# Patient Record
Sex: MALE | Race: WHITE | NOT HISPANIC OR LATINO | Employment: OTHER | URBAN - METROPOLITAN AREA
[De-identification: names, ages, dates, MRNs, and addresses within clinical notes are randomized per-mention and may not be internally consistent; named-entity substitution may affect disease eponyms.]

---

## 2017-02-04 ENCOUNTER — OFFICE VISIT (OUTPATIENT)
Dept: URGENT CARE | Facility: CLINIC | Age: 67
End: 2017-02-04
Payer: MEDICARE

## 2017-02-04 VITALS
OXYGEN SATURATION: 94 % | SYSTOLIC BLOOD PRESSURE: 110 MMHG | TEMPERATURE: 97.9 F | BODY MASS INDEX: 36.49 KG/M2 | DIASTOLIC BLOOD PRESSURE: 80 MMHG | WEIGHT: 226 LBS | HEART RATE: 129 BPM | RESPIRATION RATE: 16 BRPM

## 2017-02-04 DIAGNOSIS — M10.00 ACUTE IDIOPATHIC GOUT, UNSPECIFIED SITE: ICD-10-CM

## 2017-02-04 PROCEDURE — 4040F PNEUMOC VAC/ADMIN/RCVD: CPT | Performed by: PHYSICIAN ASSISTANT

## 2017-02-04 PROCEDURE — 1101F PT FALLS ASSESS-DOCD LE1/YR: CPT | Mod: 8P | Performed by: PHYSICIAN ASSISTANT

## 2017-02-04 PROCEDURE — 99214 OFFICE O/P EST MOD 30 MIN: CPT | Performed by: PHYSICIAN ASSISTANT

## 2017-02-04 PROCEDURE — G8484 FLU IMMUNIZE NO ADMIN: HCPCS | Performed by: PHYSICIAN ASSISTANT

## 2017-02-04 PROCEDURE — G8419 CALC BMI OUT NRM PARAM NOF/U: HCPCS | Performed by: PHYSICIAN ASSISTANT

## 2017-02-04 PROCEDURE — 3017F COLORECTAL CA SCREEN DOC REV: CPT | Mod: 8P | Performed by: PHYSICIAN ASSISTANT

## 2017-02-04 PROCEDURE — G8432 DEP SCR NOT DOC, RNG: HCPCS | Performed by: PHYSICIAN ASSISTANT

## 2017-02-04 PROCEDURE — 1036F TOBACCO NON-USER: CPT | Performed by: PHYSICIAN ASSISTANT

## 2017-02-04 RX ORDER — PREDNISONE 20 MG/1
40 TABLET ORAL DAILY
Qty: 10 TAB | Refills: 1 | Status: SHIPPED | OUTPATIENT
Start: 2017-02-04 | End: 2017-02-09

## 2017-02-04 RX ORDER — HYDROCODONE BITARTRATE AND ACETAMINOPHEN 10; 325 MG/1; MG/1
1-2 TABLET ORAL EVERY 6 HOURS PRN
Status: ON HOLD | COMMUNITY
End: 2017-09-22

## 2017-02-04 ASSESSMENT — ENCOUNTER SYMPTOMS
MUSCULOSKELETAL NEGATIVE: 1
NUMBNESS: 0
WEAKNESS: 0
CONSTITUTIONAL NEGATIVE: 1
NEUROLOGICAL NEGATIVE: 1

## 2017-02-04 NOTE — PROGRESS NOTES
Subjective:      Zhang Cheung is a 66 y.o. male who presents with Foot Pain            Other  This is a new problem. The current episode started in the past 7 days (L foot pn, redn/swell). The problem occurs constantly. The problem has been unchanged. Pertinent negatives include no numbness or weakness. The symptoms are aggravated by bending and walking. He has tried rest for the symptoms. The treatment provided no relief.       Review of Systems   Constitutional: Negative.    Musculoskeletal: Negative.    Skin: Negative.    Neurological: Negative.  Negative for weakness and numbness.          Objective:     /80 mmHg  Pulse 129  Temp(Src) 36.6 °C (97.9 °F)  Resp 16  Wt 102.513 kg (226 lb)  SpO2 94%     Physical Exam   Constitutional: He is oriented to person, place, and time. He appears well-developed and well-nourished. No distress.   Musculoskeletal: He exhibits edema and tenderness (L foot , 1st MTP redn/swell/tend/warm).   Neurological: He is alert and oriented to person, place, and time. No sensory deficit. He exhibits normal muscle tone. Coordination and gait normal.   Skin: Skin is warm and dry. There is erythema.   Psychiatric: He has a normal mood and affect. His behavior is normal. Judgment and thought content normal.   Nursing note and vitals reviewed.    Filed Vitals:    02/04/17 1042   BP: 110/80   Pulse: 129   Temp: 36.6 °C (97.9 °F)   Resp: 16   Weight: 102.513 kg (226 lb)   SpO2: 94%     Active Ambulatory Problems     Diagnosis Date Noted   • Type 2 DM 10/13/2013   • Hypertension 10/13/2013   • Dyslipidemia 10/13/2013   • Albuminuria manifested in a patient with type 2 DM 10/13/2013   • Degeneration of lumbar or lumbosacral intervertebral disc 02/19/2014   • Benign prostatic hyperplasia 03/27/2014     Resolved Ambulatory Problems     Diagnosis Date Noted   • No Resolved Ambulatory Problems     Past Medical History   Diagnosis Date   • Hyperlipidemia    • Unspecified urinary incontinence     • Diabetes    • Arthritis    • Pain 04/11/14   • CATARACT      Current Outpatient Prescriptions on File Prior to Visit   Medication Sig Dispense Refill   • meloxicam (MOBIC) 15 MG tablet Take 15 mg by mouth every day.     • tamsulosin (FLOMAX) 0.4 MG capsule Take 0.4 mg by mouth ONE-HALF HOUR AFTER BREAKFAST.     • Omega-3 Fatty Acids (FISH OIL TRIPLE STRENGTH) 1400 MG Cap Take 1 Cap by mouth every day.     • Multiple Vitamins-Minerals (MULTIVITAMIN PO) Take 1 Tab by mouth every day.     • aspirin EC (ECOTRIN) 81 MG Tablet Delayed Response Take 81 mg by mouth every day.     • tramadol (ULTRAM) 50 MG Tab Take  mg by mouth every 6 hours as needed.     • Blood Glucose Monitoring Suppl SUPPLIES MISC Using One Touch Ultra test strips. Test 3 times daily for insulin adjustment.  Dx. Code 250.02 300 Each 3   • Insulin Pen Needle (B-D ULTRAFINE III SHORT PEN) 31G X 8 MM MISC 1 Each by Does not apply route 3 times a day before meals. 300 Each 3   • Lancets MISC Using One Touch Ultra Soft Lancets. Testing 3 times daily for insulin adjustment.  DX. Code 250 02 300 Each 3   • gabapentin (NEURONTIN) 100 MG CAPS Take 100 mg by mouth 2 Times a Day.     • metformin (GLUCOPHAGE) 1000 MG tablet Take 1,000 mg by mouth 2 times a day, with meals.     • Insulin Aspart Prot & Aspart (NOVOLOG MIX 70/30 FLEXPEN SC) Inject 10 Units as instructed 3 times a day. 6-12 units before meals     • benazepril (LOTENSIN) 40 MG tablet Take 40 mg by mouth 2 Times a Day.     • indapamide (LOZOL) 2.5 MG TABS Take 2.5 mg by mouth every morning.     • Carvedilol Phosphate (COREG CR) 40 MG CP24 Take 40 mg by mouth every day.     • amlodipine (NORVASC) 5 MG TABS Take 5 mg by mouth 2 Times a Day.     • gemfibrozil (LOPID) 600 MG TABS Take 600 mg by mouth 2 times a day.       No current facility-administered medications on file prior to visit.     Gargles, Cepacol lozenges, Aleve/Advil as needed for throat pain  History reviewed. No pertinent family  history.  Pollen extract              Assessment/Plan:     ·  gout L foot      · pred rx;

## 2017-02-04 NOTE — MR AVS SNAPSHOT
Zhang Cheung   2017 10:30 AM   Office Visit   MRN: [id number]    Department:  [facility]   Dept Phone:  [phone]    Description:  Male : 1950   Provider:  Evin Bey PA-C           Reason for Visit     Foot Pain x 1 w, Lt. foot pain, swelling and redness      Allergies as of 2017     Allergen Noted Reactions    Pollen Extract 2014       Local pollen      You were diagnosed with     Acute idiopathic gout, unspecified site   [[id number]]         Vital Signs     Blood Pressure Pulse Temperature Respirations Weight Oxygen Saturation    110/80 mmHg 129 36.6 °C (97.9 °F) 16 102.513 kg (226 lb) 94%    Smoking Status                   Former Smoker           Basic Information     Date Of Birth Sex Race Ethnicity Preferred Language    1950 Male White Non- English      Problem List              ICD-10-CM Priority Class Noted - Resolved    Type 2 DM E11.9   10/13/2013 - Present    Hypertension I10   10/13/2013 - Present    Dyslipidemia E78.5   10/13/2013 - Present    Albuminuria manifested in a patient with type 2 DM R80.9   10/13/2013 - Present    Degeneration of lumbar or lumbosacral intervertebral disc M51.37   2014 - Present    Benign prostatic hyperplasia N40.0   3/27/2014 - Present      Health Maintenance        Date Due Completion Dates    COLONOSCOPY 1998 ---    IMM ZOSTER VACCINE 2008 ---    IMM PNEUMOCOCCAL 65+ (ADULT) LOW/MEDIUM RISK SERIES (1 of 2 - PCV13) 3/27/2013 3/27/2012    RETINAL SCREENING 2014, 2012    IMM INFLUENZA (1) 2013    A1C SCREENING 2017 10/31/2016, 2016, 2015, 2014, 3/18/2014, 10/16/2013, 2013, 2012, 2012, 2011    URINE ACR / MICROALBUMIN 2017, 2015, 2014, 2013, 3/18/2013, 2012, 2011, 2011    SERUM CREATININE 2017, 2016, 2015, 2015, 2015, 2014, 3/28/2014, 3/20/2014,  3/18/2014, 2/11/2014, 9/23/2013, 3/18/2013, 11/2/2012, 5/7/2012, 4/24/2012, 11/7/2011, 8/25/2011, 1/9/2008, 1/22/2007, 12/6/2006    FASTING LIPID PROFILE 10/31/2017 10/31/2016, 6/20/2016, 7/17/2015, 7/29/2014, 3/18/2014, 9/23/2013, 3/18/2013, 11/2/2012, 8/25/2011    IMM DTaP/Tdap/Td Vaccine (2 - Td) 6/8/2020 6/8/2010            Current Immunizations     Influenza TIV (IM) 11/27/2013    Pneumococcal polysaccharide vaccine (PPSV-23) 3/27/2012    Tdap Vaccine 6/8/2010      Below and/or attached are the medications your provider expects you to take. Review all of your home medications and newly ordered medications with your provider and/or pharmacist. Follow medication instructions as directed by your provider and/or pharmacist. Please keep your medication list with you and share with your provider. Update the information when medications are discontinued, doses are changed, or new medications (including over-the-counter products) are added; and carry medication information at all times in the event of emergency situations     Allergies:  POLLEN EXTRACT - (reactions not documented)               Medications  Valid as of: February 04, 2017 - 11:21 AM    Generic Name Brand Name Tablet Size Instructions for use    AmLODIPine Besylate (Tab) NORVASC 5 MG Take 5 mg by mouth 2 Times a Day.        Aspirin (Tablet Delayed Response) ECOTRIN 81 MG Take 81 mg by mouth every day.        Benazepril HCl (Tab) LOTENSIN 40 MG Take 40 mg by mouth 2 Times a Day.        Blood Glucose Monitoring Suppl (Misc) Blood Glucose Monitoring Suppl SUPPLIES Using One Touch Ultra test strips. Test 3 times daily for insulin adjustment.  Dx. Code 250.02        Carvedilol Phosphate (CAPSULE SR 24 HR) Carvedilol Phosphate 40 MG Take 40 mg by mouth every day.        Gabapentin (Cap) NEURONTIN 100 MG Take 100 mg by mouth 2 Times a Day.        Gemfibrozil (Tab) LOPID 600 MG Take 600 mg by mouth 2 times a day.        Hydrocodone-Acetaminophen (Tab) NORCO   MG Take 1-2 Tabs by mouth every 6 hours as needed.        Indapamide (Tab) LOZOL 2.5 MG Take 2.5 mg by mouth every morning.        Insulin Aspart Prot & Aspart   Inject 10 Units as instructed 3 times a day. 6-12 units before meals        Insulin Pen Needle (Misc) Insulin Pen Needle 31G X 8 MM 1 Each by Does not apply route 3 times a day before meals.        Lancets (Misc) Lancets  Using One Touch Ultra Soft Lancets. Testing 3 times daily for insulin adjustment.  DX. Code 250 02        Meloxicam (Tab) MOBIC 15 MG Take 15 mg by mouth every day.        MetFORMIN HCl (Tab) GLUCOPHAGE 1000 MG Take 1,000 mg by mouth 2 times a day, with meals.        Multiple Vitamins-Minerals   Take 1 Tab by mouth every day.        Omega-3 Fatty Acids (Cap) FISH OIL TRIPLE STRENGTH 1400 MG Take 1 Cap by mouth every day.        Tamsulosin HCl (Cap) FLOMAX 0.4 MG Take 0.4 mg by mouth ONE-HALF HOUR AFTER BREAKFAST.        TraMADol HCl (Tab) ULTRAM 50 MG Take  mg by mouth every 6 hours as needed.        .                 Medicines prescribed today were sent to:     [facility] #[id number] - [place] - [address]    [address] [place] [address]    Phone: [phone] Fax: [phone]    Open 24 Hours?: No    [facility] - [place], [place] - [address]    [address] [place] [address]    Phone: [phone] Fax: [phone]    Open 24 Hours?: No    [facility] - [place], [place] - [address] [address] [address] [facility] [address]    Phone: [phone] Fax: [phone]    Open 24 Hours?: No    [facility] - [place], [place] - [address] [place] [address]    Phone: [phone] Fax: [phone]    Open 24 Hours?: No      Medication refill instructions:       If your prescription bottle indicates you have medication refills left, it is not necessary to call your provider’s office. Please contact your pharmacy and they will refill your medication.    If your prescription bottle indicates you do not  have any refills left, you may request refills at any time through one of the following ways: The online Verimed system (except Urgent Care), by calling your provider’s office, or by asking your pharmacy to contact your provider’s office with a refill request. Medication refills are processed only during regular business hours and may not be available until the next business day. Your provider may request additional information or to have a follow-up visit with you prior to refilling your medication.   *Please Note: Medication refills are assigned a new Rx number when refilled electronically. Your pharmacy may indicate that no refills were authorized even though a new prescription for the same medication is available at the pharmacy. Please request the medicine by name with the pharmacy before contacting your provider for a refill.           Verimed Access Code: Activation code not generated  Current Verimed Status: Active

## 2017-02-08 ENCOUNTER — HOSPITAL ENCOUNTER (OUTPATIENT)
Dept: LAB | Facility: MEDICAL CENTER | Age: 67
End: 2017-02-08
Attending: PHYSICIAN ASSISTANT
Payer: MEDICARE

## 2017-02-08 LAB
BASOPHILS # BLD AUTO: 0.03 K/UL (ref 0–0.12)
BASOPHILS NFR BLD AUTO: 0.4 % (ref 0–1.8)
EOSINOPHIL # BLD: 0.01 K/UL (ref 0–0.51)
EOSINOPHIL NFR BLD AUTO: 0.1 % (ref 0–6.9)
ERYTHROCYTE [DISTWIDTH] IN BLOOD BY AUTOMATED COUNT: 47.5 FL (ref 35.9–50)
HCT VFR BLD AUTO: 47.2 % (ref 42–52)
HGB BLD-MCNC: 15.4 G/DL (ref 14–18)
IMM GRANULOCYTES # BLD AUTO: 0.04 K/UL (ref 0–0.11)
IMM GRANULOCYTES NFR BLD AUTO: 0.5 % (ref 0–0.9)
LYMPHOCYTES # BLD: 0.87 K/UL (ref 1–4.8)
LYMPHOCYTES NFR BLD AUTO: 11.5 % (ref 22–41)
MCH RBC QN AUTO: 31.6 PG (ref 27–33)
MCHC RBC AUTO-ENTMCNC: 32.6 G/DL (ref 33.7–35.3)
MCV RBC AUTO: 96.9 FL (ref 81.4–97.8)
MONOCYTES # BLD: 0.32 K/UL (ref 0–0.85)
MONOCYTES NFR BLD AUTO: 4.2 % (ref 0–13.4)
NEUTROPHILS # BLD: 6.31 K/UL (ref 1.82–7.42)
NEUTROPHILS NFR BLD AUTO: 83.3 % (ref 44–72)
NRBC # BLD AUTO: 0 K/UL
NRBC BLD-RTO: 0 /100 WBC
PLATELET # BLD AUTO: 251 K/UL (ref 164–446)
PMV BLD AUTO: 12.2 FL (ref 9–12.9)
RBC # BLD AUTO: 4.87 M/UL (ref 4.7–6.1)
URATE SERPL-MCNC: 8.3 MG/DL (ref 2.5–8.3)
WBC # BLD AUTO: 7.6 K/UL (ref 4.8–10.8)

## 2017-02-08 PROCEDURE — 85025 COMPLETE CBC W/AUTO DIFF WBC: CPT

## 2017-02-08 PROCEDURE — 36415 COLL VENOUS BLD VENIPUNCTURE: CPT

## 2017-02-08 PROCEDURE — 84550 ASSAY OF BLOOD/URIC ACID: CPT

## 2017-03-13 ENCOUNTER — HOSPITAL ENCOUNTER (OUTPATIENT)
Dept: RADIOLOGY | Facility: MEDICAL CENTER | Age: 67
DRG: 308 | End: 2017-03-13
Attending: NEUROLOGICAL SURGERY
Payer: MEDICARE

## 2017-03-13 ENCOUNTER — HOSPITAL ENCOUNTER (OUTPATIENT)
Dept: LAB | Facility: MEDICAL CENTER | Age: 67
DRG: 308 | End: 2017-03-13
Attending: UROLOGY
Payer: MEDICARE

## 2017-03-13 DIAGNOSIS — R52 PAIN: ICD-10-CM

## 2017-03-13 LAB — PSA SERPL DL<=0.01 NG/ML-MCNC: 6.56 NG/ML (ref 0–4)

## 2017-03-14 ENCOUNTER — APPOINTMENT (OUTPATIENT)
Dept: RADIOLOGY | Facility: MEDICAL CENTER | Age: 67
DRG: 308 | End: 2017-03-14
Attending: INTERNAL MEDICINE
Payer: MEDICARE

## 2017-03-14 ENCOUNTER — HOSPITAL ENCOUNTER (INPATIENT)
Facility: MEDICAL CENTER | Age: 67
LOS: 3 days | DRG: 308 | End: 2017-03-17
Attending: EMERGENCY MEDICINE | Admitting: HOSPITALIST
Payer: MEDICARE

## 2017-03-14 ENCOUNTER — APPOINTMENT (OUTPATIENT)
Dept: RADIOLOGY | Facility: MEDICAL CENTER | Age: 67
DRG: 308 | End: 2017-03-14
Attending: HOSPITALIST
Payer: MEDICARE

## 2017-03-14 ENCOUNTER — RESOLUTE PROFESSIONAL BILLING HOSPITAL PROF FEE (OUTPATIENT)
Dept: OTHER | Facility: MEDICAL CENTER | Age: 67
End: 2017-03-14
Payer: MEDICARE

## 2017-03-14 ENCOUNTER — APPOINTMENT (OUTPATIENT)
Dept: RADIOLOGY | Facility: MEDICAL CENTER | Age: 67
DRG: 308 | End: 2017-03-14
Attending: EMERGENCY MEDICINE
Payer: MEDICARE

## 2017-03-14 ENCOUNTER — OFFICE VISIT (OUTPATIENT)
Dept: URGENT CARE | Facility: CLINIC | Age: 67
End: 2017-03-14
Payer: MEDICARE

## 2017-03-14 VITALS
OXYGEN SATURATION: 84 % | HEART RATE: 60 BPM | SYSTOLIC BLOOD PRESSURE: 148 MMHG | DIASTOLIC BLOOD PRESSURE: 98 MMHG | BODY MASS INDEX: 36.32 KG/M2 | WEIGHT: 226 LBS | HEIGHT: 66 IN | TEMPERATURE: 98.4 F

## 2017-03-14 DIAGNOSIS — I48.92 ATRIAL FLUTTER WITH RAPID VENTRICULAR RESPONSE (HCC): ICD-10-CM

## 2017-03-14 DIAGNOSIS — R09.02 HYPOXIA: ICD-10-CM

## 2017-03-14 DIAGNOSIS — R41.0 CONFUSION: ICD-10-CM

## 2017-03-14 DIAGNOSIS — R06.09 DYSPNEA ON EXERTION: ICD-10-CM

## 2017-03-14 DIAGNOSIS — I48.92 ATRIAL FLUTTER, UNSPECIFIED TYPE (HCC): ICD-10-CM

## 2017-03-14 LAB
ALBUMIN SERPL BCP-MCNC: 3.7 G/DL (ref 3.2–4.9)
ALBUMIN/GLOB SERPL: 1.3 G/DL
ALP SERPL-CCNC: 84 U/L (ref 30–99)
ALT SERPL-CCNC: 7 U/L (ref 2–50)
ANION GAP SERPL CALC-SCNC: 15 MMOL/L (ref 0–11.9)
APTT PPP: 115.9 SEC (ref 24.7–36)
APTT PPP: 33.4 SEC (ref 24.7–36)
AST SERPL-CCNC: 20 U/L (ref 12–45)
BASOPHILS # BLD AUTO: 0.2 % (ref 0–1.8)
BASOPHILS # BLD: 0.02 K/UL (ref 0–0.12)
BILIRUB SERPL-MCNC: 0.6 MG/DL (ref 0.1–1.5)
BNP SERPL-MCNC: 207 PG/ML (ref 0–100)
BUN SERPL-MCNC: 47 MG/DL (ref 8–22)
CALCIUM SERPL-MCNC: 8.7 MG/DL (ref 8.5–10.5)
CHLORIDE SERPL-SCNC: 104 MMOL/L (ref 96–112)
CO2 SERPL-SCNC: 21 MMOL/L (ref 20–33)
CREAT SERPL-MCNC: 2.98 MG/DL (ref 0.5–1.4)
EKG IMPRESSION: NORMAL
EOSINOPHIL # BLD AUTO: 0.01 K/UL (ref 0–0.51)
EOSINOPHIL NFR BLD: 0.1 % (ref 0–6.9)
ERYTHROCYTE [DISTWIDTH] IN BLOOD BY AUTOMATED COUNT: 45.2 FL (ref 35.9–50)
GFR SERPL CREATININE-BSD FRML MDRD: 21 ML/MIN/1.73 M 2
GLOBULIN SER CALC-MCNC: 2.9 G/DL (ref 1.9–3.5)
GLUCOSE BLD-MCNC: 97 MG/DL (ref 65–99)
GLUCOSE SERPL-MCNC: 148 MG/DL (ref 65–99)
HCT VFR BLD AUTO: 31.6 % (ref 42–52)
HGB BLD-MCNC: 10.4 G/DL (ref 14–18)
IMM GRANULOCYTES # BLD AUTO: 0.03 K/UL (ref 0–0.11)
IMM GRANULOCYTES NFR BLD AUTO: 0.3 % (ref 0–0.9)
INR PPP: 1.12 (ref 0.87–1.13)
INR PPP: 1.23 (ref 0.87–1.13)
LACTATE BLD-SCNC: 1.5 MMOL/L (ref 0.5–2)
LV EJECT FRACT  99904: 75
LV EJECT FRACT MOD 2C 99903: 73.85
LV EJECT FRACT MOD 4C 99902: 77.97
LV EJECT FRACT MOD BP 99901: 75.48
LYMPHOCYTES # BLD AUTO: 1.06 K/UL (ref 1–4.8)
LYMPHOCYTES NFR BLD: 10.8 % (ref 22–41)
MCH RBC QN AUTO: 31 PG (ref 27–33)
MCHC RBC AUTO-ENTMCNC: 32.9 G/DL (ref 33.7–35.3)
MCV RBC AUTO: 94 FL (ref 81.4–97.8)
MONOCYTES # BLD AUTO: 0.7 K/UL (ref 0–0.85)
MONOCYTES NFR BLD AUTO: 7.2 % (ref 0–13.4)
NEUTROPHILS # BLD AUTO: 7.95 K/UL (ref 1.82–7.42)
NEUTROPHILS NFR BLD: 81.4 % (ref 44–72)
NRBC # BLD AUTO: 0 K/UL
NRBC BLD AUTO-RTO: 0 /100 WBC
PLATELET # BLD AUTO: 193 K/UL (ref 164–446)
PMV BLD AUTO: 11.2 FL (ref 9–12.9)
POTASSIUM SERPL-SCNC: 4.4 MMOL/L (ref 3.6–5.5)
PROT SERPL-MCNC: 6.6 G/DL (ref 6–8.2)
PROTHROMBIN TIME: 14.8 SEC (ref 12–14.6)
PROTHROMBIN TIME: 15.9 SEC (ref 12–14.6)
RBC # BLD AUTO: 3.36 M/UL (ref 4.7–6.1)
SODIUM SERPL-SCNC: 140 MMOL/L (ref 135–145)
TROPONIN I SERPL-MCNC: 0.03 NG/ML (ref 0–0.04)
WBC # BLD AUTO: 9.8 K/UL (ref 4.8–10.8)

## 2017-03-14 PROCEDURE — 93970 EXTREMITY STUDY: CPT

## 2017-03-14 PROCEDURE — A9270 NON-COVERED ITEM OR SERVICE: HCPCS | Performed by: HOSPITALIST

## 2017-03-14 PROCEDURE — 71010 DX-CHEST-PORTABLE (1 VIEW): CPT

## 2017-03-14 PROCEDURE — 304562 HCHG STAT O2 MASK/CANNULA

## 2017-03-14 PROCEDURE — 80053 COMPREHEN METABOLIC PANEL: CPT

## 2017-03-14 PROCEDURE — A9567 TECHNETIUM TC-99M AEROSOL: HCPCS

## 2017-03-14 PROCEDURE — 96361 HYDRATE IV INFUSION ADD-ON: CPT

## 2017-03-14 PROCEDURE — 85610 PROTHROMBIN TIME: CPT

## 2017-03-14 PROCEDURE — 1036F TOBACCO NON-USER: CPT | Performed by: PHYSICIAN ASSISTANT

## 2017-03-14 PROCEDURE — 36415 COLL VENOUS BLD VENIPUNCTURE: CPT

## 2017-03-14 PROCEDURE — 93306 TTE W/DOPPLER COMPLETE: CPT

## 2017-03-14 PROCEDURE — 99223 1ST HOSP IP/OBS HIGH 75: CPT | Mod: AI | Performed by: HOSPITALIST

## 2017-03-14 PROCEDURE — 82962 GLUCOSE BLOOD TEST: CPT

## 2017-03-14 PROCEDURE — 99291 CRITICAL CARE FIRST HOUR: CPT

## 2017-03-14 PROCEDURE — 96366 THER/PROPH/DIAG IV INF ADDON: CPT

## 2017-03-14 PROCEDURE — 81001 URINALYSIS AUTO W/SCOPE: CPT

## 2017-03-14 PROCEDURE — 87086 URINE CULTURE/COLONY COUNT: CPT

## 2017-03-14 PROCEDURE — G8482 FLU IMMUNIZE ORDER/ADMIN: HCPCS | Performed by: PHYSICIAN ASSISTANT

## 2017-03-14 PROCEDURE — 94760 N-INVAS EAR/PLS OXIMETRY 1: CPT

## 2017-03-14 PROCEDURE — 304561 HCHG STAT O2

## 2017-03-14 PROCEDURE — G8432 DEP SCR NOT DOC, RNG: HCPCS | Performed by: PHYSICIAN ASSISTANT

## 2017-03-14 PROCEDURE — 99214 OFFICE O/P EST MOD 30 MIN: CPT | Performed by: PHYSICIAN ASSISTANT

## 2017-03-14 PROCEDURE — 1101F PT FALLS ASSESS-DOCD LE1/YR: CPT | Mod: 8P | Performed by: PHYSICIAN ASSISTANT

## 2017-03-14 PROCEDURE — 700105 HCHG RX REV CODE 258: Performed by: EMERGENCY MEDICINE

## 2017-03-14 PROCEDURE — 85025 COMPLETE CBC W/AUTO DIFF WBC: CPT

## 2017-03-14 PROCEDURE — G8419 CALC BMI OUT NRM PARAM NOF/U: HCPCS | Performed by: PHYSICIAN ASSISTANT

## 2017-03-14 PROCEDURE — 83880 ASSAY OF NATRIURETIC PEPTIDE: CPT

## 2017-03-14 PROCEDURE — 96375 TX/PRO/DX INJ NEW DRUG ADDON: CPT

## 2017-03-14 PROCEDURE — 700102 HCHG RX REV CODE 250 W/ 637 OVERRIDE(OP): Performed by: HOSPITALIST

## 2017-03-14 PROCEDURE — 4040F PNEUMOC VAC/ADMIN/RCVD: CPT | Performed by: PHYSICIAN ASSISTANT

## 2017-03-14 PROCEDURE — 93306 TTE W/DOPPLER COMPLETE: CPT | Mod: 26 | Performed by: INTERNAL MEDICINE

## 2017-03-14 PROCEDURE — 93005 ELECTROCARDIOGRAM TRACING: CPT | Performed by: EMERGENCY MEDICINE

## 2017-03-14 PROCEDURE — 84484 ASSAY OF TROPONIN QUANT: CPT

## 2017-03-14 PROCEDURE — 302128 INFUSION PUMP: Performed by: EMERGENCY MEDICINE

## 2017-03-14 PROCEDURE — 87040 BLOOD CULTURE FOR BACTERIA: CPT

## 2017-03-14 PROCEDURE — 770022 HCHG ROOM/CARE - ICU (200)

## 2017-03-14 PROCEDURE — 99223 1ST HOSP IP/OBS HIGH 75: CPT | Performed by: INTERNAL MEDICINE

## 2017-03-14 PROCEDURE — 85730 THROMBOPLASTIN TIME PARTIAL: CPT

## 2017-03-14 PROCEDURE — 83605 ASSAY OF LACTIC ACID: CPT

## 2017-03-14 PROCEDURE — 700111 HCHG RX REV CODE 636 W/ 250 OVERRIDE (IP): Performed by: EMERGENCY MEDICINE

## 2017-03-14 PROCEDURE — 96365 THER/PROPH/DIAG IV INF INIT: CPT

## 2017-03-14 PROCEDURE — 76775 US EXAM ABDO BACK WALL LIM: CPT

## 2017-03-14 PROCEDURE — 700105 HCHG RX REV CODE 258: Performed by: INTERNAL MEDICINE

## 2017-03-14 PROCEDURE — 3017F COLORECTAL CA SCREEN DOC REV: CPT | Performed by: PHYSICIAN ASSISTANT

## 2017-03-14 RX ORDER — TRAMADOL HYDROCHLORIDE 50 MG/1
100 TABLET ORAL EVERY 6 HOURS PRN
Status: DISCONTINUED | OUTPATIENT
Start: 2017-03-14 | End: 2017-03-17 | Stop reason: HOSPADM

## 2017-03-14 RX ORDER — CARVEDILOL 12.5 MG/1
12.5 TABLET ORAL 2 TIMES DAILY WITH MEALS
Status: DISCONTINUED | OUTPATIENT
Start: 2017-03-15 | End: 2017-03-14

## 2017-03-14 RX ORDER — HEPARIN SODIUM 1000 [USP'U]/ML
3800 INJECTION, SOLUTION INTRAVENOUS; SUBCUTANEOUS PRN
Status: DISCONTINUED | OUTPATIENT
Start: 2017-03-14 | End: 2017-03-15

## 2017-03-14 RX ORDER — ACETAMINOPHEN 325 MG/1
650 TABLET ORAL EVERY 6 HOURS PRN
Status: DISCONTINUED | OUTPATIENT
Start: 2017-03-14 | End: 2017-03-17 | Stop reason: HOSPADM

## 2017-03-14 RX ORDER — SODIUM CHLORIDE 9 MG/ML
INJECTION, SOLUTION INTRAVENOUS CONTINUOUS
Status: DISCONTINUED | OUTPATIENT
Start: 2017-03-14 | End: 2017-03-17 | Stop reason: HOSPADM

## 2017-03-14 RX ORDER — TAMSULOSIN HYDROCHLORIDE 0.4 MG/1
0.4 CAPSULE ORAL
Status: DISCONTINUED | OUTPATIENT
Start: 2017-03-15 | End: 2017-03-17 | Stop reason: HOSPADM

## 2017-03-14 RX ORDER — TRAMADOL HYDROCHLORIDE 50 MG/1
50-100 TABLET ORAL EVERY 6 HOURS PRN
Status: DISCONTINUED | OUTPATIENT
Start: 2017-03-14 | End: 2017-03-14

## 2017-03-14 RX ORDER — ONDANSETRON 4 MG/1
4 TABLET, ORALLY DISINTEGRATING ORAL EVERY 4 HOURS PRN
Status: DISCONTINUED | OUTPATIENT
Start: 2017-03-14 | End: 2017-03-17 | Stop reason: HOSPADM

## 2017-03-14 RX ORDER — ROPINIROLE 1 MG/1
1 TABLET, FILM COATED ORAL NIGHTLY
Status: DISCONTINUED | OUTPATIENT
Start: 2017-03-14 | End: 2017-03-17 | Stop reason: HOSPADM

## 2017-03-14 RX ORDER — AMLODIPINE BESYLATE 5 MG/1
10 TABLET ORAL
Status: DISCONTINUED | OUTPATIENT
Start: 2017-03-15 | End: 2017-03-14

## 2017-03-14 RX ORDER — GEMFIBROZIL 600 MG/1
600 TABLET, FILM COATED ORAL 2 TIMES DAILY
Status: DISCONTINUED | OUTPATIENT
Start: 2017-03-14 | End: 2017-03-14

## 2017-03-14 RX ORDER — SODIUM CHLORIDE 9 MG/ML
1000 INJECTION, SOLUTION INTRAVENOUS ONCE
Status: COMPLETED | OUTPATIENT
Start: 2017-03-14 | End: 2017-03-14

## 2017-03-14 RX ORDER — BENAZEPRIL HYDROCHLORIDE 20 MG/1
40 TABLET ORAL 2 TIMES DAILY
Status: DISCONTINUED | OUTPATIENT
Start: 2017-03-14 | End: 2017-03-14

## 2017-03-14 RX ORDER — HYDROCODONE BITARTRATE AND ACETAMINOPHEN 10; 325 MG/1; MG/1
1 TABLET ORAL EVERY 6 HOURS PRN
Status: DISCONTINUED | OUTPATIENT
Start: 2017-03-14 | End: 2017-03-17 | Stop reason: HOSPADM

## 2017-03-14 RX ORDER — ONDANSETRON 2 MG/ML
4 INJECTION INTRAMUSCULAR; INTRAVENOUS EVERY 4 HOURS PRN
Status: DISCONTINUED | OUTPATIENT
Start: 2017-03-14 | End: 2017-03-17 | Stop reason: HOSPADM

## 2017-03-14 RX ORDER — WARFARIN SODIUM 5 MG/1
5 TABLET ORAL
Status: DISCONTINUED | OUTPATIENT
Start: 2017-03-14 | End: 2017-03-16

## 2017-03-14 RX ORDER — GABAPENTIN 100 MG/1
100 CAPSULE ORAL 2 TIMES DAILY
Status: DISCONTINUED | OUTPATIENT
Start: 2017-03-14 | End: 2017-03-14

## 2017-03-14 RX ORDER — CARVEDILOL PHOSPHATE 40 MG/1
40 CAPSULE, EXTENDED RELEASE ORAL DAILY
Status: DISCONTINUED | OUTPATIENT
Start: 2017-03-14 | End: 2017-03-14

## 2017-03-14 RX ORDER — INSULIN ASPART 100 [IU]/ML
8-10 INJECTION, SUSPENSION SUBCUTANEOUS
COMMUNITY
End: 2020-12-24 | Stop reason: SDUPTHER

## 2017-03-14 RX ORDER — GABAPENTIN 100 MG/1
900 CAPSULE ORAL 2 TIMES DAILY
Status: DISCONTINUED | OUTPATIENT
Start: 2017-03-14 | End: 2017-03-17 | Stop reason: HOSPADM

## 2017-03-14 RX ORDER — DEXTROSE MONOHYDRATE 25 G/50ML
25 INJECTION, SOLUTION INTRAVENOUS
Status: DISCONTINUED | OUTPATIENT
Start: 2017-03-14 | End: 2017-03-17 | Stop reason: HOSPADM

## 2017-03-14 RX ORDER — GABAPENTIN 300 MG/1
600 CAPSULE ORAL 2 TIMES DAILY
COMMUNITY
Start: 2016-01-01 | End: 2017-04-01

## 2017-03-14 RX ORDER — SODIUM CHLORIDE 9 MG/ML
INJECTION, SOLUTION INTRAVENOUS ONCE
Status: ACTIVE | OUTPATIENT
Start: 2017-03-14 | End: 2017-03-15

## 2017-03-14 RX ORDER — INDAPAMIDE 2.5 MG/1
2.5 TABLET ORAL EVERY MORNING
Status: DISCONTINUED | OUTPATIENT
Start: 2017-03-15 | End: 2017-03-14

## 2017-03-14 RX ORDER — ROPINIROLE 0.5 MG/1
.5-1 TABLET, FILM COATED ORAL
Status: ON HOLD | COMMUNITY
End: 2017-10-01

## 2017-03-14 RX ORDER — ROPINIROLE 2 MG/1
10 TABLET, FILM COATED ORAL NIGHTLY
Status: DISCONTINUED | OUTPATIENT
Start: 2017-03-14 | End: 2017-03-14

## 2017-03-14 RX ORDER — HEPARIN SODIUM 1000 [USP'U]/ML
7000 INJECTION, SOLUTION INTRAVENOUS; SUBCUTANEOUS ONCE
Status: COMPLETED | OUTPATIENT
Start: 2017-03-14 | End: 2017-03-14

## 2017-03-14 RX ORDER — CARVEDILOL 12.5 MG/1
12.5 TABLET ORAL 2 TIMES DAILY WITH MEALS
Status: DISCONTINUED | OUTPATIENT
Start: 2017-03-15 | End: 2017-03-17 | Stop reason: HOSPADM

## 2017-03-14 RX ORDER — AMLODIPINE BESYLATE 5 MG/1
10 TABLET ORAL 2 TIMES DAILY
Status: DISCONTINUED | OUTPATIENT
Start: 2017-03-14 | End: 2017-03-14

## 2017-03-14 RX ORDER — TRAMADOL HYDROCHLORIDE 50 MG/1
50 TABLET ORAL EVERY 6 HOURS PRN
Status: DISCONTINUED | OUTPATIENT
Start: 2017-03-14 | End: 2017-03-17 | Stop reason: HOSPADM

## 2017-03-14 RX ADMIN — GABAPENTIN 900 MG: 300 CAPSULE ORAL at 23:23

## 2017-03-14 RX ADMIN — SODIUM CHLORIDE 1000 ML: 9 INJECTION, SOLUTION INTRAVENOUS at 13:15

## 2017-03-14 RX ADMIN — TRAMADOL HYDROCHLORIDE 50 MG: 50 TABLET, COATED ORAL at 20:27

## 2017-03-14 RX ADMIN — HEPARIN SODIUM 25000 UNITS: 5000 INJECTION, SOLUTION INTRAVENOUS at 15:42

## 2017-03-14 RX ADMIN — HEPARIN SODIUM 7000 UNITS: 1000 INJECTION, SOLUTION INTRAVENOUS; SUBCUTANEOUS at 15:41

## 2017-03-14 RX ADMIN — ROPINIROLE HYDROCHLORIDE 1 MG: 1 TABLET, FILM COATED ORAL at 23:26

## 2017-03-14 RX ADMIN — SODIUM CHLORIDE 1000 ML: 9 INJECTION, SOLUTION INTRAVENOUS at 15:49

## 2017-03-14 RX ADMIN — WARFARIN SODIUM 5 MG: 5 TABLET ORAL at 23:24

## 2017-03-14 ASSESSMENT — ENCOUNTER SYMPTOMS
VOMITING: 0
COUGH: 1
CHILLS: 0
PALPITATIONS: 0
SHORTNESS OF BREATH: 1
SPUTUM PRODUCTION: 0
BLURRED VISION: 0
WHEEZING: 0
SENSORY CHANGE: 1
DIZZINESS: 0
TREMORS: 1
FEVER: 0
NAUSEA: 0

## 2017-03-14 ASSESSMENT — COPD QUESTIONNAIRES
DO YOU EVER COUGH UP ANY MUCUS OR PHLEGM?: NO/ONLY WITH OCCASIONAL COLDS OR INFECTIONS
DURING THE PAST 4 WEEKS HOW MUCH DID YOU FEEL SHORT OF BREATH: NONE/LITTLE OF THE TIME
COPD SCREENING SCORE: 2
HAVE YOU SMOKED AT LEAST 100 CIGARETTES IN YOUR ENTIRE LIFE: NO/DON'T KNOW

## 2017-03-14 ASSESSMENT — PAIN SCALES - GENERAL
PAINLEVEL_OUTOF10: 0

## 2017-03-14 ASSESSMENT — LIFESTYLE VARIABLES
DO YOU DRINK ALCOHOL: NO
EVER_SMOKED: YES

## 2017-03-14 NOTE — ED NOTES
Bib ivana, pt was at  for sob, pt states thinks he has pneumonia, recent hx of cold s/s last week, 70% on RA. 97% on  4 L O2 nc. Pt states +cough, denies asthma or copd.

## 2017-03-14 NOTE — MR AVS SNAPSHOT
"        Zhang Cheung   3/14/2017 10:15 AM   Office Visit   MRN: [id number]    Department:  [facility]   Dept Phone:  [phone]    Description:  Male : 1950   Provider:  Parvez Chiang PA-C           Reason for Visit     Shortness of Breath low oxygen,       Allergies as of 3/14/2017     Allergen Noted Reactions    Pollen Extract 2014       Local pollen      You were diagnosed with     Dyspnea on exertion   [312676]       Confusion   [[id number]]       Cough   [786.2.ICD-9-CM]         Vital Signs     Blood Pressure Pulse Temperature Height Weight Body Mass Index    148/98 mmHg 60 36.9 °C (98.4 °F) 1.676 m (5' 5.98\") 102.513 kg (226 lb) 36.49 kg/m2    Oxygen Saturation Smoking Status                84% Former Smoker          Basic Information     Date Of Birth Sex Race Ethnicity Preferred Language    1950 Male White Non- English      Problem List              ICD-10-CM Priority Class Noted - Resolved    Type 2 DM E11.9   10/13/2013 - Present    Hypertension I10   10/13/2013 - Present    Dyslipidemia E78.5   10/13/2013 - Present    Albuminuria manifested in a patient with type 2 DM R80.9   10/13/2013 - Present    Degeneration of lumbar or lumbosacral intervertebral disc M51.37   2014 - Present    Benign prostatic hyperplasia N40.0   3/27/2014 - Present      Health Maintenance        Date Due Completion Dates    COLONOSCOPY 1998 ---    IMM ZOSTER VACCINE 2008 ---    IMM PNEUMOCOCCAL 65+ (ADULT) LOW/MEDIUM RISK SERIES (1 of 2 - PCV13) 3/27/2013 3/27/2012    RETINAL SCREENING 2014, 2012    IMM INFLUENZA (1) 2013    A1C SCREENING 2017 10/31/2016, 2016, 2015, 2014, 3/18/2014, 10/16/2013, 2013, 2012, 2012, 2011    URINE ACR / MICROALBUMIN 2017, 2015, 2014, 2013, 3/18/2013, 2012, 2011, 2011    SERUM CREATININE 2017, 2016, 2015, " 8/12/2015, 7/17/2015, 4/11/2014, 3/28/2014, 3/20/2014, 3/18/2014, 2/11/2014, 9/23/2013, 3/18/2013, 11/2/2012, 5/7/2012, 4/24/2012, 11/7/2011, 8/25/2011, 1/9/2008, 1/22/2007, 12/6/2006    FASTING LIPID PROFILE 10/31/2017 10/31/2016, 6/20/2016, 7/17/2015, 7/29/2014, 3/18/2014, 9/23/2013, 3/18/2013, 11/2/2012, 8/25/2011    IMM DTaP/Tdap/Td Vaccine (2 - Td) 6/8/2020 6/8/2010            Current Immunizations     Influenza TIV (IM) 11/27/2013    Pneumococcal polysaccharide vaccine (PPSV-23) 3/27/2012    Tdap Vaccine 6/8/2010      Below and/or attached are the medications your provider expects you to take. Review all of your home medications and newly ordered medications with your provider and/or pharmacist. Follow medication instructions as directed by your provider and/or pharmacist. Please keep your medication list with you and share with your provider. Update the information when medications are discontinued, doses are changed, or new medications (including over-the-counter products) are added; and carry medication information at all times in the event of emergency situations     Allergies:  POLLEN EXTRACT - (reactions not documented)               Medications  Valid as of: March 14, 2017 - 11:11 AM    Generic Name Brand Name Tablet Size Instructions for use    AmLODIPine Besylate (Tab) NORVASC 5 MG Take 5 mg by mouth 2 Times a Day.        Aspirin (Tablet Delayed Response) ECOTRIN 81 MG Take 81 mg by mouth every day.        Benazepril HCl (Tab) LOTENSIN 40 MG Take 40 mg by mouth 2 Times a Day.        Blood Glucose Monitoring Suppl (Misc) Blood Glucose Monitoring Suppl SUPPLIES Using One Touch Ultra test strips. Test 3 times daily for insulin adjustment.  Dx. Code 250.02        Carvedilol Phosphate (CAPSULE SR 24 HR) Carvedilol Phosphate 40 MG Take 40 mg by mouth every day.        Gabapentin (Cap) NEURONTIN 100 MG Take 100 mg by mouth 2 Times a Day.        Gemfibrozil (Tab) LOPID 600 MG Take 600 mg by mouth 2 times a  day.        Hydrocodone-Acetaminophen (Tab) NORCO  MG Take 1-2 Tabs by mouth every 6 hours as needed.        Indapamide (Tab) LOZOL 2.5 MG Take 2.5 mg by mouth every morning.        Insulin Aspart Prot & Aspart   Inject 10 Units as instructed 3 times a day. 6-12 units before meals        Insulin Pen Needle (Misc) Insulin Pen Needle 31G X 8 MM 1 Each by Does not apply route 3 times a day before meals.        Lancets (Misc) Lancets  Using One Touch Ultra Soft Lancets. Testing 3 times daily for insulin adjustment.  DX. Code 250 02        Meloxicam (Tab) MOBIC 15 MG Take 15 mg by mouth every day.        MetFORMIN HCl (Tab) GLUCOPHAGE 1000 MG Take 1,000 mg by mouth 2 times a day, with meals.        Multiple Vitamins-Minerals   Take 1 Tab by mouth every day.        Omega-3 Fatty Acids (Cap) FISH OIL TRIPLE STRENGTH 1400 MG Take 1 Cap by mouth every day.        Tamsulosin HCl (Cap) FLOMAX 0.4 MG Take 0.4 mg by mouth ONE-HALF HOUR AFTER BREAKFAST.        TraMADol HCl (Tab) ULTRAM 50 MG Take  mg by mouth every 6 hours as needed.        .                 Medicines prescribed today were sent to:     [facility]Y #[id number] - [place] - [address]    [address] [place] [address]    Phone: [phone] Fax: [phone]    Open 24 Hours?: No    [facility] - [place], [place] - [address]    [address] [place] [address]    Phone: [phone] Fax: [phone]    Open 24 Hours?: No    [facility] - [place], [place] - [address] [address] [address] [facility] [address]    Phone: [phone] Fax: [phone]    Open 24 Hours?: No    [facility] - [place], [place] - [facility] [address]    [address] [place] [address]    Phone: [phone] Fax: [phone]    Open 24 Hours?: No      Medication refill instructions:       If your prescription bottle indicates you have medication refills left, it is not necessary to call your provider’s office. Please contact your pharmacy and they will refill your medication.      If your prescription bottle indicates you do not have any refills left, you may request refills at any time through one of the following ways: The online Rapid Mobile system (except Urgent Care), by calling your provider’s office, or by asking your pharmacy to contact your provider’s office with a refill request. Medication refills are processed only during regular business hours and may not be available until the next business day. Your provider may request additional information or to have a follow-up visit with you prior to refilling your medication.   *Please Note: Medication refills are assigned a new Rx number when refilled electronically. Your pharmacy may indicate that no refills were authorized even though a new prescription for the same medication is available at the pharmacy. Please request the medicine by name with the pharmacy before contacting your provider for a refill.           Rapid Mobile Access Code: Activation code not generated  Current Rapid Mobile Status: Active

## 2017-03-14 NOTE — ED PROVIDER NOTES
"ED Provider Note    Scribed for Desmond Allison M.D. by Gideon Conway. 3/14/2017  12:29 PM    Primary care provider: Tori Rai M.D.  Means of arrival: Ambulance  History obtained from: Patient  History limited by: None    CHIEF COMPLAINT  Chief Complaint   Patient presents with   • Shortness of Breath       HPI  Zhang Cheung is a 67 y.o. male who presents to the Emergency Department by ambulance complaining of constant shortness of breath that began 3/12/17 in the morning.  Patient reports waking up Sunday morning, 3/12/17, with severe back pain.  He has chronic back pain but reports this pain was more severe than normal and radiated to his buttock region and bilateral sides of his chest.  Patient describes the chest pain as \"sharp\" in character and has caused him to feel short of breath.  Per patient, the pain worsened this morning which prompted him to go to the urgent care last night.  From there he was advised to come to the ED for further evaluation.  Patient has had pneumonia in the past and reports his shortness of breath is similar to what he has felt in the past secondary to pneumonia.  He has had intermittent nausea since Sunday, 3/12/17, as well.  Patient denies any fevers, rhinorrhea, leg pain and swelling or any productive cough that usually presents itself with pneumonia.  Patient has no history of DVT or PE and denies any recent surgeries or cancer.        REVIEW OF SYSTEMS  Pertinent positives include lower back pain, nausea, bilateral chest wall pain, shortness of breath. Pertinent negatives include no fevers, rhinorrhea, productive cough, leg pain or swelling.  All other systems reviewed and negative.    PAST MEDICAL HISTORY   has a past medical history of Hyperlipidemia; Unspecified urinary incontinence; Hypertension (2014); Diabetes; Arthritis; Pain (04/11/14); and CATARACT.    SURGICAL HISTORY   has past surgical history that includes lumbar laminectomy diskectomy (5/2/2012); " lumbar laminectomy diskectomy (2/19/2014); trans urethral resection prostate (3/27/2014); other orthopedic surgery; other orthopedic surgery; other orthopedic surgery (03/12); other orthopedic surgery (02/14); lumbar laminectomy diskectomy (4/22/2014); and recovery (1/13/2016).    SOCIAL HISTORY  Social History   Substance Use Topics   • Smoking status: Former Smoker -- 0.75 packs/day for 10 years     Types: Cigarettes     Quit date: 04/24/2008   • Smokeless tobacco: Never Used   • Alcohol Use: No      History   Drug Use No       FAMILY HISTORY  History reviewed. No pertinent family history.    CURRENT MEDICATIONS  No current facility-administered medications on file prior to encounter.     Current Outpatient Prescriptions on File Prior to Encounter   Medication Sig Dispense Refill   • hydrocodone/acetaminophen (NORCO)  MG Tab Take 1-2 Tabs by mouth every 6 hours as needed for Severe Pain.     • tamsulosin (FLOMAX) 0.4 MG capsule Take 0.4 mg by mouth ONE-HALF HOUR AFTER BREAKFAST.     • Omega-3 Fatty Acids (FISH OIL TRIPLE STRENGTH) 1400 MG Cap Take 1 Cap by mouth every day.     • Multiple Vitamins-Minerals (MULTIVITAMIN PO) Take 1 Tab by mouth every day.     • aspirin EC (ECOTRIN) 81 MG Tablet Delayed Response Take 81 mg by mouth every day.     • tramadol (ULTRAM) 50 MG Tab Take  mg by mouth every 6 hours as needed for Mild Pain.     • metformin (GLUCOPHAGE) 1000 MG tablet Take 1,000 mg by mouth 2 times a day, with meals.     • benazepril (LOTENSIN) 40 MG tablet Take 40 mg by mouth 2 Times a Day.     • indapamide (LOZOL) 2.5 MG TABS Take 2.5 mg by mouth every morning.     • Carvedilol Phosphate (COREG CR) 40 MG CP24 Take 40 mg by mouth every day.     • amlodipine (NORVASC) 5 MG TABS Take 5 mg by mouth 2 Times a Day.     • gemfibrozil (LOPID) 600 MG TABS Take 600 mg by mouth 2 times a day.         ALLERGIES  Allergies   Allergen Reactions   • Pollen Extract      Local pollen       PHYSICAL EXAM  VITAL  "SIGNS: BP 93/52 mmHg  Pulse 126  Temp(Src) 37.4 °C (99.4 °F)  Resp 20  Ht 1.676 m (5' 5.98\")  Wt 120.203 kg (265 lb)  BMI 42.79 kg/m2  SpO2 95%    Constitutional: Well developed, Well nourished, mild distress, Non-toxic appearance.   HENT: Normocephalic, Atraumatic, Bilateral external ears normal, Oropharynx moist, No oral exudates.   Eyes: PERRLA, EOMI, Conjunctiva normal, No discharge.   Neck: No tenderness, Supple, No stridor.   Lymphatic: No lymphadenopathy noted.   Cardiovascular: Regular irregular tachycardic, Normal rhythm.   Thorax & Lungs: Clear to auscultation bilaterally, No respiratory distress, No wheezing, No crackles.   Abdomen: Soft, No tenderness, No masses, No pulsatile masses.   Skin: Warm, Dry, No erythema, No rash.   Extremities: Entrance lower extremity edema, No cyanosis.   Musculoskeletal: No tenderness to palpation or major deformities noted.  Intact distal pulses  Neurologic: Awake, alert. Moves all extremities spontaneously.  Psychiatric: Affect normal, Judgment normal, Mood normal.     LABS  Labs Reviewed   CBC WITH DIFFERENTIAL - Abnormal; Notable for the following:     RBC 3.36 (*)     Hemoglobin 10.4 (*)     Hematocrit 31.6 (*)     MCHC 32.9 (*)     Neutrophils-Polys 81.40 (*)     Lymphocytes 10.80 (*)     Neutrophils (Absolute) 7.95 (*)     All other components within normal limits   COMP METABOLIC PANEL - Abnormal; Notable for the following:     Anion Gap 15.0 (*)     Glucose 148 (*)     Bun 47 (*)     Creatinine 2.98 (*)     All other components within normal limits   ESTIMATED GFR - Abnormal; Notable for the following:     GFR If  25 (*)     GFR If Non  21 (*)     All other components within normal limits   BTYPE NATRIURETIC PEPTIDE - Abnormal; Notable for the following:     B Natriuretic Peptide 207 (*)     All other components within normal limits    Narrative:     Indicate which anticoagulants the patient is on:->UNKNOWN   PROTHROMBIN TIME " "- Abnormal; Notable for the following:     PT 14.8 (*)     All other components within normal limits    Narrative:     Indicate which anticoagulants the patient is on:->UNKNOWN   LACTIC ACID   BLOOD CULTURE    Narrative:     Per Hospital Policy: Only change Specimen Src: to \"Line\" if  specified by physician order.   BLOOD CULTURE    Narrative:     Per Hospital Policy: Only change Specimen Src: to \"Line\" if  specified by physician order.   APTT    Narrative:     Indicate which anticoagulants the patient is on:->UNKNOWN   TROPONIN   URINE CULTURE(NEW)   URINALYSIS   [facility]UCOSE   [facility] GLUCOSE   [facility] GLUCOSE     All labs reviewed by me.    EKG    EKG from Urgent Care 9:07PM last night:  12 lead EKG interpreted by me to show atrial flutter heart rate of 60 with 4 to 1 block.     EKG from ED 11:31AM:   12 lead EKG interpreted by me to show tachycardic sinus rhythm at a rate of 126 likely a-flutter. Normal p waves, Normal QRS, Flipped T waves in v3-v6 and one in AVL. Normal ST-T waves, Normal axis. Normal intervals.  Overall clinical impression: atrial flutter       RADIOLOGY  NM-LUNG VENT/PERF IMAGING   Final Result      Normal VQ scan.      LE VENOUS DUPLEX (Specify in Comments Left, Right Or Bilateral)   Preliminary Result      ECHOCARDIOGRAM COMP W/O CONT   Final Result      DX-CHEST-PORTABLE (1 VIEW)   Final Result      1.  Mild hypoinflation with minimal LEFT lung base atelectasis.   2.  No pneumonia or pulmonary edema.      ECHOCARDIOGRAM LTD W/O CONT    (Results Pending)     The radiologist's interpretation of all radiological studies have been reviewed by me.    COURSE & MEDICAL DECISION MAKING  Pertinent Labs & Imaging studies reviewed. (See chart for details)    I reviewed the patient's medical records which showed patient has a history of diabetes.  He was seen at Urgent Care for shortness of breath earlier today.  His EKG there showed a-flutter.     12:29 PM - Patient seen and " examined at bedside. Patient will be treated with 1000 unit porcine, 1000mL NS infusion. Ordered chest x-ray, lactate, estimated GFR, CBC, CMP, blood culture, urine culture, urinalysis, EKG to evaluate his symptoms. The differential diagnoses include but are not limited to: PE, DVT, CHF and pneumonia.     1:39 PM - I paged cardiologist.     1:44 PM- I paged pulmonary.     1:46 PM- I discussed the patient's case and the above findings with Dr. Lawson (cardiologist) who agrees to evaluate patient.     3:21 PM- I discussed the patient's case and the above findings with Dr. Diaz (hospitalist) who will see and admit the patient. Care is transferred at this time.    CRITICAL CARE  I provided critical care services, which included medication orders, frequent reevaluations of the patient's condition and response to treatment, ordering and reviewing test results, and discussing the case with various consultants.  The critical care time associated with the care of the patient was 35 minutes. Review chart for interventions. This time is exclusive of any other billable procedures.        DISPOSITION:  Patient will be admitted to Dr. Diaz (hospitalist)  in critical condition.    Decision Making:  Patient with tachycardia, hypotension, pleuritic chest pain, hypoxia, May leading differential diagnosis with a pulmonary embolism. Due to this I order an echocardiogram, started the patient on a heparin drip, consult pulmonary. Pulmonary ordered a VQ scan that was negative, cardiology has evaluated the patient and will manage the patient's atrial flutter with rapid ventricular response. The patient is critically ill and will be admitted to the hospital, by Dr. Diaz        FINAL IMPRESSION  1. Hypoxia    2. Atrial flutter with rapid ventricular response (CMS-HCC)          I, Gideon Conway (Kerrie), am scribing for, and in the presence of, Desmond Allison M.D..    Electronically signed by: Gideon Doshi),  3/14/2017    I, Desmond Allison M.D. personally performed the services described in this documentation, as scribed by Gideon Conway in my presence, and it is both accurate and complete.    The note accurately reflects work and decisions made by me.  Desmond Allison  3/14/2017  6:03 PM

## 2017-03-14 NOTE — IP AVS SNAPSHOT
3/17/2017          Zhang Cheung  [address] [place] [address]    Dear Zhang:    [facility] wants to ensure your discharge home is safe and you or your loved ones have had all your questions answered regarding your care after you leave the hospital.    You may receive a telephone call within two days of your discharge.  This call is to make certain you understand your discharge instructions as well as ensure we provided you with the best care possible during your stay with us.     The call will only last approximately 3-5 minutes and will be done by a nurse.    Once again, we want to ensure your discharge home is safe and that you have a clear understanding of any next steps in your care.  If you have any questions or concerns, please do not hesitate to contact us, we are here for you.  Thank you for choosing [facility] for your healthcare needs.    Sincerely,    Ash Montoya    [facility]

## 2017-03-14 NOTE — CONSULTS
"Reason of Consult: Bella    Consulting Physician: Dr. Allison, ERP    HPI:  This is a 67-year-old gentleman with a history of hypertension, insulin dependent diabetes mellitus and hyperlipidemia who presented to his primary care office for progressive shortness of breath beginning this morning. He was noted to be hypoxic to 70% on room air, but denies any chest pain palpitations lower extremity edema PND or orthopnea. His hypoxia corrected with 4 L nasal cannula oxygen. The day prior h had the sudden onset of mid-thoracic back pain worse with deep inspiration. This is now resolved. In the emergency department he is noted to be in atrial flutter, atypical, with a heart rate in the 120s to 130s. Is also noted to be in acute renal failure with no history of chronic kidney disease prior. Indicates that it feels like when he has had pneumonia in the past.    Denies any other cardiovascular symptoms including chest pain, lightheadedness, syncope or presyncope, lower extremity edema, PND, orthopnea or palpitations.      Past Medical History   Diagnosis Date   • Hyperlipidemia    • Unspecified urinary incontinence      \" enlarged prostate\"   • Hypertension 2014     well  controlled   • Diabetes      IDDM   • Arthritis      generalized   • Pain 04/11/14     back pain 2/14 -7/10; increases with activity   • CATARACT      early stages       Past Surgical History   Procedure Laterality Date   • Lumbar laminectomy diskectomy  5/2/2012     Performed by MAZIN WU at SURGERY [facility]   • Lumbar laminectomy diskectomy  2/19/2014     Performed by Mazin Wu M.D. at [facility]   • Trans urethral resection prostate  3/27/2014     Performed by Kyle Guzman M.D. at [facility]   • Other orthopedic surgery       left shoulder replacement   • Other orthopedic surgery       right knee replacement/manipulation   • Other orthopedic surgery  03/12     L3-L5 Laminectomy   • Other orthopedic " surgery  02/14     L2-L3 Laminectomy   • Lumbar laminectomy diskectomy  4/22/2014     Performed by Rogers Long M.D. at SURGERY [facility]   • Recovery  1/13/2016     Procedure: IR Deep bone biopsy L2-L3 with general anesthesia-DAYANA;  Surgeon: [facility];  Location: SURGERY PRE-POST PROC UNIT [facility];  Service:        No current facility-administered medications on file prior to encounter.     Current Outpatient Prescriptions on File Prior to Encounter   Medication Sig Dispense Refill   • hydrocodone/acetaminophen (NORCO)  MG Tab Take 1-2 Tabs by mouth every 6 hours as needed.     • meloxicam (MOBIC) 15 MG tablet Take 15 mg by mouth every day.     • tamsulosin (FLOMAX) 0.4 MG capsule Take 0.4 mg by mouth ONE-HALF HOUR AFTER BREAKFAST.     • Omega-3 Fatty Acids (FISH OIL TRIPLE STRENGTH) 1400 MG Cap Take 1 Cap by mouth every day.     • Multiple Vitamins-Minerals (MULTIVITAMIN PO) Take 1 Tab by mouth every day.     • aspirin EC (ECOTRIN) 81 MG Tablet Delayed Response Take 81 mg by mouth every day.     • tramadol (ULTRAM) 50 MG Tab Take  mg by mouth every 6 hours as needed.     • Blood Glucose Monitoring Suppl SUPPLIES MISC Using One Touch Ultra test strips. Test 3 times daily for insulin adjustment.  Dx. Code 250.02 300 Each 3   • Insulin Pen Needle (B-D ULTRAFINE III SHORT PEN) 31G X 8 MM MISC 1 Each by Does not apply route 3 times a day before meals. 300 Each 3   • Lancets MISC Using One Touch Ultra Soft Lancets. Testing 3 times daily for insulin adjustment.  DX. Code 250 02 300 Each 3   • gabapentin (NEURONTIN) 100 MG CAPS Take 100 mg by mouth 2 Times a Day.     • metformin (GLUCOPHAGE) 1000 MG tablet Take 1,000 mg by mouth 2 times a day, with meals.     • Insulin Aspart Prot & Aspart (NOVOLOG MIX 70/30 FLEXPEN SC) Inject 10 Units as instructed 3 times a day. 6-12 units before meals     • benazepril (LOTENSIN) 40 MG tablet Take 40 mg by mouth 2 Times a Day.     • indapamide (LOZOL) 2.5  "MG TABS Take 2.5 mg by mouth every morning.     • Carvedilol Phosphate (COREG CR) 40 MG CP24 Take 40 mg by mouth every day.     • amlodipine (NORVASC) 5 MG TABS Take 5 mg by mouth 2 Times a Day.     • gemfibrozil (LOPID) 600 MG TABS Take 600 mg by mouth 2 times a day.         Current Facility-Administered Medications   Medication Dose Frequency Provider Last Rate Last Dose   • heparin 1000 units/mL injection 7,000 Units  7,000 Units Once Desmond Allison M.D.        And   • heparin 1000 units/mL injection 3,800 Units  3,800 Units PRN Desmond Allison M.D.        And   • heparin infusion 25,000 units in 500 ml 0.45% nacl   Continuous Desmond Allison M.D.         Last reviewed on 3/14/2017 10:39 AM by Parvez Chiang PA-C    Allergies: Pollen extract    History reviewed. No pertinent family history.    ROS: As HPI other reviewed and negative     Physical Exam   Blood pressure 93/52, pulse 125, temperature 37.4 °C (99.4 °F), resp. rate 15, height 1.676 m (5' 5.98\"), weight 120.203 kg (265 lb), SpO2 98 %.    Constitutional: Obese. Appears well-developed.   HENT: Normocephalic and atraumatic. No scleral icterus.   Neck: No JVD present.   Cardiovascular: Tachy and regular. Exam reveals no gallop and no friction rub. No murmur heard.   Pulmonary/Chest: CTAB   Abdominal: S/NT/ND BS+   Musculoskeletal:  Pulses present. No atrophy. Strength normal.  Extremities: Exhibits no edema. No clubbing or cyanosis.   Skin: Skin is warm and dry.   Neuro: Non-focal, CN 2-12 intact grossly    No intake or output data in the 24 hours ending 03/14/17 1353    Recent Labs      03/14/17   1142   WBC  9.8   RBC  3.36*   HEMOGLOBIN  10.4*   HEMATOCRIT  31.6*   MCV  94.0   MCH  31.0   MCHC  32.9*   RDW  45.2   PLATELETCT  193   MPV  11.2     Recent Labs      03/14/17   1142   SODIUM  140   POTASSIUM  4.4   CHLORIDE  104   CO2  21   GLUCOSE  148*   BUN  47*   CREATININE  2.98*   CALCIUM  8.7     Recent Labs      03/14/17   1142   APTT  " 33.4   INR  1.12                   EKG (3/14/2017):  I have personally reviewed the EKG this visit and discussed with the patient. It shows atypical atrial flutter rate of 125 bpm.    CHEST x-ray 3/14/2017: Atelectasis but no consolidation or infiltrate. No pulmonary edema noted.    Imaging reviewed    Impressions:  1. Acute hypoxia, rule out pulmonary embolism  2. Acute renal failure  3. New onset atrial flutter  4. Hypertension  5. Insulin-dependent diabetes mellitus  6. Hyperlipidemia    Recommendations:  Echo  Heparin gtt  VQ  LE venous US  Lopressor as tolerated  Will need OAC for AFL +/- VTE    Thank you for this interesting consultation. It was my pleasure to see Zhang Cheung today.

## 2017-03-14 NOTE — IP AVS SNAPSHOT
" Home Care Instructions                                                                                                                  Name:Zhang Cheung  Medical Record Number:[id number]  CSN: [id number]    YOB: 1950   Age: 67 y.o.  Sex: male  HT:1.676 m (5' 6\") WT: 96.2 kg (212 lb 1.3 oz)          Admit Date: 3/14/2017     Discharge Date:   Today's Date: 3/17/2017  Attending Doctor:  Piper Reilly M.D.                  Allergies:  Pollen extract            Discharge Instructions       Discharge Instructions    Discharged to home by car with relative. Discharged via walking, hospital escort: Refused.  Special equipment needed: Not Applicable    Be sure to schedule a follow-up appointment with your primary care doctor or any specialists as instructed.     Discharge Plan:   Diet Plan: Discussed  Activity Level: Discussed  Confirmed Follow up Appointment: Patient to Call and Schedule Appointment  Confirmed Symptoms Management: Discussed  Medication Reconciliation Updated: Yes  Pneumococcal Vaccine Given - only chart on this line when given:  (patient refused previnar vaccine)  Influenza Vaccine Indication: Not indicated: Previously immunized this influenza season and > 8 years of age    I understand that a diet low in cholesterol, fat, and sodium is recommended for good health. Unless I have been given specific instructions below for another diet, I accept this instruction as my diet prescription.   Other diet: diabetic    Special Instructions: None    · Is patient discharged on Warfarin / Coumadin?   No     · Is patient Post Blood Transfusion?  No    Depression / Suicide Risk    As you are discharged from this [facility] facility, it is important to learn how to keep safe from harming yourself.    Recognize the warning signs:  · Abrupt changes in personality, positive or negative- including increase in energy   · Giving away possessions  · Change in eating patterns- significant weight changes-  positive " or negative  · Change in sleeping patterns- unable to sleep or sleeping all the time   · Unwillingness or inability to communicate  · Depression  · Unusual sadness, discouragement and loneliness  · Talk of wanting to die  · Neglect of personal appearance   · Rebelliousness- reckless behavior  · Withdrawal from people/activities they love  · Confusion- inability to concentrate     If you or a loved one observes any of these behaviors or has concerns about self-harm, here's what you can do:  · Talk about it- your feelings and reasons for harming yourself  · Remove any means that you might use to hurt yourself (examples: pills, rope, extension cords, firearm)  · Get professional help from the community (Mental Health, Substance Abuse, psychological counseling)  · Do not be alone:Call your Safe Contact- someone whom you trust who will be there for you.  · Call your local CRISIS HOTLINE 479-5296 or 939-599-6687  · Call your local Children's Mobile Crisis Response Team [place] (275) 541-9830 or www.[employer]  · Call the toll free National Suicide Prevention Hotlines   · National Suicide Prevention Lifeline 325-757-DIRD (6338)  · National Hope Line Network 800-SUICIDE (256-7745)        Your appointments     Mar 18, 2017  9:05 PM   Sleep Study Diagnostic with SLEEP TECH   [facility] (--)    [address]  [place] [id number] [phone]            Mar 22, 2017  1:40 PM   Follow UP with ALIYAH Lockett   [facility] (--)    [address]  [place] [id number] [phone]              Follow-up Information     1. Follow up with Tori Rai M.D. In 1 month.    Specialty:  Family Medicine    Why:  As needed    Contact information    [address]  [address]  [place] [address]  [phone]          2. Follow up with Zhang Torres M.D.. Schedule an appointment as soon as possible for a visit in 2 weeks.    Specialty:  Cardiac  Electrophysiology    Contact information    [address]  [address]  [place] [id number] [phone]          3. Follow up with Chin Goldsmith M.D.. Schedule an appointment as soon as possible for a visit in 2 weeks.    Specialty:  Interventional Cardiology    Contact information    [address]  [address]  [place] [address]  [phone]           Discharge Medication Instructions:    Below are the medications your physician expects you to take upon discharge:    Review all your home medications and newly ordered medications with your doctor and/or pharmacist. Follow medication instructions as directed by your doctor and/or pharmacist.    Please keep your medication list with you and share with your physician.               Medication List      START taking these medications        Instructions    * rivaroxaban 15 MG Tabs tablet   Last time this was given:  15 mg on 3/16/2017  3:26 PM   Commonly known as:  XARELTO    Take 1 Tab by mouth 2 Times a Day for 20 days.   Dose:  15 mg       * rivaroxaban 20 MG Tabs tablet   Start taking on:  4/5/2017   Last time this was given:  15 mg on 3/16/2017  3:26 PM   Commonly known as:  XARELTO    Take 1 Tab by mouth with dinner.   Dose:  20 mg       * Notice:  This list has 2 medication(s) that are the same as other medications prescribed for you. Read the directions carefully, and ask your doctor or other care provider to review them with you.      CONTINUE taking these medications        Instructions    amlodipine 5 MG Tabs   Commonly known as:  NORVASC    Take 5 mg by mouth 2 Times a Day.   Dose:  5 mg       benazepril 40 MG tablet   Commonly known as:  LOTENSIN    Take 40 mg by mouth 2 Times a Day.   Dose:  40 mg       COREG CR 40 MG Cp24   Generic drug:  Carvedilol Phosphate    Take 40 mg by mouth every day.   Dose:  40 mg       FISH OIL TRIPLE STRENGTH 1400 MG Caps    Take 1 Cap by mouth every day.   Dose:  1 Cap       gabapentin 300 MG Caps   Last time this was given:   900 mg on 3/16/2017  9:05 PM   Commonly known as:  NEURONTIN    Take 900 mg by mouth 2 Times a Day.   Dose:  900 mg       gemfibrozil 600 MG Tabs   Commonly known as:  LOPID    Take 600 mg by mouth 2 times a day.   Dose:  600 mg       hydrocodone/acetaminophen  MG Tabs   Last time this was given:  1 Tab on 3/17/2017  5:21 AM   Commonly known as:  NORCO    Take 1-2 Tabs by mouth every 6 hours as needed for Severe Pain.   Dose:  1-2 Tab       indapamide 2.5 MG Tabs   Commonly known as:  LOZOL    Take 2.5 mg by mouth every morning.   Dose:  2.5 mg       metformin 1000 MG tablet   Commonly known as:  GLUCOPHAGE    Take 1,000 mg by mouth 2 times a day, with meals.   Dose:  1000 mg       MULTIVITAMIN PO    Take 1 Tab by mouth every day.   Dose:  1 Tab       NOVOLOG MIX 70/30 (70-30) 100 UNIT/ML injection   Generic drug:  insulin aspart protamine-insulin aspart    Inject 5-22 Units as instructed 3 times a day before meals. 100 = 5 units 120= 7 units 184 = 9 units 200 = 12 units Pt can go as high as 22 units if needed   Dose:  5-22 Units       ropinirole 0.5 MG Tabs   Last time this was given:  1 mg on 3/16/2017  9:05 PM   Commonly known as:  REQUIP    Take 1 mg by mouth every bedtime.   Dose:  1 mg       tamsulosin 0.4 MG capsule   Last time this was given:  0.4 mg on 3/16/2017  8:27 AM   Commonly known as:  FLOMAX    Take 0.4 mg by mouth ONE-HALF HOUR AFTER BREAKFAST.   Dose:  0.4 mg       tramadol 50 MG Tabs   Last time this was given:  50 mg on 3/17/2017  3:16 AM   Commonly known as:  ULTRAM    Take  mg by mouth every 6 hours as needed for Mild Pain.   Dose:   mg         STOP taking these medications     aspirin EC 81 MG Tbec   Commonly known as:  ECOTRIN               Instructions           Diet / Nutrition:    Follow any diet instructions given to you by your doctor or the dietician, including how much salt (sodium) you are allowed each day.    If you are overweight, talk to your doctor about a  weight reduction plan.    Activity:    Remain physically active following your doctor's instructions about exercise and activity.    Rest often.     Any time you become even a little tired or short of breath, SIT DOWN and rest.    Worsening Symptoms:    Report any of the following signs and symptoms to the doctor's office immediately:    *Pain of jaw, arm, or neck  *Chest pain not relieved by medication                               *Dizziness or loss of consciousness  *Difficulty breathing even when at rest   *More tired than usual                                       *Bleeding drainage or swelling of surgical site  *Swelling of feet, ankles, legs or stomach                 *Fever (>100ºF)  *Pink or blood tinged sputum  *Weight gain (3lbs/day or 5lbs /week)           *Shock from internal defibrillator (if applicable)  *Palpitations or irregular heartbeats                *Cool and/or numb extremities    Stroke Awareness    Common Risk Factors for Stroke include:    Age  Atrial Fibrillation  Carotid Artery Stenosis  Diabetes Mellitus  Excessive alcohol consumption  High blood pressure  Overweight   Physical inactivity  Smoking    Warning signs and symptoms of a stroke include:    *Sudden numbness or weakness of the face, arm or leg (especially on one side of the body).  *Sudden confusion, trouble speaking or understanding.  *Sudden trouble seeing in one or both eyes.  *Sudden trouble walking, dizziness, loss of balance or coordination.Sudden severe headache with no known cause.    It is very important to get treatment quickly when a stroke occurs. If you experience any of the above warning signs, call 911 immediately.                   Disclaimer         Quit Smoking / Tobacco Use:    I understand the use of any tobacco products increases my chance of suffering from future heart disease or stroke and could cause other illnesses which may shorten my life. Quitting the use of tobacco products is the single most  important thing I can do to improve my health. For further information on smoking / tobacco cessation call a Toll Free Quit Line at 1-277.148.9806 (*National [employer] [place]) or 1-616.390.1270 (American Lung Association) or you can access the web based program at www.lungusa.org.    [place] Tobacco Users Help Line:  (799) 525-2147       Toll Free: 1-516.465.7154  Quit Tobacco Program [facility] (095)906-8751    Crisis Hotline:    [place] Crisis Hotline:  9-232-ZTFWETN or 1-560.550.1167    [place] Crisis Hotline:    1-436.768.2992 or 003-013-8995    Discharge Survey:   Thank you for choosing [facility]. We hope we did everything we could to make your hospital stay a pleasant one. You may be receiving a phone survey and we would appreciate your time and participation in answering the questions. Your input is very valuable to us in our efforts to improve our service to our patients and their families.        My signature on this form indicates that:    1. I have reviewed and understand the above information.  2. My questions regarding this information have been answered to my satisfaction.  3. I have formulated a plan with my discharge nurse to obtain my prescribed medications for home.                  Disclaimer         __________________________________                     __________       ________                       Patient Signature                                                 Date                    Time

## 2017-03-14 NOTE — IP AVS SNAPSHOT
" <p align=\"LEFT\"><IMG SRC=\"//EMRWB/blob$/[email or web address]\" alt=\"Image\" WIDTH=\"50%\" HEIGHT=\"200\" BORDER=\"\"></p>                   Name:Zhang Cheung  Medical Record Number:[id number]  CSN: [id number]    YOB: 1950   Age: 67 y.o.  Sex: male  HT:1.676 m (5' 6\") WT: 96.2 kg (212 lb 1.3 oz)          Admit Date: 3/14/2017     Discharge Date:   Today's Date: 3/17/2017  Attending Doctor:  Piper Reilly M.D.                  Allergies:  Pollen extract          Your appointments     Mar 18, 2017  9:05 PM   Sleep Study Diagnostic with SLEEP TECH   [facility] (--)    [address]  [place] [id number] [phone]            Mar 22, 2017  1:40 PM   Follow UP with ALIYAH Lockett   [facility] (--)    [address]  [place] [id number] [phone]              Follow-up Information     1. Follow up with Tori Rai M.D. In 1 month.    Specialty:  Family Medicine    Why:  As needed    Contact information    [address] [place] [address] [address]  [address]  [place] [address] [phone]          2. Follow up with Zhang Torres M.D.. Schedule an appointment as soon as possible for a visit in 2 weeks.    Specialty:  Cardiac Electrophysiology    Contact information    [address]  [address]  [place] [address] [phone]          3. Follow up with Chin Goldsmith M.D.. Schedule an appointment as soon as possible for a visit in 2 weeks.    Specialty:  Interventional Cardiology    Contact information    [address]  [address]  [place] [address] [phone]           Medication List      Take these Medications        Instructions    amlodipine 5 MG Tabs   Commonly known as:  NORVASC    Take 5 mg by mouth 2 Times a Day.   Dose:  5 mg       benazepril 40 MG tablet   Commonly known as:  LOTENSIN    Take 40 mg by mouth 2 Times a Day.   Dose:  40 mg       COREG CR 40 MG Cp24   Generic drug:  Carvedilol Phosphate    Take 40 mg by mouth every day.   Dose:  40 " mg       FISH OIL TRIPLE STRENGTH 1400 MG Caps    Take 1 Cap by mouth every day.   Dose:  1 Cap       gabapentin 300 MG Caps   Commonly known as:  NEURONTIN    Take 900 mg by mouth 2 Times a Day.   Dose:  900 mg       gemfibrozil 600 MG Tabs   Commonly known as:  LOPID    Take 600 mg by mouth 2 times a day.   Dose:  600 mg       hydrocodone/acetaminophen  MG Tabs   Commonly known as:  NORCO    Take 1-2 Tabs by mouth every 6 hours as needed for Severe Pain.   Dose:  1-2 Tab       indapamide 2.5 MG Tabs   Commonly known as:  LOZOL    Take 2.5 mg by mouth every morning.   Dose:  2.5 mg       metformin 1000 MG tablet   Commonly known as:  GLUCOPHAGE    Take 1,000 mg by mouth 2 times a day, with meals.   Dose:  1000 mg       MULTIVITAMIN PO    Take 1 Tab by mouth every day.   Dose:  1 Tab       NOVOLOG MIX 70/30 (70-30) 100 UNIT/ML injection   Generic drug:  insulin aspart protamine-insulin aspart    Inject 5-22 Units as instructed 3 times a day before meals. 100 = 5 units 120= 7 units 184 = 9 units 200 = 12 units Pt can go as high as 22 units if needed   Dose:  5-22 Units       * rivaroxaban 15 MG Tabs tablet   Commonly known as:  XARELTO    Take 1 Tab by mouth 2 Times a Day for 20 days.   Dose:  15 mg       * rivaroxaban 20 MG Tabs tablet   Start taking on:  4/5/2017   Commonly known as:  XARELTO    Take 1 Tab by mouth with dinner.   Dose:  20 mg       ropinirole 0.5 MG Tabs   Commonly known as:  REQUIP    Take 1 mg by mouth every bedtime.   Dose:  1 mg       tamsulosin 0.4 MG capsule   Commonly known as:  FLOMAX    Take 0.4 mg by mouth ONE-HALF HOUR AFTER BREAKFAST.   Dose:  0.4 mg       tramadol 50 MG Tabs   Commonly known as:  ULTRAM    Take  mg by mouth every 6 hours as needed for Mild Pain.   Dose:   mg       * Notice:  This list has 2 medication(s) that are the same as other medications prescribed for you. Read the directions carefully, and ask your doctor or other care provider to review them  with you.

## 2017-03-14 NOTE — PROGRESS NOTES
Subjective:      Zhang Cheung is a 67 y.o. male who presents with           Cough  Associated symptoms include shortness of breath. Pertinent negatives include no chest pain, chills, fever or wheezing.   noted this am w/ dyspnea, worsening w/ all exertion, sig shortness of breath, denies chest pain/palpitations or PMH of similar, notes PMH of pneumonia and is concerned w/ that this am, called his son due to inability to remember basic home information - felt confusion. Relates recent cough worsening over last few days. Notes PMH of abnormal ECG but denies any hx w/ arrythmia/cardiac events. He has been on medications for HTN and elevated cholesterol for many years. Pt is diabetic, notes he has not taken his medications for DM this am, he states glucose is usu in 150-200 range. Primary complaint this am is dramatic dyspnea w/ all exertion, needed to rest every 5 feet or so in home to catch his breath.      Review of Systems   Constitutional: Negative for fever and chills.   Eyes: Negative for blurred vision.   Respiratory: Positive for cough and shortness of breath. Negative for sputum production and wheezing.    Cardiovascular: Negative for chest pain, palpitations and leg swelling.   Gastrointestinal: Negative for nausea and vomiting.   Neurological: Positive for tremors and sensory change. Negative for dizziness.   Psychiatric/Behavioral:        Confusion     PMH:  has a past medical history of Hyperlipidemia; Unspecified urinary incontinence; Hypertension (2014); Diabetes; Arthritis; Pain (04/11/14); and CATARACT. He also has no past medical history of Dental disorder, Cold, or Anesthesia.  MEDS:   Current outpatient prescriptions:   •  hydrocodone/acetaminophen (NORCO)  MG Tab, Take 1-2 Tabs by mouth every 6 hours as needed., Disp: , Rfl:   •  meloxicam (MOBIC) 15 MG tablet, Take 15 mg by mouth every day., Disp: , Rfl:   •  tamsulosin (FLOMAX) 0.4 MG capsule, Take 0.4 mg by mouth ONE-HALF HOUR AFTER  BREAKFAST., Disp: , Rfl:   •  Omega-3 Fatty Acids (FISH OIL TRIPLE STRENGTH) 1400 MG Cap, Take 1 Cap by mouth every day., Disp: , Rfl:   •  Multiple Vitamins-Minerals (MULTIVITAMIN PO), Take 1 Tab by mouth every day., Disp: , Rfl:   •  aspirin EC (ECOTRIN) 81 MG Tablet Delayed Response, Take 81 mg by mouth every day., Disp: , Rfl:   •  tramadol (ULTRAM) 50 MG Tab, Take  mg by mouth every 6 hours as needed., Disp: , Rfl:   •  Blood Glucose Monitoring Suppl SUPPLIES MISC, Using One Touch Ultra test strips. Test 3 times daily for insulin adjustment.  Dx. Code 250.02, Disp: 300 Each, Rfl: 3  •  Insulin Pen Needle (B-D ULTRAFINE III SHORT PEN) 31G X 8 MM MISC, 1 Each by Does not apply route 3 times a day before meals., Disp: 300 Each, Rfl: 3  •  Lancets MISC, Using One Touch Ultra Soft Lancets. Testing 3 times daily for insulin adjustment.  DX. Code 250 02, Disp: 300 Each, Rfl: 3  •  gabapentin (NEURONTIN) 100 MG CAPS, Take 100 mg by mouth 2 Times a Day., Disp: , Rfl:   •  metformin (GLUCOPHAGE) 1000 MG tablet, Take 1,000 mg by mouth 2 times a day, with meals., Disp: , Rfl:   •  Insulin Aspart Prot & Aspart (NOVOLOG MIX 70/30 FLEXPEN SC), Inject 10 Units as instructed 3 times a day. 6-12 units before meals, Disp: , Rfl:   •  benazepril (LOTENSIN) 40 MG tablet, Take 40 mg by mouth 2 Times a Day., Disp: , Rfl:   •  indapamide (LOZOL) 2.5 MG TABS, Take 2.5 mg by mouth every morning., Disp: , Rfl:   •  Carvedilol Phosphate (COREG CR) 40 MG CP24, Take 40 mg by mouth every day., Disp: , Rfl:   •  amlodipine (NORVASC) 5 MG TABS, Take 5 mg by mouth 2 Times a Day., Disp: , Rfl:   •  gemfibrozil (LOPID) 600 MG TABS, Take 600 mg by mouth 2 times a day., Disp: , Rfl:   ALLERGIES:   Allergies   Allergen Reactions   • Pollen Extract      Local pollen     SURGHX:   Past Surgical History   Procedure Laterality Date   • Lumbar laminectomy diskectomy  5/2/2012     Performed by MAZIN WU at SURGERY [facility]   • Lumbar  "laminectomy diskectomy  2/19/2014     Performed by Rogers Long M.D. at SURGERY [facility]   • Trans urethral resection prostate  3/27/2014     Performed by Kyle Guzman M.D. at SURGERY [facility]   • Other orthopedic surgery       left shoulder replacement   • Other orthopedic surgery       right knee replacement/manipulation   • Other orthopedic surgery  03/12     L3-L5 Laminectomy   • Other orthopedic surgery  02/14     L2-L3 Laminectomy   • Lumbar laminectomy diskectomy  4/22/2014     Performed by Rogers Long M.D. at SURGERY [facility]   • Recovery  1/13/2016     Procedure: IR Deep bone biopsy L2-L3 with general anesthesia-DAYANA;  Surgeon: [facility];  Location: SURGERY PRE-POST PROC UNIT [facility];  Service:      SOCHX:   FH: Family history was reviewed, no pertinent findings to report    I have worn a mask for the entire encounter with this patient.        Objective:     /98 mmHg  Pulse 60  Temp(Src) 36.9 °C (98.4 °F)  Ht 1.676 m (5' 5.98\")  Wt 102.513 kg (226 lb)  BMI 36.49 kg/m2  SpO2 84%    Physical Exam   Constitutional: He is oriented to person, place, and time. He appears well-developed and well-nourished. No distress.   HENT:   Head: Normocephalic and atraumatic.   Right Ear: External ear normal.   Left Ear: External ear normal.   Nose: Nose normal.   Eyes: Conjunctivae are normal. Right eye exhibits no discharge. Left eye exhibits no discharge. No scleral icterus.   Neck: Neck supple.   Cardiovascular: Intact distal pulses.  An irregular rhythm present.  Extrasystoles are present. Bradycardia present.    Pulmonary/Chest: Effort normal. No respiratory distress. He has decreased breath sounds ( not decreased but slightly distant). He has no wheezes. He has no rhonchi. He has no rales.   Musculoskeletal: Normal range of motion.   Neurological: He is alert and oriented to person, place, and time. Coordination normal.   Skin: Skin is warm and dry. He is not " diaphoretic. No pallor.   Psychiatric: He has a normal mood and affect.   Nursing note and vitals reviewed.      POCT glucose - 188    EKG in the office reveals an irreg sinus rhythm with atrial flutter (3:1) atrial rate of 245, PVC's, and abnormal repol in an/lat/inf leads (abnormal repolarization is also on prior ECG from Jan 2017)    Pt is given #4 81mg asa, placed on 2L NC 02 and transported via [facility] to [facility] ED for further evaluation and care        Assessment/Plan:     1. Dyspnea on exertion  Patient has been directed to [facility] ER via [facility] for further management/work up - transported via [facility] to [facility]    2. Confusion      3. Cough

## 2017-03-15 LAB
ALBUMIN SERPL BCP-MCNC: 3.8 G/DL (ref 3.2–4.9)
ALBUMIN/GLOB SERPL: 1.2 G/DL
ALP SERPL-CCNC: 83 U/L (ref 30–99)
ALT SERPL-CCNC: <5 U/L (ref 2–50)
ANION GAP SERPL CALC-SCNC: 14 MMOL/L (ref 0–11.9)
APPEARANCE UR: CLEAR
APTT PPP: 56.8 SEC (ref 24.7–36)
APTT PPP: 56.9 SEC (ref 24.7–36)
APTT PPP: 98.8 SEC (ref 24.7–36)
AST SERPL-CCNC: 22 U/L (ref 12–45)
BACTERIA #/AREA URNS HPF: ABNORMAL /HPF
BASOPHILS # BLD AUTO: 0.5 % (ref 0–1.8)
BASOPHILS # BLD: 0.04 K/UL (ref 0–0.12)
BILIRUB SERPL-MCNC: 0.6 MG/DL (ref 0.1–1.5)
BILIRUB UR QL STRIP.AUTO: NEGATIVE
BUN SERPL-MCNC: 46 MG/DL (ref 8–22)
CALCIUM SERPL-MCNC: 8.9 MG/DL (ref 8.5–10.5)
CHLORIDE SERPL-SCNC: 104 MMOL/L (ref 96–112)
CO2 SERPL-SCNC: 22 MMOL/L (ref 20–33)
COLOR UR: COLORLESS
CREAT SERPL-MCNC: 2.04 MG/DL (ref 0.5–1.4)
EKG IMPRESSION: NORMAL
EOSINOPHIL # BLD AUTO: 0.09 K/UL (ref 0–0.51)
EOSINOPHIL NFR BLD: 1.1 % (ref 0–6.9)
EPI CELLS #/AREA URNS HPF: ABNORMAL /HPF
ERYTHROCYTE [DISTWIDTH] IN BLOOD BY AUTOMATED COUNT: 45.1 FL (ref 35.9–50)
GFR SERPL CREATININE-BSD FRML MDRD: 33 ML/MIN/1.73 M 2
GLOBULIN SER CALC-MCNC: 3.2 G/DL (ref 1.9–3.5)
GLUCOSE BLD-MCNC: 117 MG/DL (ref 65–99)
GLUCOSE BLD-MCNC: 119 MG/DL (ref 65–99)
GLUCOSE BLD-MCNC: 141 MG/DL (ref 65–99)
GLUCOSE BLD-MCNC: 99 MG/DL (ref 65–99)
GLUCOSE SERPL-MCNC: 112 MG/DL (ref 65–99)
GLUCOSE UR STRIP.AUTO-MCNC: NEGATIVE MG/DL
HCT VFR BLD AUTO: 32.5 % (ref 42–52)
HGB BLD-MCNC: 10.5 G/DL (ref 14–18)
IMM GRANULOCYTES # BLD AUTO: 0.02 K/UL (ref 0–0.11)
IMM GRANULOCYTES NFR BLD AUTO: 0.2 % (ref 0–0.9)
INR PPP: 1.21 (ref 0.87–1.13)
KETONES UR STRIP.AUTO-MCNC: NEGATIVE MG/DL
LEUKOCYTE ESTERASE UR QL STRIP.AUTO: NEGATIVE
LV EJECT FRACT  99904: 65
LYMPHOCYTES # BLD AUTO: 1.12 K/UL (ref 1–4.8)
LYMPHOCYTES NFR BLD: 13.1 % (ref 22–41)
MCH RBC QN AUTO: 30.7 PG (ref 27–33)
MCHC RBC AUTO-ENTMCNC: 32.3 G/DL (ref 33.7–35.3)
MCV RBC AUTO: 95 FL (ref 81.4–97.8)
MICRO URNS: ABNORMAL
MONOCYTES # BLD AUTO: 0.75 K/UL (ref 0–0.85)
MONOCYTES NFR BLD AUTO: 8.8 % (ref 0–13.4)
NEUTROPHILS # BLD AUTO: 6.53 K/UL (ref 1.82–7.42)
NEUTROPHILS NFR BLD: 76.3 % (ref 44–72)
NITRITE UR QL STRIP.AUTO: NEGATIVE
NRBC # BLD AUTO: 0 K/UL
NRBC BLD AUTO-RTO: 0 /100 WBC
PH UR STRIP.AUTO: 5.5 [PH]
PLATELET # BLD AUTO: 174 K/UL (ref 164–446)
PMV BLD AUTO: 11.7 FL (ref 9–12.9)
POTASSIUM SERPL-SCNC: 3.8 MMOL/L (ref 3.6–5.5)
PROT SERPL-MCNC: 7 G/DL (ref 6–8.2)
PROT UR QL STRIP: 30 MG/DL
PROTHROMBIN TIME: 15.7 SEC (ref 12–14.6)
RBC # BLD AUTO: 3.42 M/UL (ref 4.7–6.1)
RBC # URNS HPF: ABNORMAL /HPF
RBC UR QL AUTO: NEGATIVE
SODIUM SERPL-SCNC: 140 MMOL/L (ref 135–145)
SP GR UR STRIP.AUTO: 1.01
TROPONIN I SERPL-MCNC: 0.02 NG/ML (ref 0–0.04)
WBC # BLD AUTO: 8.6 K/UL (ref 4.8–10.8)
WBC #/AREA URNS HPF: ABNORMAL /HPF

## 2017-03-15 PROCEDURE — 700111 HCHG RX REV CODE 636 W/ 250 OVERRIDE (IP): Performed by: HOSPITALIST

## 2017-03-15 PROCEDURE — 82962 GLUCOSE BLOOD TEST: CPT | Mod: 91

## 2017-03-15 PROCEDURE — 85610 PROTHROMBIN TIME: CPT

## 2017-03-15 PROCEDURE — 93010 ELECTROCARDIOGRAM REPORT: CPT | Performed by: INTERNAL MEDICINE

## 2017-03-15 PROCEDURE — 93325 DOPPLER ECHO COLOR FLOW MAPG: CPT

## 2017-03-15 PROCEDURE — 700102 HCHG RX REV CODE 250 W/ 637 OVERRIDE(OP): Performed by: HOSPITALIST

## 2017-03-15 PROCEDURE — 93312 ECHO TRANSESOPHAGEAL: CPT

## 2017-03-15 PROCEDURE — B246ZZ4 ULTRASONOGRAPHY OF RIGHT AND LEFT HEART, TRANSESOPHAGEAL: ICD-10-PCS | Performed by: INTERNAL MEDICINE

## 2017-03-15 PROCEDURE — 99291 CRITICAL CARE FIRST HOUR: CPT | Performed by: INTERNAL MEDICINE

## 2017-03-15 PROCEDURE — 5A2204Z RESTORATION OF CARDIAC RHYTHM, SINGLE: ICD-10-PCS | Performed by: INTERNAL MEDICINE

## 2017-03-15 PROCEDURE — 85730 THROMBOPLASTIN TIME PARTIAL: CPT

## 2017-03-15 PROCEDURE — 770022 HCHG ROOM/CARE - ICU (200)

## 2017-03-15 PROCEDURE — 93321 DOPPLER ECHO F-UP/LMTD STD: CPT

## 2017-03-15 PROCEDURE — 80053 COMPREHEN METABOLIC PANEL: CPT

## 2017-03-15 PROCEDURE — 96366 THER/PROPH/DIAG IV INF ADDON: CPT

## 2017-03-15 PROCEDURE — 92960 CARDIOVERSION ELECTRIC EXT: CPT

## 2017-03-15 PROCEDURE — 85025 COMPLETE CBC W/AUTO DIFF WBC: CPT

## 2017-03-15 PROCEDURE — 84484 ASSAY OF TROPONIN QUANT: CPT

## 2017-03-15 PROCEDURE — 99232 SBSQ HOSP IP/OBS MODERATE 35: CPT | Performed by: HOSPITALIST

## 2017-03-15 PROCEDURE — 93005 ELECTROCARDIOGRAM TRACING: CPT | Performed by: INTERNAL MEDICINE

## 2017-03-15 PROCEDURE — A9270 NON-COVERED ITEM OR SERVICE: HCPCS | Performed by: HOSPITALIST

## 2017-03-15 RX ORDER — HEPARIN SODIUM 1000 [USP'U]/ML
3200 INJECTION, SOLUTION INTRAVENOUS; SUBCUTANEOUS PRN
Status: DISCONTINUED | OUTPATIENT
Start: 2017-03-15 | End: 2017-03-16

## 2017-03-15 RX ADMIN — GABAPENTIN 900 MG: 300 CAPSULE ORAL at 20:25

## 2017-03-15 RX ADMIN — CARVEDILOL 12.5 MG: 6.25 TABLET, FILM COATED ORAL at 17:51

## 2017-03-15 RX ADMIN — GABAPENTIN 900 MG: 300 CAPSULE ORAL at 09:33

## 2017-03-15 RX ADMIN — WARFARIN SODIUM 5 MG: 5 TABLET ORAL at 17:51

## 2017-03-15 RX ADMIN — TRAMADOL HYDROCHLORIDE 50 MG: 50 TABLET, COATED ORAL at 09:33

## 2017-03-15 RX ADMIN — CARVEDILOL 12.5 MG: 6.25 TABLET, FILM COATED ORAL at 09:33

## 2017-03-15 RX ADMIN — HEPARIN SODIUM 1250 UNITS/HR: 5000 INJECTION, SOLUTION INTRAVENOUS at 10:19

## 2017-03-15 RX ADMIN — TRAMADOL HYDROCHLORIDE 50 MG: 50 TABLET, COATED ORAL at 17:51

## 2017-03-15 RX ADMIN — TAMSULOSIN HYDROCHLORIDE 0.4 MG: 0.4 CAPSULE ORAL at 09:33

## 2017-03-15 RX ADMIN — ROPINIROLE HYDROCHLORIDE 1 MG: 1 TABLET, FILM COATED ORAL at 20:25

## 2017-03-15 RX ADMIN — HYDROCODONE BITARTRATE AND ACETAMINOPHEN 1 TABLET: 10; 325 TABLET ORAL at 03:13

## 2017-03-15 ASSESSMENT — ENCOUNTER SYMPTOMS
PALPITATIONS: 0
DEPRESSION: 0
SHORTNESS OF BREATH: 0
SENSORY CHANGE: 0
DIZZINESS: 0
NERVOUS/ANXIOUS: 0
BACK PAIN: 0
FEVER: 0
COUGH: 0
DIARRHEA: 0
NAUSEA: 0
EYE DISCHARGE: 0
ABDOMINAL PAIN: 0
SPEECH CHANGE: 0
HEADACHES: 0
WEAKNESS: 0
SORE THROAT: 0

## 2017-03-15 ASSESSMENT — PAIN SCALES - GENERAL
PAINLEVEL_OUTOF10: 5
PAINLEVEL_OUTOF10: 1
PAINLEVEL_OUTOF10: 2
PAINLEVEL_OUTOF10: 2
PAINLEVEL_OUTOF10: 1
PAINLEVEL_OUTOF10: 4
PAINLEVEL_OUTOF10: 0
PAINLEVEL_OUTOF10: 5
PAINLEVEL_OUTOF10: 7

## 2017-03-15 ASSESSMENT — LIFESTYLE VARIABLES: DO YOU DRINK ALCOHOL: NO

## 2017-03-15 NOTE — ED NOTES
Patient sleeping on gurney. No behavioral pain indicators noted. No restlessness/agitation noted. Call bell within reach.

## 2017-03-15 NOTE — CONSULTS
DATE OF SERVICE:  03/14/2017    REQUESTING PHYSICIAN:  Dr. Desmond Allison.    REASON FOR CONSULTATION:  Acute hypoxemic respiratory failure.    HISTORY OF PRESENT ILLNESS:  The patient is a 67-year-old male with a past   medical history significant for diabetes, hypertension, hyperlipidemia and   chronic back pain, but no previous lung disease who went to the urgent care   today after developing shortness of breath that started last night.  A week   ago, he had upper respiratory symptoms, which seemed to resolve, but continues   to have mild postnasal drip.  The shortness of breath was associated with a   nonproductive cough, but no chest pain, syncope, palpitations, fevers, chills,   recent travel or sick contacts.  At the urgent care, patient underwent EKG,   which per the provider was concerning for atrial flutter, PVCs and abnormal   repolarization, was given 325 mg of aspirin, placed on 2 liters of oxygen   after found to have oxygen saturation of 70% on room air and a transported via   [facility] to [facility] for higher level of care.  Here, patient was found to be   tachycardic and hypotensive with acute kidney injury and relatively normal   chest x-ray and the concern was high enough that he was started on a heparin   drip for probable pulmonary embolism.  We were consulted for assistance in his   management and for consideration of TPA.  Per the patient, he has never had   any history of blood clots personally or in his family and has not had any leg   pain, swelling, trauma, surgeries, or travel.  He states he has been   compliant with his medical management, has a primary care doctor and was just   seen by Dr. Guzman yesterday where he underwent a PSA level that was mildly   elevated at 6.6, but per patient, it is close to his baseline with his BPH.    PAST MEDICAL HISTORY:  1.  Diabetes.  2.  Benign prostatic hypertrophy.  3.  Hyperlipidemia.  4.  Hypertension.  5.  Arthritis with chronic joint and lower back  pain with associated   radiculopathy.    PAST SURGICAL HISTORY:  Includes left shoulder surgery, knee surgeries, lumbar   surgery, TURP.    ALLERGIES:  TO POLLEN.    OUTPATIENT MEDICATIONS:  Include Norco, meloxicam, Flomax, omega-3 fatty   acids, multivitamin, aspirin, tramadol, insulin, gabapentin, metformin,   benazepril, indapamide, Coreg, amlodipine, gemfibrozil.    SOCIAL HISTORY:  Patient is recently , has a history of tobacco abuse   for approximately 30 years, quitting 10 years ago.  Does not drink alcohol or   use illicit drugs.  Lives here in [place].  He is a full code.    FAMILY HISTORY:  Remarkable for congestive heart failure and arthritis.    REVIEW OF SYSTEMS:  Please see history present illness, otherwise negative   review of systems.    PHYSICAL EXAMINATION:  VITAL SIGNS:  Temperature 37.4 degrees Celsius, heart rate 125, respiratory   rate of 18 with an oxygen saturation 98% on 4 liters nasal cannula, and blood   pressure of 83/56.  GENERAL:  Obese, pleasant male in no acute distress.  HEENT:  Normocephalic, atraumatic.  Pupils are equal, round and reactive to   light without scleral icterus or conjunctival pallor.  He has moist oral   mucosal membranes without oropharyngeal erythema or swelling.  NECK:  Supple.  Trachea is midline.  There is no stridor.  No jugular venous   distention.  CARDIOVASCULAR:  Tachycardic with a systolic murmur in the left sternal   border, laterally displaced PMI.  Radial pulses are 2+ and symmetric with   brisk capillary refill.  PULMONARY:  Clear to auscultation bilaterally without wheezing, rhonchi, or   rales.  Breathing comfortably and speaking in full sentences without accessory   muscle use.  Normal AP diameter of the chest.  ABDOMEN:  Obese, but soft, nontender and nondistended.  Positive bowel sounds.  GENITOURINARY:  Deferred.  EXTREMITIES:  Minimal enlargement of the left lower extremity greater than the   right rather it is chronic for the patient.  No  calf tenderness, palpable   cord and negative Homans sign.  NEUROLOGIC:  Alert and oriented x4.  Cranial nerves II-XII are intact.  No   focal deficits.  SKIN:  Warm, pink and dry without lesions or rash. Brisk capillary   refill.    LABORATORY DATA:  CBC with a white count of 10,000, hemoglobin of 10,   platelets of 193 with minimal left shift on the differential.  Complete   metabolic panel is remarkable for a BUN of 47, creatinine of 3, a glucose of   148 and the anion gap of 15.  Lactic acid 1.5.  LFTs within normal limits.    Brain natriuretic peptide of ____.  Coags within normal limits.    IMAGIN.  Chest x-ray showing enlarged cardiac silhouette without acute   cardiopulmonary process.  2.  EKG showing sinus tachycardia at a rate of 126 with lateral ST depression   and evidence of minimal right heart strain.    ASSESSMENT:  1.  Acute hypoxemic respiratory failure.  2.  Tachycardia and hypotensive, not yet responsive to fluids, concerning for   possible obstructive shock/pulmonary embolism.  3.  Recent upper respiratory infection, resolved.  4.  Acute kidney injury.  5.  Anion gap metabolic acidosis.  6.  Elevated glucose with history of diabetes.  7.  Hyperlipidemia.  8.  Tobacco abuse without bronchospasm.  9.  Anemia of chronic disease.  10.  Elevated PSA, followed by urology (Dr. Guzman).  11.  Full code.    PLAN:  At this time, it is important to figure out whether patient truly has a   pulmonary embolism or not.  Per verbal report, the bilateral lower extremity   Dopplers are negative for deep venous thrombosis and given his acute kidney   injury, we are unable to perform a CT PE study.  However, at this time, we   will perform a VQ scan and should it show high probability of pulmonary   embolism, then recommend half dose thrombolytics in the setting of persistent   tachycardia, shock, and signs of right heart strain.  In the meantime, I agree   with empiric heparinization, IV fluid resuscitation,  and supplemental oxygen   therapy.  At this time, no evidence of infection on exam or laboratory data,   we will thus hold off on antibiotics.  Should he have evidence of a high   probability for pulmonary embolism on VQ scan and receives half dose tissue   plasminogen activator then he definitely needs to be admitted to the intensive   care unit for 24 hours.  Otherwise, may be suitable for telemetry assuming   that he can maintain adequate blood pressure after fluid resuscitation.  An   echocardiogram has been ordered as well and we are awaiting the results of   that.    We appreciate the opportunity to assist in his care and we will continue to   follow along closely with you.       ____________________________________     Jeremy Gonda, MD JG / THAO    DD:  03/14/2017 15:21:52  DT:  03/14/2017 19:10:01    D#:  [id number]  Job#:  [id number]

## 2017-03-15 NOTE — PROGRESS NOTES
Hospital Medicine Progress Note, Adult, Complex               Author: Filippo Wilson Date & Time created: 3/15/2017  9:56 AM     Interval History:  ID: 68yo overweight M here with atrial fibrillatin in RVR.    Today:  Plan for cardioversion today w/ JOSE GUADALUPE.  Patient states improvement every since addition of oxygen at urgent care.  His son is at bedside and given updates.  Dr Gonda and I discussed care plan.  Patient is a snorer and does wake himself up at times snoring.  We discussed possible BENEDICT and need of weight loss and polysomnogram testing once out of hospital.    Review of Systems:  Review of Systems   Constitutional: Negative for fever.   HENT: Negative for congestion and sore throat.    Eyes: Negative for discharge.   Respiratory: Negative for cough and shortness of breath.    Cardiovascular: Negative for chest pain, palpitations and leg swelling.   Gastrointestinal: Negative for nausea, abdominal pain and diarrhea.   Genitourinary: Negative for dysuria and hematuria.   Musculoskeletal: Negative for back pain and joint pain.   Neurological: Negative for dizziness, sensory change, speech change, weakness and headaches.   Psychiatric/Behavioral: Negative for depression. The patient is not nervous/anxious.        Physical Exam:  Physical Exam   Constitutional: He is oriented to person, place, and time. He appears well-developed and well-nourished. No distress.   obese   HENT:   Head: Normocephalic and atraumatic.   Nose: Nose normal.   Mouth/Throat: Oropharynx is clear and moist.   Eyes: Conjunctivae and EOM are normal. Right eye exhibits no discharge. Left eye exhibits no discharge. No scleral icterus.   Neck: Normal range of motion. No tracheal deviation present.   Cardiovascular: Intact distal pulses.  An irregularly irregular rhythm present. Tachycardia present.    No murmur heard.  Pulmonary/Chest: Effort normal and breath sounds normal. No stridor. No respiratory distress. He has no wheezes.   Abdominal:  Soft. Bowel sounds are normal. He exhibits no distension. There is no tenderness.   Musculoskeletal: He exhibits edema (1+ bilateral lower leg/ankles).   Lymphadenopathy:     He has no cervical adenopathy.   Neurological: He is alert and oriented to person, place, and time. No cranial nerve deficit.   Skin: Skin is warm. He is not diaphoretic.   Psychiatric: He has a normal mood and affect. His behavior is normal. Thought content normal.   Vitals reviewed.      Labs:        Invalid input(s): [id number]  Recent Labs      17   1142  03/15/17   0323   TROPONINI  0.03  0.02   BNPBTYPENAT  207*   --      Recent Labs      03/14/17   1142  03/15/17   0323   SODIUM  140  140   POTASSIUM  4.4  3.8   CHLORIDE  104  104   CO2  21  22   BUN  47*  46*   CREATININE  2.98*  2.04*   CALCIUM  8.7  8.9     Recent Labs      03/14/17   1142  03/15/17   0323   ALTSGPT  7  <5   ASTSGOT  20  22   ALKPHOSPHAT  84  83   TBILIRUBIN  0.6  0.6   GLUCOSE  148*  112*     Recent Labs      173  03/15/17   0323   RBC  3.36*   --   3.42*   HEMOGLOBIN  10.4*   --   10.5*   HEMATOCRIT  31.6*   --   32.5*   PLATELETCT  193   --   174   PROTHROMBTM  14.8*  15.9*  15.7*   APTT  33.4  115.9*  98.8*   INR  1.12  1.23*  1.21*     Recent Labs      03/14/17   1142  03/15/17   0323   WBC  9.8  8.6   NEUTSPOLYS  81.40*  76.30*   LYMPHOCYTES  10.80*  13.10*   MONOCYTES  7.20  8.80   EOSINOPHILS  0.10  1.10   BASOPHILS  0.20  0.50   ASTSGOT  20  22   ALTSGPT  7  <5   ALKPHOSPHAT  84  83   TBILIRUBIN  0.6  0.6           Hemodynamics:  Temp (24hrs), Av.7 °C (98.1 °F), Min:36.5 °C (97.7 °F), Max:37.4 °C (99.4 °F)  Temperature: 36.5 °C (97.7 °F)  Pulse  Av.1  Min: 121  Max: 127Heart Rate (Monitored): (!) 125  Blood Pressure : (!) 93/52 mmHg, NIBP: 108/64 mmHg     Respiratory:    Respiration: 18, Pulse Oximetry: 98 %     Work Of Breathing / Effort: Mild  RLL Breath Sounds: Diminished, LLL Breath Sounds:  Diminished  Fluids:    Intake/Output Summary (Last 24 hours) at 03/15/17 0956  Last data filed at 03/15/17 0800   Gross per 24 hour   Intake  929.7 ml   Output   1200 ml   Net -270.3 ml     Weight: 96.2 kg (212 lb 1.3 oz)  GI/Nutrition:  Orders Placed This Encounter   Procedures   • DIET NPO     Standing Status: Standing      Number of Occurrences: 1      Standing Expiration Date:      Order Specific Question:  Restrict to:     Answer:  Strict [1]     Medical Decision Making, by Problem:  Active Hospital Problems    Diagnosis   • Atrial fibrillation with rapid ventricular rate  - heparin drip, start warfarin.  JOSE GUADALUPE planned today by cardiology  - normal TSH  - question if BENEDICT as etiology of atrial fibrillation, suggest polysomnogram outpatient.     • Hypoxia [R09.02]  - supplemental oxygen       *  Acute renal insufficiency:        - patient denies any prior knowledge of renal disease.        - correct rapid heart rate.  IV fluids.  AM BMP, may need U/S if no improvement      *  Diabetes:        - SSI, accucheck    DISPO:  Transfer out of ICU to tele tomorrow if stable     Medications reviewed, Labs reviewed, EKG reviewed and Radiology images reviewed  Ac catheter: No Ac      DVT Prophylaxis: Heparin

## 2017-03-15 NOTE — PROGRESS NOTES
"Interval History:  This is a 67-year-old gentleman with a history of hypertension, insulin dependent diabetes mellitus and hyperlipidemia who presented to his primary care office for progressive shortness of breath beginning this morning. He was noted to be hypoxic to 70% on room air, but denies any chest pain palpitations lower extremity edema PND or orthopnea. Noted to be in atrial flutter.  3/15: No VTE. Flutter remains 125bpm. No CV events.    Physical Exam   Blood pressure 93/52, pulse 125, temperature 36.5 °C (97.7 °F), resp. rate 18, height 1.676 m (5' 6\"), weight 96.2 kg (212 lb 1.3 oz), SpO2 98 %.    Constitutional: Obese. Appears well-developed. Abrasive affect.  HENT: Normocephalic and atraumatic. No scleral icterus.   Neck: No JVD present.   Cardiovascular: Tachy rate. Exam reveals no gallop and no friction rub. No murmur heard.   Pulmonary/Chest: CTAB   Abdominal: S/NT/ND BS+   Musculoskeletal: Exhibits no edema. Pulses present.  Skin: Skin is warm and dry.     ROS: As HPI other reviewed and negative       Intake/Output Summary (Last 24 hours) at 03/15/17 1024  Last data filed at 03/15/17 0800   Gross per 24 hour   Intake  929.7 ml   Output   1200 ml   Net -270.3 ml       Recent Labs      03/14/17   1142  03/15/17   0323   WBC  9.8  8.6   RBC  3.36*  3.42*   HEMOGLOBIN  10.4*  10.5*   HEMATOCRIT  31.6*  32.5*   MCV  94.0  95.0   MCH  31.0  30.7   MCHC  32.9*  32.3*   RDW  45.2  45.1   PLATELETCT  193  174   MPV  11.2  11.7     Recent Labs      03/14/17   1142  03/15/17   0323   SODIUM  140  140   POTASSIUM  4.4  3.8   CHLORIDE  104  104   CO2  21  22   GLUCOSE  148*  112*   BUN  47*  46*   CREATININE  2.98*  2.04*   CALCIUM  8.7  8.9     Recent Labs      03/14/17   1142  03/14/17   2153  03/15/17   0323   APTT  33.4  115.9*  98.8*   INR  1.12  1.23*  1.21*     Recent Labs      03/14/17   1142   BNPBTYPENAT  207*     Recent Labs      03/14/17   1142  03/15/17   0323   TROPONINI  0.03  0.02   BNPBTYPENAT  " 207*   --            No current facility-administered medications on file prior to encounter.     Current Outpatient Prescriptions on File Prior to Encounter   Medication Sig Dispense Refill   • hydrocodone/acetaminophen (NORCO)  MG Tab Take 1-2 Tabs by mouth every 6 hours as needed for Severe Pain.     • tamsulosin (FLOMAX) 0.4 MG capsule Take 0.4 mg by mouth ONE-HALF HOUR AFTER BREAKFAST.     • Omega-3 Fatty Acids (FISH OIL TRIPLE STRENGTH) 1400 MG Cap Take 1 Cap by mouth every day.     • Multiple Vitamins-Minerals (MULTIVITAMIN PO) Take 1 Tab by mouth every day.     • aspirin EC (ECOTRIN) 81 MG Tablet Delayed Response Take 81 mg by mouth every day.     • tramadol (ULTRAM) 50 MG Tab Take  mg by mouth every 6 hours as needed for Mild Pain.     • metformin (GLUCOPHAGE) 1000 MG tablet Take 1,000 mg by mouth 2 times a day, with meals.     • benazepril (LOTENSIN) 40 MG tablet Take 40 mg by mouth 2 Times a Day.     • indapamide (LOZOL) 2.5 MG TABS Take 2.5 mg by mouth every morning.     • Carvedilol Phosphate (COREG CR) 40 MG CP24 Take 40 mg by mouth every day.     • amlodipine (NORVASC) 5 MG TABS Take 5 mg by mouth 2 Times a Day.     • gemfibrozil (LOPID) 600 MG TABS Take 600 mg by mouth 2 times a day.         Current Facility-Administered Medications   Medication Dose Frequency Provider Last Rate Last Dose   • heparin 1000 units/mL injection 3,800 Units  3,800 Units PRN Zach Diaz M.D.        And   • heparin infusion 25,000 units in 500 ml 0.45% nacl   Continuous Zach Diaz M.D. 25 mL/hr at 03/15/17 1019 1,250 Units/hr at 03/15/17 1019   • NS infusion   Once Desmond Allison M.D.       • aspirin EC (ECOTRIN) tablet 81 mg  81 mg DAILY Zach Diaz M.D.   Stopped at 03/14/17 1073   • tamsulosin (FLOMAX) capsule 0.4 mg  0.4 mg AFTER BREAKFAST Zach Diaz M.D.   0.4 mg at 03/15/17 0989   • Respiratory Care per Protocol   Continuous RT Zach Diaz M.D.       • ondansetron (ZOFRAN) syringe/vial  injection 4 mg  4 mg Q4HRS PRN Zach Diaz M.D.       • ondansetron (ZOFRAN ODT) dispertab 4 mg  4 mg Q4HRS PRN Zach Diaz M.D.       • acetaminophen (TYLENOL) tablet 650 mg  650 mg Q6HRS PRN Zach Diaz M.D.       • glucose 4 g chewable tablet 16 g  16 g Q15 MIN PRN Zach Diaz M.D.        And   • dextrose 50% (D50W) injection 25 mL  25 mL Q15 MIN PRN Zach Diaz M.D.       • NS infusion   Continuous Zach Diaz M.D.       • MD ALERT... warfarin (COUMADIN) per pharmacy protocol   PRN Zach Diaz M.D.       • insulin lispro (HUMALOG) injection 3-14 Units  3-14 Units Q6HRS Zach Diaz M.D.   Stopped at 03/15/17 0600   • warfarin (COUMADIN) tablet 5 mg  5 mg COUMADIN-DAILY Zach Diaz M.D.   5 mg at 03/14/17 2324   • carvedilol (COREG) tablet 12.5 mg  12.5 mg BID WITH MEALS Zach Diaz M.D.   12.5 mg at 03/15/17 0933   • gabapentin (NEURONTIN) capsule 900 mg  900 mg BID Zach Diaz M.D.   900 mg at 03/15/17 0933   • hydrocodone/acetaminophen (NORCO)  MG per tablet 1 Tab  1 Tab Q6HRS PRN Zach Diaz M.D.   1 Tab at 03/15/17 0313   • tramadol (ULTRAM) 50 MG tablet 50 mg  50 mg Q6HRS PRN Zach Diaz M.D.   50 mg at 03/15/17 0933    Or   • tramadol (ULTRAM) 50 MG tablet 100 mg  100 mg Q6HRS PRN Zach Diaz M.D.       • ropinirole (REQUIP) tablet 1 mg  1 mg Nightly Zach Diaz M.D.   1 mg at 03/14/17 2326     Last reviewed on 3/14/2017  8:46 PM by Danielle Fitzpatrick  Medications reviewed    Imaging reviewed    ECHO(3/14/2017):  No prior study is available for comparison.   Hyperdynamic left ventricular systolic function.  Left ventricular ejection fraction is visually estimated to be greater   than 75%.  Normal right ventricular size and systolic function.  No significant valve abnormalities.   Estimated right ventricular systolic pressure  is 35 mmHg.      Impressions:  1. Hypoxia  2. Atrial flutter RVR  3. HTN  4. IDDM  5. Obesity  6. MEREDITH    Recommendations:  JOSE GUADALUPE DCCV  Systemic  anticoagulation with heparin transition to coumadin  BB  Will FU with EP as outpatient to consider AFL ablation.

## 2017-03-15 NOTE — CARE PLAN
Problem: Communication  Goal: The ability to communicate needs accurately and effectively will improve  Outcome: PROGRESSING AS EXPECTED  Oriented patient to environment, educated patient about plan of care, procedures, treatments, and medications, answered all patients questions.     Problem: Safety  Goal: Will remain free from injury  Outcome: PROGRESSING AS EXPECTED  Provided assistance with mobility

## 2017-03-15 NOTE — PROGRESS NOTES
Inpatient Anticoagulation Service Note    Date: 3/15/2017  Reason for Anticoagulation: Other (Comments) (Atrial flutter, R/O PE)  Hemoglobin Value: 10.4  Hematocrit Value: 31.6  Lab Platelet Value: 193  Target INR: 2.0 to 3.0  INR from last 7 days     Date/Time INR Value    03/14/17 2153 (!)1.23    03/14/17 1142 1.12        Dose from last 7 days     Date/Time Dose (mg)    03/15/17 0000 5        Average Dose (mg):  (New start)  Significant Interactions: Aspirin  Bridge Therapy: Yes (Heparin weight-based protocol)     A/P:   New start warfarin this admission for new onset atrial flutter and concern for possible PE, currently on heparin weight based protocol.  Patient presented to Dr. Goldsmith's office with SOB and oxygen saturation of 70%.  EKG upon arrival to ED suggestive of R heart strain.  Doppler of LE negative for DVT.  Due to ARF, unable to obtain CT to confirm if patient does have PE.  Will start patient on warfarin 5 mg PO daily, repeat INR ordered for tomorrow.  Pharmacy will continue to follow.     Education Material Provided?: No (Patient left unit)  Pharmacist suggested discharge dosing: Warfarin 5 mg PO daily with close INR follow-up until on stable dosing schedule.       Karly Burden, PharmD, BCPS

## 2017-03-15 NOTE — CARE PLAN
Problem: Respiratory:  Goal: Respiratory status will improve  Intervention: Administer and titrate oxygen therapy  Administrate and titrate o2 as needed during procedure      Problem: Pain Management  Goal: Pain level will decrease to patient’s comfort goal  Intervention: Follow pain managment plan developed in collaboration with patient and Interdisciplinary Team  Assess patient frequently for pain and administrate medication as needed for comfort

## 2017-03-15 NOTE — ED NOTES
Called ICU to notify that patient is ready for transfer. As per ICU bed still needs to be cleaned. Patient continues to sleep comfortably without distress.

## 2017-03-15 NOTE — H&P
PRIMARY CARE PHYSICIAN:  Dr. Rai.    CHIEF COMPLAINT:  Shortness of breath.    HISTORY OF PRESENT ILLNESS:  Patient is a 67-year-old male with history of   diabetes mellitus, hypertension, chronic back pain, evaluated here at [facility]   with complaints of shortness of breath.  Onset of symptoms was on Saturday   night, he has had associated chest pain.  He notes pain in bilateral lower   chest, rib.  Increased with inspiration.  Reports feeling tired with fatigue,   decreased appetite, nausea without vomiting, decreased oral intake.  Reports   no cough.  Has had sensation of fevers, chills.  No calf pain or leg swelling.    No localized weakness.  Reports of chronic back pain with sciatica.  No   bloody or black stools.    REVIEW OF SYSTEMS:  As above, otherwise negative according to AMA and CMS   criteria.    PAST MEDICAL HISTORY:  Includes,  1.  Insulin dependent diabetes.  2.  Hypertension.  3.  Chronic pain in the back.  4.  Arthritis.  5.  Cataracts.  6.  Prostatism.  7.  Dyslipidemia.    PAST SURGICAL HISTORY:  Includes TURP, left shoulder, right knee, 3 back   surgeries.  Also with nerve block to the lumbar region.    MEDICATIONS:  Currently,  1.  Norvasc 5 mg b.i.d.  2.  Aspirin enteric coated 81 mg daily.  3.  Benazepril 40 mg b.i.d.  4.  Coreg CR 40 mg daily.  5.  Neurontin 900 b.i.d.  6.  Gemfibrozil 600 b.i.d.  7.  Norco 10/325 one to two q. 6 hours p.r.n. severe pain.  8.  Indapamide 2.5 mg daily.  9.  Insulin NovoLog 70/30 mix.  10.  Metformin 1000 b.i.d.  11.  Multivitamin daily.  12.  Omega-3 fatty acid daily.  13.  Flomax 0.4 mg.  14.  Tramadol  mg q. 6 p.r.n.    ALLERGIES:  POLLEN EXTRACT.    SOCIAL HISTORY:  No tobacco, no alcohol.  .  No illicit drug use.    FAMILY HISTORY:  Father  in 80s, possibly associated with heart failure   and pneumonia.    PHYSICAL EXAMINATION:  VITAL SIGNS:  Temperature 37.4, pulse in the 120s,-90s, respiratory rate is   15, blood pressure of  83/52 to currently 97/64, 98% on 4 liters.  GENERAL:  The patient was alert, easily agitated, anxious, appropriately   oriented with tachypnea with exertion, using accessory muscle use.  HEENT:  Anicteric.  Extraocular movements are intact.  Mucous membranes were   moist.  NECK:  No cervical or supraclavicular adenopathy.  Trachea midline.  No JVD.  CARDIOVASCULAR:  Tachycardic, regular, without murmurs.  LUNGS:  Clear to auscultation bilaterally.  No wheeze or rales.  CHEST:  Normal chest wall  excursion, no chest wall tenderness, increased work   of breathing.  ABDOMEN:  Obese, soft, nontender, nondistended, no hepatosplenomegaly.  No   pulsatile mass.  BACK:  No CVA or paraspinal tenderness.  EXTREMITIES:  He had no lower extremity edema, negative Homans sign, symmetric   generalized 5/5 strength, 2+ symmetric radial pulses.  Capillary refill less   than 2 seconds.  No distal cyanosis.  NEUROLOGIC:  Cranial nerves were grossly intact, nonfocal exam.  SKIN:  Warm, dry without pallor.  PSYCHIATRIC:  Anxious, easily agitated.    LABORATORY DATA:  The patient's lab, white count 9, hemoglobin 10.4, platelet   count 193,000.  BUN and creatinine of 47/2.9, blood sugar of 148.  Anion gap   of 15.  Lactic acid of 1.5.  Troponin of 0.03.  .  INR of 1.12.    IMAGING:  A chest x-ray revealed mild hypoinflation, minimal left lung base   atelectasis, no edema or infiltrate.    A V/Q scan revealed normal V/Q, no defects identified.    An echocardiogram revealed normal LV wall thickness, hyperdynamic LV systolic   function, EF of greater than 75%, right ventricular pressure 35 mmHg.    Patient's EKG revealed atrial flutter, rate now in the 120s, Q-waves in   inferior lead III with nonspecific ST-T findings.    IMPRESSION AND PLAN:  1.  Newly diagnosed atrial flutter, duration of symptoms greater than 48   hours.  Patient will be admitted acute to ICU.  Patient does have increased   BRENDA stroke risk factors with diabetes,  hypertension.  Risks and benefits of   anticoagulation were discussed with the patient.  He will be started on IV   heparin drip, warfarin anticoagulation.  Case was discussed with Dr. Goldsmith.  Cardiology to consult.  Continue with beta-blocker therapy.  2.  Hypotension, likely multifactorial, cardiogenic cause, multiple   Antihypertensives contributing.  We will hold his Norvasc, Lotensin, _____ for now, provide   IV fluids.  3.  Acute hypoxemic respiratory failure, workup negative for pulmonary   embolism.  Chest x-ray without signs of overt heart failure.  He has no   symptoms to suggest pneumonia.  May be at risk for obesity hypoventilation,   sleep apnea.  Other differential includes underlying intrinsic lung disease,   may benefit in the future pulmonary function, sleep study testing.  Dr. Gonda,   critical care has been contacted to provide further consultation.  Continue   respiratory and O2 protocols, wean FIO2 as tolerated.  4.  Acute renal failure attributed to hypotension.  Plan holding   antihypertensives as above, IV fluid support.  We will check renal ultrasound,   follow up renal function, electrolytes, urine output.  5.  Insulin-dependent diabetes, currently fair control.  We will monitor   serial Accu-Cheks, provide insulin sliding scale coverage.  Add long-acting   insulin when consistent oral intake.  6.  Elevated troponin, indeterminate range, possibly due to demand ischemia.    We will repeat serial cardiac enzymes with planned O2 and pressure support as   above.  7.  Dyslipidemia.  We will hold gemfibrozil for now with acute kidney injury.  8.  Chronic back pain.  Resume Neurontin.  9.  Anemia, without symptoms of bleed.  Monitor hemoglobin and hematocrit.  10.  History of benign prostatic hypertrophy.  Continue with Flomax.    Case discussed with Dr. Goldsmith, cardiology.       ____________________________________     YIN MORALES MD    [facility] / NTS    DD:  03/14/2017 20:51:42  DT:   03/14/2017 21:40:18    D#:  [id number]  Job#:  [id number]

## 2017-03-15 NOTE — ED NOTES
Patient sleeping in bed. No acute distress. No restlessness/agitation noted. Call bell within reach.

## 2017-03-15 NOTE — ED NOTES
Pt upset in room at this time RE current POC.  Pt feels he has not been well informed of current status and plan.  He states the MD's are conflicting at this time.   Sat and talked with pt in length to update.  Charge notified.

## 2017-03-15 NOTE — ED NOTES
BS report given to ICU........ Pt awake at bs.  Denies pain.  Pt en route to floor at this time.

## 2017-03-15 NOTE — PROGRESS NOTES
Bedside report received from Missy.  Patient up to ICU, patient is alert and oriented, however agitated.  Vital signs are stable.

## 2017-03-15 NOTE — PROGRESS NOTES
"Inpatient Anticoagulation Service Note    Date: 3/15/2017  Reason for Anticoagulation: Other (Comments) (Atrial flutter, R/O PE)        Hemoglobin Value: 10.5  Hematocrit Value: 32.5  Lab Platelet Value: 174  Target INR: 2.0 to 3.0    INR from last 7 days     Date/Time INR Value    03/15/17 0323 (!)1.21    03/14/17 2153 (!)1.23    03/14/17 1142 1.12        Dose from last 7 days     Date/Time Dose (mg)    03/15/17 1300 5    03/15/17 0000 5        Average Dose (mg):  (New start)  Significant Interactions: Aspirin (PRN tramadol - receiving)  Bridge Therapy: Yes (Heparin weight-based protocol)     Assessment: Patient underwent JOSE GUADALUPE DCCV today.  VQ scan negative.  No CT-PE 2/2 poor renal function.  Aspirin is ordered, however, he has been refusing to take it.  He remains on the heparin weight based protocol, last aPTT was therapeutic.  Patient started on a Cardiac Diet following the procedure today.  H/H low but stable, no overt s/sx of bleeding.  INR has not moved, however, patient received warfarin dose late last night, INR not yet reflective of warfarin dosing.   Patient given education booklet, attempted to discuss warfarin with patient and go through the booklet with the patient, he however was resistant to education.  Notified patient to please let us know if he has any questions.      Plan:  Will continue with warfarin 5 mg daily for now, as he may experience a \"stacking\" effect with warfarin since his dose was given so late last night.  Will follow PT/INR in the AM, if still no significant change will likely increase dose.    Education Material Provided?: Yes (3/15/17)  Pharmacist suggested discharge dosing: JONES Schulte, Pharm.D., BCPS                  "

## 2017-03-16 PROBLEM — N19 RENAL FAILURE: Status: ACTIVE | Noted: 2017-03-16

## 2017-03-16 LAB
ANION GAP SERPL CALC-SCNC: 8 MMOL/L (ref 0–11.9)
BUN SERPL-MCNC: 50 MG/DL (ref 8–22)
CALCIUM SERPL-MCNC: 8.9 MG/DL (ref 8.5–10.5)
CHLORIDE SERPL-SCNC: 100 MMOL/L (ref 96–112)
CO2 SERPL-SCNC: 25 MMOL/L (ref 20–33)
CREAT SERPL-MCNC: 1.86 MG/DL (ref 0.5–1.4)
GFR SERPL CREATININE-BSD FRML MDRD: 36 ML/MIN/1.73 M 2
GLUCOSE BLD-MCNC: 101 MG/DL (ref 65–99)
GLUCOSE BLD-MCNC: 129 MG/DL (ref 65–99)
GLUCOSE BLD-MCNC: 134 MG/DL (ref 65–99)
GLUCOSE BLD-MCNC: 175 MG/DL (ref 65–99)
GLUCOSE SERPL-MCNC: 154 MG/DL (ref 65–99)
INR PPP: 1.43 (ref 0.87–1.13)
POTASSIUM SERPL-SCNC: 3.9 MMOL/L (ref 3.6–5.5)
PROTHROMBIN TIME: 17.9 SEC (ref 12–14.6)
SODIUM SERPL-SCNC: 133 MMOL/L (ref 135–145)
TSH SERPL DL<=0.005 MIU/L-ACNC: 2.72 UIU/ML (ref 0.3–3.7)

## 2017-03-16 PROCEDURE — 700102 HCHG RX REV CODE 250 W/ 637 OVERRIDE(OP): Performed by: NURSE PRACTITIONER

## 2017-03-16 PROCEDURE — 700111 HCHG RX REV CODE 636 W/ 250 OVERRIDE (IP): Performed by: PHARMACIST

## 2017-03-16 PROCEDURE — A9270 NON-COVERED ITEM OR SERVICE: HCPCS | Performed by: HOSPITALIST

## 2017-03-16 PROCEDURE — 700102 HCHG RX REV CODE 250 W/ 637 OVERRIDE(OP): Performed by: HOSPITALIST

## 2017-03-16 PROCEDURE — 770020 HCHG ROOM/CARE - TELE (206)

## 2017-03-16 PROCEDURE — 84443 ASSAY THYROID STIM HORMONE: CPT

## 2017-03-16 PROCEDURE — 99232 SBSQ HOSP IP/OBS MODERATE 35: CPT | Performed by: HOSPITALIST

## 2017-03-16 PROCEDURE — A9270 NON-COVERED ITEM OR SERVICE: HCPCS | Performed by: NURSE PRACTITIONER

## 2017-03-16 PROCEDURE — 99233 SBSQ HOSP IP/OBS HIGH 50: CPT | Performed by: INTERNAL MEDICINE

## 2017-03-16 PROCEDURE — 82962 GLUCOSE BLOOD TEST: CPT | Mod: 91

## 2017-03-16 PROCEDURE — 85610 PROTHROMBIN TIME: CPT

## 2017-03-16 PROCEDURE — 80048 BASIC METABOLIC PNL TOTAL CA: CPT

## 2017-03-16 RX ADMIN — TAMSULOSIN HYDROCHLORIDE 0.4 MG: 0.4 CAPSULE ORAL at 08:27

## 2017-03-16 RX ADMIN — ASPIRIN 81 MG: 81 TABLET ORAL at 08:27

## 2017-03-16 RX ADMIN — HEPARIN SODIUM 25000 UNITS: 5000 INJECTION, SOLUTION INTRAVENOUS at 08:27

## 2017-03-16 RX ADMIN — TRAMADOL HYDROCHLORIDE 50 MG: 50 TABLET, COATED ORAL at 06:07

## 2017-03-16 RX ADMIN — CARVEDILOL 12.5 MG: 6.25 TABLET, FILM COATED ORAL at 17:39

## 2017-03-16 RX ADMIN — ROPINIROLE HYDROCHLORIDE 1 MG: 1 TABLET, FILM COATED ORAL at 21:05

## 2017-03-16 RX ADMIN — TRAMADOL HYDROCHLORIDE 50 MG: 50 TABLET, COATED ORAL at 21:05

## 2017-03-16 RX ADMIN — HYDROCODONE BITARTRATE AND ACETAMINOPHEN 1 TABLET: 10; 325 TABLET ORAL at 06:07

## 2017-03-16 RX ADMIN — GABAPENTIN 900 MG: 300 CAPSULE ORAL at 08:27

## 2017-03-16 RX ADMIN — CARVEDILOL 12.5 MG: 6.25 TABLET, FILM COATED ORAL at 06:30

## 2017-03-16 RX ADMIN — INSULIN LISPRO 3 UNITS: 100 INJECTION, SOLUTION INTRAVENOUS; SUBCUTANEOUS at 11:58

## 2017-03-16 RX ADMIN — RIVAROXABAN 15 MG: 15 TABLET, FILM COATED ORAL at 15:26

## 2017-03-16 RX ADMIN — GABAPENTIN 900 MG: 300 CAPSULE ORAL at 21:05

## 2017-03-16 ASSESSMENT — ENCOUNTER SYMPTOMS
PALPITATIONS: 0
DIZZINESS: 0
COUGH: 0
NAUSEA: 0
WHEEZING: 0
EYE DISCHARGE: 0
DIARRHEA: 0
SORE THROAT: 0
BACK PAIN: 1
ORTHOPNEA: 0
SPEECH CHANGE: 0
NERVOUS/ANXIOUS: 0
HEADACHES: 0
WEAKNESS: 0
ABDOMINAL PAIN: 0
DEPRESSION: 0
FEVER: 0
SHORTNESS OF BREATH: 0

## 2017-03-16 ASSESSMENT — PAIN SCALES - GENERAL
PAINLEVEL_OUTOF10: 3
PAINLEVEL_OUTOF10: 2
PAINLEVEL_OUTOF10: 2
PAINLEVEL_OUTOF10: 8
PAINLEVEL_OUTOF10: 2
PAINLEVEL_OUTOF10: 0
PAINLEVEL_OUTOF10: 5

## 2017-03-16 NOTE — PROGRESS NOTES
Pulmonary Critical Care Progress Note        Chief Complaint: SOB    History of Present Illness: 67 y.o. male with a past medical history significant for diabetes, hypertension, hyperlipidemia and chronic back pain, but no previous lung disease who went to the urgent care today after developing shortness of breath that started last night.  A week ago, he had upper respiratory symptoms, which seemed to resolve, but continues to have mild postnasal drip.  The shortness of breath was associated with a nonproductive cough, but no chest pain, syncope, palpitations, fevers, chills, recent travel or sick contacts.  At the urgent care, patient underwent EKG, which per the provider was concerning for atrial flutter, PVCs and abnormal repolarization, was given 325 mg of aspirin, placed on 2 liters of oxygen after found to have oxygen saturation of 70% on room air and a transported via [facility] to [facility] for higher level of care.  Here, patient was found to be tachycardic and hypotensive with acute kidney injury and relatively normal chest x-ray and the concern was high enough that he was started on a heparin drip for probable pulmonary embolism.  We wer consulted for assistance in his management and for consideration of TPA.  Per the patient, he has never had any history of blood clots personally or in his family and has not had any leg pain, swelling, trauma, surgeries, or travel.  He states he has been compliant with his medical management, has a primary care doctor and was just seen by Dr. Guzman yesterday where he underwent a PSA level that was mildly elevated at 6.6, but per patient, it is close to his baseline with his BPH.     ROS:  Respiratory: negative cough, negative shortness of breath and negative wheezing, Cardiac: negative chest pain, negative palpitations and negative leg swelling, GI: negative.  All other systems negative.    Interval Events:  24 hour interval history reviewed    - remains in NSR after successful  cardioversion out of A-flutter   - off O2 this afternoon    PFSH:  No change.    Respiratory:   Room air, IS 1800  Pulse Oximetry: 95 %          Exam: unlabored respirations, no intercostal retractions or accessory muscle use and clear to auscultation without rales or wheezes  ImagingAvailable data reviewed         Invalid input(s): [id number]    HemoDynamics:  Pulse: 85, Heart Rate (Monitored): 86  NIBP: 122/73 mmHg       Exam: regular rate and rhythm, regular rhythm (Sinus)  Imaging: Available data reviewed  Recent Labs      03/14/17   1142  03/15/17   0323   TROPONINI  0.03  0.02   BNPBTYPENAT  207*   --        Neuro:  GCS  15       Exam: no focal deficits noted mental status intact oriented for age x3  Imaging: Available data reviewed    Fluids:  Intake/Output       03/14/17 0700 - 03/15/17 0659 03/15/17 0700 - 03/16/17 0659 03/16/17 0700 - 03/17/17 0659      [id number] 1751-8883 Total 5302-1661 6876-4082 Total 8661-7899 1331-5586 Total       Intake    P.O.  --  400 400  350  670 1020  --  -- --    P.O. -- 400 400  -- -- --    I.V.  --  419.7 419.7  300  300 600  --  -- --    Heparin Volume -- 419.7 419.7 300 300 600 -- -- --    Total Intake -- 819.7 819.7  -- -- --       Output    Urine  --  600 600  1600  600 2200  --  -- --    Number of Times Voided -- 1 x 1 x 3 x 0 x 3 x -- -- --    Void (ml) --  600 2200 -- -- --    Total Output --  600 2200 -- -- --       Net I/O     -- 219.7 219.7 -950 370 -580 -- -- --           Recent Labs      03/14/17   1142  03/15/17   0323   SODIUM  140  140   POTASSIUM  4.4  3.8   CHLORIDE  104  104   CO2  21  22   BUN  47*  46*   CREATININE  2.98*  2.04*   CALCIUM  8.7  8.9       GI/Nutrition:  Exam: abdomen is soft and non-tender, normal active bowel sounds  Imaging: Available data reviewed  taking PO  Liver Function  Recent Labs      03/14/17   1142  03/15/17   0323   ALTSGPT  7  <5   ASTSGOT  20  22   ALKPHOSPHAT  84  83    TBILIRUBIN  0.6  0.6   GLUCOSE  148*  112*       Heme:  Recent Labs      17   1142  17   2153  03/15/17   0323  03/15/17   1145  03/15/17   1800   RBC  3.36*   --   3.42*   --    --    HEMOGLOBIN  10.4*   --   10.5*   --    --    HEMATOCRIT  31.6*   --   32.5*   --    --    PLATELETCT  193   --   174   --    --    PROTHROMBTM  14.8*  15.9*  15.7*   --    --    APTT  33.4  115.9*  98.8*  56.9*  56.8*   INR  1.12  1.23*  1.21*   --    --        Infectious Disease:  Temp  Av.8 °C (98.2 °F)  Min: 36.3 °C (97.3 °F)  Max: 37.2 °C (99 °F)  Micro: reviewed  Recent Labs      17   1142  03/15/17   0323   WBC  9.8  8.6   NEUTSPOLYS  81.40*  76.30*   LYMPHOCYTES  10.80*  13.10*   MONOCYTES  7.20  8.80   EOSINOPHILS  0.10  1.10   BASOPHILS  0.20  0.50   ASTSGOT  20  22   ALTSGPT  7  <5   ALKPHOSPHAT  84  83   TBILIRUBIN  0.6  0.6     Current Facility-Administered Medications   Medication Dose Frequency Provider Last Rate Last Dose   • heparin 1000 units/mL injection 3,200 Units  3,200 Units PRN Caroline Schulte, PHARMD        And   • heparin infusion 25,000 units in 500 ml 0.45% nacl   Continuous Caroline Schulte, PHARMD 25 mL/hr at 03/15/17 1843 1,250 Units/hr at 03/15/17 1843   • aspirin EC (ECOTRIN) tablet 81 mg  81 mg DAILY Zach Diaz M.D.   Stopped at 17 1745   • tamsulosin (FLOMAX) capsule 0.4 mg  0.4 mg AFTER BREAKFAST Zach Diaz M.D.   0.4 mg at 03/15/17 0933   • Respiratory Care per Protocol   Continuous RT Zach Diaz M.D.       • ondansetron (ZOFRAN) syringe/vial injection 4 mg  4 mg Q4HRS PRN Zach Diaz M.D.       • ondansetron (ZOFRAN ODT) dispertab 4 mg  4 mg Q4HRS PRN Zach Diaz M.D.       • acetaminophen (TYLENOL) tablet 650 mg  650 mg Q6HRS PRN Zach Diaz M.D.       • glucose 4 g chewable tablet 16 g  16 g Q15 MIN PRN Zach Diaz M.D.        And   • dextrose 50% (D50W) injection 25 mL  25 mL Q15 MIN PRN Zach Diaz M.D.       • NS infusion   Continuous Zach COBOS  ZEN Diaz       • MD ALERT... warfarin (COUMADIN) per pharmacy protocol   PRN Zach Diaz M.D.       • insulin lispro (HUMALOG) injection 3-14 Units  3-14 Units Q6HRS Zach Diaz M.D.   Stopped at 03/15/17 0600   • warfarin (COUMADIN) tablet 5 mg  5 mg COUMADIN-DAILY Zach Diaz M.D.   5 mg at 03/15/17 1751   • carvedilol (COREG) tablet 12.5 mg  12.5 mg BID WITH MEALS Zach Diaz M.D.   12.5 mg at 03/16/17 0630   • gabapentin (NEURONTIN) capsule 900 mg  900 mg BID Zach Diaz M.D.   900 mg at 03/15/17 2025   • hydrocodone/acetaminophen (NORCO)  MG per tablet 1 Tab  1 Tab Q6HRS PRN Zach Diaz M.D.   1 Tab at 03/16/17 0607   • tramadol (ULTRAM) 50 MG tablet 50 mg  50 mg Q6HRS PRN Zach Diaz M.D.   50 mg at 03/16/17 0607    Or   • tramadol (ULTRAM) 50 MG tablet 100 mg  100 mg Q6HRS PRN Zach Diaz M.D.       • ropinirole (REQUIP) tablet 1 mg  1 mg Nightly Zach Diaz M.D.   1 mg at 03/15/17 2025     Last reviewed on 3/14/2017  8:46 PM by Danielle Fitzpatrick    Quality  Measures:  Radiology images reviewed, Labs reviewed and Medications reviewed  Ac catheter: No Ac      DVT Prophylaxis: Heparin  DVT prophylaxis - mechanical: Not indicated at this time, ambulatory      Assessed for rehab: Patient returned to prior level of function, rehabilitation not indicated at this time    Assessment/Plan:  Acute hypoxemic respiratory failure - improved, ?cardiac related   - RT protocols   - encourage IS, mobilization  Tachycardia and hypotensive associated with new onset A-flutter with RVR s/p cardioversion 3/15   - xarelto, coreg   - cards following  Recent upper respiratory infection, resolved - doubt new infection  Acute kidney injury secondary to ATN - improving   - monitor, avoid nephrotoxins  Anion gap metabolic acidosis.  Elevated glucose with history of diabetes - insulin coverage  Hyperlipidemia.  Tobacco abuse without bronchospasm.  Anemia of chronic disease.  Elevated PSA, followed by  urology (Dr. Guzman).  Obesity with probable BENEDICT which might have precipitated A-flutter   - sleep study arranged 3/18 with 9pm check-in and [facility] pulm follow-up visit 3/22 @ 1340  Prophylaxis, diet, Full code    Ok to transfer patient out of ICU today and likely d/c home tomorrow. [facility] will sign off at transfer. Please call with questions.     Discussed patient condition with Family, RN, RT, Pharmacy, Charge nurse / hot rounds, QA team, Patient and cardiology and hospitalist.

## 2017-03-16 NOTE — PROGRESS NOTES
Hospital Medicine Progress Note, Adult, Complex               Author: Filippo Wilson Date & Time created: 3/16/2017  9:51 AM     Interval History:  ID: 68yo overweight M here with atrial fibrillatin in RVR.    Today:  Cardioverted successfully yesterday after JOSE GUADALUPE.  Concerns of sleep apnea per RN.  Discussed need to transfer to telemetry. Patient with ongoing chronic back pain.  Speech clear and he is oriented. His daughter and son are at bedside.    Review of Systems:  Review of Systems   Constitutional: Negative for fever.   HENT: Negative for sore throat.    Eyes: Negative for discharge.   Respiratory: Negative for cough and shortness of breath.    Cardiovascular: Negative for chest pain, palpitations and leg swelling.   Gastrointestinal: Negative for nausea, abdominal pain and diarrhea.   Genitourinary: Negative for dysuria and hematuria.   Musculoskeletal: Positive for back pain. Negative for joint pain.   Neurological: Negative for dizziness, speech change, weakness and headaches.   Psychiatric/Behavioral: Negative for depression. The patient is not nervous/anxious.        Physical Exam:  Physical Exam   Constitutional: He is oriented to person, place, and time. He appears well-developed and well-nourished. No distress.   obese   HENT:   Head: Normocephalic and atraumatic.   Nose: Nose normal.   Mouth/Throat: Oropharynx is clear and moist.   Eyes: Conjunctivae and EOM are normal. Right eye exhibits no discharge. Left eye exhibits no discharge. No scleral icterus.   Neck: Normal range of motion. No tracheal deviation present.   Cardiovascular: Regular rhythm and intact distal pulses.    No murmur heard.  Pulmonary/Chest: Effort normal and breath sounds normal. No stridor. No respiratory distress. He has no wheezes.   Abdominal: Soft. Bowel sounds are normal. He exhibits no distension. There is no tenderness.   Musculoskeletal: He exhibits edema (1+ bilateral lower leg/ankles).   Lymphadenopathy:     He has no  cervical adenopathy.   Neurological: He is alert and oriented to person, place, and time. No cranial nerve deficit.   Skin: Skin is warm. He is not diaphoretic.   Psychiatric: He has a normal mood and affect. His behavior is normal. Thought content normal.   Vitals reviewed.      Labs:        Invalid input(s): [id number]  Recent Labs      17   1142  03/15/17   0323   TROPONINI  0.03  0.02   BNPBTYPENAT  207*   --      Recent Labs      17   1142  03/15/17   0323   SODIUM  140  140   POTASSIUM  4.4  3.8   CHLORIDE  104  104   CO2  21  22   BUN  47*  46*   CREATININE  2.98*  2.04*   CALCIUM  8.7  8.9     Recent Labs      17   1142  03/15/17   0323   ALTSGPT  7  <5   ASTSGOT  20  22   ALKPHOSPHAT  84  83   TBILIRUBIN  0.6  0.6   GLUCOSE  148*  112*     Recent Labs      17   11417   2153  03/15/17   0323  03/15/17   1145  03/15/17   1800  17   0625   RBC  3.36*   --   3.42*   --    --    --    HEMOGLOBIN  10.4*   --   10.5*   --    --    --    HEMATOCRIT  31.6*   --   32.5*   --    --    --    PLATELETCT  193   --   174   --    --    --    PROTHROMBTM  14.8*  15.9*  15.7*   --    --   17.9*   APTT  33.4  115.9*  98.8*  56.9*  56.8*   --    INR  1.12  1.23*  1.21*   --    --   1.43*     Recent Labs      17   1142  03/15/17   0323   WBC  9.8  8.6   NEUTSPOLYS  81.40*  76.30*   LYMPHOCYTES  10.80*  13.10*   MONOCYTES  7.20  8.80   EOSINOPHILS  0.10  1.10   BASOPHILS  0.20  0.50   ASTSGOT  20  22   ALTSGPT  7  <5   ALKPHOSPHAT  84  83   TBILIRUBIN  0.6  0.6           Hemodynamics:  Temp (24hrs), Av.8 °C (98.3 °F), Min:36.3 °C (97.3 °F), Max:37.2 °C (99 °F)  Temperature: 36.6 °C (97.9 °F)  Pulse  Av.2  Min: 80  Max: 131Heart Rate (Monitored): 83  NIBP: 105/62 mmHg     Respiratory:    Respiration: (!) 24, Pulse Oximetry: 95 %     Work Of Breathing / Effort: Mild  RLL Breath Sounds: Diminished, LLL Breath Sounds: Diminished;Inspiratory Wheezes  Fluids:    Intake/Output  Summary (Last 24 hours) at 03/16/17 0951  Last data filed at 03/16/17 0800   Gross per 24 hour   Intake   1560 ml   Output   1600 ml   Net    -40 ml        GI/Nutrition:  Orders Placed This Encounter   Procedures   • DIET ORDER     Standing Status: Standing      Number of Occurrences: 1      Standing Expiration Date:      Order Specific Question:  Diet:     Answer:  Diabetic [3]     Medical Decision Making, by Problem:  Active Hospital Problems    Diagnosis   • Atrial fibrillation with rapid ventricular rate  - Xarelto.    - In sinus rhythm after 3/15 JOSE GUADALUPE and cardioversion  - normal TSH  - Probable BENEDICT as etiology of atrial fibrillation, suggest polysomnogram outpatient this has been scheduled by Dr Gonda     • Hypoxia [R09.02]  - supplemental oxygen       *  Acute renal insufficiency:        - patient denies any prior knowledge of renal disease.        - continues to improve      *  Probable Obstructive sleep apnea        - polysomnogram testing scheduled      *  Chronic low back pain:        - he has f/u with Dr Sharma, neurosurgeon      *  Diabetes:        - SSI, accucheck    DISPO:  Transfer out of ICU to tele tomorrow if stable     Medications reviewed and Labs reviewed  Ac catheter: No Ac      DVT Prophylaxis: Heparin

## 2017-03-16 NOTE — PROGRESS NOTES
Pulmonary Critical Care Progress Note        Chief Complaint: SOB    History of Present Illness: 67 y.o. male with a past medical history significant for diabetes, hypertension, hyperlipidemia and chronic back pain, but no previous lung disease who went to the urgent care today after developing shortness of breath that started last night.  A week ago, he had upper respiratory symptoms, which seemed to resolve, but continues to have mild postnasal drip.  The shortness of breath was associated with a nonproductive cough, but no chest pain, syncope, palpitations, fevers, chills, recent travel or sick contacts.  At the urgent care, patient underwent EKG, which per the provider was concerning for atrial flutter, PVCs and abnormal repolarization, was given 325 mg of aspirin, placed on 2 liters of oxygen after found to have oxygen saturation of 70% on room air and a transported via [facility] to [facility] for higher level of care.  Here, patient was found to be tachycardic and hypotensive with acute kidney injury and relatively normal chest x-ray and the concern was high enough that he was started on a heparin drip for probable pulmonary embolism.  We wer consulted for assistance in his management and for consideration of TPA.  Per the patient, he has never had any history of blood clots personally or in his family and has not had any leg pain, swelling, trauma, surgeries, or travel.  He states he has been compliant with his medical management, has a primary care doctor and was just seen by Dr. Guzman yesterday where he underwent a PSA level that was mildly elevated at 6.6, but per patient, it is close to his baseline with his BPH.     ROS:  Respiratory: negative cough, negative shortness of breath and negative wheezing, Cardiac: negative chest pain, negative palpitations and negative leg swelling, GI: negative.  All other systems negative.    Interval Events:  24 hour interval history reviewed    - remains in A-flutt overnight  with RVR   - better BP, SaO2, MEREDITH   - VQ scan and LE dopplers neg    PFSH:  No change.    Respiratory:   2 lpm n/c, IS 1800  Pulse Oximetry: 94 %          Exam: unlabored respirations, no intercostal retractions or accessory muscle use and clear to auscultation without rales or wheezes  ImagingAvailable data reviewed         Invalid input(s): [id number]    HemoDynamics:  Pulse: 99, Heart Rate (Monitored): 99  NIBP: 106/67 mmHg       Exam: atrial flutter, tachycardia  Imaging: Available data reviewed  Recent Labs      03/14/17   1142  03/15/17   0323   TROPONINI  0.03  0.02   BNPBTYPENAT  207*   --        Neuro:  GCS  15       Exam: no focal deficits noted mental status intact oriented for age x3  Imaging: Available data reviewed    Fluids:  Intake/Output       03/13/17 0700 - 03/14/17 0659 (Not Admitted) 03/14/17 0700 - 03/15/17 0659 03/15/17 0700 - 03/16/17 0659      2517-3511 1746-4486 Total 9796-8948 5215-6567 Total 4219-1903 9758-8159 Total       Intake    P.O.  --  -- --  --  400 400  270  -- 270    P.O. -- -- -- -- 400 400 270 -- 270    I.V.  --  -- --  --  419.7 419.7  250  -- 250    Heparin Volume -- -- -- -- 419.7 419.7 250 -- 250    Total Intake -- -- -- -- 819.7 819.7 520 -- 520       Output    Urine  --  -- --  --  600 600  1100  -- 1100    Number of Times Voided -- -- -- -- 1 x 1 x 2 x -- 2 x    Void (ml) -- -- -- --  -- 1100    Total Output -- -- -- --  -- 1100       Net I/O     -- -- -- -- 219.7 219.7 -580 -- -580        Weight: 96.2 kg (212 lb 1.3 oz)  Recent Labs      03/14/17   1142  03/15/17   0323   SODIUM  140  140   POTASSIUM  4.4  3.8   CHLORIDE  104  104   CO2  21  22   BUN  47*  46*   CREATININE  2.98*  2.04*   CALCIUM  8.7  8.9       GI/Nutrition:  Exam: abdomen is soft and non-tender, normal active bowel sounds  Imaging: Available data reviewed  taking PO  Liver Function  Recent Labs      03/14/17   1142  03/15/17   0323   ALTSGPT  7  <5   ASTSGOT  20  22    ALKPHOSPHAT  84  83   TBILIRUBIN  0.6  0.6   GLUCOSE  148*  112*       Heme:  Recent Labs      17   1142  17   2153  03/15/17   0323  03/15/17   1145   RBC  3.36*   --   3.42*   --    HEMOGLOBIN  10.4*   --   10.5*   --    HEMATOCRIT  31.6*   --   32.5*   --    PLATELETCT  193   --   174   --    PROTHROMBTM  14.8*  15.9*  15.7*   --    APTT  33.4  115.9*  98.8*  56.9*   INR  1.12  1.23*  1.21*   --        Infectious Disease:  Temp  Av.6 °C (97.9 °F)  Min: 36.5 °C (97.7 °F)  Max: 36.7 °C (98.1 °F)  Micro: reviewed  Recent Labs      17   1142  03/15/17   0323   WBC  9.8  8.6   NEUTSPOLYS  81.40*  76.30*   LYMPHOCYTES  10.80*  13.10*   MONOCYTES  7.20  8.80   EOSINOPHILS  0.10  1.10   BASOPHILS  0.20  0.50   ASTSGOT  20  22   ALTSGPT  7  <5   ALKPHOSPHAT  84  83   TBILIRUBIN  0.6  0.6     Current Facility-Administered Medications   Medication Dose Frequency Provider Last Rate Last Dose   • heparin 1000 units/mL injection 3,200 Units  3,200 Units PRN Caroline Schulte, PHARMD        And   • heparin infusion 25,000 units in 500 ml 0.45% nacl   Continuous Caroline Schulte, PHARMLUCITA 25 mL/hr at 03/15/17 1400 1,250 Units/hr at 03/15/17 1400   • aspirin EC (ECOTRIN) tablet 81 mg  81 mg DAILY Zach Diaz M.D.   Stopped at 17 1745   • tamsulosin (FLOMAX) capsule 0.4 mg  0.4 mg AFTER BREAKFAST Zach Diaz M.D.   0.4 mg at 03/15/17 0933   • Respiratory Care per Protocol   Continuous RT Zach Diaz M.D.       • ondansetron (ZOFRAN) syringe/vial injection 4 mg  4 mg Q4HRS PRN Zach Diaz M.D.       • ondansetron (ZOFRAN ODT) dispertab 4 mg  4 mg Q4HRS PRN Zach Diaz M.D.       • acetaminophen (TYLENOL) tablet 650 mg  650 mg Q6HRS PRN Zach Diaz M.D.       • glucose 4 g chewable tablet 16 g  16 g Q15 MIN PRN Zach Diaz M.D.        And   • dextrose 50% (D50W) injection 25 mL  25 mL Q15 MIN PRN Zach Diaz M.D.       • NS infusion   Continuous Zach Diaz M.D.       • MD ALERT... warfarin  (COUMADIN) per pharmacy protocol   PRN Zach Diaz M.D.       • insulin lispro (HUMALOG) injection 3-14 Units  3-14 Units Q6HRS Zach Diaz M.D.   Stopped at 03/15/17 0600   • warfarin (COUMADIN) tablet 5 mg  5 mg COUMADIN-DAILY Zach Diaz M.D.   5 mg at 03/15/17 1751   • carvedilol (COREG) tablet 12.5 mg  12.5 mg BID WITH MEALS Zach Diaz M.D.   12.5 mg at 03/15/17 1751   • gabapentin (NEURONTIN) capsule 900 mg  900 mg BID Zach Diaz M.D.   900 mg at 03/15/17 0933   • hydrocodone/acetaminophen (NORCO)  MG per tablet 1 Tab  1 Tab Q6HRS PRN Zach Diaz M.D.   1 Tab at 03/15/17 0313   • tramadol (ULTRAM) 50 MG tablet 50 mg  50 mg Q6HRS PRN Zach Diaz M.D.   50 mg at 03/15/17 1751    Or   • tramadol (ULTRAM) 50 MG tablet 100 mg  100 mg Q6HRS PRN Zach Diaz M.D.       • ropinirole (REQUIP) tablet 1 mg  1 mg Nightly Zach Diaz M.D.   1 mg at 03/14/17 2326     Last reviewed on 3/14/2017  8:46 PM by Danielle Fitzpatrick    Quality  Measures:  Radiology images reviewed, Labs reviewed and Medications reviewed  Ac catheter: No Ac      DVT Prophylaxis: Heparin  DVT prophylaxis - mechanical: Not indicated at this time, ambulatory      Assessed for rehab: Patient returned to prior level of function, rehabilitation not indicated at this time    Assessment/Plan:  Acute hypoxemic respiratory failure - improving, ?cardiac related   - O2/RT protocols   - encourage IS, mobilization  Tachycardia and hypotensive associated with new onset A-flutter with RVR   - cont heparin gtt, JOSE GUADALUPE and plans for electrical cardioversion today   - cards following  Recent upper respiratory infection, resolved - doubt new infection  Acute kidney injury secondary to ATN - improving   - monitor, avoid nephrotoxins  Anion gap metabolic acidosis.  Elevated glucose with history of diabetes - insulin coverage  Hyperlipidemia.  Tobacco abuse without bronchospasm.  Anemia of chronic disease.  Elevated PSA, followed by urology (  [facility]).  Prophylaxis, NPO for procedure, Full code    Discussed patient condition and risk of morbidity and/or mortality with Family, RN, RT, Pharmacy, Charge nurse / hot rounds, QA team, Patient and cardiology and hospitalist.    The patient remains critically ill.  Critical care time = 31 minutes in directly providing and coordinating critical care and extensive data review.  No time overlap and excludes procedures.

## 2017-03-16 NOTE — CARE PLAN
Problem: Respiratory:  Goal: Respiratory status will improve  Intervention: Administer and titrate oxygen therapy  Continue to monitor and titrate 02 needs and change to mask for comfort when sleeping      Problem: Pain Management  Goal: Pain level will decrease to patient’s comfort goal  Intervention: Follow pain managment plan developed in collaboration with patient and Interdisciplinary Team  Assess patient frequently for pain and administer medication as needed for comfort

## 2017-03-16 NOTE — PROGRESS NOTES
Patient's oxygen saturation drops into mid to high 80's while sleeping.  Patient refused wearing oxymask, oxygen turned to 5 Liters nasal canula.

## 2017-03-16 NOTE — PROGRESS NOTES
Cardiology Progress Note               Author: Mack Hayes Date & Time created: 3/16/2017  1:46 PM     Interval History:  67 M with history of hypertension, DM with insulin dependent, and hyperlipidemia. Present with PCP for progressive shortness of breath and hypoxic 70% on room air. ER he was noted to have atrial flutter with atypical chest pain. -130s.     Consult atrial flutter.    3/15/2017: Cardioversion and JOSE GUADALUPE, reverted back to SR     3/16:   Maintained NSR  Denies any chest pain, MCMANUS, or palpitations   Breath sound clear, patient is on 2L of oxygen sat high 90. Titrate oxygen.  /62  HR 78        3/15/2017  Transesophageal Echo Report  No thrombus detected in the left atrial appendage.  Left ventricular ejection fraction is visually estimated to be 65%.  The right ventricle was normal in size and function.  No significant valve abnormalities.       Echocardiography Laboratory  No prior study is available for comparison.   Hyperdynamic left ventricular systolic function.  Left ventricular ejection fraction is visually estimated to be greater   than 75%.  Normal right ventricular size and systolic function.  No significant valve abnormalities.   Estimated right ventricular systolic pressure  is 35 mmHg.    Review of Systems   Respiratory: Negative for cough, shortness of breath and wheezing.    Cardiovascular: Negative for chest pain, palpitations, orthopnea and leg swelling.   All other systems reviewed and are negative.      Physical Exam   Constitutional: He is oriented to person, place, and time. He appears well-developed and well-nourished. No distress.   Neck: Normal range of motion. No JVD present.   Cardiovascular: Normal rate, regular rhythm and normal heart sounds.    No murmur heard.  Pulmonary/Chest: Effort normal and breath sounds normal. No respiratory distress. He has no wheezes.   Abdominal: Bowel sounds are normal.   Musculoskeletal: He exhibits no edema or tenderness.    Neurological: He is alert and oriented to person, place, and time.   Skin: He is not diaphoretic.   Nursing note and vitals reviewed.      Hemodynamics:  Temp (24hrs), Av.8 °C (98.3 °F), Min:36.3 °C (97.3 °F), Max:37.2 °C (99 °F)  Temperature: 36.6 °C (97.9 °F)  Pulse  Av.6  Min: 80  Max: 131Heart Rate (Monitored): 82  NIBP: 100/61 mmHg     Respiratory:    Respiration: 15, Pulse Oximetry: 98 %     Work Of Breathing / Effort: Mild  RLL Breath Sounds: Diminished, LLL Breath Sounds: Diminished;Inspiratory Wheezes  Fluids:  Date 17 0700 - 17 0659   Shift 8944-8792 2109-2743 8305-3514 24 Hour Total   I  N  T  A  K  E   P.O. 200   200    I.V. 100   100    Shift Total 300   300   O  U  T  P  U  T   Urine 0   0    Shift Total 0   0   Weight (kg) 96.2 96.2 96.2 96.2          GI/Nutrition:  Orders Placed This Encounter   Procedures   • DIET ORDER     Standing Status: Standing      Number of Occurrences: 1      Standing Expiration Date:      Order Specific Question:  Diet:     Answer:  Diabetic [3]     Lab Results:  Recent Labs      17   1142  03/15/17   0323   WBC  9.8  8.6   RBC  3.36*  3.42*   HEMOGLOBIN  10.4*  10.5*   HEMATOCRIT  31.6*  32.5*   MCV  94.0  95.0   MCH  31.0  30.7   MCHC  32.9*  32.3*   RDW  45.2  45.1   PLATELETCT  193  174   MPV  11.2  11.7     Recent Labs      17   1142  03/15/17   0323  17   1210   SODIUM  140  140  133*   POTASSIUM  4.4  3.8  3.9   CHLORIDE  104  104  100   CO2  21  22  25   GLUCOSE  148*  112*  154*   BUN  47*  46*  50*   CREATININE  2.98*  2.04*  1.86*   CALCIUM  8.7  8.9  8.9     Recent Labs      17   2153  03/15/17   0323  03/15/17   1145  03/15/17   1800  17   0625   APTT  115.9*  98.8*  56.9*  56.8*   --    INR  1.23*  1.21*   --    --   1.43*     Recent Labs      17   1142   BNPBTYPENAT  207*     Recent Labs      17   1142  03/15/17   0323   TROPONINI  0.03  0.02   BNPBTYPENAT  207*   --              Medical  Decision Making, by Problem:  Active Hospital Problems    Diagnosis   • Atrial flutter (CMS-HCC) [I48.92]   • Hypoxia [R09.02]       Plan:  1. Cardioverted out of Atrial flutter:  - rate controlled coreg 12.5 BID  - ECHO showed EF 75% RVSP 35mmHg  - oral anticoagulation: xarelto 15mg GFR 33, [id number] 2 (hypertension and DM)    Recommend a sleep apnea study as outpatient.     Core Measures

## 2017-03-16 NOTE — PROGRESS NOTES
"Interval History:  This is a 67-year-old gentleman with a history of hypertension, insulin dependent diabetes mellitus and hyperlipidemia who presented to his primary care office for progressive shortness of breath beginning this morning. He was noted to be hypoxic to 70% on room air, but denies any chest pain palpitations lower extremity edema PND or orthopnea. Noted to be in atrial flutter.  3/15: No VTE. Flutter remains 125bpm. No CV events.  3/16: Sucecssful JOSE GUADALUPE DCCV yesterday. SR now.    Physical Exam   Blood pressure 93/52, pulse 84, temperature 36.6 °C (97.9 °F), resp. rate 24, height 1.676 m (5' 6\"), weight 96.2 kg (212 lb 1.3 oz), SpO2 95 %.    Constitutional: Obese. Appears well-developed. Abrasive affect.  HENT: Normocephalic and atraumatic. No scleral icterus.   Neck: No JVD present.   Cardiovascular: Tachy rate. Exam reveals no gallop and no friction rub. No murmur heard.   Pulmonary/Chest: CTAB   Abdominal: S/NT/ND BS+   Musculoskeletal: Exhibits no edema. Pulses present.  Skin: Skin is warm and dry.     ROS: As HPI other reviewed and negative       Intake/Output Summary (Last 24 hours) at 03/16/17 1047  Last data filed at 03/16/17 1000   Gross per 24 hour   Intake   1700 ml   Output   1100 ml   Net    600 ml       Recent Labs      03/14/17   1142  03/15/17   0323   WBC  9.8  8.6   RBC  3.36*  3.42*   HEMOGLOBIN  10.4*  10.5*   HEMATOCRIT  31.6*  32.5*   MCV  94.0  95.0   MCH  31.0  30.7   MCHC  32.9*  32.3*   RDW  45.2  45.1   PLATELETCT  193  174   MPV  11.2  11.7     Recent Labs      03/14/17   1142  03/15/17   0323   SODIUM  140  140   POTASSIUM  4.4  3.8   CHLORIDE  104  104   CO2  21  22   GLUCOSE  148*  112*   BUN  47*  46*   CREATININE  2.98*  2.04*   CALCIUM  8.7  8.9     Recent Labs      03/14/17   2153  03/15/17   0323  03/15/17   1145  03/15/17   1800  03/16/17   0625   APTT  115.9*  98.8*  56.9*  56.8*   --    INR  1.23*  1.21*   --    --   1.43*     Recent Labs      03/14/17   1142 "   BNPBTYPENAT  207*     Recent Labs      03/14/17   1142  03/15/17   0323   TROPONINI  0.03  0.02   BNPBTYPENAT  207*   --            No current facility-administered medications on file prior to encounter.     Current Outpatient Prescriptions on File Prior to Encounter   Medication Sig Dispense Refill   • hydrocodone/acetaminophen (NORCO)  MG Tab Take 1-2 Tabs by mouth every 6 hours as needed for Severe Pain.     • tamsulosin (FLOMAX) 0.4 MG capsule Take 0.4 mg by mouth ONE-HALF HOUR AFTER BREAKFAST.     • Omega-3 Fatty Acids (FISH OIL TRIPLE STRENGTH) 1400 MG Cap Take 1 Cap by mouth every day.     • Multiple Vitamins-Minerals (MULTIVITAMIN PO) Take 1 Tab by mouth every day.     • aspirin EC (ECOTRIN) 81 MG Tablet Delayed Response Take 81 mg by mouth every day.     • tramadol (ULTRAM) 50 MG Tab Take  mg by mouth every 6 hours as needed for Mild Pain.     • metformin (GLUCOPHAGE) 1000 MG tablet Take 1,000 mg by mouth 2 times a day, with meals.     • benazepril (LOTENSIN) 40 MG tablet Take 40 mg by mouth 2 Times a Day.     • indapamide (LOZOL) 2.5 MG TABS Take 2.5 mg by mouth every morning.     • Carvedilol Phosphate (COREG CR) 40 MG CP24 Take 40 mg by mouth every day.     • amlodipine (NORVASC) 5 MG TABS Take 5 mg by mouth 2 Times a Day.     • gemfibrozil (LOPID) 600 MG TABS Take 600 mg by mouth 2 times a day.         Current Facility-Administered Medications   Medication Dose Frequency Provider Last Rate Last Dose   • heparin 1000 units/mL injection 3,200 Units  3,200 Units PRN Caroline Schulte, PHARMD        And   • heparin infusion 25,000 units in 500 ml 0.45% nacl   Continuous Caroline Schulte, PHARMD 25 mL/hr at 03/15/17 1843 25,000 Units at 03/16/17 0827   • aspirin EC (ECOTRIN) tablet 81 mg  81 mg DAILY Zach Diaz M.D.   81 mg at 03/16/17 0827   • tamsulosin (FLOMAX) capsule 0.4 mg  0.4 mg AFTER BREAKFAST Zach Diaz M.D.   0.4 mg at 03/16/17 0827   • Respiratory Care per Protocol   Continuous RT  Zach Diaz M.D.       • ondansetron (ZOFRAN) syringe/vial injection 4 mg  4 mg Q4HRS PRN Zach Diaz M.D.       • ondansetron (ZOFRAN ODT) dispertab 4 mg  4 mg Q4HRS PRN Zach Diaz M.D.       • acetaminophen (TYLENOL) tablet 650 mg  650 mg Q6HRS PRN Zach Diaz M.D.       • glucose 4 g chewable tablet 16 g  16 g Q15 MIN PRN Zach Diaz M.D.        And   • dextrose 50% (D50W) injection 25 mL  25 mL Q15 MIN PRN Zach Diaz M.D.       • NS infusion   Continuous Zach Diaz M.D.       • MD ALERT... warfarin (COUMADIN) per pharmacy protocol   PRN Zach Diaz M.D.       • insulin lispro (HUMALOG) injection 3-14 Units  3-14 Units Q6HRS Zach Diaz M.D.   Stopped at 03/15/17 0600   • warfarin (COUMADIN) tablet 5 mg  5 mg COUMADIN-DAILY Zach Diaz M.D.   5 mg at 03/15/17 1751   • carvedilol (COREG) tablet 12.5 mg  12.5 mg BID WITH MEALS Zach Diaz M.D.   12.5 mg at 03/16/17 0630   • gabapentin (NEURONTIN) capsule 900 mg  900 mg BID Zach Diaz M.D.   900 mg at 03/16/17 0827   • hydrocodone/acetaminophen (NORCO)  MG per tablet 1 Tab  1 Tab Q6HRS PRN Zach Diaz M.D.   1 Tab at 03/16/17 0607   • tramadol (ULTRAM) 50 MG tablet 50 mg  50 mg Q6HRS PRN Zach Diaz M.D.   50 mg at 03/16/17 0607    Or   • tramadol (ULTRAM) 50 MG tablet 100 mg  100 mg Q6HRS PRN Zach Diaz M.D.       • ropinirole (REQUIP) tablet 1 mg  1 mg Nightly Zach Diaz M.D.   1 mg at 03/15/17 2025     Last reviewed on 3/14/2017  8:46 PM by Danielle Fitzpatrick  Medications reviewed    Imaging reviewed    ECHO(3/14/2017):  No prior study is available for comparison.   Hyperdynamic left ventricular systolic function.  Left ventricular ejection fraction is visually estimated to be greater   than 75%.  Normal right ventricular size and systolic function.  No significant valve abnormalities.   Estimated right ventricular systolic pressure  is 35 mmHg.      Impressions:  1. Hypoxia  2. Atrial flutter RVR, now SR  3. HTN  4.  IDDM  5. Obesity  6. MEREDITH    Recommendations:  Systemic anticoagulation with heparin transition to coumadin  BB  Will FU with EP as outpatient to consider AFL ablation.    Thank you for the consultation. Will sign off. Please call with any questions.

## 2017-03-16 NOTE — PROCEDURES
DATE OF SERVICE:  03/15/2017    CARDIOLOGY PROCEDURE NOTE    PREPROCEDURAL DIAGNOSIS:  Atrial flutter.    POSTPROCEDURAL DIAGNOSIS:  Sinus rhythm.    PROCEDURAL DETAILS:  The patient is a 67-year-old gentleman with atrial   flutter, who has been started on anticoagulation.  After informed consent was   obtained, he underwent a JOSE GUADALUPE with anesthesia service provided by   anesthesiologist at the bedside in the ICU.  We determined that he did not   have any evidence of thrombus and so with adequate moderate sedation   anesthesia with propofol, the patient underwent successful direct current   cardioversion with 200 joules of energy with pads in the anterior and   posterior position.  He immediately reverted back to sinus rhythm and   maintained that in the ICU.    COMPLICATIONS:  None.       ____________________________________     MD SYBIL Snowden / THAO    DD:  03/15/2017 16:02:36  DT:  03/15/2017 18:13:14    D#:  [id number]  Job#:  [id number]

## 2017-03-16 NOTE — CARE PLAN
Problem: Safety  Goal: Will remain free from injury  Outcome: PROGRESSING AS EXPECTED  Educated patient on use of call light, bed alarm and chair alarms on.    Problem: Venous Thromboembolism (VTW)/Deep Vein Thrombosis (DVT) Prevention:  Goal: Patient will participate in Venous Thrombosis (VTE)/Deep Vein Thrombosis (DVT)Prevention Measures  Outcome: PROGRESSING AS EXPECTED  Assessing and monitoring for anticoagulation complications.

## 2017-03-17 ENCOUNTER — TELEPHONE (OUTPATIENT)
Dept: SLEEP MEDICINE | Facility: MEDICAL CENTER | Age: 67
End: 2017-03-17

## 2017-03-17 VITALS
HEIGHT: 66 IN | DIASTOLIC BLOOD PRESSURE: 71 MMHG | SYSTOLIC BLOOD PRESSURE: 111 MMHG | WEIGHT: 212.08 LBS | HEART RATE: 82 BPM | TEMPERATURE: 97.5 F | RESPIRATION RATE: 20 BRPM | OXYGEN SATURATION: 92 % | BODY MASS INDEX: 34.08 KG/M2

## 2017-03-17 DIAGNOSIS — G47.33 OSA (OBSTRUCTIVE SLEEP APNEA): ICD-10-CM

## 2017-03-17 PROBLEM — I48.92 ATRIAL FLUTTER (HCC): Status: RESOLVED | Noted: 2017-03-14 | Resolved: 2017-03-17

## 2017-03-17 LAB
ANION GAP SERPL CALC-SCNC: 9 MMOL/L (ref 0–11.9)
BACTERIA UR CULT: NORMAL
BUN SERPL-MCNC: 47 MG/DL (ref 8–22)
CALCIUM SERPL-MCNC: 8.8 MG/DL (ref 8.5–10.5)
CHLORIDE SERPL-SCNC: 104 MMOL/L (ref 96–112)
CO2 SERPL-SCNC: 22 MMOL/L (ref 20–33)
CREAT SERPL-MCNC: 1.57 MG/DL (ref 0.5–1.4)
ERYTHROCYTE [DISTWIDTH] IN BLOOD BY AUTOMATED COUNT: 43.9 FL (ref 35.9–50)
GFR SERPL CREATININE-BSD FRML MDRD: 44 ML/MIN/1.73 M 2
GLUCOSE BLD-MCNC: 126 MG/DL (ref 65–99)
GLUCOSE BLD-MCNC: 136 MG/DL (ref 65–99)
GLUCOSE SERPL-MCNC: 112 MG/DL (ref 65–99)
HCT VFR BLD AUTO: 28.5 % (ref 42–52)
HGB BLD-MCNC: 9.6 G/DL (ref 14–18)
MCH RBC QN AUTO: 31.4 PG (ref 27–33)
MCHC RBC AUTO-ENTMCNC: 33.7 G/DL (ref 33.7–35.3)
MCV RBC AUTO: 93.1 FL (ref 81.4–97.8)
PLATELET # BLD AUTO: 199 K/UL (ref 164–446)
PMV BLD AUTO: 11.5 FL (ref 9–12.9)
POTASSIUM SERPL-SCNC: 3.7 MMOL/L (ref 3.6–5.5)
RBC # BLD AUTO: 3.06 M/UL (ref 4.7–6.1)
SIGNIFICANT IND 70042: NORMAL
SITE SITE: NORMAL
SODIUM SERPL-SCNC: 135 MMOL/L (ref 135–145)
SOURCE SOURCE: NORMAL
WBC # BLD AUTO: 7.9 K/UL (ref 4.8–10.8)

## 2017-03-17 PROCEDURE — 700102 HCHG RX REV CODE 250 W/ 637 OVERRIDE(OP): Performed by: HOSPITALIST

## 2017-03-17 PROCEDURE — 80048 BASIC METABOLIC PNL TOTAL CA: CPT

## 2017-03-17 PROCEDURE — 82962 GLUCOSE BLOOD TEST: CPT

## 2017-03-17 PROCEDURE — 85027 COMPLETE CBC AUTOMATED: CPT

## 2017-03-17 PROCEDURE — 99233 SBSQ HOSP IP/OBS HIGH 50: CPT | Performed by: INTERNAL MEDICINE

## 2017-03-17 PROCEDURE — 99239 HOSP IP/OBS DSCHRG MGMT >30: CPT | Performed by: HOSPITALIST

## 2017-03-17 PROCEDURE — A9270 NON-COVERED ITEM OR SERVICE: HCPCS | Performed by: HOSPITALIST

## 2017-03-17 RX ADMIN — HYDROCODONE BITARTRATE AND ACETAMINOPHEN 1 TABLET: 10; 325 TABLET ORAL at 05:21

## 2017-03-17 RX ADMIN — TRAMADOL HYDROCHLORIDE 50 MG: 50 TABLET, COATED ORAL at 03:16

## 2017-03-17 ASSESSMENT — PAIN SCALES - GENERAL
PAINLEVEL_OUTOF10: 6
PAINLEVEL_OUTOF10: 4
PAINLEVEL_OUTOF10: 8
PAINLEVEL_OUTOF10: 4

## 2017-03-17 NOTE — CARE PLAN
Problem: Knowledge Deficit  Goal: Knowledge of disease process/condition, treatment plan, diagnostic tests, and medications will improve  Outcome: PROGRESSING AS EXPECTED  Assessed knowledge of level of disease process, treatment plan, diagnostic tests, and medications.    Problem: Respiratory:  Goal: Respiratory status will improve  Outcome: PROGRESSING AS EXPECTED  Educated and encouraged coughing and deep breathing.

## 2017-03-17 NOTE — PROGRESS NOTES
Pt's refuses  stating that it is inaccurate and that he does not need oxygen. Two RN's educated pt on importance of  and proper oxygenation. Pt verbalizes understanding and continues to refuse.

## 2017-03-17 NOTE — PROGRESS NOTES
"Bedside report received from RN. Pt upset about transfer to PACU, states that \"this is not a room.\" Pt educated on need to transfer patients at this time. He verbalized understanding but was still verbally upset about it. States that his \"discharge orders better be in first thing in the morning.\" Pt denies any other needs at this time. Will continue to assess and provide care.   "

## 2017-03-17 NOTE — DISCHARGE INSTRUCTIONS
Discharge Instructions    Discharged to home by car with relative. Discharged via walking, hospital escort: Refused.  Special equipment needed: Not Applicable    Be sure to schedule a follow-up appointment with your primary care doctor or any specialists as instructed.     Discharge Plan:   Diet Plan: Discussed  Activity Level: Discussed  Confirmed Follow up Appointment: Patient to Call and Schedule Appointment  Confirmed Symptoms Management: Discussed  Medication Reconciliation Updated: Yes  Pneumococcal Vaccine Given - only chart on this line when given:  (patient refused previnar vaccine)  Influenza Vaccine Indication: Not indicated: Previously immunized this influenza season and > 8 years of age    I understand that a diet low in cholesterol, fat, and sodium is recommended for good health. Unless I have been given specific instructions below for another diet, I accept this instruction as my diet prescription.   Other diet: diabetic    Special Instructions: None    · Is patient discharged on Warfarin / Coumadin?   No     · Is patient Post Blood Transfusion?  No    Depression / Suicide Risk    As you are discharged from this [facility] facility, it is important to learn how to keep safe from harming yourself.    Recognize the warning signs:  · Abrupt changes in personality, positive or negative- including increase in energy   · Giving away possessions  · Change in eating patterns- significant weight changes-  positive or negative  · Change in sleeping patterns- unable to sleep or sleeping all the time   · Unwillingness or inability to communicate  · Depression  · Unusual sadness, discouragement and loneliness  · Talk of wanting to die  · Neglect of personal appearance   · Rebelliousness- reckless behavior  · Withdrawal from people/activities they love  · Confusion- inability to concentrate     If you or a loved one observes any of these behaviors or has concerns about self-harm, here's what you can do:  · Talk  about it- your feelings and reasons for harming yourself  · Remove any means that you might use to hurt yourself (examples: pills, rope, extension cords, firearm)  · Get professional help from the community (Mental Health, Substance Abuse, psychological counseling)  · Do not be alone:Call your Safe Contact- someone whom you trust who will be there for you.  · Call your local CRISIS HOTLINE 231-8297 or 851-626-7165  · Call your local Children's Mobile Crisis Response Team [place] (607) 167-7213 or wwwimport.io  · Call the toll free National Suicide Prevention Hotlines   · National Suicide Prevention Lifeline 721-156-YUZV (8806)  · National Hope Line Network 800-SUICIDE (143-2313)

## 2017-03-17 NOTE — PROGRESS NOTES
Pulmonary Critical Care Progress Note        Chief Complaint: SOB    History of Present Illness: 67 y.o. male with a past medical history significant for diabetes, hypertension, hyperlipidemia and chronic back pain, but no previous lung disease who went to the urgent care today after developing shortness of breath that started last night.  A week ago, he had upper respiratory symptoms, which seemed to resolve, but continues to have mild postnasal drip.  The shortness of breath was associated with a nonproductive cough, but no chest pain, syncope, palpitations, fevers, chills, recent travel or sick contacts.  At the urgent care, patient underwent EKG, which per the provider was concerning for atrial flutter, PVCs and abnormal repolarization, was given 325 mg of aspirin, placed on 2 liters of oxygen after found to have oxygen saturation of 70% on room air and a transported via [facility] to [facility] for higher level of care.  Here, patient was found to be tachycardic and hypotensive with acute kidney injury and relatively normal chest x-ray and the concern was high enough that he was started on a heparin drip for probable pulmonary embolism.  We wer consulted for assistance in his management and for consideration of TPA.  Per the patient, he has never had any history of blood clots personally or in his family and has not had any leg pain, swelling, trauma, surgeries, or travel.  He states he has been compliant with his medical management, has a primary care doctor and was just seen by Dr. Guzman yesterday where he underwent a PSA level that was mildly elevated at 6.6, but per patient, it is close to his baseline with his BPH.     ROS:  Respiratory: negative cough, negative shortness of breath and negative wheezing, Cardiac: negative chest pain, negative palpitations and negative leg swelling, GI: negative.  All other systems negative.    Interval Events:  24 hour interval history reviewed    - no overnight events, slept well,  "refused pulse ox as \"felt it was inaccurate\"     PFSH:  No change.    Respiratory:   Room air, IS 1800  Pulse Oximetry:  (pt refused pulse ox. RN notified)          Exam: unlabored respirations, no intercostal retractions or accessory muscle use and clear to auscultation without rales or wheezes  ImagingAvailable data reviewed         Invalid input(s): [id number]    HemoDynamics:  Pulse: 82, Heart Rate (Monitored): 87  Blood Pressure : 111/71 mmHg, NIBP: 119/74 mmHg       Exam: regular rate and rhythm, regular rhythm (Sinus)  Imaging: Available data reviewed  Recent Labs      03/14/17   1142  03/15/17   0323   TROPONINI  0.03  0.02   BNPBTYPENAT  207*   --        Neuro:  GCS  15       Exam: no focal deficits noted mental status intact oriented for age x3  Imaging: Available data reviewed    Fluids:  Intake/Output       03/15/17 0700 - 03/16/17 0659 03/16/17 0700 - 03/17/17 0659 03/17/17 0700 - 03/18/17 0659      [id number] 2827-1713 Total 3262-5656 1125-9456 Total 7333-0466 3782-5823 Total       Intake    P.O.  350  670 1020  590  200 790  --  -- --    P.O.  590 200 790 -- -- --    I.V.  300  300 600  250  -- 250  --  -- --    Heparin Volume 300 300 600 250 -- 250 -- -- --    Total Intake   -- -- --       Output    Urine  1600  600 2200  1000  650 1650  --  -- --    Number of Times Voided 3 x 0 x 3 x 2 x 2 x 4 x -- -- --    Void (ml) 7542 866 8354 8374 105 3289 -- -- --    Total Output 5240 630 7336 9554 489 3696 -- -- --       Net I/O     -950 370 -580 -160 -450 -610 -- -- --           Recent Labs      03/15/17   0323  03/16/17   1210  03/17/17   0315   SODIUM  140  133*  135   POTASSIUM  3.8  3.9  3.7   CHLORIDE  104  100  104   CO2  22  25  22   BUN  46*  50*  47*   CREATININE  2.04*  1.86*  1.57*   CALCIUM  8.9  8.9  8.8       GI/Nutrition:  Exam: abdomen is soft and non-tender, normal active bowel sounds  Imaging: Available data reviewed  taking PO  Liver Function  Recent " Labs      17   1142  03/15/17   0323  17   1210  17   0315   ALTSGPT  7  <5   --    --    ASTSGOT  20  22   --    --    ALKPHOSPHAT  84  83   --    --    TBILIRUBIN  0.6  0.6   --    --    GLUCOSE  148*  112*  154*  112*       Heme:  Recent Labs      17   1142  17   2153  03/15/17   0323  03/15/17   1145  03/15/17   1800  17   0625  17   0315   RBC  3.36*   --   3.42*   --    --    --   3.06*   HEMOGLOBIN  10.4*   --   10.5*   --    --    --   9.6*   HEMATOCRIT  31.6*   --   32.5*   --    --    --   28.5*   PLATELETCT  193   --   174   --    --    --   199   PROTHROMBTM  14.8*  15.9*  15.7*   --    --   17.9*   --    APTT  33.4  115.9*  98.8*  56.9*  56.8*   --    --    INR  1.12  1.23*  1.21*   --    --   1.43*   --        Infectious Disease:  Temp  Av.9 °C (98.4 °F)  Min: 36.4 °C (97.5 °F)  Max: 37.2 °C (99 °F)  Micro: reviewed  Recent Labs      17   1142  03/15/17   0323  17   0315   WBC  9.8  8.6  7.9   NEUTSPOLYS  81.40*  76.30*   --    LYMPHOCYTES  10.80*  13.10*   --    MONOCYTES  7.20  8.80   --    EOSINOPHILS  0.10  1.10   --    BASOPHILS  0.20  0.50   --    ASTSGOT  20  22   --    ALTSGPT  7  <5   --    ALKPHOSPHAT  84  83   --    TBILIRUBIN  0.6  0.6   --      Current Facility-Administered Medications   Medication Dose Frequency Provider Last Rate Last Dose   • rivaroxaban (XARELTO) tablet 15 mg  15 mg PM MEAL Redet Freddy, A.P.R.N.   15 mg at 17 1526   • aspirin EC (ECOTRIN) tablet 81 mg  81 mg DAILY Zach Diaz M.D.   81 mg at 17   • tamsulosin (FLOMAX) capsule 0.4 mg  0.4 mg AFTER BREAKFAST Zach Diaz M.D.   0.4 mg at 17   • Respiratory Care per Protocol   Continuous RT Zach Diaz M.D.       • ondansetron (ZOFRAN) syringe/vial injection 4 mg  4 mg Q4HRS PRN Zach Diaz M.D.       • ondansetron (ZOFRAN ODT) dispertab 4 mg  4 mg Q4HRS PRN Zach Diaz M.D.       • acetaminophen (TYLENOL) tablet 650 mg  650  mg Q6HRS PRN Zach Diaz M.D.       • glucose 4 g chewable tablet 16 g  16 g Q15 MIN PRN Zach Diaz M.D.        And   • dextrose 50% (D50W) injection 25 mL  25 mL Q15 MIN PRN Zach Diaz M.D.       • NS infusion   Continuous aZch Diaz M.D.       • insulin lispro (HUMALOG) injection 3-14 Units  3-14 Units Q6HRS Zach Diaz M.D.   Stopped at 03/16/17 1800   • carvedilol (COREG) tablet 12.5 mg  12.5 mg BID WITH MEALS Zach Diaz M.D.   12.5 mg at 03/16/17 1739   • gabapentin (NEURONTIN) capsule 900 mg  900 mg BID Zach Diaz M.D.   900 mg at 03/16/17 2105   • hydrocodone/acetaminophen (NORCO)  MG per tablet 1 Tab  1 Tab Q6HRS PRN Zach Diaz M.D.   1 Tab at 03/17/17 0521   • tramadol (ULTRAM) 50 MG tablet 50 mg  50 mg Q6HRS PRN Zach Diaz M.D.   50 mg at 03/17/17 0316    Or   • tramadol (ULTRAM) 50 MG tablet 100 mg  100 mg Q6HRS PRN Zach Diaz M.D.       • ropinirole (REQUIP) tablet 1 mg  1 mg Nightly Zach Diaz M.D.   1 mg at 03/16/17 2105     Last reviewed on 3/14/2017  8:46 PM by Danielle Fitzpatrick    Quality  Measures:  Radiology images reviewed, Labs reviewed and Medications reviewed  Ac catheter: No Ac      DVT Prophylaxis: Heparin  DVT prophylaxis - mechanical: Not indicated at this time, ambulatory      Assessed for rehab: Patient returned to prior level of function, rehabilitation not indicated at this time    Assessment/Plan:  Acute hypoxemic respiratory failure - improved, ?cardiac related   - RT protocols   - encourage IS, mobilization  Tachycardia and hypotensive associated with new onset A-flutter with RVR s/p cardioversion 3/15   - xarelto, coreg   - cards following  Recent upper respiratory infection, resolved - doubt new infection  Acute kidney injury secondary to ATN - improving   - monitor, avoid nephrotoxins  Anion gap metabolic acidosis.  Elevated glucose with history of diabetes - insulin coverage  Hyperlipidemia.  Tobacco abuse without bronchospasm.  Anemia  of chronic disease.  Elevated PSA, followed by urology (Dr. Guzman).  Obesity with probable BENEDICT which might have precipitated A-flutter   - sleep study arranged 3/18 with 9pm check-in and [facility] pulm follow-up visit 3/22 @ 1340  Prophylaxis, diet, Full code    Ok to d/c home today from pulmonary standpoint.    Discussed patient condition with Family, RN, RT, Pharmacy, Charge nurse / hot rounds, QA team, Patient and cardiology and hospitalist.

## 2017-03-17 NOTE — PROGRESS NOTES
"Received report from day shift RN.  Patient is sitting in chair alert and oriented.  Daughter is at bedside.  Patient has orders to go to telemetry, however states \" they better not try to transfer me during the middle of the night\".  "

## 2017-03-17 NOTE — PROGRESS NOTES
"Patient transferred to PACU.  Patient extremely upset to be transferred to a place that is \"not a real room\".  Patient was apparently told yesterday that he would be discharged today and doesn't understand why he has to be moved.  Educated provided, however patient is still upset.  Bedside report given to RN.  "

## 2017-03-17 NOTE — TELEPHONE ENCOUNTER
Pt has SS tomorrow 3/18. It was order by Dr. Gonda. There is no order for sleep study. Please sign so I can link the order. Thank you!

## 2017-03-17 NOTE — PROGRESS NOTES
D/C instructions and upcoming appointments discussed with pt.  PIV removed. Pt ambulatory and refusing escort.

## 2017-03-18 ENCOUNTER — SLEEP STUDY (OUTPATIENT)
Dept: SLEEP MEDICINE | Facility: MEDICAL CENTER | Age: 67
End: 2017-03-18
Attending: NURSE PRACTITIONER
Payer: MEDICARE

## 2017-03-18 DIAGNOSIS — G47.33 OSA (OBSTRUCTIVE SLEEP APNEA): ICD-10-CM

## 2017-03-18 PROCEDURE — 95811 POLYSOM 6/>YRS CPAP 4/> PARM: CPT | Performed by: INTERNAL MEDICINE

## 2017-03-18 NOTE — DISCHARGE SUMMARY
DATE OF ADMISSION:  03/14/2017    DATE OF DISCHARGE:  03/17/2017    PRIMARY CARE PHYSICIAN:  Tori Rai MD    CONSULTING PHYSICIAN:  Jeremy Gonda, MD    ADMITTING PHYSICIAN:  Zach Diaz MD    DISCHARGE DIAGNOSES:  1.  Atrial fibrillation with rapid ventricular rate, status post   defibrillation, cardioversion after transesophageal echocardiogram.  2.  Probable obstructive sleep apnea with polysomnogram testing set up for   outpatient followup in the next 4 days.  3.  Hypoxia secondary to #2 above.  4.  Acute renal insufficiency with improvement during hospitalization.  5.  Obesity.  6.  Chronic low back pain, for which he follows up with Dr. Sharma,   neurosurgeon.  7.  Diabetes.  8.  Tobacco abuse.  9.  Dyslipidemia.  10.  Anemia of chronic disease.  11.  History of elevated PSA.    IMAGING/PROCEDURES:  On 03/15/2017, patient had a transesophageal   echocardiogram without any evidence of thrombus.  He underwent successful   direct current cardioversion at 200 joules.  Lower extremity duplex on   03/14/2017 showed no evidence of DVT in the right or left lower extremity.    Echocardiogram on March 14th showed ejection fraction of 75%, estimated right   ventricular systolic pressure 35 mmHg.  V/Q scan on March 14th showed normal   V/Q scan.  Ultrasound of kidneys on March 14th showed multiple bilateral renal   cysts, heterogeneous echo texture of the right kidney with a small amount of   right perinephric fluid, no evidence of hydronephrosis.    COURSE OF HOSPITALIZATION:  Please refer to the history and physical as well   as consulting physician's notes for further details.  This is a 67-year-old   male, he was brought in with concerns of shortness breath.  He is found to be   in atrial fibrillation with a rapid ventricular rate.  Patient was monitored.    He was initiated on anticoagulation.  He had a transesophageal   echocardiogram, found no thrombus.  He was initiated with cardioversion, had   resolved his  atrial fibrillation into normal sinus rhythm.  Evaluation of the   patient, lab workup suggested patient probably has obstructive sleep apnea;   however, unable to fully determine in the hospital.  Dr. Jeremy Gonda was   consulting _____ and set the patient up for polysomnogram test.  Patient was   alerted that he has followup appointment with polysomnogram testing.  Patient   and I did discuss further weight loss management, he states he has a difficult   time secondary to chronic pain.  We discussed different options.  Patient has   followup of chronic back pain for Dr. Yemi Sharma.  Patient is to control of   his insulin-dependent diabetes.  Recommend ongoing smoking cessation.    Recommend control of dyslipidemia.    LABORATORY DATA DURING HOSPITALIZATION:  Patient did have hemoglobin of 9.6 on   March 17th with hematocrit of 28.5, white count was unremarkable, platelet   count was unremarkable during course of hospitalization.  On March 17th, he   had a BUN of 47, creatinine of 1.57, which is improvement from admission with   a BUN of 47 on March 14th to a creatinine of 2.98.  Lactic acid was   unremarkable on admission.  Troponin 0.03, second is 0.02 on admission.  BNP   was mildly elevated at 207.  INR on March 14th was 1.12.  Urinalysis is   unremarkable on March 14th.  Blood culture showed no growth.    DISCHARGE CONDITION:  Stable.    DISCHARGE INSTRUCTIONS:  Patient has follow up with cardiology within the next   7-10 days.  Patient is to follow up with Dr. Yemi Sharma for his ongoing   chronic back pain.  Patient will follow up Dr. Rai.  Patient is to return   to the hospital if having any ongoing shortness of breath, palpitations.    DISCHARGE DIET:  Recommend healthy heart diet.    DISCHARGE MEDICATIONS:  1.  Xarelto.  He has given risk and benefits.  He is to avoid any contact   sports.  Patient will be on 50 mg twice a day for 20 days, then converted over   to Xarelto 20 mg in evening.  2.   NovoLog 70/30 mixture per sliding scale.  3.  Neurontin 900 mg twice a day.  4.  ReQuip 1 mg at bedtime.  5.  Norco 10/325 one to two tablets every 6 hours as needed for severe pain.  6.  Flomax 0.4 mg after breakfast.  7.  Fish oil 1 cap daily.  8.  Multivitamin with minerals daily.  9.  Ultram  mg every 6 hours as needed for mild pain.  10.  Metformin 1000 mg twice a day with meals.  11.  Lotensin 40 mg twice a day.  12.  Indapamide 2.5 mg in morning.  13.  Coreg 40 mg controlled release daily.  14.  Amlodipine 5 mg twice a day.  15.  Lopid 600 mg twice a day.    Please note that 40 minutes was spent on discharge process, discussing   discharge instructions with him setting up with outpatient followup.       ____________________________________     DO JUAN C BUTLER / THAO    DD:  03/18/2017 07:31:06  DT:  03/18/2017 07:56:53    D#:  [id number]  Job#:  [id number]

## 2017-03-19 LAB
BACTERIA BLD CULT: NORMAL
BACTERIA BLD CULT: NORMAL
SIGNIFICANT IND 70042: NORMAL
SIGNIFICANT IND 70042: NORMAL
SITE SITE: NORMAL
SITE SITE: NORMAL
SOURCE SOURCE: NORMAL
SOURCE SOURCE: NORMAL

## 2017-03-22 ENCOUNTER — SLEEP CENTER VISIT (OUTPATIENT)
Dept: SLEEP MEDICINE | Facility: MEDICAL CENTER | Age: 67
End: 2017-03-22
Payer: MEDICARE

## 2017-03-22 VITALS
BODY MASS INDEX: 36.32 KG/M2 | OXYGEN SATURATION: 93 % | RESPIRATION RATE: 16 BRPM | WEIGHT: 226 LBS | HEIGHT: 66 IN | TEMPERATURE: 97.5 F | HEART RATE: 79 BPM | SYSTOLIC BLOOD PRESSURE: 130 MMHG | DIASTOLIC BLOOD PRESSURE: 64 MMHG

## 2017-03-22 DIAGNOSIS — G47.10 HYPERSOMNOLENCE: ICD-10-CM

## 2017-03-22 DIAGNOSIS — G47.37 CENTRAL SLEEP APNEA DUE TO MEDICAL CONDITION: ICD-10-CM

## 2017-03-22 PROCEDURE — 99214 OFFICE O/P EST MOD 30 MIN: CPT | Performed by: INTERNAL MEDICINE

## 2017-03-22 RX ORDER — INDOMETHACIN 50 MG/1
CAPSULE ORAL
Refills: 1 | COMMUNITY
Start: 2017-02-27 | End: 2017-03-01

## 2017-03-22 RX ORDER — OXYCODONE HYDROCHLORIDE AND ACETAMINOPHEN 5; 325 MG/1; MG/1
1 TABLET ORAL
Refills: 0 | COMMUNITY
Start: 2017-01-16 | End: 2017-03-01

## 2017-03-22 RX ORDER — GABAPENTIN 600 MG/1
TABLET ORAL
COMMUNITY
Start: 2017-03-17 | End: 2017-03-17

## 2017-03-22 RX ORDER — MELOXICAM 15 MG/1
15 TABLET ORAL
Refills: 11 | Status: ON HOLD | COMMUNITY
Start: 2017-01-23 | End: 2017-04-25

## 2017-03-22 RX ORDER — LIDOCAINE 50 MG/G
PATCH TOPICAL
Refills: 5 | COMMUNITY
Start: 2017-01-04 | End: 2017-03-01

## 2017-03-22 NOTE — MR AVS SNAPSHOT
"        Zhang CRAWFORD    3/22/2017 1:00 PM   Sleep Center Visit   MRN: [id number]    Department:  [facility]   Dept Phone:  [phone]    Description:  Male : 1950   Provider:  Yemi NICK M.D.           Reason for Visit     Results Sleep Study    Hospital Follow-up Discharged 2017      Allergies as of 3/22/2017     Allergen Noted Reactions    Pollen Extract 2014       Local pollen, primarily Rabitt Bailey      You were diagnosed with     Central sleep apnea due to medical condition   [630175]       Hypersomnolence   [875498]         Vital Signs     Blood Pressure Pulse Temperature Respirations Height Weight    130/64 mmHg 79 36.4 °C (97.5 °F) 16 1.676 m (5' 6\") 102.513 kg (226 lb)    Body Mass Index Oxygen Saturation Smoking Status             36.49 kg/m2 93% Former Smoker         Basic Information     Date Of Birth Sex Race Ethnicity Preferred Language    1950 Male White Non- English      Your appointments     Mar 29, 2017  9:00 AM   NEW PATIENT with Zhang Torres M.D.   [facility] (--)    [address] , [facility] [address]  [place] [address]   [phone]              Problem List              ICD-10-CM Priority Class Noted - Resolved    Type 2 DM E11.9   10/13/2013 - Present    Hypertension I10   10/13/2013 - Present    Dyslipidemia E78.5   10/13/2013 - Present    Albuminuria manifested in a patient with type 2 DM R80.9   10/13/2013 - Present    Degeneration of lumbar or lumbosacral intervertebral disc M51.37   2014 - Present    Benign prostatic hyperplasia N40.0   3/27/2014 - Present    Hypoxia R09.02   3/14/2017 - Present    Renal failure N19   3/16/2017 - Present      Health Maintenance        Date Due Completion Dates    COLONOSCOPY 1998 ---    IMM ZOSTER VACCINE 2008 ---    IMM PNEUMOCOCCAL 65+ (ADULT) LOW/MEDIUM RISK SERIES (1 of 2 - PCV13) 3/27/2013 3/27/2012    RETINAL SCREENING 2014, 2012   " A1C SCREENING 4/30/2017 10/31/2016, 6/20/2016, 7/17/2015, 4/24/2014, 3/18/2014, 10/16/2013, 9/23/2013, 11/2/2012, 5/7/2012, 11/7/2011    URINE ACR / MICROALBUMIN 6/20/2017 6/20/2016, 7/17/2015, 7/29/2014, 9/23/2013, 3/18/2013, 11/2/2012, 11/7/2011, 8/25/2011    FASTING LIPID PROFILE 10/31/2017 10/31/2016, 6/20/2016, 7/17/2015, 7/29/2014, 3/18/2014, 9/23/2013, 3/18/2013, 11/2/2012, 8/25/2011    SERUM CREATININE 3/17/2018 3/17/2017, 3/16/2017, 3/15/2017, 3/14/2017, 6/20/2016, 1/8/2016, 11/17/2015, 8/12/2015, 7/17/2015, 4/11/2014, 3/28/2014, 3/20/2014, 3/18/2014, 2/11/2014, 9/23/2013, 3/18/2013, 11/2/2012, 5/7/2012, 4/24/2012, 11/7/2011, 8/25/2011, 1/9/2008, 1/22/2007, 12/6/2006    IMM DTaP/Tdap/Td Vaccine (2 - Td) 6/8/2020 6/8/2010            Current Immunizations     Influenza TIV (IM) 11/27/2013    Influenza Vaccine Adult HD 10/15/2016    Pneumococcal polysaccharide vaccine (PPSV-23) 3/27/2012    Tdap Vaccine 6/8/2010      Below and/or attached are the medications your provider expects you to take. Review all of your home medications and newly ordered medications with your provider and/or pharmacist. Follow medication instructions as directed by your provider and/or pharmacist. Please keep your medication list with you and share with your provider. Update the information when medications are discontinued, doses are changed, or new medications (including over-the-counter products) are added; and carry medication information at all times in the event of emergency situations     Allergies:  POLLEN EXTRACT - (reactions not documented)               Medications  Valid as of: March 22, 2017 -  2:33 PM    Generic Name Brand Name Tablet Size Instructions for use    AmLODIPine Besylate (Tab) NORVASC 5 MG Take 5 mg by mouth 2 Times a Day.        Benazepril HCl (Tab) LOTENSIN 40 MG Take 40 mg by mouth 2 Times a Day.        Carvedilol Phosphate (CAPSULE SR 24 HR) Carvedilol Phosphate 40 MG Take 40 mg by mouth every day.         Cholecalciferol (Cap) vitamin D3 5000 UNITS Take 1 Cap by mouth every day.        Gabapentin (Cap) NEURONTIN 300 MG Take 600 mg by mouth 2 Times a Day.        Gemfibrozil (Tab) LOPID 600 MG Take 600 mg by mouth 2 times a day.        Hydrocodone-Acetaminophen (Tab) NORCO  MG Take 1-2 Tabs by mouth every 6 hours as needed for Severe Pain.        Indapamide (Tab) LOZOL 2.5 MG Take 2.5 mg by mouth every morning.        Insulin Aspart Prot & Aspart (Suspension) NOVOLOG 70/30 (70-30) 100 UNIT/ML Inject 5-22 Units as instructed 3 times a day before meals. 100 = 5 units  120= 7 units  184 = 9 units  200 = 12 units  Pt can go as high as 22 units if needed        Meloxicam (Tab) MOBIC 15 MG Take 15 mg by mouth every day.        MetFORMIN HCl (Tab) GLUCOPHAGE 1000 MG Take 1,000 mg by mouth 2 times a day, with meals.        Multiple Vitamins-Minerals   Take 1 Tab by mouth every day.        Omega-3 Fatty Acids (Cap) FISH OIL TRIPLE STRENGTH 1400 MG Take 1 Cap by mouth every day.        Rivaroxaban (Tab) XARELTO 15 MG Take 1 Tab by mouth 2 Times a Day for 20 days.        Rivaroxaban (Tab) XARELTO 20 MG Take 1 Tab by mouth with dinner.        ROPINIRole HCl (Tab) REQUIP 0.5 MG Take 1 mg by mouth every bedtime.        Tamsulosin HCl (Cap) FLOMAX 0.4 MG Take 0.4 mg by mouth ONE-HALF HOUR AFTER BREAKFAST.        TraMADol HCl (Tab) ULTRAM 50 MG Take  mg by mouth every 6 hours as needed for Mild Pain.        .                 Medicines prescribed today were sent to:     [id number] - [place] - [address] [place] [address]    Phone: [phone] Fax: [phone]    Open 24 Hours?: No      Medication refill instructions:       If your prescription bottle indicates you have medication refills left, it is not necessary to call your provider’s office. Please contact your pharmacy and they will refill your medication.    If your prescription bottle indicates you do not have any refills left, you may request  refills at any time through one of the following ways: The online [employer] system (except Urgent Care), by calling your provider’s office, or by asking your pharmacy to contact your provider’s office with a refill request. Medication refills are processed only during regular business hours and may not be available until the next business day. Your provider may request additional information or to have a follow-up visit with you prior to refilling your medication.   *Please Note: Medication refills are assigned a new Rx number when refilled electronically. Your pharmacy may indicate that no refills were authorized even though a new prescription for the same medication is available at the pharmacy. Please request the medicine by name with the pharmacy before contacting your provider for a refill.           [employer] Access Code: Activation code not generated  Current [employer] Status: Active

## 2017-03-22 NOTE — PROCEDURES
CLINICAL COMMENTS:  The patient underwent a split night polysomnogram with a CPAP titration using the standard montage for measurement of parameters of sleep, respiratory events, movement abnormalities, heart rate and rhythm. A microphone was used to monitor snoring.    ANALYSIS: The diagnostic recording time was 156.8 minutes with a sleep period of 153.7 minutes.  Total sleep time was 132.2 minutes with a sleep efficiency of 84.3%.  The sleep latency was 3.1 minutes, and REM latency was N/A minutes.  The patient had 90 arousals in total, for an arousal index of 40.8.        RESPIRATORY: The patient had 214 apneas in total.  Of these, 1 were obstructive apneas, and 213 were central apneas.  This resulted in an apnea index (AI) of 97.1.  The patient had 4 hypopneas in total, which resulted in a hypopnea index of 1.8.  The overall AHI was 98.9, while the AHI during REM was N/A.  The supine AHI = 101.9.    OXIMETRY: Oxygen saturation monitoring showed a mean SpO2 of 84.1% for the diagnostic part of the study, with a minimum oxygen saturation of 60.0%.  Oxygen saturations were below 89% for 86.0% of sleep time.    CARDIAC: The highest heart rate for the first part of the study was 90.0 beats per minute.  The average heart rate during sleep was 70.5 bpm, while the highest heart rate was 84.0 bpm.    LIMB MOVEMENTS: There were a total of 0 periodic limb movements during sleep, of which 0 were PLMS arousals.  This resulted in a PLMS index of 0.0 and a PLMS arousal index of 0.0.    TREATMENT    Treatment recording time was 311.2 minutes with a total sleep time of 302.6min.  The patient had an arousal index of 12.9.      RESPIRATORY: The patient had 0 obstructive apneas, 198 central apneas, and 100 hypopneas for an overall AHI was 59.1.    OXIMETRY: The mean SpO2 during treatment was 82.0%, with a minimum oxygen saturation of 63.0%.        Interpretation:    This is a split-night study.    In the diagnostic phase there is  fragmentation of sleep with a reduced sleep efficiency and an elevated arousal and awakening index.  No slow-wave sleep or REM sleep time is recorded.  There are no periodic limb movements.  The apnea hypopnea index is 98.9 events per hour, consisting almost exclusively of central apnea episodes.  There are 213 episodes of central apnea with 4 episodes of hypopnea and a single obstructive apnea.  The lowest arterial oxygen saturation is 60% on room air.  He spends 73% of the diagnostic time with a saturation below 90%.    In the treatment phase of the study there is significant improvement in sleep continuity with normalization of sleep efficiency.  CPAP was adjusted across a pressure range of 5-14 cm water with persistence of central apnea and hypopnea events.  BiPAP was used with a pressure range of 16-17/10-13 cm water, again with persistence of central apnea and hypopnea events.  Finally, Servo adaptive BiPAP ventilation was applied and did reduce the number of central apnea and hypopnea events.  Events persisted during REM sleep however, and in the final pressure stage, the apnea hypopnea index was still 60.8 events per hour with a lowest arterial oxygen saturation of 75% on room air.    Assessment:  This study demonstrates very severe central sleep apnea with an apnea hypopnea index of 98.9 events per hour.  There is severe nocturnal hypoxemia with a lowest arterial oxygen saturation of 60% on room air.  There is fragmentation of sleep related in part to the breathing events but probably to laboratory effect as well, and improved on treatment.  There was no effective response to CPAP or BiPAP therapy across a broad range of pressures.  Servo adaptive BiPAP reduced the number of hypopnea and central apnea events but did not completely resolve the problem in the pressure ranges applied.    Recommendations:  Specific treatment is difficult to extrapolate from this study.  It appears that Servo adaptive BiPAP  ventilation may eventually be successful but will require higher expiratory pressures and probably more pressure support as well with the application of a backup rate.  ASV with an expiratory pressure range of 6-10 cm water and pressure support at 6-15 cm water may be effective.  If his response to this treatment is not optimal, repeat polysomnography dedicated to further titration of ASV therapy will be necessary.  He did best with a large Dunia view mask.

## 2017-03-24 NOTE — PROGRESS NOTES
CC:  Sleep apnea hypopnea syndrome.    HPI:   Mr. Cheung is a 67-year-old man referred by Dr. Tori Rai to assist in the evaluation and management of sleep-disordered breathing.    He was evaluated in the hospital last week by Dr. Gonda after admission for respiratory insufficiency with hypoxemia. He was found to be in new onset atrial flutter with a rapid ventricular rate. He was evaluated by cardiology and underwent cardioversion on March 15. He is currently taking Xarelto and carvedilol but not having any bleeding complications related to the anticoagulant. In the course of his evaluation, symptoms suggesting sleep-disordered breathing were identified.    He has a regular sleep schedule at bedtime at about 10 PM and falls asleep in a variable amount of time without perceiving insomnia symptoms. He may awaken 2-3 times on an average night and arises at 5:30 to 6 in the morning without fatigue or morning grogginess.    He snores only lightly and does not have a history of witnessed nocturnal apnea. He denies much daytime somnolence. His [place] sleepiness score is 2 points. He does not regularly consume caffeine and does not use substances to induce sleep or to maintain wakefulness. He does not have symptoms suggesting parasomnia or restless leg syndrome.    A split night polysomnogram was done on March 18, 2017. In the diagnostic phase there is fragmentation of sleep with a reduced sleep efficiency and an elevated arousal and awakening index but no periodic limb movements. No REM sleep time was identified. The apnea hypopnea index is 98.9 events per hour including 213 episodes of central apnea, a single obstructive apnea, and 4 episodes of hypopnea. The lowest arterial oxygen saturation is 60% on room air and he spent 73% of the diagnostic time with a saturation below 90%. In the treatment phase of the study, CPAP was titrated across a pressure range of 5-14 cm water with persistence of central apnea events.  "Central apneas persisted on BiPAP with a pressure range of 16-17/10-17 cm water. He was finally changed to servo adaptive BiPAP ventilation with a reduction in the number of central apnea events but he had persisting hypopnea events during REM sleep. In the final pressure stage, the apnea hypopnea index was still 60.8 events per hour with the lowest arterial oxygen saturation of 75% on room air.        Patient Active Problem List    Diagnosis Date Noted   • Renal failure 03/16/2017   • Hypoxia 03/14/2017   • Benign prostatic hyperplasia 03/27/2014   • Degeneration of lumbar or lumbosacral intervertebral disc 02/19/2014   • Type 2 DM 10/13/2013   • Hypertension 10/13/2013   • Dyslipidemia 10/13/2013   • Albuminuria manifested in a patient with type 2 DM 10/13/2013       Past Medical History   Diagnosis Date   • Hyperlipidemia    • Unspecified urinary incontinence      \" enlarged prostate\"   • Hypertension 2014     well  controlled   • Diabetes      IDDM   • Arthritis      generalized   • Pain 04/11/14     back pain 2/14 -7/10; increases with activity   • CATARACT      early stages       Past Surgical History   Procedure Laterality Date   • Lumbar laminectomy diskectomy  5/2/2012     Performed by MAZIN WU at SURGERY [facility]   • Lumbar laminectomy diskectomy  2/19/2014     Performed by Mazin Wu M.D. at [facility]   • Trans urethral resection prostate  3/27/2014     Performed by Kyle Guzman M.D. at [facility]   • Other orthopedic surgery       left shoulder replacement   • Other orthopedic surgery       right knee replacement/manipulation   • Other orthopedic surgery  03/12     L3-L5 Laminectomy   • Other orthopedic surgery  02/14     L2-L3 Laminectomy   • Lumbar laminectomy diskectomy  4/22/2014     Performed by Mazin Wu M.D. at [facility]   • Recovery  1/13/2016     Procedure: IR Deep bone biopsy L2-L3 with general anesthesia-DAYANA;  Surgeon: " Recoveryonly Surgery;  Location: SURGERY PRE-POST PROC UNIT [facility];  Service:        Family History   Problem Relation Age of Onset   • Sleep Apnea Neg Hx        Social History     Social History   • Marital Status:      Spouse Name: N/A   • Number of Children: N/A   • Years of Education: N/A     Occupational History   • Not on file.     Social History Main Topics   • Smoking status: Former Smoker -- 0.75 packs/day for 10 years     Types: Cigarettes     Quit date: 04/24/2008   • Smokeless tobacco: Never Used   • Alcohol Use: No   • Drug Use: No   • Sexual Activity: Not on file     Other Topics Concern   • Not on file     Social History Narrative       Current Outpatient Prescriptions   Medication Sig Dispense Refill   • meloxicam (MOBIC) 15 MG tablet Take 15 mg by mouth every day.  11   • Cholecalciferol (VITAMIN D3) 5000 UNITS Cap Take 1 Cap by mouth every day.     • rivaroxaban (XARELTO) 15 MG Tab tablet Take 1 Tab by mouth 2 Times a Day for 20 days. (Patient taking differently: Take 15 mg by mouth 2 Times a Day. Until 4/6/2017) 40 Tab 0   • insulin aspart protamine-insulin aspart (NOVOLOG MIX 70/30) (70-30) 100 UNIT/ML injection Inject 5-22 Units as instructed 3 times a day before meals. 100 = 5 units  120= 7 units  184 = 9 units  200 = 12 units  Pt can go as high as 22 units if needed     • gabapentin (NEURONTIN) 300 MG Cap Take 600 mg by mouth 2 Times a Day.     • ropinirole (REQUIP) 0.5 MG Tab Take 1 mg by mouth every bedtime.     • hydrocodone/acetaminophen (NORCO)  MG Tab Take 1-2 Tabs by mouth every 6 hours as needed for Severe Pain.     • tamsulosin (FLOMAX) 0.4 MG capsule Take 0.4 mg by mouth ONE-HALF HOUR AFTER BREAKFAST.     • Omega-3 Fatty Acids (FISH OIL TRIPLE STRENGTH) 1400 MG Cap Take 1 Cap by mouth every day.     • Multiple Vitamins-Minerals (MULTIVITAMIN PO) Take 1 Tab by mouth every day.     • tramadol (ULTRAM) 50 MG Tab Take  mg by mouth every 6 hours as needed for Mild Pain.    "  • metformin (GLUCOPHAGE) 1000 MG tablet Take 1,000 mg by mouth 2 times a day, with meals.     • benazepril (LOTENSIN) 40 MG tablet Take 40 mg by mouth 2 Times a Day.     • indapamide (LOZOL) 2.5 MG TABS Take 2.5 mg by mouth every morning.     • Carvedilol Phosphate (COREG CR) 40 MG CP24 Take 40 mg by mouth every day.     • amlodipine (NORVASC) 5 MG TABS Take 5 mg by mouth 2 Times a Day.     • gemfibrozil (LOPID) 600 MG TABS Take 600 mg by mouth 2 times a day.     • [START ON 4/5/2017] rivaroxaban (XARELTO) 20 MG Tab tablet Take 1 Tab by mouth with dinner. (Patient taking differently: Take 20 mg by mouth with dinner. Starts 04/07/2017) 30 Tab 2     No current facility-administered medications for this visit.    \"CURRENT RX\"      Allergies: Pollen extract      ROS  Positive for the sleep, cardiac, endocrine and renal/urologic issues reviewed above. All other aspects of the CBD review of systems process are negative.      Physical Exam:   /64 mmHg  Pulse 79  Temp(Src) 36.4 °C (97.5 °F)  Resp 16  Ht 1.676 m (5' 6\")  Wt 102.513 kg (226 lb)  BMI 36.49 kg/m2  SpO2 93%   Head and neck examination demonstrates no mucosal lesion, purulent drainage or evident polyps. The pharynx is benign with a Mallampati II presentation. The neck is supple without thyromegaly. On chest examination there are symmetrical bilateral breath sounds without rales, wheezing or consolidation. On cardiac examination, the apical impulse and heart sounds are normal and the rhythm is regular at this time. There is no murmur, gallop or rub and no jugular venous distention. The abdomen is soft with active bowel sounds and no palpable hepatosplenomegaly, mass, guarding or rebound. The extremities show no clubbing, cyanosis or edema and no signs of deep venous thrombosis. There is no warmth, redness, tenderness or palpable venous cord in the calves. The skin is clear, warm and dry. There is no unusual peripheral lymphadenopathy. Peripheral " pulses are palpable in all 4 extremities. On neurologic examination, cranial nerve function is intact, motor tone is symmetrical, and the patient is alert, oriented and responsive.       Problems:  1. Central sleep apnea due to medical condition  He has severe central sleep apnea with an apnea hypopnea index of 98.9 events per hour and the lowest arterial oxygen saturation of 60% on room air. The central apnea did not respond to treatment with CPAP or BiPAP therapy across the broad range of pressures. The use of servo adaptive BiPAP ventilation resulted in a reduction in the number central apnea events but persisting hypopnea events were identified in REM sleep. It is clear that CPAP and BiPAP will not effectively treat his problem. Servo adaptive BiPAP should be successful but it is likely that he will require a higher expiratory pressure or an adjustable expiratory pressure to suppress the hypopnea episodes in rem sleep and he may need a bit higher level of pressure support and a backup rate as well given the persistence of some central apnea episodes on ASV.    2. Hypersomnolence  Related to the sleep-disordered breathing. He is spending sufficient time in bed and does not seem to have major issues with sleep hygiene.      Plan:   1. Initiate nocturnal ASV. For the reasons described above a Respironics unit with a variable expiratory pressure range of 6-10 cm water and pressure support of 6-15 cm water with an automatic backup rate will be used initially. If his response to treatment is not optimal, repeat polysomnography dedicated to finer titration of ASV will be necessary. He did well with a large Dunia View mask and heated humidification.    2. Return visit here in about 6 weeks for reevaluation, bringing the data recording chip with him. He may drop into the technician clinic at any time for assistance.    We appreciate the opportunity to assist in his care.  Return in about 6 weeks (around 5/3/2017).

## 2017-03-29 ENCOUNTER — TELEPHONE (OUTPATIENT)
Dept: CARDIOLOGY | Facility: MEDICAL CENTER | Age: 67
End: 2017-03-29

## 2017-03-29 ENCOUNTER — OFFICE VISIT (OUTPATIENT)
Dept: CARDIOLOGY | Facility: MEDICAL CENTER | Age: 67
End: 2017-03-29
Payer: MEDICARE

## 2017-03-29 VITALS
HEART RATE: 77 BPM | BODY MASS INDEX: 35.36 KG/M2 | DIASTOLIC BLOOD PRESSURE: 70 MMHG | WEIGHT: 220 LBS | SYSTOLIC BLOOD PRESSURE: 122 MMHG | OXYGEN SATURATION: 94 % | HEIGHT: 66 IN

## 2017-03-29 DIAGNOSIS — I48.3 TYPICAL ATRIAL FLUTTER (HCC): ICD-10-CM

## 2017-03-29 DIAGNOSIS — I10 ESSENTIAL HYPERTENSION: ICD-10-CM

## 2017-03-29 LAB — EKG IMPRESSION: NORMAL

## 2017-03-29 PROCEDURE — 1111F DSCHRG MED/CURRENT MED MERGE: CPT | Performed by: INTERNAL MEDICINE

## 2017-03-29 PROCEDURE — 99205 OFFICE O/P NEW HI 60 MIN: CPT | Performed by: INTERNAL MEDICINE

## 2017-03-29 PROCEDURE — G8417 CALC BMI ABV UP PARAM F/U: HCPCS | Performed by: INTERNAL MEDICINE

## 2017-03-29 PROCEDURE — 4040F PNEUMOC VAC/ADMIN/RCVD: CPT | Performed by: INTERNAL MEDICINE

## 2017-03-29 PROCEDURE — 93000 ELECTROCARDIOGRAM COMPLETE: CPT | Performed by: INTERNAL MEDICINE

## 2017-03-29 PROCEDURE — 1101F PT FALLS ASSESS-DOCD LE1/YR: CPT | Mod: 8P | Performed by: INTERNAL MEDICINE

## 2017-03-29 PROCEDURE — G8432 DEP SCR NOT DOC, RNG: HCPCS | Performed by: INTERNAL MEDICINE

## 2017-03-29 PROCEDURE — 3017F COLORECTAL CA SCREEN DOC REV: CPT | Performed by: INTERNAL MEDICINE

## 2017-03-29 PROCEDURE — 1036F TOBACCO NON-USER: CPT | Performed by: INTERNAL MEDICINE

## 2017-03-29 PROCEDURE — G8482 FLU IMMUNIZE ORDER/ADMIN: HCPCS | Performed by: INTERNAL MEDICINE

## 2017-03-29 ASSESSMENT — ENCOUNTER SYMPTOMS
CLAUDICATION: 1
WEAKNESS: 1
BACK PAIN: 1

## 2017-03-29 NOTE — MR AVS SNAPSHOT
"        Zhang Cheung   3/29/2017 9:00 AM   Office Visit   MRN: [id number]    Department:  [facility]   Dept Phone:  [phone]    Description:  Male : 1950   Provider:  Zhang Torres M.D.           Reason for Visit     New Patient           Allergies as of 3/29/2017     Allergen Noted Reactions    Pollen Extract 2014       Local pollen, primarily [place]      You were diagnosed with     Essential hypertension   [2196739]       Typical atrial flutter (CMS-[facility])   [262239]         Vital Signs     Blood Pressure Pulse Height Weight Body Mass Index Oxygen Saturation    122/70 mmHg 77 1.676 m (5' 5.98\") 99.791 kg (220 lb) 35.53 kg/m2 94%    Smoking Status                   Former Smoker           Basic Information     Date Of Birth Sex Race Ethnicity Preferred Language    1950 Male White Non- English      Your appointments     2017  7:15 AM   Scheduled Pre Admission with PREADMIT 2   [facility] [place]    [address] [place] [id number] [phone]              Problem List              ICD-10-CM Priority Class Noted - Resolved    Type 2 DM E11.9   10/13/2013 - Present    Hypertension I10   10/13/2013 - Present    Dyslipidemia E78.5   10/13/2013 - Present    Albuminuria manifested in a patient with type 2 DM R80.9   10/13/2013 - Present    Degeneration of lumbar or lumbosacral intervertebral disc M51.37   2014 - Present    Benign prostatic hyperplasia N40.0   3/27/2014 - Present    Hypoxia R09.02   3/14/2017 - Present    Renal failure N19   3/16/2017 - Present      Health Maintenance        Date Due Completion Dates    COLONOSCOPY 1998 ---    IMM ZOSTER VACCINE 2008 ---    IMM PNEUMOCOCCAL 65+ (ADULT) LOW/MEDIUM RISK SERIES (1 of 2 - PCV13) 3/27/2013 3/27/2012    RETINAL SCREENING 2014, 2012    A1C SCREENING 2017 10/31/2016, 2016, 2015, 2014, 3/18/2014, 10/16/2013, 2013, 2012, 2012, 2011   " URINE ACR / MICROALBUMIN 6/20/2017 6/20/2016, 7/17/2015, 7/29/2014, 9/23/2013, 3/18/2013, 11/2/2012, 11/7/2011, 8/25/2011    FASTING LIPID PROFILE 10/31/2017 10/31/2016, 6/20/2016, 7/17/2015, 7/29/2014, 3/18/2014, 9/23/2013, 3/18/2013, 11/2/2012, 8/25/2011    SERUM CREATININE 3/17/2018 3/17/2017, 3/16/2017, 3/15/2017, 3/14/2017, 6/20/2016, 1/8/2016, 11/17/2015, 8/12/2015, 7/17/2015, 4/11/2014, 3/28/2014, 3/20/2014, 3/18/2014, 2/11/2014, 9/23/2013, 3/18/2013, 11/2/2012, 5/7/2012, 4/24/2012, 11/7/2011, 8/25/2011, 1/9/2008, 1/22/2007, 12/6/2006    IMM DTaP/Tdap/Td Vaccine (2 - Td) 6/8/2020 6/8/2010            Results       Current Immunizations     Influenza TIV (IM) 11/27/2013    Influenza Vaccine Adult HD 10/15/2016    Pneumococcal polysaccharide vaccine (PPSV-23) 3/27/2012    Tdap Vaccine 6/8/2010      Below and/or attached are the medications your provider expects you to take. Review all of your home medications and newly ordered medications with your provider and/or pharmacist. Follow medication instructions as directed by your provider and/or pharmacist. Please keep your medication list with you and share with your provider. Update the information when medications are discontinued, doses are changed, or new medications (including over-the-counter products) are added; and carry medication information at all times in the event of emergency situations     Allergies:  POLLEN EXTRACT - (reactions not documented)               Medications  Valid as of: March 29, 2017 - 10:14 AM    Generic Name Brand Name Tablet Size Instructions for use    AmLODIPine Besylate (Tab) NORVASC 5 MG Take 5 mg by mouth 2 Times a Day.        Benazepril HCl (Tab) LOTENSIN 40 MG Take 40 mg by mouth 2 Times a Day.        Carvedilol Phosphate (CAPSULE SR 24 HR) Carvedilol Phosphate 40 MG Take 40 mg by mouth every day.        Cholecalciferol (Cap) vitamin D3 5000 UNITS Take 1 Cap by mouth every day.        Gabapentin (Cap) NEURONTIN 300 MG Take  600 mg by mouth 2 Times a Day.        Gemfibrozil (Tab) LOPID 600 MG Take 600 mg by mouth 2 times a day.        Hydrocodone-Acetaminophen (Tab) NORCO  MG Take 1-2 Tabs by mouth every 6 hours as needed for Severe Pain.        Indapamide (Tab) LOZOL 2.5 MG Take 2.5 mg by mouth every morning.        Insulin Aspart Prot & Aspart (Suspension) NOVOLOG 70/30 (70-30) 100 UNIT/ML Inject 5-22 Units as instructed 3 times a day before meals. 100 = 5 units  120= 7 units  184 = 9 units  200 = 12 units  Pt can go as high as 22 units if needed        Meloxicam (Tab) MOBIC 15 MG Take 15 mg by mouth every day.        MetFORMIN HCl (Tab) GLUCOPHAGE 1000 MG Take 1,000 mg by mouth 2 times a day, with meals.        Multiple Vitamins-Minerals   Take 1 Tab by mouth every day.        Omega-3 Fatty Acids (Cap) FISH OIL TRIPLE STRENGTH 1400 MG Take 1 Cap by mouth every day.        Rivaroxaban (Tab) XARELTO 15 MG Take 1 Tab by mouth 2 Times a Day for 20 days.        Rivaroxaban (Tab) XARELTO 20 MG Take 1 Tab by mouth with dinner.        ROPINIRole HCl (Tab) REQUIP 0.5 MG Take 1 mg by mouth every bedtime.        Tamsulosin HCl (Cap) FLOMAX 0.4 MG Take 0.4 mg by mouth ONE-HALF HOUR AFTER BREAKFAST.        TraMADol HCl (Tab) ULTRAM 50 MG Take  mg by mouth every 6 hours as needed for Mild Pain.        .                 Medicines prescribed today were sent to:     [facility]Y #[id number] - [place] - [address] [address]    Phone: [phone] Fax: [phone]    Open 24 Hours?: No      Medication refill instructions:       If your prescription bottle indicates you have medication refills left, it is not necessary to call your provider’s office. Please contact your pharmacy and they will refill your medication.    If your prescription bottle indicates you do not have any refills left, you may request refills at any time through one of the following ways: The online [employer] system (except Urgent Care), by calling your  provider’s office, or by asking your pharmacy to contact your provider’s office with a refill request. Medication refills are processed only during regular business hours and may not be available until the next business day. Your provider may request additional information or to have a follow-up visit with you prior to refilling your medication.   *Please Note: Medication refills are assigned a new Rx number when refilled electronically. Your pharmacy may indicate that no refills were authorized even though a new prescription for the same medication is available at the pharmacy. Please request the medicine by name with the pharmacy before contacting your provider for a refill.           [employer] Access Code: Activation code not generated  Current [employer] Status: Active

## 2017-03-29 NOTE — Clinical Note
"     [facility]   [address] [place] [address]  [place] [address]  Phone: [phone]  Fax: [phone]              Zhang Cheung  1950    Encounter Date: 3/29/2017    Zhang Torres M.D.          PROGRESS NOTE:  Subjective:   Zhang Cheung is a 67 y.o. male who presents today being seen in consultation today at the request of Dr. Silva for evaluation of atrial fibrillation.    Was hospitalized with very rapid heart beat and low bp and low oxygen level.  He was cardioverted to sinus and has felt sign    He has never felt palpitations.    He had an attack that started late Saturday 3/11.  He felt exhausted and slightly out of control mentally.  He just curled up and went back to bed.  He just slept all day Sunday and realized something was wrong.  No appetite.  Not nauseated, just not hungry.  He was confused.  Sunday night called son and told him he thought he migh have had pneumonia.  A heavy pressure on his lungs.  He had this 30-40 years ago and this was reminiscent but he notes he was half delirious.  Son reports that he was hypoxic with oxygen level at 70 when he arrived.      He has horrible sleep apnea    Past Medical History   Diagnosis Date   • Hyperlipidemia    • Unspecified urinary incontinence      \" enlarged prostate\"   • Hypertension 2014     well  controlled   • Diabetes      IDDM   • Arthritis      generalized   • Pain 04/11/14     back pain 2/14 -7/10; increases with activity   • CATARACT      early stages     Past Surgical History   Procedure Laterality Date   • Lumbar laminectomy diskectomy  5/2/2012     Performed by MAZIN LONG at SURGERY [facility]   • Lumbar laminectomy diskectomy  2/19/2014     Performed by Mazin Long M.D. at SURGERY [facility]   • Trans urethral resection prostate  3/27/2014     Performed by Kyle Guzman M.D. at SURGERY [facility]   • Other orthopedic surgery       left shoulder replacement   • Other " orthopedic surgery       right knee replacement/manipulation   • Other orthopedic surgery  03/12     L3-L5 Laminectomy   • Other orthopedic surgery  02/14     L2-L3 Laminectomy   • Lumbar laminectomy diskectomy  4/22/2014     Performed by Rogers Long M.D. at SURGERY [facility]   • Recovery  1/13/2016     Procedure: IR Deep bone biopsy L2-L3 with general anesthesia-DAYANA;  Surgeon: [facility] Surgery;  Location: SURGERY PRE-POST PROC UNIT [facility];  Service:      Family History   Problem Relation Age of Onset   • Sleep Apnea Neg Hx      History   Smoking status   • Former Smoker -- 0.75 packs/day for 10 years   • Types: Cigarettes   • Quit date: 04/24/2008   Smokeless tobacco   • Never Used     Allergies   Allergen Reactions   • Pollen Extract      Local pollen, primarily [place]     Outpatient Encounter Prescriptions as of 3/29/2017   Medication Sig Dispense Refill   • meloxicam (MOBIC) 15 MG tablet Take 15 mg by mouth every day.  11   • Cholecalciferol (VITAMIN D3) 5000 UNITS Cap Take 1 Cap by mouth every day.     • rivaroxaban (XARELTO) 15 MG Tab tablet Take 1 Tab by mouth 2 Times a Day for 20 days. (Patient taking differently: Take 15 mg by mouth 2 Times a Day. Until 4/6/2017) 40 Tab 0   • insulin aspart protamine-insulin aspart (NOVOLOG MIX 70/30) (70-30) 100 UNIT/ML injection Inject 5-22 Units as instructed 3 times a day before meals. 100 = 5 units  120= 7 units  184 = 9 units  200 = 12 units  Pt can go as high as 22 units if needed     • gabapentin (NEURONTIN) 300 MG Cap Take 600 mg by mouth 2 Times a Day.     • ropinirole (REQUIP) 0.5 MG Tab Take 1 mg by mouth every bedtime.     • hydrocodone/acetaminophen (NORCO)  MG Tab Take 1-2 Tabs by mouth every 6 hours as needed for Severe Pain.     • tamsulosin (FLOMAX) 0.4 MG capsule Take 0.4 mg by mouth ONE-HALF HOUR AFTER BREAKFAST.     • Multiple Vitamins-Minerals (MULTIVITAMIN PO) Take 1 Tab by mouth every day.     • tramadol (ULTRAM) 50 MG Tab  "Take  mg by mouth every 6 hours as needed for Mild Pain.     • metformin (GLUCOPHAGE) 1000 MG tablet Take 1,000 mg by mouth 2 times a day, with meals.     • benazepril (LOTENSIN) 40 MG tablet Take 40 mg by mouth 2 Times a Day.     • indapamide (LOZOL) 2.5 MG TABS Take 2.5 mg by mouth every morning.     • Carvedilol Phosphate (COREG CR) 40 MG CP24 Take 40 mg by mouth every day.     • amlodipine (NORVASC) 5 MG TABS Take 5 mg by mouth 2 Times a Day.     • gemfibrozil (LOPID) 600 MG TABS Take 600 mg by mouth 2 times a day.     • [START ON 4/5/2017] rivaroxaban (XARELTO) 20 MG Tab tablet Take 1 Tab by mouth with dinner. (Patient taking differently: Take 20 mg by mouth with dinner. Starts 04/07/2017) 30 Tab 2   • Omega-3 Fatty Acids (FISH OIL TRIPLE STRENGTH) 1400 MG Cap Take 1 Cap by mouth every day.       No facility-administered encounter medications on file as of 3/29/2017.     Review of Systems   HENT: Positive for tinnitus.    Cardiovascular: Positive for claudication.   Musculoskeletal: Positive for back pain and joint pain.   Neurological: Positive for weakness.   Endo/Heme/Allergies: Positive for environmental allergies.   All other systems reviewed and are negative.       Objective:   /70 mmHg  Pulse 77  Ht 1.676 m (5' 5.98\")  Wt 99.791 kg (220 lb)  BMI 35.53 kg/m2  SpO2 94%    Physical Exam   Constitutional: He is oriented to person, place, and time. He appears well-developed and well-nourished. No distress.   HENT:   Head: Normocephalic and atraumatic.   Right Ear: External ear normal.   Left Ear: External ear normal.   Nose: Nose normal.   Mouth/Throat: Oropharynx is clear and moist.   Eyes: Conjunctivae and EOM are normal. Pupils are equal, round, and reactive to light. Right eye exhibits no discharge. Left eye exhibits no discharge. No scleral icterus.   Neck: Normal range of motion. Neck supple. No JVD present. No tracheal deviation present.   Cardiovascular: Normal rate, regular rhythm, " normal heart sounds and intact distal pulses.  Exam reveals no gallop and no friction rub.    No murmur heard.  Pulmonary/Chest: Effort normal and breath sounds normal. No stridor. No respiratory distress. He has no wheezes. He has no rales. He exhibits no tenderness.   Abdominal: Soft. He exhibits no distension. There is no tenderness.   Musculoskeletal: He exhibits no edema or tenderness.   Neurological: He is alert and oriented to person, place, and time. No cranial nerve deficit. Coordination normal.   Skin: Skin is warm and dry. No rash noted. He is not diaphoretic. No erythema. No pallor.   Psychiatric: He has a normal mood and affect. His behavior is normal. Judgment and thought content normal.   Vitals reviewed.      Assessment:     1. Atrial fibrillation, unspecified type (CMS-HCC)  EKG   2. Essential hypertension  EKG     3/15/17 typical flutter- numerous ecg reviewed and all appear to be typical flutter.  I even reviewed the monitoring strips from media and I do not see any atrial fibrillation  Cr 3 on arrival to hospital- came down to 1.5.  Baseline year before was 1.0  ecg today with nsr.    Medical Decision Making:  Today's Assessment / Status / Plan:   A flutter- offered ablation and he wishes to proceed.  Understands risk and benefits.    xarelto can be changed to daily dosing 20 mg as no need for dvt dosing.          Tori Rai M.D.  [address]  [address]  [place] [address]  VIA Facsimile: [phone]     Chin Goldsmith M.D.  [address]  [address]  [place] [address]  VIA In Basket

## 2017-03-29 NOTE — PROGRESS NOTES
"Subjective:   Zhang Cheung is a 67 y.o. male who presents today being seen in consultation today at the request of Dr. Goldsmith for evaluation of atrial fibrillation.    Was hospitalized with very rapid heart beat and low bp and low oxygen level.  He was cardioverted to sinus and has felt sign    He has never felt palpitations.    He had an attack that started late Saturday 3/11.  He felt exhausted and slightly out of control mentally.  He just curled up and went back to bed.  He just slept all day Sunday and realized something was wrong.  No appetite.  Not nauseated, just not hungry.  He was confused.  Sunday night called son and told him he thought he migh have had pneumonia.  A heavy pressure on his lungs.  He had this 30-40 years ago and this was reminiscent but he notes he was half delirious.  Son reports that he was hypoxic with oxygen level at 70 when he arrived.      He has horrible sleep apnea- going to start tx    On dvt dosing xarelto.  Thinks it may make him feel cold.    Past Medical History   Diagnosis Date   • Hyperlipidemia    • Unspecified urinary incontinence      \" enlarged prostate\"   • Hypertension 2014     well  controlled   • Diabetes      IDDM   • Arthritis      generalized   • Pain 04/11/14     back pain 2/14 -7/10; increases with activity   • CATARACT      early stages     Past Surgical History   Procedure Laterality Date   • Lumbar laminectomy diskectomy  5/2/2012     Performed by MAZIN LONG at SURGERY [facility]   • Lumbar laminectomy diskectomy  2/19/2014     Performed by Mazin Long M.D. at SURGERY [facility]   • Trans urethral resection prostate  3/27/2014     Performed by Kyle Guzman M.D. at SURGERY [facility]   • Other orthopedic surgery       left shoulder replacement   • Other orthopedic surgery       right knee replacement/manipulation   • Other orthopedic surgery  03/12     L3-L5 Laminectomy   • Other orthopedic surgery  02/14     L2-L3 Laminectomy   • " Lumbar laminectomy diskectomy  4/22/2014     Performed by Rogers Long M.D. at SURGERY [facility]   • Recovery  1/13/2016     Procedure: IR Deep bone biopsy L2-L3 with general anesthesia-DAYANA;  Surgeon: [facility];  Location: SURGERY PRE-POST PROC UNIT [facility];  Service:      Family History   Problem Relation Age of Onset   • Sleep Apnea Neg Hx      History   Smoking status   • Former Smoker -- 0.75 packs/day for 10 years   • Types: Cigarettes   • Quit date: 04/24/2008   Smokeless tobacco   • Never Used     Allergies   Allergen Reactions   • Pollen Extract      Local pollen, primarily [place]     Outpatient Encounter Prescriptions as of 3/29/2017   Medication Sig Dispense Refill   • meloxicam (MOBIC) 15 MG tablet Take 15 mg by mouth every day.  11   • Cholecalciferol (VITAMIN D3) 5000 UNITS Cap Take 1 Cap by mouth every day.     • rivaroxaban (XARELTO) 15 MG Tab tablet Take 1 Tab by mouth 2 Times a Day for 20 days. (Patient taking differently: Take 15 mg by mouth 2 Times a Day. Until 4/6/2017) 40 Tab 0   • insulin aspart protamine-insulin aspart (NOVOLOG MIX 70/30) (70-30) 100 UNIT/ML injection Inject 5-22 Units as instructed 3 times a day before meals. 100 = 5 units  120= 7 units  184 = 9 units  200 = 12 units  Pt can go as high as 22 units if needed     • gabapentin (NEURONTIN) 300 MG Cap Take 600 mg by mouth 2 Times a Day.     • ropinirole (REQUIP) 0.5 MG Tab Take 1 mg by mouth every bedtime.     • hydrocodone/acetaminophen (NORCO)  MG Tab Take 1-2 Tabs by mouth every 6 hours as needed for Severe Pain.     • tamsulosin (FLOMAX) 0.4 MG capsule Take 0.4 mg by mouth ONE-HALF HOUR AFTER BREAKFAST.     • Multiple Vitamins-Minerals (MULTIVITAMIN PO) Take 1 Tab by mouth every day.     • tramadol (ULTRAM) 50 MG Tab Take  mg by mouth every 6 hours as needed for Mild Pain.     • metformin (GLUCOPHAGE) 1000 MG tablet Take 1,000 mg by mouth 2 times a day, with meals.     • benazepril  "(LOTENSIN) 40 MG tablet Take 40 mg by mouth 2 Times a Day.     • indapamide (LOZOL) 2.5 MG TABS Take 2.5 mg by mouth every morning.     • Carvedilol Phosphate (COREG CR) 40 MG CP24 Take 40 mg by mouth every day.     • amlodipine (NORVASC) 5 MG TABS Take 5 mg by mouth 2 Times a Day.     • gemfibrozil (LOPID) 600 MG TABS Take 600 mg by mouth 2 times a day.     • [START ON 4/5/2017] rivaroxaban (XARELTO) 20 MG Tab tablet Take 1 Tab by mouth with dinner. (Patient taking differently: Take 20 mg by mouth with dinner. Starts 04/07/2017) 30 Tab 2   • Omega-3 Fatty Acids (FISH OIL TRIPLE STRENGTH) 1400 MG Cap Take 1 Cap by mouth every day.       No facility-administered encounter medications on file as of 3/29/2017.     Review of Systems   HENT: Positive for tinnitus.    Cardiovascular: Positive for claudication.   Musculoskeletal: Positive for back pain and joint pain.   Neurological: Positive for weakness.   Endo/Heme/Allergies: Positive for environmental allergies.   All other systems reviewed and are negative.       Objective:   /70 mmHg  Pulse 77  Ht 1.676 m (5' 5.98\")  Wt 99.791 kg (220 lb)  BMI 35.53 kg/m2  SpO2 94%    Physical Exam   Constitutional: He is oriented to person, place, and time. He appears well-developed and well-nourished. No distress.   HENT:   Head: Normocephalic and atraumatic.   Right Ear: External ear normal.   Left Ear: External ear normal.   Nose: Nose normal.   Mouth/Throat: Oropharynx is clear and moist.   Eyes: Conjunctivae and EOM are normal. Pupils are equal, round, and reactive to light. Right eye exhibits no discharge. Left eye exhibits no discharge. No scleral icterus.   Neck: Normal range of motion. Neck supple. No JVD present. No tracheal deviation present.   Cardiovascular: Normal rate, regular rhythm, normal heart sounds and intact distal pulses.  Exam reveals no gallop and no friction rub.    No murmur heard.  Pulmonary/Chest: Effort normal and breath sounds normal. No " stridor. No respiratory distress. He has no wheezes. He has no rales. He exhibits no tenderness.   Abdominal: Soft. He exhibits no distension. There is no tenderness.   Musculoskeletal: He exhibits no edema or tenderness.   Neurological: He is alert and oriented to person, place, and time. No cranial nerve deficit. Coordination normal.   Skin: Skin is warm and dry. No rash noted. He is not diaphoretic. No erythema. No pallor.   Psychiatric: He has a normal mood and affect. His behavior is normal. Judgment and thought content normal.   Vitals reviewed.      Assessment:     1. Atrial fibrillation, unspecified type (CMS-[facility])  EKG   2. Essential hypertension  EKG     3/15/17 typical flutter- numerous ecg reviewed and all appear to be typical flutter.  I even reviewed the monitoring strips from media and I do not see any atrial fibrillation  Cr 3 on arrival to hospital- came down to 1.5.  Baseline year before was 1.0  ecg today with nsr.    Medical Decision Making:  Today's Assessment / Status / Plan:   A flutter- offered ablation and he wishes to proceed.  Understands risk and benefits.    xarelto can be changed to daily dosing 20 mg as no need for dvt dosing.

## 2017-03-29 NOTE — TELEPHONE ENCOUNTER
Patient scheduled for flutter ablation on 4-25-17 at [facility] with Dr. Torres.

## 2017-04-21 ENCOUNTER — TELEPHONE (OUTPATIENT)
Dept: SLEEP MEDICINE | Facility: MEDICAL CENTER | Age: 67
End: 2017-04-21

## 2017-04-21 NOTE — TELEPHONE ENCOUNTER
Patient stopped by the sleep center.  C/o waking with a claustrophic feeling and choking.  He's not sure if the the pressure is to blame.  Downloaded the compliance report.  Patient has only had his machine 3 days.    Dr. Richard reviewed the ASV settings and they seem to be working, but patient needs to acclimate.  He recommended patient utilize the ramp feature, more than once each night if necessary.     Patient notified @ 1630 and will call if he continues to have any problems.  If needed Dr. Richard will add him to his schedule the w/o 05/14/17 as an add-on patient.

## 2017-04-24 DIAGNOSIS — Z01.810 PREPROCEDURAL CARDIOVASCULAR EXAMINATION: ICD-10-CM

## 2017-04-24 DIAGNOSIS — Z01.812 PRE-PROCEDURAL LABORATORY EXAMINATION: ICD-10-CM

## 2017-04-24 LAB
ALBUMIN SERPL BCP-MCNC: 4.3 G/DL (ref 3.2–4.9)
ALBUMIN/GLOB SERPL: 1.2 G/DL
ALP SERPL-CCNC: 128 U/L (ref 30–99)
ALT SERPL-CCNC: 13 U/L (ref 2–50)
ANION GAP SERPL CALC-SCNC: 8 MMOL/L (ref 0–11.9)
AST SERPL-CCNC: 23 U/L (ref 12–45)
BASOPHILS # BLD AUTO: 0.6 % (ref 0–1.8)
BASOPHILS # BLD: 0.04 K/UL (ref 0–0.12)
BILIRUB SERPL-MCNC: 0.4 MG/DL (ref 0.1–1.5)
BUN SERPL-MCNC: 31 MG/DL (ref 8–22)
CALCIUM SERPL-MCNC: 10.1 MG/DL (ref 8.5–10.5)
CHLORIDE SERPL-SCNC: 102 MMOL/L (ref 96–112)
CO2 SERPL-SCNC: 29 MMOL/L (ref 20–33)
CREAT SERPL-MCNC: 0.99 MG/DL (ref 0.5–1.4)
EKG IMPRESSION: NORMAL
EOSINOPHIL # BLD AUTO: 0.29 K/UL (ref 0–0.51)
EOSINOPHIL NFR BLD: 4.5 % (ref 0–6.9)
ERYTHROCYTE [DISTWIDTH] IN BLOOD BY AUTOMATED COUNT: 47 FL (ref 35.9–50)
GFR SERPL CREATININE-BSD FRML MDRD: >60 ML/MIN/1.73 M 2
GLOBULIN SER CALC-MCNC: 3.6 G/DL (ref 1.9–3.5)
GLUCOSE SERPL-MCNC: 126 MG/DL (ref 65–99)
HCT VFR BLD AUTO: 40 % (ref 42–52)
HGB BLD-MCNC: 12.7 G/DL (ref 14–18)
IMM GRANULOCYTES # BLD AUTO: 0.02 K/UL (ref 0–0.11)
IMM GRANULOCYTES NFR BLD AUTO: 0.3 % (ref 0–0.9)
INR PPP: 1.31 (ref 0.87–1.13)
LYMPHOCYTES # BLD AUTO: 1.42 K/UL (ref 1–4.8)
LYMPHOCYTES NFR BLD: 22.3 % (ref 22–41)
MCH RBC QN AUTO: 29.1 PG (ref 27–33)
MCHC RBC AUTO-ENTMCNC: 31.8 G/DL (ref 33.7–35.3)
MCV RBC AUTO: 91.7 FL (ref 81.4–97.8)
MONOCYTES # BLD AUTO: 0.7 K/UL (ref 0–0.85)
MONOCYTES NFR BLD AUTO: 11 % (ref 0–13.4)
NEUTROPHILS # BLD AUTO: 3.91 K/UL (ref 1.82–7.42)
NEUTROPHILS NFR BLD: 61.3 % (ref 44–72)
NRBC # BLD AUTO: 0 K/UL
NRBC BLD AUTO-RTO: 0 /100 WBC
PLATELET # BLD AUTO: 263 K/UL (ref 164–446)
PMV BLD AUTO: 11.3 FL (ref 9–12.9)
POTASSIUM SERPL-SCNC: 4.5 MMOL/L (ref 3.6–5.5)
PROT SERPL-MCNC: 7.9 G/DL (ref 6–8.2)
PROTHROMBIN TIME: 16.7 SEC (ref 12–14.6)
RBC # BLD AUTO: 4.36 M/UL (ref 4.7–6.1)
SODIUM SERPL-SCNC: 139 MMOL/L (ref 135–145)
WBC # BLD AUTO: 6.4 K/UL (ref 4.8–10.8)

## 2017-04-24 PROCEDURE — 85610 PROTHROMBIN TIME: CPT

## 2017-04-24 PROCEDURE — 85025 COMPLETE CBC W/AUTO DIFF WBC: CPT

## 2017-04-24 PROCEDURE — 36415 COLL VENOUS BLD VENIPUNCTURE: CPT

## 2017-04-24 PROCEDURE — 80053 COMPREHEN METABOLIC PANEL: CPT

## 2017-04-24 NOTE — TELEPHONE ENCOUNTER
Patient stopped by the sleep again this morning still feeling frustrated with the ASV machine.  He is ready to turn it back into [facility].  He tried tightening the seal on his mask, but the pressure is so high it still blows the mask off his face.    Can we lower the pressure?

## 2017-04-24 NOTE — TELEPHONE ENCOUNTER
As previously noted, please have patient scheduled to see Dr. Richard to change settings and discuss mask issues. He may still be eligible at [facility] for further mask fits.

## 2017-04-25 ENCOUNTER — HOSPITAL ENCOUNTER (OUTPATIENT)
Facility: MEDICAL CENTER | Age: 67
End: 2017-04-25
Attending: INTERNAL MEDICINE | Admitting: INTERNAL MEDICINE
Payer: MEDICARE

## 2017-04-25 VITALS
TEMPERATURE: 98 F | HEIGHT: 66 IN | DIASTOLIC BLOOD PRESSURE: 79 MMHG | RESPIRATION RATE: 19 BRPM | BODY MASS INDEX: 35.75 KG/M2 | SYSTOLIC BLOOD PRESSURE: 128 MMHG | OXYGEN SATURATION: 82 % | WEIGHT: 222.44 LBS | HEART RATE: 81 BPM

## 2017-04-25 PROBLEM — I48.3 TYPICAL ATRIAL FLUTTER (HCC): Status: ACTIVE | Noted: 2017-04-25

## 2017-04-25 LAB
GLUCOSE BLD-MCNC: 105 MG/DL (ref 65–99)
GLUCOSE BLD-MCNC: 129 MG/DL (ref 65–99)

## 2017-04-25 PROCEDURE — C1893 INTRO/SHEATH, FIXED,NON-PEEL: HCPCS

## 2017-04-25 PROCEDURE — 304952 HCHG R 2 PADS

## 2017-04-25 PROCEDURE — C1731 CATH, EP, 20 OR MORE ELEC: HCPCS

## 2017-04-25 PROCEDURE — C1894 INTRO/SHEATH, NON-LASER: HCPCS

## 2017-04-25 PROCEDURE — C1732 CATH, EP, DIAG/ABL, 3D/VECT: HCPCS

## 2017-04-25 PROCEDURE — 700101 HCHG RX REV CODE 250

## 2017-04-25 PROCEDURE — 700111 HCHG RX REV CODE 636 W/ 250 OVERRIDE (IP)

## 2017-04-25 PROCEDURE — 305383 HCHG CARTO3 REF PATCH

## 2017-04-25 PROCEDURE — 93621 COMP EP EVL L PAC&REC C SINS: CPT

## 2017-04-25 PROCEDURE — 93623 PRGRMD STIMJ&PACG IV RX NFS: CPT

## 2017-04-25 PROCEDURE — 00537 ANES CARDIAC EP PROCEDURES: CPT

## 2017-04-25 PROCEDURE — 93613 INTRACARDIAC EPHYS 3D MAPG: CPT

## 2017-04-25 PROCEDURE — 82962 GLUCOSE BLOOD TEST: CPT

## 2017-04-25 PROCEDURE — 93653 COMPRE EP EVAL TX SVT: CPT

## 2017-04-25 RX ORDER — LIDOCAINE HYDROCHLORIDE 20 MG/ML
INJECTION, SOLUTION INFILTRATION; PERINEURAL
Status: COMPLETED
Start: 2017-04-25 | End: 2017-04-25

## 2017-04-25 RX ORDER — ISOPROTERENOL HYDROCHLORIDE 0.2 MG/ML
INJECTION, SOLUTION INTRAVENOUS
Status: COMPLETED
Start: 2017-04-25 | End: 2017-04-25

## 2017-04-25 RX ORDER — LIDOCAINE HYDROCHLORIDE 40 MG/ML
SOLUTION TOPICAL
Status: DISCONTINUED
Start: 2017-04-25 | End: 2017-04-25 | Stop reason: HOSPADM

## 2017-04-25 RX ORDER — SODIUM CHLORIDE 9 MG/ML
INJECTION, SOLUTION INTRAVENOUS
Status: DISCONTINUED | OUTPATIENT
Start: 2017-04-25 | End: 2017-04-25 | Stop reason: HOSPADM

## 2017-04-25 RX ORDER — MIDAZOLAM HYDROCHLORIDE 1 MG/ML
INJECTION INTRAMUSCULAR; INTRAVENOUS
Status: DISCONTINUED
Start: 2017-04-25 | End: 2017-04-25 | Stop reason: HOSPADM

## 2017-04-25 RX ADMIN — LIDOCAINE HYDROCHLORIDE: 20 INJECTION, SOLUTION INFILTRATION; PERINEURAL at 12:27

## 2017-04-25 RX ADMIN — HEPARIN SODIUM 4000 UNITS: 200 INJECTION, SOLUTION INTRAVENOUS at 12:27

## 2017-04-25 RX ADMIN — ISOPROTERENOL HYDROCHLORIDE 200 MCG: 0.2 INJECTION, SOLUTION INTRACARDIAC; INTRAMUSCULAR; INTRAVENOUS; SUBCUTANEOUS at 13:18

## 2017-04-25 ASSESSMENT — PAIN SCALES - GENERAL
PAINLEVEL_OUTOF10: 0
PAINLEVEL_OUTOF10: 0

## 2017-04-25 NOTE — OR SURGEON
Immediate Post-Operative Note      PreOp Diagnosis: atrial flutter    PostOp Diagnosis: same    Procedure(s) :  eps and ablation of atrial flutter    Surgeon(s):  Zhang Torres M.D.    Type of Anesthesia: Moderate Sedation    Specimen: None    Estimated Blood Loss: 20 cc's    Contrast Media:  0 cc's    Fluoro Time: see flow     Findings: induced typical flutter.  Ablated cti.  No other arrhythmia    Complications: none      Zhang Torres M.D.  4/25/2017 3:58 PM

## 2017-04-25 NOTE — DISCHARGE INSTRUCTIONS
ACTIVITY: Rest and take it easy for the first 24 hours.  A responsible adult is recommended to remain with you during that time.  It is normal to feel sleepy.  We encourage you to not do anything that requires balance, judgment or coordination.    MILD FLU-LIKE SYMPTOMS ARE NORMAL. YOU MAY EXPERIENCE GENERALIZED MUSCLE ACHES, THROAT IRRITATION, HEADACHE AND/OR SOME NAUSEA.    FOR 24 HOURS DO NOT:  Drive, operate machinery or run household appliances.  Drink beer or alcoholic beverages.   Make important decisions or sign legal documents.    SPECIAL INSTRUCTIONS:     DIET: To avoid nausea, slowly advance diet as tolerated, avoiding spicy or greasy foods for the first day.  Add more substantial food to your diet according to your physician's instructions.  Babies can be fed formula or breast milk as soon as they are hungry.  INCREASE FLUIDS AND FIBER TO AVOID CONSTIPATION.    SURGICAL DRESSING/BATHING: May remove dressing in 24 hours. May shower in 24 hours. Do not submerge in water for 7 days.     FOLLOW-UP APPOINTMENT:  A follow-up appointment should be arranged with your doctor; call to schedule.    You should CALL YOUR PHYSICIAN if you develop:  Fever greater than 101 degrees F.  Pain not relieved by medication, or persistent nausea or vomiting.  Excessive bleeding (blood soaking through dressing) or unexpected drainage from the wound.  Extreme redness or swelling around the incision site, drainage of pus or foul smelling drainage.  Inability to urinate or empty your bladder within 8 hours.  Problems with breathing or chest pain.    You should call 911 if you develop problems with breathing or chest pain.  If you are unable to contact your doctor or surgical center, you should go to the nearest emergency room or urgent care center.  Physician's telephone #: Dr. Torres [phone]    If any questions arise, call your doctor.  If your doctor is not available, please feel free to call the Surgical Center at  [phone].  The Center is open Monday through Friday from 7AM to 7PM.  You can also call the HEALTH HOTLINE open 24 hours/day, 7 days/week and speak to a nurse at [phone], or toll free at [phone].    A registered nurse may call you a few days after your surgery to see how you are doing after your procedure.    MEDICATIONS: Resume taking daily medication.  Take prescribed pain medication with food.  If no medication is prescribed, you may take non-aspirin pain medication if needed.  PAIN MEDICATION CAN BE VERY CONSTIPATING.  Take a stool softener or laxative such as senokot, pericolace, or milk of magnesia if needed.        If your physician has prescribed pain medication that includes Acetaminophen (Tylenol), do not take additional Acetaminophen (Tylenol) while taking the prescribed medication.    Depression / Suicide Risk    As you are discharged from this [facility] facility, it is important to learn how to keep safe from harming yourself.    Recognize the warning signs:  · Abrupt changes in personality, positive or negative- including increase in energy   · Giving away possessions  · Change in eating patterns- significant weight changes-  positive or negative  · Change in sleeping patterns- unable to sleep or sleeping all the time   · Unwillingness or inability to communicate  · Depression  · Unusual sadness, discouragement and loneliness  · Talk of wanting to die  · Neglect of personal appearance   · Rebelliousness- reckless behavior  · Withdrawal from people/activities they love  · Confusion- inability to concentrate     If you or a loved one observes any of these behaviors or has concerns about self-harm, here's what you can do:  · Talk about it- your feelings and reasons for harming yourself  · Remove any means that you might use to hurt yourself (examples: pills, rope, extension cords, firearm)  · Get professional help from the community (Mental Health, Substance Abuse, psychological  "counseling)  · Do not be alone:Call your Safe Contact- someone whom you trust who will be there for you.  · Call your local CRISIS HOTLINE 864-3498 or 043-791-2387  · Call your local Children's Mobile Crisis Response Team [place] (327) 938-6758 or www.[employer]  · Call the toll free National Suicide Prevention Hotlines   · National Suicide Prevention Lifeline 671-800-OHGP (1974)  [place] [facility] Line [facility] 800-SUICIDE (017-5352)    Post Angiogram Groin Care Instructions     INSTRUCTIONS  2. Examine (look and feel) the site of your incision site TODAY so you can recognize changes that should be called to your doctor (see below).  3. Avoid straining either by lifting or pulling objects for 4-5 days. Avoid lifting over 5 pounds.   4. For at least 72 hours, if you should sneeze or cough, please hold pressure over your groin area.  5. If you should begin to have oozing from the catheterization site, please hold firm pressure and call your doctor's office immediately.  6. If profuse bleeding occurs from the catheterization site, hold firm pressure and call \"[phone]\" immediately for assistance.  7. Remove bandage after 24 hours.     ACTIVITY  2. Limit activity as instructed by your doctor.  3. No driving or very limited driving with frequent stops for one week.   4. If you must take a long car ride, stop every hour and walk around the car.   5. Warm showers or baths are permitted after the bandage is removed. Avoid hot showers, baths, hot tubs, and swimming for one week.    PLEASE CALL YOUR DOCTOR IF:  1. Temperature elevation occurs.  2. Catheterization site becomes reddened or begins to drain.   3. Bruising appears to be new or not resolving. The bruise may move down your leg. This is normal.  4. The small round lump in the groin increases in size.  5. Any leg numbness, aching, or discomfort (immediately).  6. Increasing discomfort in the leg at the insertion site.  7. Chest pains, even if relieved by " Nitroglycerin.    MISCELLANEOUS INSTRUCTIONS  1. Bruising may occur as a result of heart catheterization. Some of the discoloration may travel down the leg, going from blue to green in color.  2. A small round lump under the catheterization site will remain for up to six weeks.  3. If any questions arise call your physician's office. You can also call the HEALTH HOTLINE open 24 hours/day, 7 days/week and speak to a nurse at [phone], or toll free at [phone].   4. You should call 911 if you develop problems with breathing or chest pain.      I acknowledge receipt and understanding of these Home Care instructions.  ·

## 2017-04-25 NOTE — IP AVS SNAPSHOT
4/25/2017    Zhang Cheung  [address] [place] [address]    Dear Zhang:    [facility] wants to ensure your discharge home is safe and you or your loved ones have had all of your questions answered regarding your care after you leave the hospital.    Below is a list of resources and contact information should you have any questions regarding your hospital stay, follow-up instructions, or active medical symptoms.    Questions or Concerns Regarding… Contact   Medical Questions Related to Your Discharge  (7 days a week, 8am-5pm) Contact a Nurse Care Coordinator   [phone]   Medical Questions Not Related to Your Discharge  (24 hours a day / 7 days a week)  Contact the Nurse Health Line   [phone]    Medications or Discharge Instructions Refer to your discharge packet   or contact your [facility] Primary Care Provider   [phone]   Follow-up Appointment(s) Schedule your appointment via [employer]   or contact Scheduling [phone]   Billing Review your statement via [employer]  or contact Billing [phone]   Medical Records Review your records via [employer]   or contact Medical Records [phone]     You may receive a telephone call within two days of discharge. This call is to make certain you understand your discharge instructions and have the opportunity to have any questions answered. You can also easily access your medical information, test results and upcoming appointments via the [employer] free online health management tool. You can learn more and sign up at [employer]. For assistance setting up your [employer] account, please call [phone].    Once again, we want to ensure your discharge home is safe and that you have a clear understanding of any next steps in your care. If you have any questions or concerns, please do not hesitate to contact us, we are here for you. Thank you for choosing [facility] for your healthcare needs.    Sincerely,    Your [facility] Healthcare Team

## 2017-04-25 NOTE — IP AVS SNAPSHOT
" Home Care Instructions                                                                                                                Name:Zhang Cheung  Medical Record Number:[id number]  CSN: [id number]    YOB: 1950   Age: 67 y.o.  Sex: male  HT:1.676 m (5' 6\") WT: 100.9 kg (222 lb 7.1 oz)          Admit Date: 4/25/2017     Discharge Date:   Today's Date: 4/25/2017  Attending Doctor:  Zhang Torres M.D.                  Allergies:  Pollen extract              Follow-up Information     1. Follow up with Zhang Torres M.D. In 1 week.    Specialty:  Cardiac Electrophysiology    Contact information    [address]  [address]  [place] [id number] [phone]          Discharge Instructions         ACTIVITY: Rest and take it easy for the first 24 hours.  A responsible adult is recommended to remain with you during that time.  It is normal to feel sleepy.  We encourage you to not do anything that requires balance, judgment or coordination.    MILD FLU-LIKE SYMPTOMS ARE NORMAL. YOU MAY EXPERIENCE GENERALIZED MUSCLE ACHES, THROAT IRRITATION, HEADACHE AND/OR SOME NAUSEA.    FOR 24 HOURS DO NOT:  Drive, operate machinery or run household appliances.  Drink beer or alcoholic beverages.   Make important decisions or sign legal documents.    SPECIAL INSTRUCTIONS:     DIET: To avoid nausea, slowly advance diet as tolerated, avoiding spicy or greasy foods for the first day.  Add more substantial food to your diet according to your physician's instructions.  Babies can be fed formula or breast milk as soon as they are hungry.  INCREASE FLUIDS AND FIBER TO AVOID CONSTIPATION.    SURGICAL DRESSING/BATHING: May remove dressing in 24 hours. May shower in 24 hours. Do not submerge in water for 7 days.     FOLLOW-UP APPOINTMENT:  A follow-up appointment should be arranged with your doctor; call to schedule.    You should CALL YOUR PHYSICIAN if you develop:  Fever greater than 101 degrees F.  Pain not relieved by medication, or " persistent nausea or vomiting.  Excessive bleeding (blood soaking through dressing) or unexpected drainage from the wound.  Extreme redness or swelling around the incision site, drainage of pus or foul smelling drainage.  Inability to urinate or empty your bladder within 8 hours.  Problems with breathing or chest pain.    You should call 911 if you develop problems with breathing or chest pain.  If you are unable to contact your doctor or surgical center, you should go to the nearest emergency room or urgent care center.  Physician's telephone #: Dr. Torres [phone]    If any questions arise, call your doctor.  If your doctor is not available, please feel free to call the Surgical Center at [phone].  The Center is open Monday through Friday from 7AM to 7PM.  You can also call the [employer] HOTLINE open 24 hours/day, 7 days/week and speak to a nurse at [phone], or toll free at [phone].    A registered nurse may call you a few days after your surgery to see how you are doing after your procedure.    MEDICATIONS: Resume taking daily medication.  Take prescribed pain medication with food.  If no medication is prescribed, you may take non-aspirin pain medication if needed.  PAIN MEDICATION CAN BE VERY CONSTIPATING.  Take a stool softener or laxative such as senokot, pericolace, or milk of magnesia if needed.        If your physician has prescribed pain medication that includes Acetaminophen (Tylenol), do not take additional Acetaminophen (Tylenol) while taking the prescribed medication.    Depression / Suicide Risk    As you are discharged from this [facility] facility, it is important to learn how to keep safe from harming yourself.    Recognize the warning signs:  · Abrupt changes in personality, positive or negative- including increase in energy   · Giving away possessions  · Change in eating patterns- significant weight changes-  positive or negative  · Change in sleeping patterns- unable to  "sleep or sleeping all the time   · Unwillingness or inability to communicate  · Depression  · Unusual sadness, discouragement and loneliness  · Talk of wanting to die  · Neglect of personal appearance   · Rebelliousness- reckless behavior  · Withdrawal from people/activities they love  · Confusion- inability to concentrate     If you or a loved one observes any of these behaviors or has concerns about self-harm, here's what you can do:  · Talk about it- your feelings and reasons for harming yourself  · Remove any means that you might use to hurt yourself (examples: pills, rope, extension cords, firearm)  · Get professional help from the community (Mental Health, Substance Abuse, psychological counseling)  · Do not be alone:Call your Safe Contact- someone whom you trust who will be there for you.  · Call your local CRISIS HOTLINE 784-2595 or 515-971-7491  · Call your local Children's Mobile Crisis Response Team [place] (719) 245-8435 or www.[employer]  · Call the toll free National Suicide Prevention Hotlines   · National Suicide Prevention Lifeline 631-002-GKQX (0760)  National Hope Line Network 800-SUICIDE (879-1216)    Post Angiogram Groin Care Instructions     INSTRUCTIONS  2. Examine (look and feel) the site of your incision site TODAY so you can recognize changes that should be called to your doctor (see below).  3. Avoid straining either by lifting or pulling objects for 4-5 days. Avoid lifting over 5 pounds.   4. For at least 72 hours, if you should sneeze or cough, please hold pressure over your groin area.  5. If you should begin to have oozing from the catheterization site, please hold firm pressure and call your doctor's office immediately.  6. If profuse bleeding occurs from the catheterization site, hold firm pressure and call \"815\" immediately for assistance.  7. Remove bandage after 24 hours.     ACTIVITY  2. Limit activity as instructed by your doctor.  3. No driving or very limited " driving with frequent stops for one week.   4. If you must take a long car ride, stop every hour and walk around the car.   5. Warm showers or baths are permitted after the bandage is removed. Avoid hot showers, baths, hot tubs, and swimming for one week.    PLEASE CALL YOUR DOCTOR IF:  1. Temperature elevation occurs.  2. Catheterization site becomes reddened or begins to drain.   3. Bruising appears to be new or not resolving. The bruise may move down your leg. This is normal.  4. The small round lump in the groin increases in size.  5. Any leg numbness, aching, or discomfort (immediately).  6. Increasing discomfort in the leg at the insertion site.  7. Chest pains, even if relieved by Nitroglycerin.    MISCELLANEOUS INSTRUCTIONS  1. Bruising may occur as a result of heart catheterization. Some of the discoloration may travel down the leg, going from blue to green in color.  2. A small round lump under the catheterization site will remain for up to six weeks.  3. If any questions arise call your physician's office. You can also call the [employer] HOTLINE open 24 hours/day, 7 days/week and speak to a nurse at [phone], or toll free at [phone].   4. You should call 911 if you develop problems with breathing or chest pain.      I acknowledge receipt and understanding of these Home Care instructions.  ·        Medication List      ASK your doctor about these medications        Instructions    Morning Afternoon Evening Bedtime    amlodipine 5 MG Tabs   Commonly known as:  NORVASC        Take 5 mg by mouth 2 Times a Day.   Dose:  5 mg                        benazepril 40 MG tablet   Commonly known as:  LOTENSIN        Take 40 mg by mouth 2 Times a Day.   Dose:  40 mg                        COREG CR 40 MG Cp24   Generic drug:  Carvedilol Phosphate        Take 40 mg by mouth every day.   Dose:  40 mg                        FISH OIL TRIPLE STRENGTH 1400 MG Caps        Take 1 Cap by mouth every day.   Dose:  1 Cap                         gabapentin 300 MG Caps   Commonly known as:  NEURONTIN        Take 600 mg by mouth 2 Times a Day.   Dose:  600 mg                        gemfibrozil 600 MG Tabs   Commonly known as:  LOPID        Take 600 mg by mouth 2 times a day.   Dose:  600 mg                        hydrocodone/acetaminophen  MG Tabs   Commonly known as:  NORCO        Take 1-2 Tabs by mouth every 6 hours as needed for Severe Pain.   Dose:  1-2 Tab                        indapamide 2.5 MG Tabs   Commonly known as:  LOZOL        Take 2.5 mg by mouth every morning.   Dose:  2.5 mg                        metformin 1000 MG tablet   Commonly known as:  GLUCOPHAGE        Take 1,000 mg by mouth 2 times a day, with meals.   Dose:  1000 mg                        MULTIVITAMIN PO        Take 1 Tab by mouth every day.   Dose:  1 Tab                        NOVOLOG MIX 70/30 (70-30) 100 UNIT/ML injection   Generic drug:  insulin aspart protamine-insulin aspart        Inject 5-22 Units as instructed 3 times a day before meals. 100 = 5 units 120= 7 units 184 = 9 units 200 = 12 units Pt can go as high as 22 units if needed   Dose:  5-22 Units                        rivaroxaban 20 MG Tabs tablet   Commonly known as:  XARELTO        Take 1 Tab by mouth with dinner.   Dose:  20 mg                        ropinirole 0.5 MG Tabs   Commonly known as:  REQUIP        Take 1 mg by mouth every bedtime.   Dose:  1 mg                        tamsulosin 0.4 MG capsule   Commonly known as:  FLOMAX        Take 0.4 mg by mouth ONE-HALF HOUR AFTER BREAKFAST.   Dose:  0.4 mg                        tramadol 50 MG Tabs   Commonly known as:  ULTRAM        Take  mg by mouth every 6 hours as needed for Mild Pain.   Dose:   mg                        vitamin D3 5000 UNITS Caps        Take 1 Cap by mouth every day.   Dose:  1 Cap                                Medication Information     Above and/or attached are the medications your physician expects  you to take upon discharge. Review all of your home medications and newly ordered medications with your doctor and/or pharmacist. Follow medication instructions as directed by your doctor and/or pharmacist. Please keep your medication list with you and share with your physician. Update the information when medications are discontinued, doses are changed, or new medications (including over-the-counter products) are added; and carry medication information at all times in the event of emergency situations.        Resources     Quit Smoking / Tobacco Use:    I understand the use of any tobacco products increases my chance of suffering from future heart disease or stroke and could cause other illnesses which may shorten my life. Quitting the use of tobacco products is the single most important thing I can do to improve my health. For further information on smoking / tobacco cessation call a Toll Free Quit Line at 1-348.700.4100 (*National [employer] [place]) or 1-959.929.6820 (American Lung Association) or you can access the web based program at www.lungusa.org.    [place] Tobacco Users Help Line:  (211) 960-3382       Toll Free: 1-914.894.6728  Quit Tobacco Program [facility] (884)853-6320    Crisis Hotline:    [place] Crisis Hotline:  8-298-RMMKGIK or 1-534.714.5498    [place] Crisis Hotline:    1-459.940.2330 or 274-650-5300    Discharge Survey:   Thank you for choosing [facility]. We hope we did everything we could to make your hospital stay a pleasant one. You may be receiving a survey and we would appreciate your time and participation in answering the questions. Your input is very valuable to us in our efforts to improve our service to our patients and their families.            Signatures     My signature on this form indicates that:    1. I acknowledge receipt and understanding of these Home Care Instruction.  2. My questions regarding this information have been answered to my  satisfaction.  3. I have formulated a plan with my discharge nurse to obtain my prescribed medications for home.    __________________________________      __________________________________                   Patient Signature                                 Guardian/Responsible Adult Signature      __________________________________                 __________       ________                       Nurse Signature                                               Date                 Time

## 2017-04-25 NOTE — TELEPHONE ENCOUNTER
Called patient, left voicemail advising the suggestion for a mask exchange for a different style to see if that would help, and, again, to utilize the ramp button often.  When patient calls back I will add him to Dr. Richard's schedule for the week of 05/15/2017.

## 2017-04-26 NOTE — PROCEDURES
DATE OF SERVICE:  04/25/2017    INDICATION FOR PROCEDURE:  Atrial flutter.    PROCEDURE COMPONENTS:  1.  EP study with atrial flutter ablation.  2.  Coronary sinus catheter placed via the coronary sinus for sensing and   pacing of the left atrium.  3.  Guidance 3 dimensional mapping.  4.  Program stimulation after drug infusion.    BRIEF CLINICAL SCENARIO:  Patient has recurrent episodes of symptomatic atrial   tachycardia and he prefers ablation to get definitive control of his   arrhythmia.    PROCEDURE IN DETAIL:  After obtaining informed consent, the patient brought to   cardiac catheterization laboratory in the fasted state and placed under   general anesthesia.  He presents today in normal sinus rhythm.  I began by   getting access in the right femoral vein x3 using the ultrasound guidance and   I placed 6-Romanian, 7-Romanian, and 8-Romanian sheath.  The 7-Romanian sheath had a   dual decapolar coronary sinus catheter that wrapped from the right atrium to   left atrium and the 6-Romanian sheath had a quadripolar His catheter that was   also used for pacing the RV and the 8-Romanian sheath had mapping and ablation   catheter.  I exchanged the 8-Romanian sheath for a SAFL guiding sheath to get   better access to the cavotricuspid isthmus.  At the outset of the study, as   noted, patient was in sinus rhythm.  The AH was 101 and the HV was 35.  We   started by performing an EP study, AV Wenckebach occurred at 350.  There was   no VA conduction at baseline.  Atrial ERP was 360; pacing at 600.  We did not   get evidence of dual mindy physiology.  We were able to burst pace a rapid   rate in the left atrium and induce a tachycardia with a cycle length of 250   msec in the atrium.  I performed pacing from the cavotricuspid isthmus and the   post-pacing interval was equal to tachycardia cycle length with concealed   entrainment showing that this was typical flutter.  At this point, I used my   sheath and the ablator and performed  linear ablation from the tricuspid valve   back to the IVC and at approximately 6 o'clock in the tricuspid annulus.  I   performed 3 deliveries of radiofrequency and my total ablation time was 531   seconds.  The patient converted to sinus rhythm once again and I performed   pacing from medial to lateral to show that there was complete block across   line.  I paced both sides and lined immediately after showing that there was   bidirectional block and again 20 minutes later to show that this bidirectional   block was lasting.  In the meantime, we repeated the EP study.  The patient   at the end of the case remained in sinus rhythm.  The AH was 108 and the HV   was 40.  We gave isoproterenol and there was intermittent VA conduction with   VA Wenckebach at approximately cycle length of 620.  We performed extrastimuli   pacing and showed no evidence of dual mindy physiology.  The patient did have   1 left-sided PAC, but no easily inducible atrial fibrillation.    CONCLUSIONS:  1.  Induction of typical atrial flutter.  2.  Successful ablation of his flutter guided by 3 dimensional mapping system   to decrease use of fluoroscopy.  3.  Normal conduction intervals.  4.  No other inducible tachycardia, even on protocol using isoproterenol, two   attempts of induction.    DISCUSSION:  Patient did very well with the procedure.  Given that he presents   in sinus, I think it is reasonable to continue his anticoagulation until the   current prescription runs out and then he can stop with daily monitoring of   his pulse to look for any residual atrial fibrillation.  He is aware he has a   20% risk of concomitant atrial fibrillation.  Given his atrial flutter, I am   hopeful that he will have neither in the future.  I will see him in followup.       ____________________________________     MD ANTON López / THAO    DD:  04/25/2017 20:51:24  DT:  04/25/2017 21:32:55    D#:  [id number]  Job#:  [id number]

## 2017-05-05 ENCOUNTER — OFFICE VISIT (OUTPATIENT)
Dept: CARDIOLOGY | Facility: MEDICAL CENTER | Age: 67
End: 2017-05-05
Payer: MEDICARE

## 2017-05-05 VITALS
SYSTOLIC BLOOD PRESSURE: 132 MMHG | HEIGHT: 66 IN | OXYGEN SATURATION: 92 % | BODY MASS INDEX: 36.32 KG/M2 | DIASTOLIC BLOOD PRESSURE: 70 MMHG | HEART RATE: 89 BPM | WEIGHT: 226 LBS

## 2017-05-05 DIAGNOSIS — I48.3 TYPICAL ATRIAL FLUTTER (HCC): ICD-10-CM

## 2017-05-05 DIAGNOSIS — I10 ESSENTIAL HYPERTENSION: ICD-10-CM

## 2017-05-05 LAB — EKG IMPRESSION: NORMAL

## 2017-05-05 PROCEDURE — 99214 OFFICE O/P EST MOD 30 MIN: CPT | Performed by: INTERNAL MEDICINE

## 2017-05-05 PROCEDURE — 3017F COLORECTAL CA SCREEN DOC REV: CPT | Performed by: INTERNAL MEDICINE

## 2017-05-05 PROCEDURE — 1036F TOBACCO NON-USER: CPT | Performed by: INTERNAL MEDICINE

## 2017-05-05 PROCEDURE — G8417 CALC BMI ABV UP PARAM F/U: HCPCS | Performed by: INTERNAL MEDICINE

## 2017-05-05 PROCEDURE — 1101F PT FALLS ASSESS-DOCD LE1/YR: CPT | Mod: 8P | Performed by: INTERNAL MEDICINE

## 2017-05-05 PROCEDURE — G8432 DEP SCR NOT DOC, RNG: HCPCS | Performed by: INTERNAL MEDICINE

## 2017-05-05 PROCEDURE — 4040F PNEUMOC VAC/ADMIN/RCVD: CPT | Performed by: INTERNAL MEDICINE

## 2017-05-05 PROCEDURE — 93000 ELECTROCARDIOGRAM COMPLETE: CPT | Performed by: INTERNAL MEDICINE

## 2017-05-05 ASSESSMENT — ENCOUNTER SYMPTOMS: PALPITATIONS: 0

## 2017-05-05 NOTE — PROGRESS NOTES
"Subjective:   Zhang Cheung is a 67 y.o. male who presents today for follow up of flutter ablation    He is doing generally well.  Staying sinus rhythm.    No palpiations      Past Medical History   Diagnosis Date   • Hyperlipidemia    • Unspecified urinary incontinence      \" enlarged prostate\"   • Hypertension 2014     well  controlled   • Diabetes      IDDM   • Arthritis      generalized   • Snoring    • High cholesterol    • Arrhythmia      A Flutter   • Sleep apnea      CPAP   • Pain 04/11/14     back pain increases with activity   • Restless leg      Past Surgical History   Procedure Laterality Date   • Lumbar laminectomy diskectomy  5/2/2012     Performed by MAZIN WU at SURGERY [facility]   • Lumbar laminectomy diskectomy  2/19/2014     Performed by Mazin Wu M.D. at SURGERY [facility]   • Trans urethral resection prostate  3/27/2014     Performed by Kyle Guzman M.D. at SURGERY [facility]   • Lumbar laminectomy diskectomy  4/22/2014     Performed by Mazin Wu M.D. at SURGERY [facility]   • Recovery  1/13/2016     Procedure: IR Deep bone biopsy L2-L3 with general anesthesia-DAYANA;  Surgeon: [facility];  [place] [facility] [place]  Service:    • Other orthopedic surgery  2003/2007/2008     left shoulder replacement/with corrections   • Other orthopedic surgery       right knee replacement/manipulation   • Other orthopedic surgery  03/12     L3-L5 Laminectomy   • Other orthopedic surgery  02/14     L2-L3 Laminectomy   • Other       laser eye susrgery   • Other       carpectomy   • Other  2014     transection supraorbital nerve     Family History   Problem Relation Age of Onset   • Sleep Apnea Neg Hx      History   Smoking status   • Former Smoker -- 0.75 packs/day for 10 years   • Types: Cigarettes   • Quit date: 04/24/2008   Smokeless tobacco   • Never Used     Allergies   Allergen Reactions   • Pollen Extract      Local pollen, primarily [place]itt " "[place]     Outpatient Encounter Prescriptions as of 5/5/2017   Medication Sig Dispense Refill   • Cholecalciferol (VITAMIN D3) 5000 UNITS Cap Take 1 Cap by mouth every day.     • rivaroxaban (XARELTO) 20 MG Tab tablet Take 1 Tab by mouth with dinner. (Patient taking differently: Take 20 mg by mouth with dinner. Starts 04/07/2017) 30 Tab 2   • insulin aspart protamine-insulin aspart (NOVOLOG MIX 70/30) (70-30) 100 UNIT/ML injection Inject 5-22 Units as instructed 3 times a day before meals. 100 = 5 units  120= 7 units  184 = 9 units  200 = 12 units  Pt can go as high as 22 units if needed     • gabapentin (NEURONTIN) 300 MG Cap Take 600 mg by mouth 2 Times a Day.     • hydrocodone/acetaminophen (NORCO)  MG Tab Take 1-2 Tabs by mouth every 6 hours as needed for Severe Pain.     • Omega-3 Fatty Acids (FISH OIL TRIPLE STRENGTH) 1400 MG Cap Take 1 Cap by mouth every day.     • Multiple Vitamins-Minerals (MULTIVITAMIN PO) Take 1 Tab by mouth every day.     • tramadol (ULTRAM) 50 MG Tab Take  mg by mouth every 6 hours as needed for Mild Pain.     • metformin (GLUCOPHAGE) 1000 MG tablet Take 1,000 mg by mouth 2 times a day, with meals.     • benazepril (LOTENSIN) 40 MG tablet Take 40 mg by mouth 2 Times a Day.     • indapamide (LOZOL) 2.5 MG TABS Take 2.5 mg by mouth every morning.     • Carvedilol Phosphate (COREG CR) 40 MG CP24 Take 40 mg by mouth every day.     • amlodipine (NORVASC) 5 MG TABS Take 5 mg by mouth 2 Times a Day.     • gemfibrozil (LOPID) 600 MG TABS Take 600 mg by mouth 2 times a day.     • ropinirole (REQUIP) 0.5 MG Tab Take 1 mg by mouth every bedtime.     • tamsulosin (FLOMAX) 0.4 MG capsule Take 0.4 mg by mouth ONE-HALF HOUR AFTER BREAKFAST.       No facility-administered encounter medications on file as of 5/5/2017.     Review of Systems   Cardiovascular: Negative for palpitations.        Objective:   /70 mmHg  Pulse 89  Ht 1.676 m (5' 5.98\")  Wt 102.513 kg (226 lb)  BMI 36.49 " kg/m2  SpO2 92%    Physical Exam   Constitutional: He is oriented to person, place, and time. He appears well-developed and well-nourished. No distress.   HENT:   Head: Normocephalic and atraumatic.   Right Ear: External ear normal.   Left Ear: External ear normal.   Nose: Nose normal.   Mouth/Throat: Oropharynx is clear and moist.   Eyes: Conjunctivae and EOM are normal. Pupils are equal, round, and reactive to light. Right eye exhibits no discharge. Left eye exhibits no discharge. No scleral icterus.   Neck: Normal range of motion. Neck supple. No JVD present. No tracheal deviation present.   Cardiovascular: Normal rate, regular rhythm, normal heart sounds and intact distal pulses.  Exam reveals no gallop and no friction rub.    No murmur heard.  Pulmonary/Chest: Effort normal and breath sounds normal. No stridor. No respiratory distress. He has no wheezes. He has no rales. He exhibits no tenderness.   Abdominal: Soft. He exhibits no distension. There is no tenderness.   Musculoskeletal: He exhibits no edema or tenderness.   Neurological: He is alert and oriented to person, place, and time. No cranial nerve deficit. Coordination normal.   Skin: Skin is warm and dry. No rash noted. He is not diaphoretic. No erythema. No pallor.   Psychiatric: He has a normal mood and affect. His behavior is normal. Judgment and thought content normal.   Vitals reviewed.      Assessment:     1. Essential hypertension  EKG   2. Typical atrial flutter (CMS-HCC)         Medical Decision Making:  Today's Assessment / Status / Plan:     htn well controlled.  Flutter ablated.  No recurrence.  I taught him how to check pulse daily to monitor for any recurrence or any a fib.  He will contact up if any arrhythmia.  Can stop oac for now.    We had a long discussion of diet and exercise as losing some of his abdominal fat will help with his htn and dm control.

## 2017-05-05 NOTE — MR AVS SNAPSHOT
"        Zhang Cheung   2017 8:40 AM   Office Visit   MRN: [id number]    Department:  [facility]   Dept Phone:  [phone]    Description:  Male : 1950   Provider:  Zhang Torres M.D.           Reason for Visit     Follow-Up           Allergies as of 2017     Allergen Noted Reactions    Pollen Extract 2014       Local pollen, primarily Rabitt [place]      You were diagnosed with     Essential hypertension   [6167152]       Typical atrial flutter (CMS-HCC)   [540032]         Vital Signs     Blood Pressure Pulse Height Weight Body Mass Index Oxygen Saturation    132/70 mmHg 89 1.676 m (5' 5.98\") 102.513 kg (226 lb) 36.49 kg/m2 92%    Smoking Status                   Former Smoker           Basic Information     Date Of Birth Sex Race Ethnicity Preferred Language    1950 Male White Non- English      Your appointments     2017  8:00 AM   Follow UP with JOIE Soriano   [facility] (--)    [address]  [place] [address]   [phone]              Problem List              ICD-10-CM Priority Class Noted - Resolved    Type 2 DM E11.9   10/13/2013 - Present    Hypertension I10   10/13/2013 - Present    Dyslipidemia E78.5   10/13/2013 - Present    Albuminuria manifested in a patient with type 2 DM R80.9   10/13/2013 - Present    Degeneration of lumbar or lumbosacral intervertebral disc M51.37   2014 - Present    Benign prostatic hyperplasia N40.0   3/27/2014 - Present    Hypoxia R09.02   3/14/2017 - Present    Renal failure N19   3/16/2017 - Present    Typical atrial flutter (CMS-HCC) I48.3   2017 - Present      Health Maintenance        Date Due Completion Dates    COLONOSCOPY 1998 ---    IMM ZOSTER VACCINE 2008 ---    IMM PNEUMOCOCCAL 65+ (ADULT) LOW/MEDIUM RISK SERIES (1 of 2 - PCV13) 3/27/2013 3/27/2012    RETINAL SCREENING 2014, 2012    A1C SCREENING 2017 10/31/2016, 2016, " 7/17/2015, 4/24/2014, 3/18/2014, 10/16/2013, 9/23/2013, 11/2/2012, 5/7/2012, 11/7/2011    URINE ACR / MICROALBUMIN 6/20/2017 6/20/2016, 7/17/2015, 7/29/2014, 9/23/2013, 3/18/2013, 11/2/2012, 11/7/2011, 8/25/2011    FASTING LIPID PROFILE 10/31/2017 10/31/2016, 6/20/2016, 7/17/2015, 7/29/2014, 3/18/2014, 9/23/2013, 3/18/2013, 11/2/2012, 8/25/2011    SERUM CREATININE 4/24/2018 4/24/2017, 3/17/2017, 3/16/2017, 3/15/2017, 3/14/2017, 6/20/2016, 1/8/2016, 11/17/2015, 8/12/2015, 7/17/2015, 4/11/2014, 3/28/2014, 3/20/2014, 3/18/2014, 2/11/2014, 9/23/2013, 3/18/2013, 11/2/2012, 5/7/2012, 4/24/2012, 11/7/2011, 8/25/2011, 1/9/2008, 1/22/2007, 12/6/2006    IMM DTaP/Tdap/Td Vaccine (2 - Td) 6/8/2020 6/8/2010            Results       Current Immunizations     Influenza TIV (IM) 11/27/2013    Influenza Vaccine Adult HD 10/15/2016    Pneumococcal polysaccharide vaccine (PPSV-23) 3/27/2012    Tdap Vaccine 6/8/2010      Below and/or attached are the medications your provider expects you to take. Review all of your home medications and newly ordered medications with your provider and/or pharmacist. Follow medication instructions as directed by your provider and/or pharmacist. Please keep your medication list with you and share with your provider. Update the information when medications are discontinued, doses are changed, or new medications (including over-the-counter products) are added; and carry medication information at all times in the event of emergency situations     Allergies:  POLLEN EXTRACT - (reactions not documented)               Medications  Valid as of: May 05, 2017 -  9:50 AM    Generic Name Brand Name Tablet Size Instructions for use    AmLODIPine Besylate (Tab) NORVASC 5 MG Take 5 mg by mouth 2 Times a Day.        Benazepril HCl (Tab) LOTENSIN 40 MG Take 40 mg by mouth 2 Times a Day.        Carvedilol Phosphate (CAPSULE SR 24 HR) Carvedilol Phosphate 40 MG Take 40 mg by mouth every day.        Cholecalciferol (Cap)  vitamin D3 5000 UNITS Take 1 Cap by mouth every day.        Gabapentin (Cap) NEURONTIN 300 MG Take 600 mg by mouth 2 Times a Day.        Gemfibrozil (Tab) LOPID 600 MG Take 600 mg by mouth 2 times a day.        Hydrocodone-Acetaminophen (Tab) NORCO  MG Take 1-2 Tabs by mouth every 6 hours as needed for Severe Pain.        Indapamide (Tab) LOZOL 2.5 MG Take 2.5 mg by mouth every morning.        Insulin Aspart Prot & Aspart (Suspension) NOVOLOG 70/30 (70-30) 100 UNIT/ML Inject 5-22 Units as instructed 3 times a day before meals. 100 = 5 units  120= 7 units  184 = 9 units  200 = 12 units  Pt can go as high as 22 units if needed        MetFORMIN HCl (Tab) GLUCOPHAGE 1000 MG Take 1,000 mg by mouth 2 times a day, with meals.        Multiple Vitamins-Minerals   Take 1 Tab by mouth every day.        Omega-3 Fatty Acids (Cap) FISH OIL TRIPLE STRENGTH 1400 MG Take 1 Cap by mouth every day.        ROPINIRole HCl (Tab) REQUIP 0.5 MG Take 1 mg by mouth every bedtime.        Tamsulosin HCl (Cap) FLOMAX 0.4 MG Take 0.4 mg by mouth ONE-HALF HOUR AFTER BREAKFAST.        TraMADol HCl (Tab) ULTRAM 50 MG Take  mg by mouth every 6 hours as needed for Mild Pain.        .                 Medicines prescribed today were sent to:     [facility] #[id number] - [place] - [address] [place] [address]    Phone: [phone] Fax: [phone]    Open 24 Hours?: No      Medication refill instructions:       If your prescription bottle indicates you have medication refills left, it is not necessary to call your provider’s office. Please contact your pharmacy and they will refill your medication.    If your prescription bottle indicates you do not have any refills left, you may request refills at any time through one of the following ways: The online [employer] system (except Urgent Care), by calling your provider’s office, or by asking your pharmacy to contact your provider’s office with a refill request. Medication refills are  processed only during regular business hours and may not be available until the next business day. Your provider may request additional information or to have a follow-up visit with you prior to refilling your medication.   *Please Note: Medication refills are assigned a new Rx number when refilled electronically. Your pharmacy may indicate that no refills were authorized even though a new prescription for the same medication is available at the pharmacy. Please request the medicine by name with the pharmacy before contacting your provider for a refill.           rumr Access Code: Activation code not generated  Current rumr Status: Active

## 2017-05-08 ENCOUNTER — TELEPHONE (OUTPATIENT)
Dept: SLEEP MEDICINE | Facility: MEDICAL CENTER | Age: 67
End: 2017-05-08

## 2017-05-08 NOTE — TELEPHONE ENCOUNTER
Patient is still having the same complaints and struggles with his ASV machine as documented previously (SEE PHONE NOTE 04/21/17).  He would like to firm up an appt with Dr. Richard.  Scheduled 06/02/2017 with C Rotation.

## 2017-05-16 ENCOUNTER — HOSPITAL ENCOUNTER (OUTPATIENT)
Dept: LAB | Facility: MEDICAL CENTER | Age: 67
End: 2017-05-16
Attending: NEUROLOGICAL SURGERY
Payer: MEDICARE

## 2017-05-16 LAB
CRP SERPL HS-MCNC: 5.59 MG/DL (ref 0–0.75)
ERYTHROCYTE [SEDIMENTATION RATE] IN BLOOD BY WESTERGREN METHOD: 49 MM/HOUR (ref 0–20)

## 2017-05-16 PROCEDURE — 86140 C-REACTIVE PROTEIN: CPT

## 2017-05-16 PROCEDURE — 85652 RBC SED RATE AUTOMATED: CPT

## 2017-05-16 PROCEDURE — 36415 COLL VENOUS BLD VENIPUNCTURE: CPT

## 2017-05-19 ENCOUNTER — SLEEP CENTER VISIT (OUTPATIENT)
Dept: SLEEP MEDICINE | Facility: MEDICAL CENTER | Age: 67
End: 2017-05-19
Payer: MEDICARE

## 2017-05-19 VITALS
HEIGHT: 66 IN | OXYGEN SATURATION: 96 % | TEMPERATURE: 97.9 F | DIASTOLIC BLOOD PRESSURE: 80 MMHG | RESPIRATION RATE: 16 BRPM | BODY MASS INDEX: 36.8 KG/M2 | SYSTOLIC BLOOD PRESSURE: 142 MMHG | WEIGHT: 229 LBS | HEART RATE: 70 BPM

## 2017-05-19 DIAGNOSIS — G47.31 COMPLEX SLEEP APNEA SYNDROME: ICD-10-CM

## 2017-05-19 PROCEDURE — 99213 OFFICE O/P EST LOW 20 MIN: CPT | Performed by: INTERNAL MEDICINE

## 2017-05-19 RX ORDER — GABAPENTIN 600 MG/1
1800 TABLET ORAL 2 TIMES DAILY
Status: ON HOLD | COMMUNITY
Start: 2017-05-18 | End: 2017-10-01

## 2017-05-19 RX ORDER — RIVAROXABAN 20 MG/1
20 TABLET, FILM COATED ORAL
Refills: 0 | COMMUNITY
Start: 2017-04-04 | End: 2017-05-01

## 2017-05-19 NOTE — MR AVS SNAPSHOT
"        Zhang Cheung   2017 3:20 PM   Sleep Center Visit   MRN: [id number]    Department:  [facility]   Dept Phone:  [phone]    Description:  Male : 1950   Provider:  Yemi NICK M.D.           Reason for Visit     Apnea Compliance Check; c/o struggling with use of ASV      Allergies as of 2017     Allergen Noted Reactions    Pollen Extract 2014       Local pollen, primarily Rabitt [place]      You were diagnosed with     Complex sleep apnea syndrome   [314864]         Vital Signs     Blood Pressure Pulse Temperature Respirations Height Weight    142/80 mmHg 70 36.6 °C (97.9 °F) 16 1.676 m (5' 5.98\") 103.874 kg (229 lb)    Body Mass Index Oxygen Saturation Smoking Status             36.98 kg/m2 96% Former Smoker         Basic Information     Date Of Birth Sex Race Ethnicity Preferred Language    1950 Male White Non- English      Your appointments     2017 10:40 AM   Follow UP with C Rotation   [facility] (--)    [address] [place] [address]  [place] [address]   [phone]              Problem List              ICD-10-CM Priority Class Noted - Resolved    Type 2 DM E11.9   10/13/2013 - Present    Hypertension I10   10/13/2013 - Present    Dyslipidemia E78.5   10/13/2013 - Present    Albuminuria manifested in a patient with type 2 DM R80.9   10/13/2013 - Present    Degeneration of lumbar or lumbosacral intervertebral disc M51.37   2014 - Present    Benign prostatic hyperplasia N40.0   3/27/2014 - Present    Hypoxia R09.02   3/14/2017 - Present    Renal failure N19   3/16/2017 - Present    Typical atrial flutter (CMS-HCC) I48.3   2017 - Present      Health Maintenance        Date Due Completion Dates    COLONOSCOPY 1998 ---    IMM ZOSTER VACCINE 2008 ---    IMM PNEUMOCOCCAL 65+ (ADULT) LOW/MEDIUM RISK SERIES (1 of 2 - PCV13) 3/27/2013 3/27/2012    RETINAL SCREENING 2014, 2012    A1C SCREENING " 4/30/2017 10/31/2016, 6/20/2016, 7/17/2015, 4/24/2014, 3/18/2014, 10/16/2013, 9/23/2013, 11/2/2012, 5/7/2012, 11/7/2011    URINE ACR / MICROALBUMIN 6/20/2017 6/20/2016, 7/17/2015, 7/29/2014, 9/23/2013, 3/18/2013, 11/2/2012, 11/7/2011, 8/25/2011    FASTING LIPID PROFILE 10/31/2017 10/31/2016, 6/20/2016, 7/17/2015, 7/29/2014, 3/18/2014, 9/23/2013, 3/18/2013, 11/2/2012, 8/25/2011    SERUM CREATININE 4/24/2018 4/24/2017, 3/17/2017, 3/16/2017, 3/15/2017, 3/14/2017, 6/20/2016, 1/8/2016, 11/17/2015, 8/12/2015, 7/17/2015, 4/11/2014, 3/28/2014, 3/20/2014, 3/18/2014, 2/11/2014, 9/23/2013, 3/18/2013, 11/2/2012, 5/7/2012, 4/24/2012, 11/7/2011, 8/25/2011, 1/9/2008, 1/22/2007, 12/6/2006    IMM DTaP/Tdap/Td Vaccine (2 - Td) 6/8/2020 6/8/2010            Current Immunizations     Influenza TIV (IM) 11/27/2013    Influenza Vaccine Adult HD 10/15/2016    Pneumococcal polysaccharide vaccine (PPSV-23) 3/27/2012    Tdap Vaccine 6/8/2010      Below and/or attached are the medications your provider expects you to take. Review all of your home medications and newly ordered medications with your provider and/or pharmacist. Follow medication instructions as directed by your provider and/or pharmacist. Please keep your medication list with you and share with your provider. Update the information when medications are discontinued, doses are changed, or new medications (including over-the-counter products) are added; and carry medication information at all times in the event of emergency situations     Allergies:  POLLEN EXTRACT - (reactions not documented)               Medications  Valid as of: May 19, 2017 -  4:17 PM    Generic Name Brand Name Tablet Size Instructions for use    AmLODIPine Besylate (Tab) NORVASC 5 MG Take 5 mg by mouth 2 Times a Day.        Benazepril HCl (Tab) LOTENSIN 40 MG Take 40 mg by mouth 2 Times a Day.        Carvedilol Phosphate (CAPSULE SR 24 HR) Carvedilol Phosphate 40 MG Take 40 mg by mouth every day.         Cholecalciferol (Cap) vitamin D3 5000 UNITS Take 1 Cap by mouth every day.        Gabapentin (Tab) NEURONTIN 600 MG every day.        Gemfibrozil (Tab) LOPID 600 MG Take 600 mg by mouth 2 times a day.        Hydrocodone-Acetaminophen (Tab) NORCO  MG Take 1-2 Tabs by mouth every 6 hours as needed for Severe Pain.        Indapamide (Tab) LOZOL 2.5 MG Take 2.5 mg by mouth every morning.        Insulin Aspart Prot & Aspart (Suspension) NOVOLOG 70/30 (70-30) 100 UNIT/ML Inject 5-22 Units as instructed 3 times a day before meals. 100 = 5 units  120= 7 units  184 = 9 units  200 = 12 units  Pt can go as high as 22 units if needed        MetFORMIN HCl (Tab) GLUCOPHAGE 1000 MG Take 1,000 mg by mouth 2 times a day, with meals.        Multiple Vitamins-Minerals   Take 1 Tab by mouth every day.        Omega-3 Fatty Acids (Cap) FISH OIL TRIPLE STRENGTH 1400 MG Take 1 Cap by mouth every day.        ROPINIRole HCl (Tab) REQUIP 0.5 MG Take 1 mg by mouth every bedtime.        Tamsulosin HCl (Cap) FLOMAX 0.4 MG Take 0.8 mg by mouth ONE-HALF HOUR AFTER BREAKFAST.        TraMADol HCl (Tab) ULTRAM 50 MG Take  mg by mouth every 6 hours as needed for Mild Pain.        .                 Medicines prescribed today were sent to:     [facility]/PHARMACY #[id number] - [place] - [address] [place] [address]    Phone: [phone] Fax: [phone]    Open 24 Hours?: No      Medication refill instructions:       If your prescription bottle indicates you have medication refills left, it is not necessary to call your provider’s office. Please contact your pharmacy and they will refill your medication.    If your prescription bottle indicates you do not have any refills left, you may request refills at any time through one of the following ways: The online [employer] system (except Urgent Care), by calling your provider’s office, or by asking your pharmacy to contact your provider’s office with a refill request. Medication refills are  processed only during regular business hours and may not be available until the next business day. Your provider may request additional information or to have a follow-up visit with you prior to refilling your medication.   *Please Note: Medication refills are assigned a new Rx number when refilled electronically. Your pharmacy may indicate that no refills were authorized even though a new prescription for the same medication is available at the pharmacy. Please request the medicine by name with the pharmacy before contacting your provider for a refill.           CloudOn Access Code: Activation code not generated  Current CloudOn Status: Active

## 2017-05-19 NOTE — PROGRESS NOTES
CC:  Sleep apnea hypopnea syndrome.    HPI:    Mr. Cheung is a 67-year-old man referred by Dr. Tori Rai to assist in the evaluation and management of sleep-disordered breathing. He was last seen on March 22, 2017.    He was evaluated in the hospital by Dr. Gonda after admission for respiratory insufficiency with hypoxemia. He was found to be in new onset atrial flutter with a rapid ventricular rate. He was evaluated by cardiology and underwent cardioversion on March 15. He is currently taking Xarelto and carvedilol but not having any bleeding complications related to the anticoagulant. In the course of his evaluation, symptoms suggesting sleep-disordered breathing were identified.    He has a regular sleep schedule at bedtime at about 10 PM and falls asleep in a variable amount of time without perceiving insomnia symptoms. He may awaken 2-3 times on an average night and arises at 5:30 to 6 in the morning without fatigue or morning grogginess.    He snores only lightly and does not have a history of witnessed nocturnal apnea. He denies much daytime somnolence. His Lester sleepiness score is 2 points. He does not regularly consume caffeine and does not use substances to induce sleep or to maintain wakefulness. He does not have symptoms suggesting parasomnia or restless leg syndrome.    A split night polysomnogram was done on March 18, 2017. In the diagnostic phase there is fragmentation of sleep with a reduced sleep efficiency and an elevated arousal and awakening index but no periodic limb movements. No REM sleep time was identified. The apnea hypopnea index is 98.9 events per hour including 213 episodes of central apnea, a single obstructive apnea, and 4 episodes of hypopnea. The lowest arterial oxygen saturation is 60% on room air and he spent 73% of the diagnostic time with a saturation below 90%. In the treatment phase of the study, CPAP was titrated across a pressure range of 5-14 cm water with persistence  of central apnea events. Central apneas persisted on BiPAP with a pressure range of 16-17/10-17 cm water. He was finally changed to servo adaptive BiPAP ventilation with a reduction in the number of central apnea events but he had persisting hypopnea events during REM sleep. In the final pressure stage, the apnea hypopnea index was still 60.8 events per hour with the lowest arterial oxygen saturation of 75% on room air.    At the last visit we discussed options including repeat polysomnography to further titrate ASV therapy. We agreed instead to initiate auto titrating ASV with an expiratory pressure of 6-10 cm water in an effort to address the residual hypopnea events that were seen in the final expiratory pressure levels in the polysomnogram.    He has had a great deal of difficulty acclimating to the ASV therapy. Some of this is related to mask fit. He is currently using a large Dunia View mask but it doesn't seem to completely cover his mouth and does easily slip off his nose. He also complains about high pressures from the ASV machine. In addition to the expiratory pressure of 6-10 cm water the machine is set for pressure support at 6-15 cm water which is probably more than he needs. That idea is supported by the limited data available from the ASV data recording chip. It shows use on only 8 of the last 30 days with an average daily usage of 3 hours and 24 minutes. With the high pressure support settings, tidal volumes exceeded a liter and peak airway pressures were as high as 20 cm water. The estimated residual apnea hypopnea index was 10.8 events per hour.            Patient Active Problem List    Diagnosis Date Noted   • Typical atrial flutter (CMS-HCC) 04/25/2017   • Renal failure 03/16/2017   • Hypoxia 03/14/2017   • Benign prostatic hyperplasia 03/27/2014   • Degeneration of lumbar or lumbosacral intervertebral disc 02/19/2014   • Type 2 DM 10/13/2013   • Hypertension 10/13/2013   • Dyslipidemia  "10/13/2013   • Albuminuria manifested in a patient with type 2 DM 10/13/2013       Past Medical History   Diagnosis Date   • Hyperlipidemia    • Unspecified urinary incontinence      \" enlarged prostate\"   • Hypertension 2014     well  controlled   • Diabetes      IDDM   • Arthritis      generalized   • Snoring    • High cholesterol    • Arrhythmia      A Flutter   • Sleep apnea      CPAP   • Pain 04/11/14     back pain increases with activity   • Restless leg        Past Surgical History   Procedure Laterality Date   • Lumbar laminectomy diskectomy  5/2/2012     Performed by MAZIN WU at SURGERY [facility]   • Lumbar laminectomy diskectomy  2/19/2014     Performed by Mazin Wu M.D. at SURGERY [facility]   • Trans urethral resection prostate  3/27/2014     Performed by Kyle Guzman M.D. at SURGERY [facility]   • Lumbar laminectomy diskectomy  4/22/2014     Performed by Mazin Wu M.D. at SURGERY [facility]   • Recovery  1/13/2016     Procedure: IR Deep bone biopsy L2-L3 with general anesthesia-DAYANA;  Surgeon: [facility];  Location[place] [facility] [place];  Service:    • Other orthopedic surgery  2003/2007/2008     left shoulder replacement/with corrections   • Other orthopedic surgery       right knee replacement/manipulation   • Other orthopedic surgery  03/12     L3-L5 Laminectomy   • Other orthopedic surgery  02/14     L2-L3 Laminectomy   • Other       laser eye susrgery   • Other       carpectomy   • Other  2014     transection supraorbital nerve       Family History   Problem Relation Age of Onset   • Sleep Apnea Neg Hx        Social History     Social History   • Marital Status:      Spouse Name: N/A   • Number of Children: N/A   • Years of Education: N/A     Occupational History   • Not on file.     Social History Main Topics   • Smoking status: Former Smoker -- 0.75 packs/day for 10 years     Types: Cigarettes     Quit date: 04/24/2008   • " "Smokeless tobacco: Never Used   • Alcohol Use: No   • Drug Use: No   • Sexual Activity: Not on file     Other Topics Concern   • Not on file     Social History Narrative       Current Outpatient Prescriptions   Medication Sig Dispense Refill   • gabapentin (NEURONTIN) 600 MG tablet every day.     • Cholecalciferol (VITAMIN D3) 5000 UNITS Cap Take 1 Cap by mouth every day.     • insulin aspart protamine-insulin aspart (NOVOLOG MIX 70/30) (70-30) 100 UNIT/ML injection Inject 5-22 Units as instructed 3 times a day before meals. 100 = 5 units  120= 7 units  184 = 9 units  200 = 12 units  Pt can go as high as 22 units if needed     • ropinirole (REQUIP) 0.5 MG Tab Take 1 mg by mouth every bedtime.     • hydrocodone/acetaminophen (NORCO)  MG Tab Take 1-2 Tabs by mouth every 6 hours as needed for Severe Pain.     • tamsulosin (FLOMAX) 0.4 MG capsule Take 0.8 mg by mouth ONE-HALF HOUR AFTER BREAKFAST.     • Omega-3 Fatty Acids (FISH OIL TRIPLE STRENGTH) 1400 MG Cap Take 1 Cap by mouth every day.     • Multiple Vitamins-Minerals (MULTIVITAMIN PO) Take 1 Tab by mouth every day.     • tramadol (ULTRAM) 50 MG Tab Take  mg by mouth every 6 hours as needed for Mild Pain.     • metformin (GLUCOPHAGE) 1000 MG tablet Take 1,000 mg by mouth 2 times a day, with meals.     • benazepril (LOTENSIN) 40 MG tablet Take 40 mg by mouth 2 Times a Day.     • indapamide (LOZOL) 2.5 MG TABS Take 2.5 mg by mouth every morning.     • Carvedilol Phosphate (COREG CR) 40 MG CP24 Take 40 mg by mouth every day.     • amlodipine (NORVASC) 5 MG TABS Take 5 mg by mouth 2 Times a Day.     • gemfibrozil (LOPID) 600 MG TABS Take 600 mg by mouth 2 times a day.       No current facility-administered medications for this visit.    \"CURRENT RX\"      Allergies: Pollen extract      ROS  Unchanged from prior and positive for the sleep, cardiac, endocrine and renal/urologic issues reviewed above. All other aspects of the CBD review of systems process are " "negative.      Physical Exam:   /80 mmHg  Pulse 70  Temp(Src) 36.6 °C (97.9 °F)  Resp 16  Ht 1.676 m (5' 5.98\")  Wt 103.874 kg (229 lb)  BMI 36.98 kg/m2  SpO2 96%   Head and neck examination demonstrates no mucosal lesion, purulent drainage or evident polyps. The pharynx is benign with a Mallampati II presentation. The neck is supple without thyromegaly. On chest examination there are symmetrical bilateral breath sounds without rales, wheezing or consolidation. On cardiac examination, the apical impulse and heart sounds are normal and the rhythm is regular. There is no murmur, gallop or rub and no jugular venous distention. The abdomen is soft with active bowel sounds and no palpable hepatosplenomegaly, mass, guarding or rebound. The extremities show no clubbing, cyanosis or edema and no signs of deep venous thrombosis. There is no warmth, redness, tenderness or palpable venous cord in the calves. The skin is clear, warm and dry. There is no unusual peripheral lymphadenopathy. Peripheral pulses are palpable in all 4 extremities. On neurologic examination, cranial nerve function is intact, motor tone is symmetrical, and the patient is alert, oriented and responsive.       Problems:  1. Complex sleep apnea syndrome  He has severe complex sleep apnea with an apnea hypopnea index of 98.9 events per hour and a lowest arterial oxygen saturation of 60% on room air. The central apnea did not respond to treatment with CPAP or BiPAP therapy across a broad range of pressures. The use of servo adaptive BiPAP ventilation resulted in a reduction in the number central apnea events but persisting hypopnea events were identified in REM sleep. It is clear that CPAP and BiPAP will not effectively treat his problem. Servo adaptive BiPAP should be successful but it is likely that he will require a higher expiratory pressure or an adjustable expiratory pressure to suppress the hypopnea episodes in rem sleep and he may need a " bit higher level of pressure support and a backup rate as well given the persistence of some central apnea episodes on ASV. The current pressure support settings are too high resulting in excessive tidal volumes and discomfort to the patient.  We have talked about options at this point. I think the best choice would be repeat polysomnography dedicated to further titration of ASV therapy. He has accumulated heavy medical bills this year related to his cardiac and back problems and is reluctant to undergo any study. As an alternative we will increase the pressure support range to 4-6 cm water but continue the expiratory pressure range 6-10 cm water. He was refitted for new mask today and did much better with a large F20 full face mask.      Plan:   1. The ASV machine is adjusted to an expiratory pressure range of 6-10 cm water and pressure support of 4-6 cm water.    2. Prescription is written for large F 20 full face mask.    3. Return visit here in about 2 months. He may drop into the technician clinic at any time for assistance. It is response to treatment is not optimal, titration polysomnography will be required.    We appreciate the opportunity to assist in his care.

## 2017-06-21 ENCOUNTER — TELEPHONE (OUTPATIENT)
Dept: SLEEP MEDICINE | Facility: MEDICAL CENTER | Age: 67
End: 2017-06-21

## 2017-06-21 NOTE — TELEPHONE ENCOUNTER
Patient called - the recent pressure decrease has helped with the choking sensation, however there continues to be a struggle with trying to sleep on his stomach and continued mask leaks.  Stomach sleeping is his preference due to significant back pain.  His concern is that his machine will have to be returned for non-compliance with his current usage on his ASV.    His current mask is a Large [employer]Med Airfit F20    Currently on ASV Auto @ EPAP 6/10, PS 4/9  [FYI:  He is scheduled for an upcoming back surgery 09/11/2017]

## 2017-06-27 ENCOUNTER — HOSPITAL ENCOUNTER (OUTPATIENT)
Dept: RADIOLOGY | Facility: MEDICAL CENTER | Age: 67
End: 2017-06-27
Attending: NEUROLOGICAL SURGERY
Payer: MEDICARE

## 2017-06-27 DIAGNOSIS — M54.5 LOW BACK PAIN, UNSPECIFIED BACK PAIN LATERALITY, UNSPECIFIED CHRONICITY, WITH SCIATICA PRESENCE UNSPECIFIED: ICD-10-CM

## 2017-06-27 PROCEDURE — 72131 CT LUMBAR SPINE W/O DYE: CPT

## 2017-07-01 NOTE — TELEPHONE ENCOUNTER
VOICEMAIL received from patient on Thursday 06/29/17 @ 1500.  06/30/17 @ 0783 - called patient back.  He is still struggling with his severe pain (pending back surgery) and his ASV use.  He hasn't heard back from Dr. Richard yet.  He'd like to be compliant with his ASV, but the struggle is unfortunately multi-factorial.  Currently his concern is being financially responsible for a machine he is having difficulty using.    He has had multiple mask trials through [employer], had one pressure change (which fixed one issue), but continues to struggle with many ailments, back pain being the primary source of non-compliance.    Advised I will try to speak to other providers in the office to see if any further suggestions might be helpful.  He may end up turning in his device ultimately.

## 2017-07-03 NOTE — TELEPHONE ENCOUNTER
Unfortunately there is nothing to about his back pain. Does he feel his mask still not eating appropriately? Has he tried a smaller mask possibly with chinstrap.

## 2017-07-06 NOTE — TELEPHONE ENCOUNTER
Called patient, LEFT VOICEMAIL, advised unable to get ahold of Dr. Richard, but he is due back in the office Monday.  No new information or suggestions other than maybe trying a chin strap with a smaller mask, which patient has tried multiple masks at this point through [employer].

## 2017-07-08 NOTE — TELEPHONE ENCOUNTER
Returned patient's call.  He'd still like to talk to Dr. Richard before returning his ASV machine to the DME company.  Advised will let Dr. Richard know on Monday 07/10/17.

## 2017-07-15 NOTE — TELEPHONE ENCOUNTER
We talked at length. He is having troubles using the ASV machine as he is only able to sleep in the prone body position given his severe back pain. He is scheduled for back surgery in September and he may not be able to initiate effective ASV therapy until his back pain has improved.

## 2017-07-19 ENCOUNTER — TELEPHONE (OUTPATIENT)
Dept: SLEEP MEDICINE | Facility: MEDICAL CENTER | Age: 67
End: 2017-07-19

## 2017-07-19 NOTE — TELEPHONE ENCOUNTER
Patient called.  He wanted to get back to Dr. Richard about the cost of his ASV machine without insurance, because he is now considered non-compliant with his ASV.  His cost will be $200/mo until machine is paid for.  He is not happy about the cost and feels they're overcharging for the equipment and 15min of face time for the setup.      He must return his ASV machine to [facility] by Friday morning, 07/21/2017.  He is further concerned with the cost and need to repeat sleep study to requalify for a new ASV after his upcoming back surgery.

## 2017-07-26 ENCOUNTER — APPOINTMENT (OUTPATIENT)
Dept: SLEEP MEDICINE | Facility: MEDICAL CENTER | Age: 67
End: 2017-07-26
Payer: MEDICARE

## 2017-07-28 ENCOUNTER — TELEPHONE (OUTPATIENT)
Dept: CARDIOLOGY | Facility: MEDICAL CENTER | Age: 67
End: 2017-07-28

## 2017-07-29 NOTE — TELEPHONE ENCOUNTER
----- Message from Antoinette Stubbs sent at 7/28/2017  1:11 PM PDT -----  Regarding: clearance request  Contact: [phone]  CAIN/margarita    Pt calling to report he is having back surgery on 9/11 and needs clearance, please call pt for all details, [phone]

## 2017-07-29 NOTE — TELEPHONE ENCOUNTER
Pt said he is planning extensive back surgery with Dr. Yemi Sharma in September and needs cardiac clearance.    To Dr. Torres for surgical clearance and risk.              Nursing: please mail pt a copy of clearance.    Dr. Sharma: p [phone]  F: [phone]

## 2017-08-02 NOTE — TELEPHONE ENCOUNTER
To scheduling, pt offered 8/31 for JE appt, he wants to be seen sooner by general cardiology for clearnace sooner if avail, will discuss with schedulers in am.

## 2017-08-03 ENCOUNTER — TELEPHONE (OUTPATIENT)
Dept: CARDIOLOGY | Facility: MEDICAL CENTER | Age: 67
End: 2017-08-03

## 2017-08-03 ENCOUNTER — OFFICE VISIT (OUTPATIENT)
Dept: CARDIOLOGY | Facility: MEDICAL CENTER | Age: 67
End: 2017-08-03
Payer: MEDICARE

## 2017-08-03 VITALS
WEIGHT: 233 LBS | HEART RATE: 86 BPM | SYSTOLIC BLOOD PRESSURE: 150 MMHG | OXYGEN SATURATION: 95 % | DIASTOLIC BLOOD PRESSURE: 80 MMHG | HEIGHT: 66 IN | BODY MASS INDEX: 37.45 KG/M2

## 2017-08-03 DIAGNOSIS — I48.3 TYPICAL ATRIAL FLUTTER (HCC): ICD-10-CM

## 2017-08-03 DIAGNOSIS — E11.9 TYPE 2 DIABETES MELLITUS WITHOUT COMPLICATION, WITHOUT LONG-TERM CURRENT USE OF INSULIN (HCC): ICD-10-CM

## 2017-08-03 DIAGNOSIS — E78.5 DYSLIPIDEMIA: ICD-10-CM

## 2017-08-03 DIAGNOSIS — Z01.810 PRE-OPERATIVE CARDIOVASCULAR EXAMINATION: ICD-10-CM

## 2017-08-03 DIAGNOSIS — I10 ESSENTIAL HYPERTENSION: ICD-10-CM

## 2017-08-03 PROBLEM — R09.02 HYPOXIA: Status: RESOLVED | Noted: 2017-03-14 | Resolved: 2017-08-03

## 2017-08-03 LAB — EKG IMPRESSION: NORMAL

## 2017-08-03 PROCEDURE — 99214 OFFICE O/P EST MOD 30 MIN: CPT | Performed by: NURSE PRACTITIONER

## 2017-08-03 ASSESSMENT — ENCOUNTER SYMPTOMS
PALPITATIONS: 0
ABDOMINAL PAIN: 0
BACK PAIN: 1
COUGH: 0
DIZZINESS: 0
ORTHOPNEA: 0
FEVER: 0
CLAUDICATION: 0
MYALGIAS: 0
PND: 0
SHORTNESS OF BREATH: 0

## 2017-08-03 NOTE — Clinical Note
PROCEDURE/SURGERY CLEARANCE FORM      Encounter Date: 8/3/2017    Patient: Zhang Cheung  YOB: 1950    CARDIOLOGIST:  JOIE Lezama    REFERRING DOCTOR:  Dr. Yemi Sharma    PATIENT DOES NOT HAVE A PPM OR AICD    The above patient is cleared from a cardiology standpoint at low risk to have the following procedure/surgery:   Back surgery                                                              MD Signature   JOIE Lezama

## 2017-08-03 NOTE — MR AVS SNAPSHOT
"        Zhang Cheung   8/3/2017 4:00 PM   Office Visit   MRN: [id number]    Department:  [facility]   Dept Phone:  [phone]    Description:  Male : 1950   Provider:  JOIE Lezama           Reason for Visit     Preoperative CV Eval           Allergies as of 8/3/2017     Allergen Noted Reactions    Pollen Extract 2014       Local pollen, primarily Rabitt Blain      You were diagnosed with     Pre-operative cardiovascular examination   [V72.81.ICD-9-CM]       Type 2 diabetes mellitus without complication, without long-term current use of insulin (CMS-[facility])   [0317500]       Essential hypertension   [8736490]       Dyslipidemia   [871815]       Typical atrial flutter (CMS-[facility])   [411427]         Vital Signs     Blood Pressure Pulse Height Weight Body Mass Index Oxygen Saturation    150/80 mmHg 86 1.676 m (5' 5.98\") 105.688 kg (233 lb) 37.63 kg/m2 95%    Smoking Status                   Former Smoker           Basic Information     Date Of Birth Sex Race Ethnicity Preferred Language    1950 Male White Non- English      Problem List              ICD-10-CM Priority Class Noted - Resolved    Type 2 DM E11.9 Medium  10/13/2013 - Present    Hypertension I10 Medium  10/13/2013 - Present    Dyslipidemia E78.5 Medium  10/13/2013 - Present    Albuminuria manifested in a patient with type 2 DM R80.9 Low  10/13/2013 - Present    Degeneration of lumbar or lumbosacral intervertebral disc M51.37 Low  2014 - Present    Benign prostatic hyperplasia N40.0 Low  3/27/2014 - Present    Renal failure N19 Low  3/16/2017 - Present    Typical atrial flutter (CMS-[facility]) I48.3 Medium  2017 - Present      Health Maintenance        Date Due Completion Dates    COLONOSCOPY 1998 ---    IMM ZOSTER VACCINE 2008 ---    IMM PNEUMOCOCCAL 65+ (ADULT) LOW/MEDIUM RISK SERIES (1 of 2 - PCV13) 3/27/2013 3/27/2012    RETINAL SCREENING 2014, 2012    A1C SCREENING 2017 " 10/31/2016, 6/20/2016, 7/17/2015, 4/24/2014, 3/18/2014, 10/16/2013, 9/23/2013, 11/2/2012, 5/7/2012, 11/7/2011    URINE ACR / MICROALBUMIN 6/20/2017 6/20/2016, 7/17/2015, 7/29/2014, 9/23/2013, 3/18/2013, 11/2/2012, 11/7/2011, 8/25/2011    IMM INFLUENZA (1) 9/1/2017 10/15/2016, 11/27/2013    FASTING LIPID PROFILE 10/31/2017 10/31/2016, 6/20/2016, 7/17/2015, 7/29/2014, 3/18/2014, 9/23/2013, 3/18/2013, 11/2/2012, 8/25/2011    SERUM CREATININE 4/24/2018 4/24/2017, 3/17/2017, 3/16/2017, 3/15/2017, 3/14/2017, 6/20/2016, 1/8/2016, 11/17/2015, 8/12/2015, 7/17/2015, 4/11/2014, 3/28/2014, 3/20/2014, 3/18/2014, 2/11/2014, 9/23/2013, 3/18/2013, 11/2/2012, 5/7/2012, 4/24/2012, 11/7/2011, 8/25/2011, 1/9/2008, 1/22/2007, 12/6/2006    IMM DTaP/Tdap/Td Vaccine (2 - Td) 6/8/2020 6/8/2010            Results       Current Immunizations     Influenza TIV (IM) 11/27/2013    Influenza Vaccine Adult HD 10/15/2016    Pneumococcal polysaccharide vaccine (PPSV-23) 3/27/2012    Tdap Vaccine 6/8/2010      Below and/or attached are the medications your provider expects you to take. Review all of your home medications and newly ordered medications with your provider and/or pharmacist. Follow medication instructions as directed by your provider and/or pharmacist. Please keep your medication list with you and share with your provider. Update the information when medications are discontinued, doses are changed, or new medications (including over-the-counter products) are added; and carry medication information at all times in the event of emergency situations     Allergies:  POLLEN EXTRACT - (reactions not documented)               Medications  Valid as of: August 03, 2017 -  4:39 PM    Generic Name Brand Name Tablet Size Instructions for use    AmLODIPine Besylate (Tab) NORVASC 5 MG Take 5 mg by mouth 2 Times a Day.        Benazepril HCl (Tab) LOTENSIN 40 MG Take 40 mg by mouth 2 Times a Day.        Carvedilol Phosphate (CAPSULE SR 24 HR) Carvedilol  Phosphate 40 MG Take 40 mg by mouth every day.        Cholecalciferol (Cap) vitamin D3 5000 UNITS Take 1 Cap by mouth every day.        Gabapentin (Tab) NEURONTIN 600 MG every day.        Gemfibrozil (Tab) LOPID 600 MG Take 600 mg by mouth 2 times a day.        Hydrocodone-Acetaminophen (Tab) NORCO  MG Take 1-2 Tabs by mouth every 6 hours as needed for Severe Pain.        Indapamide (Tab) LOZOL 2.5 MG Take 2.5 mg by mouth every morning.        Insulin Aspart Prot & Aspart (Suspension) NOVOLOG 70/30 (70-30) 100 UNIT/ML Inject 5-22 Units as instructed 3 times a day before meals. 100 = 5 units  120= 7 units  184 = 9 units  200 = 12 units  Pt can go as high as 22 units if needed        MetFORMIN HCl (Tab) GLUCOPHAGE 1000 MG Take 1,000 mg by mouth 2 times a day, with meals.        Multiple Vitamins-Minerals   Take 1 Tab by mouth every day.        Omega-3 Fatty Acids (Cap) FISH OIL TRIPLE STRENGTH 1400 MG Take 1 Cap by mouth every day.        ROPINIRole HCl (Tab) REQUIP 0.5 MG Take 1 mg by mouth every bedtime.        Tamsulosin HCl (Cap) FLOMAX 0.4 MG Take 0.8 mg by mouth ONE-HALF HOUR AFTER BREAKFAST.        TraMADol HCl (Tab) ULTRAM 50 MG Take  mg by mouth every 6 hours as needed for Mild Pain.        .                 Medicines prescribed today were sent to:     [facility][id number] - [place] - [address] [place] [address]    Phone: [phone] Fax: [phone]    Open 24 Hours?: No      Medication refill instructions:       If your prescription bottle indicates you have medication refills left, it is not necessary to call your provider’s office. Please contact your pharmacy and they will refill your medication.    If your prescription bottle indicates you do not have any refills left, you may request refills at any time through one of the following ways: The online [employer] system (except Urgent Care), by calling your provider’s office, or by asking your pharmacy to contact your provider’s  office with a refill request. Medication refills are processed only during regular business hours and may not be available until the next business day. Your provider may request additional information or to have a follow-up visit with you prior to refilling your medication.   *Please Note: Medication refills are assigned a new Rx number when refilled electronically. Your pharmacy may indicate that no refills were authorized even though a new prescription for the same medication is available at the pharmacy. Please request the medicine by name with the pharmacy before contacting your provider for a refill.           [employer] Access Code: Activation code not generated  Current [employer] Status: Active

## 2017-08-03 NOTE — Clinical Note
"     [facility]   [address], [address]  [place] [address]  Phone: [phone]  Fax: [phone]              Zhang Cheung  1950    Encounter Date: 8/3/2017    JOIE Lezama          PROGRESS NOTE:  Subjective:   Zhang Cheung is a 67 y.o. male who presents today for surgical clearance for back surgery.    He is a patient of Dr. Torres in our office. Hx of aflutter with aflutter ablation, DM, HTN, BENEDICT with CPAP, and obesity.    He is overall doing well from a cardiac standpoint. He does feel his pulse regularly and has no irregularities noted. He also has had no feelings of palpitations, chest pain, or shortness of breath.    His back has been his main concern and he is very excited to get this procedure done.     Past Medical History   Diagnosis Date   • Hyperlipidemia    • Unspecified urinary incontinence      \" enlarged prostate\"   • Hypertension 2014     well  controlled   • Diabetes      IDDM   • Arthritis      generalized   • Snoring    • High cholesterol    • Arrhythmia      A Flutter   • Sleep apnea      CPAP   • Pain 04/11/14     back pain increases with activity   • Restless leg      Past Surgical History   Procedure Laterality Date   • Lumbar laminectomy diskectomy  5/2/2012     Performed by MAZIN LONG at SURGERY [facility]   • Lumbar laminectomy diskectomy  2/19/2014     Performed by Mazin Long M.D. at SURGERY [facility]   • Trans urethral resection prostate  3/27/2014     Performed by Kyle Guzman M.D. at SURGERY [facility]   • Lumbar laminectomy diskectomy  4/22/2014     Performed by Mazin Long M.D. at SURGERY [facility]   • Recovery  1/13/2016     Procedure: IR Deep bone biopsy L2-L3 with general anesthesia-DAYANA;  Surgeon: [facility];  Location: SURGERY PRE-POST PROC UNIT [facility] [place];  Service:    • Other orthopedic surgery  2003/2007/2008     left shoulder replacement/with corrections   • Other " orthopedic surgery       right knee replacement/manipulation   • Other orthopedic surgery  03/12     L3-L5 Laminectomy   • Other orthopedic surgery  02/14     L2-L3 Laminectomy   • Other       laser eye susrgery   • Other       carpectomy   • Other  2014     transection supraorbital nerve     Family History   Problem Relation Age of Onset   • Sleep Apnea Neg Hx      History   Smoking status   • Former Smoker -- 0.75 packs/day for 10 years   • Types: Cigarettes   • Quit date: 04/24/2008   Smokeless tobacco   • Never Used     Allergies   Allergen Reactions   • Pollen Extract      Local pollen, primarily Rabitt [place]     Outpatient Encounter Prescriptions as of 8/3/2017   Medication Sig Dispense Refill   • gabapentin (NEURONTIN) 600 MG tablet every day.     • Cholecalciferol (VITAMIN D3) 5000 UNITS Cap Take 1 Cap by mouth every day.     • insulin aspart protamine-insulin aspart (NOVOLOG MIX 70/30) (70-30) 100 UNIT/ML injection Inject 5-22 Units as instructed 3 times a day before meals. 100 = 5 units  120= 7 units  184 = 9 units  200 = 12 units  Pt can go as high as 22 units if needed     • ropinirole (REQUIP) 0.5 MG Tab Take 1 mg by mouth every bedtime.     • hydrocodone/acetaminophen (NORCO)  MG Tab Take 1-2 Tabs by mouth every 6 hours as needed for Severe Pain.     • tamsulosin (FLOMAX) 0.4 MG capsule Take 0.8 mg by mouth ONE-HALF HOUR AFTER BREAKFAST.     • Omega-3 Fatty Acids (FISH OIL TRIPLE STRENGTH) 1400 MG Cap Take 1 Cap by mouth every day.     • Multiple Vitamins-Minerals (MULTIVITAMIN PO) Take 1 Tab by mouth every day.     • tramadol (ULTRAM) 50 MG Tab Take  mg by mouth every 6 hours as needed for Mild Pain.     • metformin (GLUCOPHAGE) 1000 MG tablet Take 1,000 mg by mouth 2 times a day, with meals.     • benazepril (LOTENSIN) 40 MG tablet Take 40 mg by mouth 2 Times a Day.     • indapamide (LOZOL) 2.5 MG TABS Take 2.5 mg by mouth every morning.     • Carvedilol Phosphate (COREG CR) 40 MG CP24  "Take 40 mg by mouth every day.     • amlodipine (NORVASC) 5 MG TABS Take 5 mg by mouth 2 Times a Day.     • gemfibrozil (LOPID) 600 MG TABS Take 600 mg by mouth 2 times a day.       No facility-administered encounter medications on file as of 8/3/2017.     Review of Systems   Constitutional: Negative for fever and malaise/fatigue.   Respiratory: Negative for cough and shortness of breath.    Cardiovascular: Negative for chest pain, palpitations, orthopnea, claudication, leg swelling and PND.   Gastrointestinal: Negative for abdominal pain.   Musculoskeletal: Positive for back pain. Negative for myalgias.   Neurological: Negative for dizziness.   All other systems reviewed and are negative.       Objective:   /80 mmHg  Pulse 86  Ht 1.676 m (5' 5.98\")  Wt 105.688 kg (233 lb)  BMI 37.63 kg/m2  SpO2 95%    Physical Exam   Constitutional: He is oriented to person, place, and time. He appears well-developed. No distress.   obese   HENT:   Head: Normocephalic and atraumatic.   Eyes: EOM are normal.   Neck: Normal range of motion. No JVD present.   Cardiovascular: Normal rate, regular rhythm, normal heart sounds and intact distal pulses.    No murmur heard.  Pulmonary/Chest: Effort normal and breath sounds normal. No respiratory distress. He has no wheezes. He has no rales.   Abdominal: Soft. Bowel sounds are normal.   Musculoskeletal: Normal range of motion. He exhibits no edema.   Neurological: He is alert and oriented to person, place, and time.   Skin: Skin is warm and dry.   Psychiatric: He has a normal mood and affect.   Nursing note and vitals reviewed.    Lab Results   Component Value Date/Time    WBC 6.4 04/24/2017 08:01 AM    RBC 4.36* 04/24/2017 08:01 AM    HEMOGLOBIN 12.7* 04/24/2017 08:01 AM    HEMATOCRIT 40.0* 04/24/2017 08:01 AM    MCV 91.7 04/24/2017 08:01 AM    MCH 29.1 04/24/2017 08:01 AM    MCHC 31.8* 04/24/2017 08:01 AM    MPV 11.3 04/24/2017 08:01 AM      Lab Results   Component Value " Date/Time    CHOLESTEROL, 10/31/2016 03:26 PM    LDL 47 10/31/2016 03:26 PM    HDL 28* 10/31/2016 03:26 PM    TRIGLYCERIDES 138 10/31/2016 03:26 PM       Lab Results   Component Value Date/Time    SODIUM 139 04/24/2017 08:01 AM    POTASSIUM 4.5 04/24/2017 08:01 AM    CHLORIDE 102 04/24/2017 08:01 AM    CO2 29 04/24/2017 08:01 AM    GLUCOSE 126* 04/24/2017 08:01 AM    BUN 31* 04/24/2017 08:01 AM    CREATININE 0.99 04/24/2017 08:01 AM     Lab Results   Component Value Date/Time    ALKALINE PHOSPHATASE 128* 04/24/2017 08:01 AM    AST(SGOT) 23 04/24/2017 08:01 AM    ALT(SGPT) 13 04/24/2017 08:01 AM    TOTAL BILIRUBIN 0.4 04/24/2017 08:01 AM      Lab Results   Component Value Date/Time    B NATRIURETIC PEPTIDE 207* 03/14/2017 11:42 AM      2012 CAROTID DUPLEX   1. There is mild estimated less than 50% bilateral internal carotid artery stenosis at the origins with a mild amount of plaque.    2017 LE DUPLEX CONCLUSIONS   No evidence of DVT in the right or left lower extremity.    2017 JOSE GUADALUPE CONCLUSIONS  No thrombus detected in the left atrial appendage.  Left ventricular ejection fraction is visually estimated to be 65%.  The right ventricle was normal in size and function.  No significant valve abnormalities.     2017 AFLUTTER ABLATION PROCEDURE COMPONENTS:  1.  EP study with atrial flutter ablation.  2.  Coronary sinus catheter placed via the coronary sinus for sensing and    pacing of the left atrium.  3.  Guidance 3 dimensional mapping.  4.  Program stimulation after drug infusion.    Assessment:     1. Pre-operative cardiovascular examination  EKG   2. Type 2 diabetes mellitus without complication, without long-term current use of insulin (CMS-HCC)     3. Essential hypertension     4. Dyslipidemia     5. Typical atrial flutter (CMS-HCC)       Medical Decision Making:  Today's Assessment / Status / Plan:     1. DM, managed well with PCP. Insulin.    2. HTN, moderate control. Higher systolic reading today than  normal. He will follow at home and call PCP for abnormal readings. Continue same med's for now, to be adjusted by PCP.    3. HLD, good readings. Continue lopid per PCP.    4. Aflutter with previous ablation, no recurrence. Good EKG. Off OAC. No sense of palpitations. Follow up as planned with JE.    He is cleared for surgery with a low cardiac risk.     FU in clinic in 6 months with JE or sooner if warranted by symptoms    Patient verbalizes understanding and agrees with the plan of care.     Collaborating MD: Johana HOLLAND        No Recipients

## 2017-08-04 NOTE — TELEPHONE ENCOUNTER
Clearance letter for back surgery faxed to [facility] Attn: Dr. Yemi Sharma at fax #[phone]. Copy of office note and ECG from today (8/3/2017) included.    PIPER RN

## 2017-08-30 ENCOUNTER — HOSPITAL ENCOUNTER (OUTPATIENT)
Dept: RADIOLOGY | Facility: MEDICAL CENTER | Age: 67
End: 2017-08-30
Attending: NEUROLOGICAL SURGERY | Admitting: NEUROLOGICAL SURGERY
Payer: MEDICARE

## 2017-08-30 DIAGNOSIS — Z01.811 PRE-OPERATIVE RESPIRATORY EXAMINATION: ICD-10-CM

## 2017-08-30 DIAGNOSIS — Z01.812 PRE-PROCEDURAL LABORATORY EXAMINATION: ICD-10-CM

## 2017-08-30 DIAGNOSIS — Z01.810 PRE-OPERATIVE CARDIOVASCULAR EXAMINATION: ICD-10-CM

## 2017-08-30 LAB
ANION GAP SERPL CALC-SCNC: 7 MMOL/L (ref 0–11.9)
APTT PPP: 32.3 SEC (ref 24.7–36)
BASOPHILS # BLD AUTO: 0.9 % (ref 0–1.8)
BASOPHILS # BLD: 0.05 K/UL (ref 0–0.12)
BUN SERPL-MCNC: 38 MG/DL (ref 8–22)
CALCIUM SERPL-MCNC: 9.9 MG/DL (ref 8.5–10.5)
CHLORIDE SERPL-SCNC: 108 MMOL/L (ref 96–112)
CO2 SERPL-SCNC: 24 MMOL/L (ref 20–33)
CREAT SERPL-MCNC: 1.1 MG/DL (ref 0.5–1.4)
EKG IMPRESSION: NORMAL
EOSINOPHIL # BLD AUTO: 0.25 K/UL (ref 0–0.51)
EOSINOPHIL NFR BLD: 4.7 % (ref 0–6.9)
ERYTHROCYTE [DISTWIDTH] IN BLOOD BY AUTOMATED COUNT: 46.7 FL (ref 35.9–50)
EST. AVERAGE GLUCOSE BLD GHB EST-MCNC: 183 MG/DL
GFR SERPL CREATININE-BSD FRML MDRD: >60 ML/MIN/1.73 M 2
GLUCOSE SERPL-MCNC: 135 MG/DL (ref 65–99)
HBA1C MFR BLD: 8 % (ref 0–5.6)
HCT VFR BLD AUTO: 42.3 % (ref 42–52)
HGB BLD-MCNC: 14.1 G/DL (ref 14–18)
IMM GRANULOCYTES # BLD AUTO: 0.01 K/UL (ref 0–0.11)
IMM GRANULOCYTES NFR BLD AUTO: 0.2 % (ref 0–0.9)
INR PPP: 0.97 (ref 0.87–1.13)
LYMPHOCYTES # BLD AUTO: 1.62 K/UL (ref 1–4.8)
LYMPHOCYTES NFR BLD: 30.4 % (ref 22–41)
MCH RBC QN AUTO: 31.1 PG (ref 27–33)
MCHC RBC AUTO-ENTMCNC: 33.3 G/DL (ref 33.7–35.3)
MCV RBC AUTO: 93.4 FL (ref 81.4–97.8)
MONOCYTES # BLD AUTO: 0.66 K/UL (ref 0–0.85)
MONOCYTES NFR BLD AUTO: 12.4 % (ref 0–13.4)
NEUTROPHILS # BLD AUTO: 2.74 K/UL (ref 1.82–7.42)
NEUTROPHILS NFR BLD: 51.4 % (ref 44–72)
NRBC # BLD AUTO: 0 K/UL
NRBC BLD AUTO-RTO: 0 /100 WBC
PLATELET # BLD AUTO: 209 K/UL (ref 164–446)
PMV BLD AUTO: 11.9 FL (ref 9–12.9)
POTASSIUM SERPL-SCNC: 4 MMOL/L (ref 3.6–5.5)
PROTHROMBIN TIME: 13.2 SEC (ref 12–14.6)
RBC # BLD AUTO: 4.53 M/UL (ref 4.7–6.1)
SODIUM SERPL-SCNC: 139 MMOL/L (ref 135–145)
WBC # BLD AUTO: 5.3 K/UL (ref 4.8–10.8)

## 2017-08-30 PROCEDURE — 71020 DX-CHEST-2 VIEWS: CPT

## 2017-08-30 PROCEDURE — 36415 COLL VENOUS BLD VENIPUNCTURE: CPT

## 2017-08-30 PROCEDURE — 85610 PROTHROMBIN TIME: CPT

## 2017-08-30 PROCEDURE — 83036 HEMOGLOBIN GLYCOSYLATED A1C: CPT

## 2017-08-30 PROCEDURE — 80048 BASIC METABOLIC PNL TOTAL CA: CPT

## 2017-08-30 PROCEDURE — 93005 ELECTROCARDIOGRAM TRACING: CPT

## 2017-08-30 PROCEDURE — 85025 COMPLETE CBC W/AUTO DIFF WBC: CPT

## 2017-08-30 PROCEDURE — 85730 THROMBOPLASTIN TIME PARTIAL: CPT

## 2017-08-30 PROCEDURE — 93010 ELECTROCARDIOGRAM REPORT: CPT | Performed by: INTERNAL MEDICINE

## 2017-09-12 ENCOUNTER — APPOINTMENT (OUTPATIENT)
Dept: RADIOLOGY | Facility: MEDICAL CENTER | Age: 67
End: 2017-09-12
Attending: NEUROLOGICAL SURGERY
Payer: MEDICARE

## 2017-09-12 ENCOUNTER — HOSPITAL ENCOUNTER (OUTPATIENT)
Facility: MEDICAL CENTER | Age: 67
End: 2017-09-12
Attending: NEUROLOGICAL SURGERY | Admitting: NEUROLOGICAL SURGERY
Payer: MEDICARE

## 2017-09-12 VITALS
BODY MASS INDEX: 36.95 KG/M2 | HEIGHT: 66 IN | HEART RATE: 77 BPM | RESPIRATION RATE: 16 BRPM | OXYGEN SATURATION: 92 % | DIASTOLIC BLOOD PRESSURE: 77 MMHG | TEMPERATURE: 98.1 F | SYSTOLIC BLOOD PRESSURE: 135 MMHG | WEIGHT: 229.94 LBS

## 2017-09-12 PROBLEM — M48.061 LUMBAR STENOSIS: Status: ACTIVE | Noted: 2017-09-12

## 2017-09-12 LAB — GLUCOSE BLD-MCNC: 96 MG/DL (ref 65–99)

## 2017-09-12 PROCEDURE — 82962 GLUCOSE BLOOD TEST: CPT

## 2017-09-12 PROCEDURE — 700102 HCHG RX REV CODE 250 W/ 637 OVERRIDE(OP)

## 2017-09-12 PROCEDURE — A9270 NON-COVERED ITEM OR SERVICE: HCPCS

## 2017-09-12 PROCEDURE — 700101 HCHG RX REV CODE 250

## 2017-09-12 RX ORDER — SODIUM CHLORIDE 9 MG/ML
INJECTION, SOLUTION INTRAVENOUS CONTINUOUS
Status: DISCONTINUED | OUTPATIENT
Start: 2017-09-12 | End: 2017-09-12 | Stop reason: HOSPADM

## 2017-09-12 RX ORDER — LIDOCAINE HYDROCHLORIDE 10 MG/ML
0.5 INJECTION, SOLUTION INFILTRATION; PERINEURAL
Status: COMPLETED | OUTPATIENT
Start: 2017-09-12 | End: 2017-09-12

## 2017-09-12 RX ORDER — LIDOCAINE AND PRILOCAINE 25; 25 MG/G; MG/G
1 CREAM TOPICAL
Status: COMPLETED | OUTPATIENT
Start: 2017-09-12 | End: 2017-09-12

## 2017-09-12 RX ORDER — GABAPENTIN 300 MG/1
CAPSULE ORAL
Status: COMPLETED
Start: 2017-09-12 | End: 2017-09-12

## 2017-09-12 RX ORDER — ACETAMINOPHEN 500 MG
TABLET ORAL
Status: COMPLETED
Start: 2017-09-12 | End: 2017-09-12

## 2017-09-12 RX ORDER — LIDOCAINE HYDROCHLORIDE 10 MG/ML
INJECTION, SOLUTION INFILTRATION; PERINEURAL
Status: COMPLETED
Start: 2017-09-12 | End: 2017-09-12

## 2017-09-12 RX ORDER — CELECOXIB 200 MG/1
CAPSULE ORAL
Status: COMPLETED
Start: 2017-09-12 | End: 2017-09-12

## 2017-09-12 RX ADMIN — LIDOCAINE HYDROCHLORIDE 0.5 ML: 10 INJECTION, SOLUTION INFILTRATION; PERINEURAL at 10:55

## 2017-09-12 RX ADMIN — GABAPENTIN 300 MG: 300 CAPSULE ORAL at 11:00

## 2017-09-12 RX ADMIN — ACETAMINOPHEN 1000 MG: 500 TABLET, FILM COATED ORAL at 11:00

## 2017-09-12 RX ADMIN — SODIUM CHLORIDE: 9 INJECTION, SOLUTION INTRAVENOUS at 10:55

## 2017-09-12 RX ADMIN — CELECOXIB 400 MG: 200 CAPSULE ORAL at 11:00

## 2017-09-12 ASSESSMENT — PAIN SCALES - GENERAL: PAINLEVEL_OUTOF10: 0

## 2017-09-12 NOTE — OR NURSING
"Prior to discharge, pt requesting to speak with \"any supervisor\".  Dc, OR Supervisor, in to speak with pt.  Pt voiced his concerns regarding billing and that he does \"not want to be billed for anything\".  Pt demographic info also given to Pre Op Manager.   Pt appreciative for time and understanding given to his concerns.  "

## 2017-09-18 ENCOUNTER — APPOINTMENT (OUTPATIENT)
Dept: RADIOLOGY | Facility: MEDICAL CENTER | Age: 67
DRG: 455 | End: 2017-09-18
Attending: NEUROLOGICAL SURGERY
Payer: MEDICARE

## 2017-09-18 ENCOUNTER — HOSPITAL ENCOUNTER (INPATIENT)
Facility: MEDICAL CENTER | Age: 67
LOS: 4 days | DRG: 455 | End: 2017-09-22
Attending: NEUROLOGICAL SURGERY | Admitting: NEUROLOGICAL SURGERY
Payer: MEDICARE

## 2017-09-18 DIAGNOSIS — M48.061 STENOSIS, SPINAL, LUMBAR: ICD-10-CM

## 2017-09-18 LAB
GLUCOSE BLD-MCNC: 112 MG/DL (ref 65–99)
GLUCOSE BLD-MCNC: 176 MG/DL (ref 65–99)

## 2017-09-18 PROCEDURE — A4314 CATH W/DRAINAGE 2-WAY LATEX: HCPCS | Performed by: NEUROLOGICAL SURGERY

## 2017-09-18 PROCEDURE — A9270 NON-COVERED ITEM OR SERVICE: HCPCS | Performed by: PHYSICIAN ASSISTANT

## 2017-09-18 PROCEDURE — 160042 HCHG SURGERY MINUTES - EA ADDL 1 MIN LEVEL 5: Performed by: NEUROLOGICAL SURGERY

## 2017-09-18 PROCEDURE — 0TWBX0Z REVISION OF DRAINAGE DEVICE IN BLADDER, EXTERNAL APPROACH: ICD-10-PCS | Performed by: UROLOGY

## 2017-09-18 PROCEDURE — 502626 HCHG SURGIFLO HEMOSTATIC MATRIX 6ML: Performed by: NEUROLOGICAL SURGERY

## 2017-09-18 PROCEDURE — 160036 HCHG PACU - EA ADDL 30 MINS PHASE I: Performed by: NEUROLOGICAL SURGERY

## 2017-09-18 PROCEDURE — 502000 HCHG MISC OR IMPLANTS RC 0278: Performed by: NEUROLOGICAL SURGERY

## 2017-09-18 PROCEDURE — 700101 HCHG RX REV CODE 250

## 2017-09-18 PROCEDURE — 160002 HCHG RECOVERY MINUTES (STAT): Performed by: NEUROLOGICAL SURGERY

## 2017-09-18 PROCEDURE — 0SG00A0 FUSION OF LUMBAR VERTEBRAL JOINT WITH INTERBODY FUSION DEVICE, ANTERIOR APPROACH, ANTERIOR COLUMN, OPEN APPROACH: ICD-10-PCS | Performed by: NEUROLOGICAL SURGERY

## 2017-09-18 PROCEDURE — 3C1ZX8Z IRRIGATION OF INDWELLING DEVICE USING IRRIGATING SUBSTANCE, EXTERNAL APPROACH: ICD-10-PCS | Performed by: UROLOGY

## 2017-09-18 PROCEDURE — A4340 INDWELLING CATHETER SPECIAL: HCPCS | Performed by: NEUROLOGICAL SURGERY

## 2017-09-18 PROCEDURE — 501838 HCHG SUTURE GENERAL: Performed by: NEUROLOGICAL SURGERY

## 2017-09-18 PROCEDURE — 500122 HCHG BOVIE, BLADE: Performed by: NEUROLOGICAL SURGERY

## 2017-09-18 PROCEDURE — A6402 STERILE GAUZE <= 16 SQ IN: HCPCS | Performed by: NEUROLOGICAL SURGERY

## 2017-09-18 PROCEDURE — 700102 HCHG RX REV CODE 250 W/ 637 OVERRIDE(OP)

## 2017-09-18 PROCEDURE — 503195 HCHG SEALER, BIPOLAR AQUAMANTYS: Performed by: NEUROLOGICAL SURGERY

## 2017-09-18 PROCEDURE — 700102 HCHG RX REV CODE 250 W/ 637 OVERRIDE(OP): Performed by: PHYSICIAN ASSISTANT

## 2017-09-18 PROCEDURE — 82962 GLUCOSE BLOOD TEST: CPT

## 2017-09-18 PROCEDURE — 500444 HCHG HEMOSTAT, SURGICEL 2X3: Performed by: NEUROLOGICAL SURGERY

## 2017-09-18 PROCEDURE — A9270 NON-COVERED ITEM OR SERVICE: HCPCS

## 2017-09-18 PROCEDURE — 72100 X-RAY EXAM L-S SPINE 2/3 VWS: CPT

## 2017-09-18 PROCEDURE — 700111 HCHG RX REV CODE 636 W/ 250 OVERRIDE (IP)

## 2017-09-18 PROCEDURE — 700112 HCHG RX REV CODE 229: Performed by: PHYSICIAN ASSISTANT

## 2017-09-18 PROCEDURE — 500885 HCHG PACK, JACKSON TABLE: Performed by: NEUROLOGICAL SURGERY

## 2017-09-18 PROCEDURE — 160035 HCHG PACU - 1ST 60 MINS PHASE I: Performed by: NEUROLOGICAL SURGERY

## 2017-09-18 PROCEDURE — 700111 HCHG RX REV CODE 636 W/ 250 OVERRIDE (IP): Performed by: PHYSICIAN ASSISTANT

## 2017-09-18 PROCEDURE — 770001 HCHG ROOM/CARE - MED/SURG/GYN PRIV*

## 2017-09-18 PROCEDURE — A6222 GAUZE <=16 IN NO W/SAL W/O B: HCPCS | Performed by: NEUROLOGICAL SURGERY

## 2017-09-18 PROCEDURE — 502240 HCHG MISC OR SUPPLY RC 0272: Performed by: NEUROLOGICAL SURGERY

## 2017-09-18 PROCEDURE — 500367 HCHG DRAIN KIT, HEMOVAC: Performed by: NEUROLOGICAL SURGERY

## 2017-09-18 PROCEDURE — 700105 HCHG RX REV CODE 258: Performed by: PHYSICIAN ASSISTANT

## 2017-09-18 PROCEDURE — 01NB0ZZ RELEASE LUMBAR NERVE, OPEN APPROACH: ICD-10-PCS | Performed by: NEUROLOGICAL SURGERY

## 2017-09-18 PROCEDURE — 160009 HCHG ANES TIME/MIN: Performed by: NEUROLOGICAL SURGERY

## 2017-09-18 PROCEDURE — 160031 HCHG SURGERY MINUTES - 1ST 30 MINS LEVEL 5: Performed by: NEUROLOGICAL SURGERY

## 2017-09-18 PROCEDURE — 160048 HCHG OR STATISTICAL LEVEL 1-5: Performed by: NEUROLOGICAL SURGERY

## 2017-09-18 PROCEDURE — 700105 HCHG RX REV CODE 258

## 2017-09-18 PROCEDURE — 0SG0071 FUSION OF LUMBAR VERTEBRAL JOINT WITH AUTOLOGOUS TISSUE SUBSTITUTE, POSTERIOR APPROACH, POSTERIOR COLUMN, OPEN APPROACH: ICD-10-PCS | Performed by: NEUROLOGICAL SURGERY

## 2017-09-18 PROCEDURE — 501423 HCHG SPONGE, SURGIFOAM 12X7: Performed by: NEUROLOGICAL SURGERY

## 2017-09-18 PROCEDURE — 502726 HCHG NEEDLE ELECTRODE KIT: Performed by: NEUROLOGICAL SURGERY

## 2017-09-18 DEVICE — SCREW RELINE-O 2S POLYAXIAL 6.5X40MM (2TX8=16): Type: IMPLANTABLE DEVICE | Site: SPINE LUMBAR | Status: FUNCTIONAL

## 2017-09-18 DEVICE — IMPLANTABLE DEVICE: Type: IMPLANTABLE DEVICE | Site: SPINE LUMBAR | Status: FUNCTIONAL

## 2017-09-18 DEVICE — BONE MATRIX OSTEOCEL PRO 7CC: Type: IMPLANTABLE DEVICE | Site: SPINE LUMBAR | Status: FUNCTIONAL

## 2017-09-18 DEVICE — SCREW LOCK RELINE OPEN TULIP 5.5MM (2TX20=2TX23=86): Type: IMPLANTABLE DEVICE | Site: SPINE LUMBAR | Status: FUNCTIONAL

## 2017-09-18 RX ORDER — SODIUM CHLORIDE, SODIUM LACTATE, POTASSIUM CHLORIDE, AND CALCIUM CHLORIDE .6; .31; .03; .02 G/100ML; G/100ML; G/100ML; G/100ML
IRRIGANT IRRIGATION
Status: DISCONTINUED | OUTPATIENT
Start: 2017-09-18 | End: 2017-09-18 | Stop reason: HOSPADM

## 2017-09-18 RX ORDER — DIPHENHYDRAMINE HYDROCHLORIDE 50 MG/ML
25 INJECTION INTRAMUSCULAR; INTRAVENOUS EVERY 6 HOURS PRN
Status: DISCONTINUED | OUTPATIENT
Start: 2017-09-18 | End: 2017-09-22 | Stop reason: HOSPADM

## 2017-09-18 RX ORDER — DIAZEPAM 5 MG/ML
INJECTION, SOLUTION INTRAMUSCULAR; INTRAVENOUS
Status: COMPLETED
Start: 2017-09-18 | End: 2017-09-18

## 2017-09-18 RX ORDER — SODIUM CHLORIDE 9 MG/ML
INJECTION, SOLUTION INTRAVENOUS
Status: DISPENSED
Start: 2017-09-18 | End: 2017-09-19

## 2017-09-18 RX ORDER — TEMAZEPAM 15 MG/1
15 CAPSULE ORAL
Status: DISCONTINUED | OUTPATIENT
Start: 2017-09-19 | End: 2017-09-22 | Stop reason: HOSPADM

## 2017-09-18 RX ORDER — CARVEDILOL 6.25 MG/1
12.5 TABLET ORAL 2 TIMES DAILY WITH MEALS
Status: DISCONTINUED | OUTPATIENT
Start: 2017-09-18 | End: 2017-09-22 | Stop reason: HOSPADM

## 2017-09-18 RX ORDER — AMOXICILLIN 250 MG
1 CAPSULE ORAL
Status: DISCONTINUED | OUTPATIENT
Start: 2017-09-18 | End: 2017-09-22 | Stop reason: HOSPADM

## 2017-09-18 RX ORDER — ENEMA 19; 7 G/133ML; G/133ML
1 ENEMA RECTAL
Status: DISCONTINUED | OUTPATIENT
Start: 2017-09-18 | End: 2017-09-22 | Stop reason: HOSPADM

## 2017-09-18 RX ORDER — MEPERIDINE HYDROCHLORIDE 25 MG/ML
INJECTION INTRAMUSCULAR; INTRAVENOUS; SUBCUTANEOUS
Status: COMPLETED
Start: 2017-09-18 | End: 2017-09-18

## 2017-09-18 RX ORDER — BENAZEPRIL HYDROCHLORIDE 20 MG/1
40 TABLET ORAL
Status: DISCONTINUED | OUTPATIENT
Start: 2017-09-18 | End: 2017-09-22 | Stop reason: HOSPADM

## 2017-09-18 RX ORDER — CALCIUM CARBONATE 500 MG/1
500 TABLET, CHEWABLE ORAL 2 TIMES DAILY
Status: DISCONTINUED | OUTPATIENT
Start: 2017-09-18 | End: 2017-09-22 | Stop reason: HOSPADM

## 2017-09-18 RX ORDER — SODIUM CHLORIDE 9 MG/ML
INJECTION, SOLUTION INTRAVENOUS
Status: DISCONTINUED | OUTPATIENT
Start: 2017-09-18 | End: 2017-09-18 | Stop reason: HOSPADM

## 2017-09-18 RX ORDER — DEXTROSE MONOHYDRATE 25 G/50ML
25 INJECTION, SOLUTION INTRAVENOUS
Status: DISCONTINUED | OUTPATIENT
Start: 2017-09-18 | End: 2017-09-22 | Stop reason: HOSPADM

## 2017-09-18 RX ORDER — INSULIN ASPART 100 [IU]/ML
5-22 INJECTION, SUSPENSION SUBCUTANEOUS
Status: DISCONTINUED | OUTPATIENT
Start: 2017-09-18 | End: 2017-09-18

## 2017-09-18 RX ORDER — SODIUM CHLORIDE 9 MG/ML
INJECTION, SOLUTION INTRAVENOUS CONTINUOUS
Status: DISCONTINUED | OUTPATIENT
Start: 2017-09-18 | End: 2017-09-18

## 2017-09-18 RX ORDER — CYCLOBENZAPRINE HCL 10 MG
10 TABLET ORAL 3 TIMES DAILY
Status: DISCONTINUED | OUTPATIENT
Start: 2017-09-18 | End: 2017-09-22 | Stop reason: HOSPADM

## 2017-09-18 RX ORDER — AMOXICILLIN 250 MG
1 CAPSULE ORAL NIGHTLY
Status: DISCONTINUED | OUTPATIENT
Start: 2017-09-18 | End: 2017-09-22 | Stop reason: HOSPADM

## 2017-09-18 RX ORDER — OXYCODONE HCL 5 MG/5 ML
SOLUTION, ORAL ORAL
Status: COMPLETED
Start: 2017-09-18 | End: 2017-09-18

## 2017-09-18 RX ORDER — AMLODIPINE BESYLATE 5 MG/1
5 TABLET ORAL 2 TIMES DAILY
Status: DISCONTINUED | OUTPATIENT
Start: 2017-09-18 | End: 2017-09-22 | Stop reason: HOSPADM

## 2017-09-18 RX ORDER — CLONIDINE HYDROCHLORIDE 0.1 MG/1
0.1 TABLET ORAL EVERY 4 HOURS PRN
Status: DISCONTINUED | OUTPATIENT
Start: 2017-09-18 | End: 2017-09-22 | Stop reason: HOSPADM

## 2017-09-18 RX ORDER — POLYETHYLENE GLYCOL 3350 17 G/17G
1 POWDER, FOR SOLUTION ORAL 2 TIMES DAILY PRN
Status: DISCONTINUED | OUTPATIENT
Start: 2017-09-18 | End: 2017-09-22 | Stop reason: HOSPADM

## 2017-09-18 RX ORDER — ACETAMINOPHEN 500 MG
500 TABLET ORAL EVERY 6 HOURS
Status: DISCONTINUED | OUTPATIENT
Start: 2017-09-18 | End: 2017-09-22 | Stop reason: HOSPADM

## 2017-09-18 RX ORDER — BISACODYL 10 MG
10 SUPPOSITORY, RECTAL RECTAL
Status: DISCONTINUED | OUTPATIENT
Start: 2017-09-18 | End: 2017-09-22 | Stop reason: HOSPADM

## 2017-09-18 RX ORDER — ONDANSETRON 2 MG/ML
4 INJECTION INTRAMUSCULAR; INTRAVENOUS EVERY 4 HOURS PRN
Status: DISCONTINUED | OUTPATIENT
Start: 2017-09-18 | End: 2017-09-22 | Stop reason: HOSPADM

## 2017-09-18 RX ORDER — CARVEDILOL PHOSPHATE 40 MG/1
40 CAPSULE, EXTENDED RELEASE ORAL EVERY MORNING
Status: DISCONTINUED | OUTPATIENT
Start: 2017-09-18 | End: 2017-09-18

## 2017-09-18 RX ORDER — CEFAZOLIN SODIUM 2 G/100ML
2 INJECTION, SOLUTION INTRAVENOUS EVERY 8 HOURS
Status: COMPLETED | OUTPATIENT
Start: 2017-09-18 | End: 2017-09-19

## 2017-09-18 RX ORDER — BUPIVACAINE HYDROCHLORIDE AND EPINEPHRINE 5; 5 MG/ML; UG/ML
INJECTION, SOLUTION EPIDURAL; INTRACAUDAL; PERINEURAL
Status: DISCONTINUED | OUTPATIENT
Start: 2017-09-18 | End: 2017-09-18 | Stop reason: HOSPADM

## 2017-09-18 RX ORDER — DOCUSATE SODIUM 100 MG/1
100 CAPSULE, LIQUID FILLED ORAL 2 TIMES DAILY
Status: DISCONTINUED | OUTPATIENT
Start: 2017-09-18 | End: 2017-09-22 | Stop reason: HOSPADM

## 2017-09-18 RX ORDER — ROPINIROLE 0.5 MG/1
0.5 TABLET, FILM COATED ORAL
Status: DISCONTINUED | OUTPATIENT
Start: 2017-09-18 | End: 2017-09-22 | Stop reason: HOSPADM

## 2017-09-18 RX ORDER — OXYCODONE HCL 20 MG/1
TABLET, FILM COATED, EXTENDED RELEASE ORAL
Status: COMPLETED
Start: 2017-09-18 | End: 2017-09-18

## 2017-09-18 RX ORDER — INDAPAMIDE 2.5 MG/1
2.5 TABLET ORAL EVERY MORNING
Status: DISCONTINUED | OUTPATIENT
Start: 2017-09-18 | End: 2017-09-22 | Stop reason: HOSPADM

## 2017-09-18 RX ORDER — ONDANSETRON 4 MG/1
4 TABLET, ORALLY DISINTEGRATING ORAL EVERY 4 HOURS PRN
Status: DISCONTINUED | OUTPATIENT
Start: 2017-09-18 | End: 2017-09-22 | Stop reason: HOSPADM

## 2017-09-18 RX ORDER — LIDOCAINE HYDROCHLORIDE 10 MG/ML
0.5 INJECTION, SOLUTION INFILTRATION; PERINEURAL
Status: COMPLETED | OUTPATIENT
Start: 2017-09-18 | End: 2017-09-18

## 2017-09-18 RX ORDER — LIDOCAINE HYDROCHLORIDE 10 MG/ML
INJECTION, SOLUTION INFILTRATION; PERINEURAL
Status: COMPLETED
Start: 2017-09-18 | End: 2017-09-18

## 2017-09-18 RX ORDER — GEMFIBROZIL 600 MG/1
600 TABLET, FILM COATED ORAL 2 TIMES DAILY
Status: DISCONTINUED | OUTPATIENT
Start: 2017-09-18 | End: 2017-09-22 | Stop reason: HOSPADM

## 2017-09-18 RX ORDER — TAMSULOSIN HYDROCHLORIDE 0.4 MG/1
0.8 CAPSULE ORAL
Status: DISCONTINUED | OUTPATIENT
Start: 2017-09-18 | End: 2017-09-22 | Stop reason: HOSPADM

## 2017-09-18 RX ORDER — DIPHENHYDRAMINE HCL 25 MG
25 TABLET ORAL EVERY 6 HOURS PRN
Status: DISCONTINUED | OUTPATIENT
Start: 2017-09-18 | End: 2017-09-22 | Stop reason: HOSPADM

## 2017-09-18 RX ORDER — LIDOCAINE AND PRILOCAINE 25; 25 MG/G; MG/G
1 CREAM TOPICAL
Status: COMPLETED | OUTPATIENT
Start: 2017-09-18 | End: 2017-09-18

## 2017-09-18 RX ORDER — BACITRACIN 50000 [IU]/1
INJECTION, POWDER, FOR SOLUTION INTRAMUSCULAR
Status: DISCONTINUED | OUTPATIENT
Start: 2017-09-18 | End: 2017-09-18 | Stop reason: HOSPADM

## 2017-09-18 RX ORDER — SODIUM CHLORIDE 9 MG/ML
INJECTION, SOLUTION INTRAVENOUS CONTINUOUS
Status: DISCONTINUED | OUTPATIENT
Start: 2017-09-18 | End: 2017-09-22 | Stop reason: HOSPADM

## 2017-09-18 RX ADMIN — FENTANYL CITRATE 50 MCG: 50 INJECTION, SOLUTION INTRAMUSCULAR; INTRAVENOUS at 17:55

## 2017-09-18 RX ADMIN — DOCUSATE SODIUM 100 MG: 100 CAPSULE ORAL at 21:24

## 2017-09-18 RX ADMIN — INSULIN LISPRO 3 UNITS: 100 INJECTION, SOLUTION INTRAVENOUS; SUBCUTANEOUS at 21:42

## 2017-09-18 RX ADMIN — TAMSULOSIN HYDROCHLORIDE 0.8 MG: 0.4 CAPSULE ORAL at 21:23

## 2017-09-18 RX ADMIN — OXYCODONE HYDROCHLORIDE 20 MG: 20 TABLET, FILM COATED, EXTENDED RELEASE ORAL at 11:54

## 2017-09-18 RX ADMIN — MEPERIDINE HYDROCHLORIDE 25 MG: 25 INJECTION INTRAMUSCULAR; INTRAVENOUS; SUBCUTANEOUS at 17:32

## 2017-09-18 RX ADMIN — CARVEDILOL 12.5 MG: 6.25 TABLET, FILM COATED ORAL at 21:23

## 2017-09-18 RX ADMIN — ANTACID TABLETS 500 MG: 500 TABLET, CHEWABLE ORAL at 21:22

## 2017-09-18 RX ADMIN — HYDROMORPHONE HYDROCHLORIDE 0.2 MG: 1 INJECTION, SOLUTION INTRAMUSCULAR; INTRAVENOUS; SUBCUTANEOUS at 17:23

## 2017-09-18 RX ADMIN — HYDROMORPHONE HYDROCHLORIDE 0.5 MG: 1 INJECTION, SOLUTION INTRAMUSCULAR; INTRAVENOUS; SUBCUTANEOUS at 17:28

## 2017-09-18 RX ADMIN — GABAPENTIN 1800 MG: 300 CAPSULE ORAL at 21:24

## 2017-09-18 RX ADMIN — FENTANYL CITRATE 50 MCG: 50 INJECTION, SOLUTION INTRAMUSCULAR; INTRAVENOUS at 16:44

## 2017-09-18 RX ADMIN — SODIUM CHLORIDE: 9 INJECTION, SOLUTION INTRAVENOUS at 21:31

## 2017-09-18 RX ADMIN — LIDOCAINE HYDROCHLORIDE 0.5 ML: 10 INJECTION, SOLUTION INFILTRATION; PERINEURAL at 09:55

## 2017-09-18 RX ADMIN — METFORMIN HYDROCHLORIDE 1000 MG: 500 TABLET, FILM COATED ORAL at 21:23

## 2017-09-18 RX ADMIN — DIAZEPAM 1 MG: 5 INJECTION, SOLUTION INTRAMUSCULAR; INTRAVENOUS at 16:48

## 2017-09-18 RX ADMIN — OXYCODONE HYDROCHLORIDE 10 MG: 5 SOLUTION ORAL at 17:15

## 2017-09-18 RX ADMIN — AMLODIPINE BESYLATE 5 MG: 5 TABLET ORAL at 21:23

## 2017-09-18 RX ADMIN — CYCLOBENZAPRINE 10 MG: 10 TABLET, FILM COATED ORAL at 21:22

## 2017-09-18 RX ADMIN — CEFAZOLIN SODIUM 2 G: 2 INJECTION, SOLUTION INTRAVENOUS at 21:28

## 2017-09-18 RX ADMIN — HYDROMORPHONE HYDROCHLORIDE 0.2 MG: 1 INJECTION, SOLUTION INTRAMUSCULAR; INTRAVENOUS; SUBCUTANEOUS at 18:55

## 2017-09-18 RX ADMIN — SODIUM CHLORIDE: 9 INJECTION, SOLUTION INTRAVENOUS at 09:55

## 2017-09-18 RX ADMIN — HYDROMORPHONE HYDROCHLORIDE 0.2 MG: 1 INJECTION, SOLUTION INTRAMUSCULAR; INTRAVENOUS; SUBCUTANEOUS at 17:18

## 2017-09-18 RX ADMIN — STANDARDIZED SENNA CONCENTRATE AND DOCUSATE SODIUM 1 TABLET: 8.6; 5 TABLET, FILM COATED ORAL at 21:23

## 2017-09-18 RX ADMIN — HYDROMORPHONE HYDROCHLORIDE 0.1 MG: 1 INJECTION, SOLUTION INTRAMUSCULAR; INTRAVENOUS; SUBCUTANEOUS at 17:31

## 2017-09-18 RX ADMIN — OXYCODONE HYDROCHLORIDE 10 MG: 5 SOLUTION ORAL at 18:16

## 2017-09-18 RX ADMIN — MORPHINE SULFATE: 50 INJECTION, SOLUTION, CONCENTRATE INTRAVENOUS at 19:00

## 2017-09-18 RX ADMIN — GEMFIBROZIL 600 MG: 600 TABLET ORAL at 21:27

## 2017-09-18 RX ADMIN — FENTANYL CITRATE 50 MCG: 50 INJECTION, SOLUTION INTRAMUSCULAR; INTRAVENOUS at 17:40

## 2017-09-18 RX ADMIN — FENTANYL CITRATE 50 MCG: 50 INJECTION, SOLUTION INTRAMUSCULAR; INTRAVENOUS at 17:08

## 2017-09-18 RX ADMIN — ACETAMINOPHEN 500 MG: 500 TABLET ORAL at 21:24

## 2017-09-18 ASSESSMENT — PAIN SCALES - GENERAL
PAINLEVEL_OUTOF10: ASSUMED PAIN PRESENT
PAINLEVEL_OUTOF10: ASSUMED PAIN PRESENT
PAINLEVEL_OUTOF10: 7
PAINLEVEL_OUTOF10: 7
PAINLEVEL_OUTOF10: 6
PAINLEVEL_OUTOF10: 9
PAINLEVEL_OUTOF10: 6
PAINLEVEL_OUTOF10: 7
PAINLEVEL_OUTOF10: 9
PAINLEVEL_OUTOF10: 5
PAINLEVEL_OUTOF10: 6
PAINLEVEL_OUTOF10: 6
PAINLEVEL_OUTOF10: 4
PAINLEVEL_OUTOF10: 6
PAINLEVEL_OUTOF10: 0
PAINLEVEL_OUTOF10: ASSUMED PAIN PRESENT
PAINLEVEL_OUTOF10: 6
PAINLEVEL_OUTOF10: 6
PAINLEVEL_OUTOF10: 0

## 2017-09-19 LAB
ANION GAP SERPL CALC-SCNC: 9 MMOL/L (ref 0–11.9)
BUN SERPL-MCNC: 29 MG/DL (ref 8–22)
CALCIUM SERPL-MCNC: 8.9 MG/DL (ref 8.5–10.5)
CHLORIDE SERPL-SCNC: 102 MMOL/L (ref 96–112)
CO2 SERPL-SCNC: 28 MMOL/L (ref 20–33)
CREAT SERPL-MCNC: 0.93 MG/DL (ref 0.5–1.4)
GFR SERPL CREATININE-BSD FRML MDRD: >60 ML/MIN/1.73 M 2
GLUCOSE BLD-MCNC: 119 MG/DL (ref 65–99)
GLUCOSE BLD-MCNC: 150 MG/DL (ref 65–99)
GLUCOSE BLD-MCNC: 152 MG/DL (ref 65–99)
GLUCOSE BLD-MCNC: 166 MG/DL (ref 65–99)
GLUCOSE BLD-MCNC: 186 MG/DL (ref 65–99)
GLUCOSE BLD-MCNC: 215 MG/DL (ref 65–99)
GLUCOSE SERPL-MCNC: 152 MG/DL (ref 65–99)
POTASSIUM SERPL-SCNC: 4.7 MMOL/L (ref 3.6–5.5)
SODIUM SERPL-SCNC: 139 MMOL/L (ref 135–145)

## 2017-09-19 PROCEDURE — 36415 COLL VENOUS BLD VENIPUNCTURE: CPT

## 2017-09-19 PROCEDURE — 82962 GLUCOSE BLOOD TEST: CPT | Mod: 91

## 2017-09-19 PROCEDURE — G8988 SELF CARE GOAL STATUS: HCPCS | Mod: CI

## 2017-09-19 PROCEDURE — A9270 NON-COVERED ITEM OR SERVICE: HCPCS | Performed by: PHYSICIAN ASSISTANT

## 2017-09-19 PROCEDURE — 97166 OT EVAL MOD COMPLEX 45 MIN: CPT

## 2017-09-19 PROCEDURE — 700102 HCHG RX REV CODE 250 W/ 637 OVERRIDE(OP): Performed by: PHYSICIAN ASSISTANT

## 2017-09-19 PROCEDURE — G8987 SELF CARE CURRENT STATUS: HCPCS | Mod: CK

## 2017-09-19 PROCEDURE — G8979 MOBILITY GOAL STATUS: HCPCS | Mod: CI

## 2017-09-19 PROCEDURE — 97162 PT EVAL MOD COMPLEX 30 MIN: CPT

## 2017-09-19 PROCEDURE — 700111 HCHG RX REV CODE 636 W/ 250 OVERRIDE (IP): Performed by: NEUROLOGICAL SURGERY

## 2017-09-19 PROCEDURE — G8978 MOBILITY CURRENT STATUS: HCPCS | Mod: CK

## 2017-09-19 PROCEDURE — 770001 HCHG ROOM/CARE - MED/SURG/GYN PRIV*

## 2017-09-19 PROCEDURE — 700112 HCHG RX REV CODE 229: Performed by: PHYSICIAN ASSISTANT

## 2017-09-19 PROCEDURE — 700111 HCHG RX REV CODE 636 W/ 250 OVERRIDE (IP): Performed by: PHYSICIAN ASSISTANT

## 2017-09-19 PROCEDURE — 80048 BASIC METABOLIC PNL TOTAL CA: CPT

## 2017-09-19 RX ORDER — NALOXONE HYDROCHLORIDE 1 MG/ML
INJECTION INTRAMUSCULAR; INTRAVENOUS; SUBCUTANEOUS
Status: COMPLETED | OUTPATIENT
Start: 2017-09-19 | End: 2017-09-19

## 2017-09-19 RX ORDER — TRAMADOL HYDROCHLORIDE 50 MG/1
50-100 TABLET ORAL EVERY 6 HOURS PRN
Status: DISCONTINUED | OUTPATIENT
Start: 2017-09-19 | End: 2017-09-19

## 2017-09-19 RX ORDER — TRAMADOL HYDROCHLORIDE 50 MG/1
100 TABLET ORAL EVERY 6 HOURS PRN
Status: DISCONTINUED | OUTPATIENT
Start: 2017-09-19 | End: 2017-09-19

## 2017-09-19 RX ORDER — NALOXONE HYDROCHLORIDE 0.4 MG/ML
INJECTION, SOLUTION INTRAMUSCULAR; INTRAVENOUS; SUBCUTANEOUS
Status: ACTIVE
Start: 2017-09-19 | End: 2017-09-19

## 2017-09-19 RX ORDER — OXYCODONE AND ACETAMINOPHEN 10; 325 MG/1; MG/1
1-2 TABLET ORAL EVERY 4 HOURS PRN
Status: DISCONTINUED | OUTPATIENT
Start: 2017-09-19 | End: 2017-09-22 | Stop reason: HOSPADM

## 2017-09-19 RX ORDER — HYDROCODONE BITARTRATE AND ACETAMINOPHEN 10; 325 MG/1; MG/1
1-2 TABLET ORAL EVERY 4 HOURS PRN
Status: DISCONTINUED | OUTPATIENT
Start: 2017-09-19 | End: 2017-09-22 | Stop reason: HOSPADM

## 2017-09-19 RX ORDER — TRAMADOL HYDROCHLORIDE 50 MG/1
50 TABLET ORAL EVERY 6 HOURS PRN
Status: DISCONTINUED | OUTPATIENT
Start: 2017-09-19 | End: 2017-09-19

## 2017-09-19 RX ADMIN — STANDARDIZED SENNA CONCENTRATE AND DOCUSATE SODIUM 1 TABLET: 8.6; 5 TABLET, FILM COATED ORAL at 20:37

## 2017-09-19 RX ADMIN — GABAPENTIN 1800 MG: 300 CAPSULE ORAL at 08:54

## 2017-09-19 RX ADMIN — NALOXONE HYDROCHLORIDE 0.4 MG: 1 INJECTION PARENTERAL at 06:58

## 2017-09-19 RX ADMIN — ACETAMINOPHEN 500 MG: 500 TABLET ORAL at 20:37

## 2017-09-19 RX ADMIN — INSULIN LISPRO 3 UNITS: 100 INJECTION, SOLUTION INTRAVENOUS; SUBCUTANEOUS at 11:27

## 2017-09-19 RX ADMIN — ACETAMINOPHEN 500 MG: 500 TABLET ORAL at 08:53

## 2017-09-19 RX ADMIN — ANTACID TABLETS 500 MG: 500 TABLET, CHEWABLE ORAL at 08:54

## 2017-09-19 RX ADMIN — AMLODIPINE BESYLATE 5 MG: 5 TABLET ORAL at 08:55

## 2017-09-19 RX ADMIN — GEMFIBROZIL 600 MG: 600 TABLET ORAL at 08:54

## 2017-09-19 RX ADMIN — CARVEDILOL 12.5 MG: 6.25 TABLET, FILM COATED ORAL at 17:42

## 2017-09-19 RX ADMIN — HYDROCODONE BITARTRATE AND ACETAMINOPHEN 1 TABLET: 10; 325 TABLET ORAL at 11:14

## 2017-09-19 RX ADMIN — INSULIN LISPRO 4 UNITS: 100 INJECTION, SOLUTION INTRAVENOUS; SUBCUTANEOUS at 20:28

## 2017-09-19 RX ADMIN — CYCLOBENZAPRINE 10 MG: 10 TABLET, FILM COATED ORAL at 08:55

## 2017-09-19 RX ADMIN — CYCLOBENZAPRINE 10 MG: 10 TABLET, FILM COATED ORAL at 15:16

## 2017-09-19 RX ADMIN — HYDROCODONE BITARTRATE AND ACETAMINOPHEN 2 TABLET: 10; 325 TABLET ORAL at 23:13

## 2017-09-19 RX ADMIN — GEMFIBROZIL 600 MG: 600 TABLET ORAL at 20:27

## 2017-09-19 RX ADMIN — METFORMIN HYDROCHLORIDE 1000 MG: 500 TABLET, FILM COATED ORAL at 08:54

## 2017-09-19 RX ADMIN — DOCUSATE SODIUM 100 MG: 100 CAPSULE ORAL at 08:54

## 2017-09-19 RX ADMIN — CEFAZOLIN SODIUM 2 G: 2 INJECTION, SOLUTION INTRAVENOUS at 03:54

## 2017-09-19 RX ADMIN — DOCUSATE SODIUM 100 MG: 100 CAPSULE ORAL at 20:27

## 2017-09-19 RX ADMIN — CEFAZOLIN SODIUM 2 G: 2 INJECTION, SOLUTION INTRAVENOUS at 14:09

## 2017-09-19 RX ADMIN — VITAMIN D, TAB 1000IU (100/BT) 5000 UNITS: 25 TAB at 20:37

## 2017-09-19 RX ADMIN — AMLODIPINE BESYLATE 5 MG: 5 TABLET ORAL at 20:28

## 2017-09-19 RX ADMIN — OXYCODONE HYDROCHLORIDE AND ACETAMINOPHEN 2 TABLET: 10; 325 TABLET ORAL at 19:10

## 2017-09-19 RX ADMIN — ACETAMINOPHEN 500 MG: 500 TABLET ORAL at 03:55

## 2017-09-19 RX ADMIN — BENAZEPRIL HYDROCHLORIDE 40 MG: 20 TABLET, COATED ORAL at 20:27

## 2017-09-19 RX ADMIN — ANTACID TABLETS 500 MG: 500 TABLET, CHEWABLE ORAL at 20:27

## 2017-09-19 RX ADMIN — TRAMADOL HYDROCHLORIDE 100 MG: 50 TABLET, FILM COATED ORAL at 07:16

## 2017-09-19 RX ADMIN — HYDROCODONE BITARTRATE AND ACETAMINOPHEN 2 TABLET: 10; 325 TABLET ORAL at 15:16

## 2017-09-19 RX ADMIN — CARVEDILOL 12.5 MG: 6.25 TABLET, FILM COATED ORAL at 08:54

## 2017-09-19 RX ADMIN — INSULIN LISPRO 3 UNITS: 100 INJECTION, SOLUTION INTRAVENOUS; SUBCUTANEOUS at 05:28

## 2017-09-19 RX ADMIN — METFORMIN HYDROCHLORIDE 1000 MG: 500 TABLET, FILM COATED ORAL at 17:42

## 2017-09-19 RX ADMIN — INDAPAMIDE 2.5 MG: 2.5 TABLET, FILM COATED ORAL at 08:53

## 2017-09-19 RX ADMIN — TAMSULOSIN HYDROCHLORIDE 0.8 MG: 0.4 CAPSULE ORAL at 20:37

## 2017-09-19 RX ADMIN — GABAPENTIN 1800 MG: 300 CAPSULE ORAL at 20:27

## 2017-09-19 RX ADMIN — CYCLOBENZAPRINE 10 MG: 10 TABLET, FILM COATED ORAL at 19:10

## 2017-09-19 ASSESSMENT — COGNITIVE AND FUNCTIONAL STATUS - GENERAL
PERSONAL GROOMING: A LITTLE
SUGGESTED CMS G CODE MODIFIER DAILY ACTIVITY: CK
CLIMB 3 TO 5 STEPS WITH RAILING: TOTAL
MOVING TO AND FROM BED TO CHAIR: A LITTLE
HELP NEEDED FOR BATHING: A LOT
TOILETING: A LOT
MOBILITY SCORE: 14
DRESSING REGULAR UPPER BODY CLOTHING: A LITTLE
TURNING FROM BACK TO SIDE WHILE IN FLAT BAD: A LITTLE
DRESSING REGULAR LOWER BODY CLOTHING: A LOT
WALKING IN HOSPITAL ROOM: A LOT
SUGGESTED CMS G CODE MODIFIER MOBILITY: CL
MOVING FROM LYING ON BACK TO SITTING ON SIDE OF FLAT BED: A LITTLE
DAILY ACTIVITIY SCORE: 16
STANDING UP FROM CHAIR USING ARMS: A LOT

## 2017-09-19 ASSESSMENT — PAIN SCALES - GENERAL
PAINLEVEL_OUTOF10: 7
PAINLEVEL_OUTOF10: 4
PAINLEVEL_OUTOF10: 5
PAINLEVEL_OUTOF10: 7
PAINLEVEL_OUTOF10: 6
PAINLEVEL_OUTOF10: 5
PAINLEVEL_OUTOF10: 4

## 2017-09-19 ASSESSMENT — GAIT ASSESSMENTS: GAIT LEVEL OF ASSIST: UNABLE TO PARTICIPATE

## 2017-09-19 ASSESSMENT — ACTIVITIES OF DAILY LIVING (ADL): TOILETING: INDEPENDENT

## 2017-09-19 NOTE — OR NURSING
Dr. Celestin and ALIZA Mendoza at bedside and notified that Ac was flushed with 60cc and only 20cc returned. No clots evacuated. Ac still not draining.

## 2017-09-19 NOTE — CONSULTS
UROLOGY Consult Note:    Nhan Martin  Date & Time note created:    9/18/2017   8:02 PM     Referring MD:  Dr. Sharma    Patient ID:   Name:             Zhang Cheung   YOB: 1950  Age:                 67 y.o.  male   MRN:               [id number]                                                             Reason for Consult:      AUR, Hematuria and clogged varghese    History of Present Illness:     Urology called emergently to bedside to evaluated pt. With Bladder scan of 900cc. Varghese place intraop. Per Neuro. Team and post op varghese found to be draining no urine. The nurse flushed but it did not drain. Urology call and we prepped catheter sterilely and deflated balloon and pushed further as pt has history of TURP in past and varghese might be in [place]. We then irrigated forward and then aspirated removing  A large clot. We flush further steriley and no further clot was found. The catheter baloon was inflated to 10 cc and placed to gravity.     Review of Systems:      Constitutional: Denies fevers, Denies weight changes  Eyes: Denies changes in vision, no eye pain  Ears/Nose/Throat/Mouth: Denies nasal congestion or sore throat   Cardiovascular: no chest pain, no palpitations   Respiratory: no shortness of breath , Denies cough  Gastrointestinal/Hepatic:Denies abdominal pain, but bladder feels full, nausea, vomiting, diarrhea, constipation or GI bleeding   Genitourinary: Denies hematuria, dysuria or frequency  Musculoskeletal/Rheum: Denies  joint pain and swelling, no edema  Skin: Denies rash  Neurological: Denies headache, confusion, memory loss or focal weakness/parasthesias  Psychiatric: denies mood disorder   Endocrine: Ronda thyroid problems  Heme/Oncology/Lymph Nodes: Denies enlarged lymph nodes, denies brusing or known bleeding disorder  All other systems were reviewed and are negative (AMA/CMS criteria)                Past Medical History:   Past Medical History:   Diagnosis Date   • Pain 04/11/14  "   back pain increases with activity   • Hypertension 2014    well  controlled   • Arrhythmia     A Flutter   • Arthritis     generalized   • Diabetes     IDDM   • High cholesterol    • Hyperlipidemia    • Restless leg    • Sleep apnea     CPAP.  8/30/17 - not using CPAP due to back pain \"unable to lay on back\".   • Snoring    • Unspecified urinary incontinence     \" enlarged prostate\"     Active Hospital Problems    Diagnosis   • Stenosis, spinal, lumbar [M48.06]       Past Surgical History:  Past Surgical History:   Procedure Laterality Date   • RECOVERY  1/13/2016    Procedure: IR Deep bone biopsy L2-L3 with general anesthesia-DAYANA;  Surgeon: [facility];  Location: SURGERY PRE-POST PROC UNIT [facility] [place];  Service:    • LUMBAR LAMINECTOMY DISKECTOMY  4/22/2014    Performed by Mazin Long M.D. at SURGERY [facility]   • TRANS URETHRAL RESECTION PROSTATE  3/27/2014    Performed by Kyle Guzman M.D. at SURGERY [facility]   • LUMBAR LAMINECTOMY DISKECTOMY  2/19/2014    Performed by Mazin Long M.D. at SURGERY [facility]   • OTHER ORTHOPEDIC SURGERY  02/14    L2-L3 Laminectomy   • OTHER  2014    transection supraorbital nerve   • LUMBAR LAMINECTOMY DISKECTOMY  5/2/2012    Performed by MAZIN LONG at SURGERY [facility]   • OTHER ORTHOPEDIC SURGERY  03/12    L3-L5 Laminectomy   • OTHER      laser eye susrgery   • OTHER      carpectomy   • OTHER ORTHOPEDIC SURGERY  2003/2007/2008    left shoulder replacement/with corrections   • OTHER ORTHOPEDIC SURGERY      right knee replacement/manipulation       Hospital Medications:    Current Facility-Administered Medications:   •  amlodipine (NORVASC) tablet 5 mg, 5 mg, Oral, BID, Kelly NORA Prietoira, P.A.-C.  •  benazepril (LOTENSIN) tablet 40 mg, 40 mg, Oral, QHS, Kelly NORA Prietoira, P.A.-C.  •  gabapentin (NEURONTIN) capsule 1,800 mg, 1,800 mg, Oral, BID, Kelly NORA Prietoira, P.A.-C.  •  gemfibrozil (LOPID) tablet 600 mg, 600 mg, Oral, BID, " Kelly Ribeiro P.A.-C.  •  indapamide (LOZOL) tablet 2.5 mg, 2.5 mg, Oral, QAM, Kelly Ribeiro, P.A.-C.  •  metformin (GLUCOPHAGE) tablet 1,000 mg, 1,000 mg, Oral, BID WITH MEALS, Kelly Ribeiro, P.A.-C.  •  ropinirole (REQUIP) tablet 0.5 mg, 0.5 mg, Oral, QHS PRN, Kelly Ribeiro, P.A.-C.  •  tamsulosin (FLOMAX) capsule 0.8 mg, 0.8 mg, Oral, QHS, Kelly Ribeiro, P.A.-C.  •  Pharmacy Consult Request ...Pain Management Review 1 Each, 1 Each, Other, PRN, Kelly Ribeiro, P.A.-C.  •  MD ALERT...Do not administer NSAIDS or ASPIRIN unless ORDERED By Neurosurgery 1 Each, 1 Each, Other, PRN, Kelly Ribeiro, P.A.-C.  •  docusate sodium (COLACE) capsule 100 mg, 100 mg, Oral, BID, Kelly Ribeiro, P.A.-C.  •  senna-docusate (PERICOLACE or SENOKOT S) 8.6-50 MG per tablet 1 Tab, 1 Tab, Oral, Nightly, Kelly Ribeiro, P.A.-C.  •  senna-docusate (PERICOLACE or SENOKOT S) 8.6-50 MG per tablet 1 Tab, 1 Tab, Oral, Q24HRS PRN, Kelly Ribeiro, P.A.-C.  •  polyethylene glycol/lytes (MIRALAX) PACKET 1 Packet, 1 Packet, Oral, BID PRN, Kelly Ribeiro, P.A.-C.  •  magnesium hydroxide (MILK OF MAGNESIA) suspension 30 mL, 30 mL, Oral, QDAY PRN, Kelly Ribeiro, P.A.-C.  •  bisacodyl (DULCOLAX) suppository 10 mg, 10 mg, Rectal, Q24HRS PRN, Kelly Ribeiro, P.A.-C.  •  fleet enema 133 mL, 1 Each, Rectal, Once PRN, Kelly Ribeiro, P.A.-C.  •  NS infusion, , Intravenous, Continuous, Kelly Ribeiro, P.A.-C.  •  acetaminophen (TYLENOL) tablet 500 mg, 500 mg, Oral, Q6HRS, Kelly Ribeiro, P.A.-C.  •  morphine PCA 1 mg/mL in 50 mL NS, , Intravenous, Continuous, Kelly Ribeiro, P.A.-C.  •  ceFAZolin (ANCEF) IVPB 2 g, 2 g, Intravenous, Q8HR, Kelly Ribeiro, P.A.-C.  •  ondansetron (ZOFRAN) syringe/vial injection 4 mg, 4 mg, Intravenous, Q4HRS PRN, Kelly Ribeiro, P.A.-C.  •  ondansetron (ZOFRAN ODT) dispertab 4 mg, 4 mg, Oral, Q4HRS PRN, Kelly Ribeiro, P.A.-C.  •   diphenhydrAMINE (BENADRYL) tablet/capsule 25 mg, 25 mg, Oral, Q6HRS PRN **OR** diphenhydrAMINE (BENADRYL) injection 25 mg, 25 mg, Intravenous, Q6HRS PRN, Kelly Ribeiro, P.A.-C.  •  cyclobenzaprine (FLEXERIL) tablet 10 mg, 10 mg, Oral, TID, Kelly Ribeiro, P.A.-C.  •  [START ON 9/19/2017] temazepam (RESTORIL) capsule 15 mg, 15 mg, Oral, HS PRN - MR X 1, Kelly Ribeiro, P.A.-C.  •  clonidine (CATAPRES) tablet 0.1 mg, 0.1 mg, Oral, Q4HRS PRN, Kelly Ribeiro, P.A.-C.  •  benzocaine-menthol (CEPACOL) lozenge 1 Lozenge, 1 Lozenge, Mouth/Throat, Q2HRS PRN, Kelly Ribeiro, P.A.-C.  •  calcium carbonate (TUMS) chewable tab 500 mg, 500 mg, Oral, BID, Kelly Ribeiro, P.A.-C.  •  vitamin D (cholecalciferol) tablet 5,000 Units, 5,000 Units, Oral, QHS, Kelly Ribeiro, P.A.-C.  •  Action is required: Protocol 1073 Hypoglycemia has been implemented, , , Once **AND** Protocol 1073 Inclusion Criteria, , , CONTINUOUS **AND** Protocol 1073 NOTIFY, , , Once **AND** Protocol 1073 Initiate protocol immediately if FSBG is less than or equal to 70 mg/dL, , , CONTINUOUS **AND** glucose 4 g chewable tablet 16 g, 16 g, Oral, Q15 MIN PRN **AND** dextrose 50% (D50W) injection 25 mL, 25 mL, Intravenous, Q15 MIN PRN, Kelly Ribeiro, P.A.-C.  •  carvedilol (COREG) tablet 12.5 mg, 12.5 mg, Oral, BID WITH MEALS, Kelly Ribeiro, P.A.-C.  •  insulin lispro (HUMALOG) injection 3-14 Units, 3-14 Units, Subcutaneous, 4X/DAY Kelly SANCHEZ P.A.-C.  •  SODIUM CHLORIDE 0.9 % IV SOLN, , , ,     Current Outpatient Medications:  Prescriptions Prior to Admission   Medication Sig Dispense Refill Last Dose   • aspirin 81 MG tablet Take 81 mg by mouth every day.   8/29/2017 at Unknown time   • gabapentin (NEURONTIN) 600 MG tablet Take 1,800 mg by mouth 2 times a day.   9/18/2017 at 0530   • Cholecalciferol (VITAMIN D3) 5000 UNITS Cap Take 1 Cap by mouth every day.   >week at Unknown time   • insulin aspart  protamine-insulin aspart (NOVOLOG MIX 70/30) (70-30) 100 UNIT/ML injection Inject 5-22 Units as instructed 3 times a day before meals. 100 = 5 units  120= 7 units  184 = 9 units  200 = 12 units  Pt can go as high as 22 units if needed   9/18/2017 at 0530   • ropinirole (REQUIP) 0.5 MG Tab Take 0.5-1 mg by mouth at bedtime as needed.   9/17/2017 at 2030   • hydrocodone/acetaminophen (NORCO)  MG Tab Take 1-2 Tabs by mouth every 6 hours as needed for Severe Pain.   9/18/2017 at 0530   • tamsulosin (FLOMAX) 0.4 MG capsule Take 0.8 mg by mouth every bedtime.   9/17/2017 at 2030   • Omega-3 Fatty Acids (FISH OIL TRIPLE STRENGTH) 1400 MG Cap Take 1 Cap by mouth every day.   >week at unknown   • Multiple Vitamins-Minerals (MULTIVITAMIN PO) Take 1 Tab by mouth every day.   >week at Unknown time   • tramadol (ULTRAM) 50 MG Tab Take  mg by mouth every 6 hours as needed for Mild Pain.   9/18/2017 at 0530   • metformin (GLUCOPHAGE) 1000 MG tablet Take 1,000 mg by mouth 2 times a day, with meals.   9/18/2017 at 0530   • benazepril (LOTENSIN) 40 MG tablet Take 40 mg by mouth every day.   9/11/2017 at 1200   • indapamide (LOZOL) 2.5 MG TABS Take 2.5 mg by mouth every morning.   9/11/2017 at 1200   • Carvedilol Phosphate (COREG CR) 40 MG CP24 Take 40 mg by mouth every morning.   9/18/2017 at 0530   • amlodipine (NORVASC) 5 MG TABS Take 5 mg by mouth 2 Times a Day.   9/18/2017 at 0530   • gemfibrozil (LOPID) 600 MG TABS Take 600 mg by mouth 2 times a day.   9/18/2017 at 0530       Medication Allergy:  Allergies   Allergen Reactions   • Pollen Extract      Local pollen, primarily Rabitt [place]       Family History:  Family History   Problem Relation Age of Onset   • Sleep Apnea Neg Hx        Social History:  Social History     Social History   • Marital status: Single     Spouse name: N/A   • Number of children: N/A   • Years of education: N/A     Occupational History   • Not on file.     Social History Main Topics   •  "Smoking status: Former Smoker     Packs/day: 0.75     Years: 10.00     Types: Cigarettes     Quit date: 4/24/2008   • Smokeless tobacco: Never Used   • Alcohol use No   • Drug use: No   • Sexual activity: Not on file     Other Topics Concern   • Not on file     Social History Narrative   • No narrative on file         Physical Exam:  Vitals/ General Appearance:   Weight/BMI: Body mass index is 37.11 kg/m².  Pulse 76, temperature 36.3 °C (97.3 °F), resp. rate 16, height 1.676 m (5' 6\"), weight 104.3 kg (229 lb 15 oz), SpO2 97 %.  Vitals:    09/18/17 1800 09/18/17 1815 09/18/17 1930 09/18/17 1945   Pulse:       Resp: 13 16 14 16   Temp:       SpO2: 98%  95% 97%   Weight:       Height:         Oxygen Therapy:  Pulse Oximetry: 97 %, O2 (LPM): 4, O2 Delivery: Nasal Cannula    Constitutional:   Well developed, Well nourished, No acute distress  HENMT:  Normocephalic, Atraumatic, Oropharynx moist mucous membranes, No oral exudates, Nose normal.  No thyromegaly.  Eyes:  EOMI, Conjunctiva normal, No discharge.  Neck:  Normal range of motion, No cervical tenderness,  no JVD.  Cardiovascular:  Normal heart rate, Normal rhythm, No murmurs, No rubs, No gallops.   Extremitites with intact distal pulses, no cyanosis, or edema.  Lungs:  Normal breath sounds, breath sounds clear to auscultation bilaterally,  no crackles, no wheezing.   Abdomen: Bowel sounds normal, Soft, No tenderness, No guarding, No rebound, No masses, No hepatosplenomegaly.distended bladder  : Normal external genitalia without lesion.   Skin: Warm, Dry, No erythema, No rash, no induration.  Neurologic: Alert & oriented x 3,  Psychiatric: Affect normal, Judgment normal, Mood normal.      MDM (Data Review):     Records reviewed and summarized in current documentation    Lab Data Review:  Recent Results (from the past 24 hour(s))   ACCU-CHEK GLUCOSE    Collection Time: 09/18/17  9:59 AM   Result Value Ref Range    Glucose - Accu-Ck 112 (H) 65 - 99 mg/dL "   ACCU-CHEK GLUCOSE    Collection Time: 09/18/17  4:25 PM   Result Value Ref Range    Glucose - Accu-Ck 176 (H) 65 - 99 mg/dL       Imaging/Procedures Review:    Reviewed    MDM (Assessment and Plan):     Active Hospital Problems    Diagnosis   • Stenosis, spinal, lumbar [M48.06]     1. Non draining varghese: Varghese repositioned and clot irrgated. D/c Varghese in 2 days. Call if any issues. If hemturia persists will need hemturia w/u in office as o/p.

## 2017-09-19 NOTE — THERAPY
"Occupational Therapy Evaluation completed.   Functional Status:  Mod A with ADLs and txfs  Plan of Care: Will benefit from Occupational Therapy 3 times per week  Discharge Recommendations:  Equipment: Will Continue to Assess for Equipment Needs. Post-acute therapy Discharge to a transitional care facility for continued skilled therapy services. and Discharge to home with outpatient or home health for additional skilled therapy services.    See \"Rehab Therapy-Acute\" Patient Summary Report for complete documentation.    "

## 2017-09-19 NOTE — PROGRESS NOTES
"  Last documented VS: /76   Pulse 88   Temp 36.3 °C (97.4 °F)   Resp 16   Ht 1.676 m (5' 6\")   Wt 109.5 kg (241 lb 6.5 oz)   SpO2 94%   BMI 38.96 kg/m²       no signs of acute distress, patient appears to be in stable condition. Zhang Cheung assessed after assuming care.       Fall precautions in place. Hourly rounding in place and explained to Zhang. Educated Zhang on call light use as well as phone instructions to call RN or CNA directly. Possessions within patient's reach, fall precautions in place. Pressure ulcer prevention techniques in use, pressure points assessed and pressure relieved from vulnerable areas. Pillows uses copiously for support and positioning where applicable.    All prudent patient safety measures and applicable nursing interventions taken.    Zhang educated on Pain, Position, Potty, Priorities and instructed to notify RN if anything additional can be done to make stay more comfortable including bathing options, linen change, and toiletries.    PMHx on file (click to expand)  Past Medical History:   Diagnosis Date   • Pain 04/11/14    back pain increases with activity   • Hypertension 2014    well  controlled   • Arrhythmia     A Flutter   • Arthritis     generalized   • Diabetes     IDDM   • High cholesterol    • Hyperlipidemia    • Restless leg    • Sleep apnea     CPAP.  8/30/17 - not using CPAP due to back pain \"unable to lay on back\".   • Snoring    • Unspecified urinary incontinence     \" enlarged prostate\"          Care plan:     Problem: Safety   Goal: Will remain free from falls   Outcome: PROGRESSING as EXPECTED   Zhang educated about fall risk. Will remain free from injury or falls.  Appropriate side rails up. Bed in low position, call light and possions within  reach, bed alarm in use. Hourly rounding in place.     Problem: Pain Management   Goal: Pain level will decrease to patient’s comfort goal   Outcome: PROGRESSING as EXPECTED   Following pain management planZhang " reports pain on 0-10 scale    *addendum to follow below at end or throughout shift if significant events occurred: if blank n/a    OVERNIGHT SIGNIFICANT EVENTS:    0645 patient last seen at 0600 and was AO4, during 0645 bedside report, patient found non-arousable to painful stimuli and had a O2 sat of 82%. RRT called. FSBG was 150. naloxone given and patient awoke in severe pain. Provider notified and PCA discontinued. PCA doses given = 11 which was the same as at 0200.

## 2017-09-19 NOTE — CODE DOCUMENTATION
"Patient unarousable and on a Morphine PCA. PCA stopped, Narcan given. Patient woke and was able to tell us \"he's still groggy from his drugs.\" Patient following commands and was able to reorient within a couple minutes.    PCA to remain off until new orders received from neurosurgery.   "

## 2017-09-19 NOTE — OR SURGEON
Operative Report    PreOp Diagnosis: varghese not draining, urinary retention, hematuria    PostOp Diagnosis: clot clogging catheter, liset    Procedure(s):  Varghese manipulation and varghese irrigation an clot evauation    Surgeon(s):  Nhan Martin    Anesthesiologist/Type of Anesthesia:  Anesthesiologist: Leobardo Celestin M.D./General    Surgical Staff:  Circulator: Kelly Ribeiro P.A.-C.; Ella Corbin R.N.  Relief Circulator: Cathy Gale R.N.  Relief Scrub: Alda Watts; Jaja Diego  Scrub Person: Yeison August  Radiology Technologist: Filemon Smith    Specimens:  none    Estimated Blood Loss: zero    Findings: none    Complications: none    Technique: Urology called emergently to bedside to evaluated pt. With Bladder scan of 900cc. Varghese place intraop. Per Neuro. Team and post op varghese found to be draining no urine. The nurse flushed but it did not drain. Urology call and we prepped catheter sterilely and deflated balloon and pushed further as pt has history of TURP in past and varghese might be in Fossa. We then irrigated forward and then aspirated removing  A large clot. We flush further steriley and no further clot was found. The catheter baloon was inflated to 10 cc and placed to gravity.     9/18/2017 7:56 PM Nhan Martin

## 2017-09-19 NOTE — OR NURSING
Dr. Sharma at bedside to assess pt. Order given to continue to monitor. Dr. Sharma states surgery went well and strength and sensation should return with time.

## 2017-09-19 NOTE — PROGRESS NOTES
Pt aaox4. O2 sat >90% on 5L NC,  in use. SCANLON 5/5, except RLE 4/5, numbness to RLE. Pt c/o severe pain when trying to get up with therapy, unable to ambulate this AM. Norco given PRN. Ac in place, patent. Dressing CDI. HVAC in place, compressed. Reviewed poc with pt-verbalized understanding. Bed alarm in use. Call light in reach. Family at bedside.

## 2017-09-19 NOTE — PROGRESS NOTES
Pt now awake and alert. Does not remember speaking with Dr. Sharma this AM. Scheduled meds given. Reports back pain 3-4/10. Pt reports new numbness to RLE since surgery. Paged APN- in to assess pt.

## 2017-09-19 NOTE — CARE PLAN
Problem: Pain Management  Goal: Pain level will decrease to patient's comfort goal    Intervention: Follow pain managment plan developed in collaboration with patient and Interdisciplinary Team  Will try Comfort for pain control      Problem: Safety  Goal: Will remain free from falls    Intervention: Implement fall precautions  Bed alarm in use. Call light w/in reach. Instructed pt to call for assistance

## 2017-09-19 NOTE — OP REPORT
DATE OF SERVICE:  09/18/2017    PREOPERATIVE DIAGNOSES:  L1-L4 stenosis with L2-L3 markedly collapsed disk   with coronal imbalance and markedly eroded disk space.  In addition, there was   severe stenosis at L2-L3, severe stenosis on the right at L4-L5 and bilateral   L2 pars defects.    POSTOPERATIVE DIAGNOSES:  L1-L4 stenosis with L2-L3 markedly collapsed disk   with coronal imbalance and markedly eroded disk space.  In addition, there was   severe stenosis at L2-L3, severe stenosis on the right at L4-L5 and bilateral   L2 pars defects.    For IV fluids, urine output, estimated blood loss, please see the anesthesia   record.    DISPOSITION:  Patient was extubated and found to be moving all 4 extremities   in the recovery room.    CLINICAL HISTORY:  The patient was kindly referred for neurosurgical opinion.    The patient presents with markedly degenerative disk at L2-L3.  This was   thought to be an infection.  The disk space biopsy and lab indices were   ordered in the past.  This workup returned negative.  Ultimately, the patient   complained of horrible diskogenic pain at L2-L3 and had evidence of   instability.  There was severe stenosis at L2-L3.  The patient had varying   degrees of stenosis at L1-L2, L3-L4 and L4-L5.  The patient was consented for   360 fusion from L1-L4.  I let him know preop that the main focus would be   L2-L3 and I would do the other levels as necessary.  The patient agreed.    Signed a written informed consent.    DESCRIPTION OF PROCEDURE:  He was brought to the operating room and placed   under general anesthesia.  A Ac catheter was placed.  He was turned into   the right lateral decubitus position.  The left flank was prepped and draped   in the usual sterile fashion.  I localized the L2-L3 disk space which was very   difficult to do given its markedly collapsed and eroded nature.  The left   flank was prepped and draped in usual sterile fashion.  A timeout was   performed.   Local anesthetic was infiltrated in the skin.  I made an incision   into the left flank.  The fascia was entered.  Blunt dissection was achieved   into the retroperitoneum.  Ultimately, I was able to place a K-wire into the   L2-L3 disk.  I used a variety of EMG directional dilators, which demonstrated   there was no evidence of EMG activity within my operative field.  The NuVasive   MaXcess retractor was placed.  AP and lateral fluoroscopy demonstrated good   placement of the retractor.  An intradiscal blade was placed.  Annulotomy was   made.  It was very difficult to get into the L2-L3 disk space, but ultimately   I was able to get in.  I also ultimately able to place a 10 mm height x 55 mm   in length lordotic PEEK interbody cage filled with Osteocel Pro.  Next, I   secured a NuVasive 2-hole plate by using two 55 mm screws.  The locking   mechanism was engaged.  Perfect hemostasis was achieved.  The wound was closed   in anatomic layers and a sterile dressing was applied.    Next, the patient was turned to the prone position.  All pressure points were   padded.  The back was shaved, prepped and draped in the usual sterile fashion.    The old incision was reopened.  Self-retaining retractors were placed.    Intraoperative fluoroscopy demonstrated the correct level.  I used an AMA   drill bit to cannulate the left L2 and left L3 pedicles.  The NuVasive pedicle   screw system was used at L2 and L3.  Next, I used an AMA drill bit to create   gutters just medial to the facet complexes.  I from bilateral L2 pars defects.    A Kerrison 2 and Kerrison 3 punch was used to complete decompressive   laminectomies, bilateral medial facetectomies and bilateral foraminotomies.    Significant severe stenosis was relieved.  Redo stenosis at L3-L4 and L4-L5 on   the right was relieved.  Perfect hemostasis was achieved.  I decompressed   bilateral nerve from L1-L5 bilaterally.  I decorticated the medial transverse   processes and  I placed local autograft and also morcellized allograft for an   L2-L3, posterolateral fusion.  Perfect hemostasis was achieved.  The wound was   closed in anatomic layers and a sterile dressing was applied.       ____________________________________     MD RAFAEL BARILLAS / THAO    DD:  09/18/2017 19:06:01  DT:  09/18/2017 19:18:46    D#:  [id number]  Job#:  [id number]

## 2017-09-19 NOTE — PROGRESS NOTES
Neurosurgery Progress Note    Doing okay  Improvement in preop RLE pain  Has increased numbness today in the right foot and ankle which he states was not present preop but is better now than earlier this am  Was oversedated this am, transitioned to PO meds, doing okay now.     Exam:  VSS  A&Ox4, NAD  NM: 5/5 hip flexion, knee extension, knee flexion, plantar flexion, 4+/5 right dorsiflexion, EHL 5/5 left dorsiflexion, EHL   Numb to light touch throughout the right ankle, foot, no proprioception in right foot  LLE sensation is intact to light touch throughout   Abdomen: soft, non-tender  Non-labored breathing on NC, normal respiratory effort  Capillary refill in all four extremities <2 seconds  No LE edema, erythema, cyanosis, clubbing  Calves non-tender to compression bilat  Lumbar dressing with serosanguinous drainage  Lt abd dressing CDI  Drain: 50          BP  Min: 118/67  Max: 146/76  Pulse  Av.9  Min: 88  Max: 111  Resp  Av.3  Min: 3  Max: 28  Temp  Av.4 °C (97.5 °F)  Min: 36.1 °C (97 °F)  Max: 36.9 °C (98.4 °F)  SpO2  Av.5 %  Min: 48 %  Max: 100 %    No Data Recorded        Recent Labs      17   0203   SODIUM  139   POTASSIUM  4.7   CHLORIDE  102   CO2  28   GLUCOSE  152*   BUN  29*               Intake/Output       17 07 - 17 0659 17 07 - 17 0659       Total 1900-0659 Total       Intake    I.V.  2000  --  -- --    Crystalloid Intake 2000 -- -- --    IV Volume (IV Normal Saline) -- 100 100 -- -- --    Total Intake 2000 -- -- --       Output    Urine  88  2250 2338  --  -- --    Indwelling Cathether 88 2250 2338 -- -- --    Drains  --  50 50  --  -- --    Hemovac 1 -- 50 50 -- -- --    Stool  --  -- --  --  -- --    Number of Times Stooled -- -- -- 0 x -- 0 x    Blood  100  -- 100  --  -- --    Est. Blood Loss (mL) 100 -- 100 -- -- --    Total Output 188 4630 9987 -- -- --       Net I/O     1812 -2200  -388 -- -- --            Intake/Output Summary (Last 24 hours) at 09/19/17 0959  Last data filed at 09/19/17 0600   Gross per 24 hour   Intake             2100 ml   Output             2488 ml   Net             -388 ml            • naloxone        • naloxone        • hydrocodone/acetaminophen  1-2 Tab Q4HRS PRN      Or   • oxycodone-acetaminophen  1-2 Tab Q4HRS PRN     • amlodipine  5 mg BID     • benazepril  40 mg QHS     • gabapentin  1,800 mg BID     • gemfibrozil  600 mg BID     • indapamide  2.5 mg QAM     • metformin  1,000 mg BID WITH MEALS     • ropinirole  0.5 mg QHS PRN     • tamsulosin  0.8 mg QHS     • Pharmacy Consult Request  1 Each PRN     • MD ALERT...Do not administer NSAIDS or ASPIRIN unless ORDERED By Neurosurgery  1 Each PRN     • docusate sodium  100 mg BID     • senna-docusate  1 Tab Nightly     • senna-docusate  1 Tab Q24HRS PRN     • polyethylene glycol/lytes  1 Packet BID PRN     • magnesium hydroxide  30 mL QDAY PRN     • bisacodyl  10 mg Q24HRS PRN     • fleet  1 Each Once PRN     • NS   Continuous 83 mL/hr at 09/18/17 2131   • acetaminophen  500 mg Q6HRS     • ceFAZolin  2 g Q8HR     • ondansetron  4 mg Q4HRS PRN     • ondansetron  4 mg Q4HRS PRN     • diphenhydrAMINE  25 mg Q6HRS PRN      Or   • diphenhydrAMINE  25 mg Q6HRS PRN     • cyclobenzaprine  10 mg TID     • temazepam  15 mg HS PRN - MR X 1     • clonidine  0.1 mg Q4HRS PRN     • benzocaine-menthol  1 Lozenge Q2HRS PRN     • calcium carbonate  500 mg BID     • vitamin D  5,000 Units QHS     • glucose 4 g  16 g Q15 MIN PRN      And   • dextrose 50%  25 mL Q15 MIN PRN     • carvedilol  12.5 mg BID WITH MEALS     • insulin lispro  3-14 Units 4X/DAY ACHS         Assessment and Plan:  POD 1 s/p L2-3 XLIF, lumbar laminectomy  Had urinary obstruction with blood clot treated by urology.     Plan  Retain halima d/c 9/21/17  Mobilize today with assistance. Monitor Rt foot weakness/sensation.   Diet as tolerated  Pain meds PRN, minimize as  able for oversedation event.   I/S, wean O2.   D/C drain per protocol.

## 2017-09-19 NOTE — OR SURGEON
Operative Report    PreOp Diagnosis: Left l2,3 markedly collapsed disc, L2,3 severe stenosis    PostOp Diagnosis: same + bilateral L2 pars defects    Procedure(s):  EXTREME LATERAL INTERBODY FUSION L2-3 W/PLATE - Wound Class: Clean  LUMBAR FUSION POSTERIOR L2-3 INSTRUMENTED - Wound Class: Clean with Drain  LUMBAR LAMINECTOMY DISKECTOMY - Wound Class: Clean with Drain    Surgeon(s):  Yemi Sharma M.D.    Anesthesiologist/Type of Anesthesia:  Anesthesiologist: Leobardo Celestin M.D./General    Surgical Staff:  Circulator: Kelly Ribeiro P.A.-C.; Ella Corbin R.N.  Relief Circulator: Cathy Gale R.N.  Relief Scrub: Alda Watts; Jaja Diego  Scrub Person: Yeison August  Radiology Technologist: Filemon Smith    Specimens:  * No specimens in log *    Estimated Blood Loss: minimal    Findings: severe stenosis relieved, nice increase in foraminal height    Complications: none    Postop - in RR, patient complained of decreased sensation in the right leg and weakness in right leg.    I explained to patient that nothing untoward occurred.  He has at least 3/5 IP,Q, DF, PF in right leg with decreased sensation.  Doubt spinal related issue.  Doubt TIA.  Recommend observation.        9/18/2017 6:46 PM Yemi Sharma

## 2017-09-19 NOTE — OR NURSING
Dr. Celestin and Dr. Sharma notified that varghese is not draining. Bladder scan >300cc. Order obtained to flush catheter.   MDs also notified that pt's right leg is weaker than his left, which pt states is new. Pt also c/o numbness in his right leg.

## 2017-09-19 NOTE — THERAPY
"Physical Therapy Evaluation completed.   Bed Mobility:  Supine to Sit: Contact Guard Assist (flat bed and rail )  Transfers: Sit to Stand: Minimal Assist  Gait: Level Of Assist: Unable to Participate with Front-Wheel Walker       Plan of Care: Will benefit from Physical Therapy 5 times per week  Discharge Recommendations: Equipment: Front-Wheel Walker. Post-acute therapy Discharge to a transitional care facility for continued skilled therapy services. Vs home with family     See \"Rehab Therapy-Acute\" Patient Summary Report for complete documentation.     "

## 2017-09-19 NOTE — CODE DOCUMENTATION
PCA discontinued, PO pain medication given per MD. Patient awake and very agitated. RRT will remain available to bedside RN,

## 2017-09-20 LAB
GLUCOSE BLD-MCNC: 135 MG/DL (ref 65–99)
GLUCOSE BLD-MCNC: 193 MG/DL (ref 65–99)
GLUCOSE BLD-MCNC: 220 MG/DL (ref 65–99)
GLUCOSE BLD-MCNC: 265 MG/DL (ref 65–99)

## 2017-09-20 PROCEDURE — 770001 HCHG ROOM/CARE - MED/SURG/GYN PRIV*

## 2017-09-20 PROCEDURE — A9270 NON-COVERED ITEM OR SERVICE: HCPCS | Performed by: PHYSICIAN ASSISTANT

## 2017-09-20 PROCEDURE — 82962 GLUCOSE BLOOD TEST: CPT | Mod: 91

## 2017-09-20 PROCEDURE — 700112 HCHG RX REV CODE 229: Performed by: PHYSICIAN ASSISTANT

## 2017-09-20 PROCEDURE — 700111 HCHG RX REV CODE 636 W/ 250 OVERRIDE (IP): Performed by: NURSE PRACTITIONER

## 2017-09-20 PROCEDURE — 700102 HCHG RX REV CODE 250 W/ 637 OVERRIDE(OP): Performed by: PHYSICIAN ASSISTANT

## 2017-09-20 PROCEDURE — 700102 HCHG RX REV CODE 250 W/ 637 OVERRIDE(OP): Performed by: NURSE PRACTITIONER

## 2017-09-20 PROCEDURE — A9270 NON-COVERED ITEM OR SERVICE: HCPCS | Performed by: NURSE PRACTITIONER

## 2017-09-20 RX ORDER — DEXAMETHASONE SODIUM PHOSPHATE 4 MG/ML
6 INJECTION, SOLUTION INTRA-ARTICULAR; INTRALESIONAL; INTRAMUSCULAR; INTRAVENOUS; SOFT TISSUE ONCE
Status: COMPLETED | OUTPATIENT
Start: 2017-09-20 | End: 2017-09-20

## 2017-09-20 RX ORDER — OXYCODONE HCL 10 MG/1
10 TABLET, FILM COATED, EXTENDED RELEASE ORAL EVERY 12 HOURS
Status: DISCONTINUED | OUTPATIENT
Start: 2017-09-20 | End: 2017-09-22 | Stop reason: HOSPADM

## 2017-09-20 RX ORDER — DIAZEPAM 2 MG/1
2 TABLET ORAL EVERY 8 HOURS PRN
Status: DISCONTINUED | OUTPATIENT
Start: 2017-09-20 | End: 2017-09-22 | Stop reason: HOSPADM

## 2017-09-20 RX ADMIN — GEMFIBROZIL 600 MG: 600 TABLET ORAL at 09:03

## 2017-09-20 RX ADMIN — AMLODIPINE BESYLATE 5 MG: 5 TABLET ORAL at 21:23

## 2017-09-20 RX ADMIN — TAMSULOSIN HYDROCHLORIDE 0.8 MG: 0.4 CAPSULE ORAL at 21:24

## 2017-09-20 RX ADMIN — OXYCODONE HYDROCHLORIDE AND ACETAMINOPHEN 1 TABLET: 10; 325 TABLET ORAL at 12:53

## 2017-09-20 RX ADMIN — ROPINIROLE HYDROCHLORIDE 0.5 MG: 0.5 TABLET, FILM COATED ORAL at 03:37

## 2017-09-20 RX ADMIN — TEMAZEPAM 15 MG: 15 CAPSULE ORAL at 01:03

## 2017-09-20 RX ADMIN — TEMAZEPAM 15 MG: 15 CAPSULE ORAL at 22:30

## 2017-09-20 RX ADMIN — GABAPENTIN 900 MG: 300 CAPSULE ORAL at 08:59

## 2017-09-20 RX ADMIN — DOCUSATE SODIUM 100 MG: 100 CAPSULE ORAL at 21:24

## 2017-09-20 RX ADMIN — OXYCODONE HYDROCHLORIDE AND ACETAMINOPHEN 2 TABLET: 10; 325 TABLET ORAL at 22:34

## 2017-09-20 RX ADMIN — DIAZEPAM 2 MG: 2 TABLET ORAL at 16:45

## 2017-09-20 RX ADMIN — INSULIN LISPRO 3 UNITS: 100 INJECTION, SOLUTION INTRAVENOUS; SUBCUTANEOUS at 12:02

## 2017-09-20 RX ADMIN — OXYCODONE HYDROCHLORIDE AND ACETAMINOPHEN 1 TABLET: 10; 325 TABLET ORAL at 16:44

## 2017-09-20 RX ADMIN — ACETAMINOPHEN 500 MG: 500 TABLET ORAL at 09:00

## 2017-09-20 RX ADMIN — CYCLOBENZAPRINE 10 MG: 10 TABLET, FILM COATED ORAL at 21:23

## 2017-09-20 RX ADMIN — INSULIN LISPRO 7 UNITS: 100 INJECTION, SOLUTION INTRAVENOUS; SUBCUTANEOUS at 16:41

## 2017-09-20 RX ADMIN — ANTACID TABLETS 500 MG: 500 TABLET, CHEWABLE ORAL at 21:23

## 2017-09-20 RX ADMIN — CYCLOBENZAPRINE 10 MG: 10 TABLET, FILM COATED ORAL at 15:52

## 2017-09-20 RX ADMIN — DEXAMETHASONE SODIUM PHOSPHATE 6 MG: 4 INJECTION, SOLUTION INTRAMUSCULAR; INTRAVENOUS at 09:58

## 2017-09-20 RX ADMIN — AMLODIPINE BESYLATE 5 MG: 5 TABLET ORAL at 09:01

## 2017-09-20 RX ADMIN — ANTACID TABLETS 500 MG: 500 TABLET, CHEWABLE ORAL at 09:01

## 2017-09-20 RX ADMIN — HYDROCODONE BITARTRATE AND ACETAMINOPHEN 2 TABLET: 10; 325 TABLET ORAL at 03:37

## 2017-09-20 RX ADMIN — DIAZEPAM 2 MG: 2 TABLET ORAL at 08:58

## 2017-09-20 RX ADMIN — INSULIN LISPRO 4 UNITS: 100 INJECTION, SOLUTION INTRAVENOUS; SUBCUTANEOUS at 21:32

## 2017-09-20 RX ADMIN — ACETAMINOPHEN 500 MG: 500 TABLET ORAL at 21:24

## 2017-09-20 RX ADMIN — BENAZEPRIL HYDROCHLORIDE 40 MG: 20 TABLET, COATED ORAL at 21:23

## 2017-09-20 RX ADMIN — CARVEDILOL 12.5 MG: 6.25 TABLET, FILM COATED ORAL at 15:52

## 2017-09-20 RX ADMIN — GABAPENTIN 1800 MG: 300 CAPSULE ORAL at 21:23

## 2017-09-20 RX ADMIN — METFORMIN HYDROCHLORIDE 1000 MG: 500 TABLET, FILM COATED ORAL at 15:52

## 2017-09-20 RX ADMIN — OXYCODONE HYDROCHLORIDE 10 MG: 10 TABLET, FILM COATED, EXTENDED RELEASE ORAL at 21:22

## 2017-09-20 RX ADMIN — OXYCODONE HYDROCHLORIDE 10 MG: 10 TABLET, FILM COATED, EXTENDED RELEASE ORAL at 09:01

## 2017-09-20 RX ADMIN — DOCUSATE SODIUM 100 MG: 100 CAPSULE ORAL at 09:00

## 2017-09-20 RX ADMIN — VITAMIN D, TAB 1000IU (100/BT) 5000 UNITS: 25 TAB at 21:24

## 2017-09-20 RX ADMIN — STANDARDIZED SENNA CONCENTRATE AND DOCUSATE SODIUM 1 TABLET: 8.6; 5 TABLET, FILM COATED ORAL at 21:23

## 2017-09-20 RX ADMIN — CARVEDILOL 12.5 MG: 6.25 TABLET, FILM COATED ORAL at 08:58

## 2017-09-20 RX ADMIN — DIPHENHYDRAMINE HCL 25 MG: 25 TABLET ORAL at 12:53

## 2017-09-20 RX ADMIN — CYCLOBENZAPRINE 10 MG: 10 TABLET, FILM COATED ORAL at 09:01

## 2017-09-20 RX ADMIN — ACETAMINOPHEN 500 MG: 500 TABLET ORAL at 15:52

## 2017-09-20 RX ADMIN — METFORMIN HYDROCHLORIDE 1000 MG: 500 TABLET, FILM COATED ORAL at 08:58

## 2017-09-20 RX ADMIN — INDAPAMIDE 2.5 MG: 2.5 TABLET, FILM COATED ORAL at 09:03

## 2017-09-20 RX ADMIN — GEMFIBROZIL 600 MG: 600 TABLET ORAL at 21:00

## 2017-09-20 ASSESSMENT — PAIN SCALES - GENERAL
PAINLEVEL_OUTOF10: 5
PAINLEVEL_OUTOF10: 1
PAINLEVEL_OUTOF10: 4
PAINLEVEL_OUTOF10: 3
PAINLEVEL_OUTOF10: 7
PAINLEVEL_OUTOF10: 8
PAINLEVEL_OUTOF10: 8
PAINLEVEL_OUTOF10: 0
PAINLEVEL_OUTOF10: 0
PAINLEVEL_OUTOF10: 5
PAINLEVEL_OUTOF10: 2

## 2017-09-20 ASSESSMENT — LIFESTYLE VARIABLES: DO YOU DRINK ALCOHOL: NO

## 2017-09-20 NOTE — PROGRESS NOTES
"Pt states sensation to RLE improving some, now tingling instead of just numb. Pt able to move leg in bed better. Pt still continues to refuse to try to ambulate, pt states \"I can't with my leg.\"  "

## 2017-09-20 NOTE — PROGRESS NOTES
20:00 Received patient at change of shift awake, alert, irritable, complaining of pain on left quadrant, 8/10 on pain scale.  Medicated with hydrocodone 2 tabs and flexeril 10 mg orally.  Patient is non compliant with mobility stating he has too much pain to walk.  Bed alarm activated.  Safety and comfort measures maintained.  Call light within reach.

## 2017-09-20 NOTE — PROGRESS NOTES
"Neurosurgery Progress Note    Doing okay  Improvement in preop RLE pain, continues with RLE weakness/numbness which is improved today.   Currently c/o \"pain all over\" especially in the groin, thighs bilaterally increased with movement.     Exam:  VSS  A&Ox4, NAD  NM: 5/5 hip flexion, knee extension, knee flexion, plantar flexion, 4+/5 right dorsiflexion, EHL 5/5 left dorsiflexion, EHL   Numb to light touch throughout the right ankle, foot, no proprioception in right foot  LLE sensation is intact to light touch throughout   Abdomen: soft, non-tender  Non-labored breathing on NC, normal respiratory effort  Capillary refill in all four extremities <2 seconds  No LE edema, erythema, cyanosis, clubbing  Calves non-tender to compression bilat  Lumbar dressing with serosanguinous drainage  Lt abd dressing CDI  Drain: out          BP  Min: 118/67  Max: 146/76  Pulse  Av.9  Min: 88  Max: 111  Resp  Av.3  Min: 3  Max: 28  Temp  Av.4 °C (97.5 °F)  Min: 36.1 °C (97 °F)  Max: 36.9 °C (98.4 °F)  SpO2  Av.5 %  Min: 48 %  Max: 100 %    No Data Recorded        Recent Labs      17   0203   SODIUM  139   POTASSIUM  4.7   CHLORIDE  102   CO2  28   GLUCOSE  152*   BUN  29*               Intake/Output       17 0700 - 17 0659 17 07 - 17 0659       Total 1900-0659 Total       Intake    P.O.  1240  300 1540  --  -- --    P.O. 5469 223 6478 -- -- --    Total Intake 7830 512 7527 -- -- --       Output    Urine  1200  2700 3900  --  -- --    Indwelling Cathether 1200 2700 3900 -- -- --    Drains  10  -- 10  --  -- --    Hemovac 1 10 -- 10 -- -- --    Stool  --  -- --  --  -- --    Number of Times Stooled 0 x -- 0 x -- -- --    Total Output 1210 2700 3910 -- -- --       Net I/O     06 -0606 -1481 -- -- --            Intake/Output Summary (Last 24 hours) at 17 0821  Last data filed at 17 0600   Gross per 24 hour   Intake             1540 ml   Output     "         3910 ml   Net            -2370 ml            • oxyCODONE CR  10 mg Q12HRS     • dexamethasone  6 mg Once     • hydrocodone/acetaminophen  1-2 Tab Q4HRS PRN      Or   • oxycodone-acetaminophen  1-2 Tab Q4HRS PRN     • amlodipine  5 mg BID     • benazepril  40 mg QHS     • gabapentin  1,800 mg BID     • gemfibrozil  600 mg BID     • indapamide  2.5 mg QAM     • metformin  1,000 mg BID WITH MEALS     • ropinirole  0.5 mg QHS PRN     • tamsulosin  0.8 mg QHS     • Pharmacy Consult Request  1 Each PRN     • MD ALERT...Do not administer NSAIDS or ASPIRIN unless ORDERED By Neurosurgery  1 Each PRN     • docusate sodium  100 mg BID     • senna-docusate  1 Tab Nightly     • senna-docusate  1 Tab Q24HRS PRN     • polyethylene glycol/lytes  1 Packet BID PRN     • magnesium hydroxide  30 mL QDAY PRN     • bisacodyl  10 mg Q24HRS PRN     • fleet  1 Each Once PRN     • NS   Continuous 83 mL/hr at 09/18/17 2131   • acetaminophen  500 mg Q6HRS     • ondansetron  4 mg Q4HRS PRN     • ondansetron  4 mg Q4HRS PRN     • diphenhydrAMINE  25 mg Q6HRS PRN      Or   • diphenhydrAMINE  25 mg Q6HRS PRN     • cyclobenzaprine  10 mg TID     • temazepam  15 mg HS PRN - MR X 1     • clonidine  0.1 mg Q4HRS PRN     • benzocaine-menthol  1 Lozenge Q2HRS PRN     • calcium carbonate  500 mg BID     • vitamin D  5,000 Units QHS     • glucose 4 g  16 g Q15 MIN PRN      And   • dextrose 50%  25 mL Q15 MIN PRN     • carvedilol  12.5 mg BID WITH MEALS     • insulin lispro  3-14 Units 4X/DAY ACHS         Assessment and Plan:  POD 2 s/p L2-3 XLIF, lumbar laminectomy  Had urinary obstruction with blood clot treated by urology.     Plan  Retain wesley varghese/c 9/21/17  Mobilize today with assistance. Monitor Rt foot weakness/sensation.   Add dose of dexamethasone this am, oxycontin 10mg bid for pain   Add low dose valium   Pain meds PRN  Diet as tolerated  I/S, wean O2.   Encouraged him, he'll do better. His daughter will be living with him for 4-6 weeks,  anticipate d/c to home, may need SNF if continues with difficulty mobilizing.

## 2017-09-20 NOTE — CARE PLAN
Problem: Pain Management  Goal: Pain level will decrease to patient's comfort goal    Intervention: Follow pain managment plan developed in collaboration with patient and Interdisciplinary Team  Medicated with PRN's pain medications every 4 hours, flexeril 10 mg oral given at bedtime.  Lower extremities elevated on pillow for comfort.        Problem: Urinary Elimination:  Goal: Ability to reestablish a normal urinary elimination pattern will improve    Intervention: Assess and monitor for signs and symptoms of urinary retention  Indwelling catheter in place, yellow, clear urine observed.

## 2017-09-20 NOTE — PROGRESS NOTES
Pt notified that , requesting insulin coverage even though pt is refusing to eat breakfast or lunch, Taina ABRAMS paged to notify and clarify orders.

## 2017-09-20 NOTE — PROGRESS NOTES
"Encouraged pt to sit in chair and mobilize, pt declines and reports \"are you crazy\"? Pt agreed to try to mobilize this evening.   "

## 2017-09-21 LAB
GLUCOSE BLD-MCNC: 175 MG/DL (ref 65–99)
GLUCOSE BLD-MCNC: 178 MG/DL (ref 65–99)
GLUCOSE BLD-MCNC: 188 MG/DL (ref 65–99)
GLUCOSE BLD-MCNC: 228 MG/DL (ref 65–99)

## 2017-09-21 PROCEDURE — 97116 GAIT TRAINING THERAPY: CPT

## 2017-09-21 PROCEDURE — 700112 HCHG RX REV CODE 229: Performed by: PHYSICIAN ASSISTANT

## 2017-09-21 PROCEDURE — 700102 HCHG RX REV CODE 250 W/ 637 OVERRIDE(OP): Performed by: PHYSICIAN ASSISTANT

## 2017-09-21 PROCEDURE — 700102 HCHG RX REV CODE 250 W/ 637 OVERRIDE(OP): Performed by: NURSE PRACTITIONER

## 2017-09-21 PROCEDURE — 97535 SELF CARE MNGMENT TRAINING: CPT

## 2017-09-21 PROCEDURE — 770001 HCHG ROOM/CARE - MED/SURG/GYN PRIV*

## 2017-09-21 PROCEDURE — A9270 NON-COVERED ITEM OR SERVICE: HCPCS | Performed by: NURSE PRACTITIONER

## 2017-09-21 PROCEDURE — 82962 GLUCOSE BLOOD TEST: CPT | Mod: 91

## 2017-09-21 PROCEDURE — 97530 THERAPEUTIC ACTIVITIES: CPT

## 2017-09-21 PROCEDURE — A9270 NON-COVERED ITEM OR SERVICE: HCPCS | Performed by: PHYSICIAN ASSISTANT

## 2017-09-21 PROCEDURE — 51798 US URINE CAPACITY MEASURE: CPT

## 2017-09-21 RX ADMIN — MAGNESIUM HYDROXIDE 30 ML: 400 SUSPENSION ORAL at 23:40

## 2017-09-21 RX ADMIN — GABAPENTIN 1800 MG: 300 CAPSULE ORAL at 20:48

## 2017-09-21 RX ADMIN — ANTACID TABLETS 500 MG: 500 TABLET, CHEWABLE ORAL at 20:57

## 2017-09-21 RX ADMIN — AMLODIPINE BESYLATE 5 MG: 5 TABLET ORAL at 08:50

## 2017-09-21 RX ADMIN — METFORMIN HYDROCHLORIDE 1000 MG: 500 TABLET, FILM COATED ORAL at 17:23

## 2017-09-21 RX ADMIN — CYCLOBENZAPRINE 10 MG: 10 TABLET, FILM COATED ORAL at 08:50

## 2017-09-21 RX ADMIN — GEMFIBROZIL 600 MG: 600 TABLET ORAL at 08:52

## 2017-09-21 RX ADMIN — METFORMIN HYDROCHLORIDE 1000 MG: 500 TABLET, FILM COATED ORAL at 08:49

## 2017-09-21 RX ADMIN — DOCUSATE SODIUM 100 MG: 100 CAPSULE ORAL at 08:50

## 2017-09-21 RX ADMIN — GEMFIBROZIL 600 MG: 600 TABLET ORAL at 20:58

## 2017-09-21 RX ADMIN — TAMSULOSIN HYDROCHLORIDE 0.8 MG: 0.4 CAPSULE ORAL at 20:52

## 2017-09-21 RX ADMIN — CYCLOBENZAPRINE 10 MG: 10 TABLET, FILM COATED ORAL at 20:57

## 2017-09-21 RX ADMIN — DIAZEPAM 2 MG: 2 TABLET ORAL at 23:39

## 2017-09-21 RX ADMIN — GABAPENTIN 900 MG: 300 CAPSULE ORAL at 08:46

## 2017-09-21 RX ADMIN — INSULIN LISPRO 3 UNITS: 100 INJECTION, SOLUTION INTRAVENOUS; SUBCUTANEOUS at 05:35

## 2017-09-21 RX ADMIN — MAGNESIUM HYDROXIDE 30 ML: 400 SUSPENSION ORAL at 11:59

## 2017-09-21 RX ADMIN — OXYCODONE HYDROCHLORIDE 10 MG: 10 TABLET, FILM COATED, EXTENDED RELEASE ORAL at 08:48

## 2017-09-21 RX ADMIN — VITAMIN D, TAB 1000IU (100/BT) 5000 UNITS: 25 TAB at 20:53

## 2017-09-21 RX ADMIN — OXYCODONE HYDROCHLORIDE AND ACETAMINOPHEN 1 TABLET: 10; 325 TABLET ORAL at 14:53

## 2017-09-21 RX ADMIN — CARVEDILOL 12.5 MG: 6.25 TABLET, FILM COATED ORAL at 17:23

## 2017-09-21 RX ADMIN — BENAZEPRIL HYDROCHLORIDE 40 MG: 20 TABLET, COATED ORAL at 20:57

## 2017-09-21 RX ADMIN — CYCLOBENZAPRINE 10 MG: 10 TABLET, FILM COATED ORAL at 14:51

## 2017-09-21 RX ADMIN — INSULIN LISPRO 4 UNITS: 100 INJECTION, SOLUTION INTRAVENOUS; SUBCUTANEOUS at 22:32

## 2017-09-21 RX ADMIN — OXYCODONE HYDROCHLORIDE AND ACETAMINOPHEN 1 TABLET: 10; 325 TABLET ORAL at 20:46

## 2017-09-21 RX ADMIN — MAGNESIUM HYDROXIDE 30 ML: 400 SUSPENSION ORAL at 17:23

## 2017-09-21 RX ADMIN — INDAPAMIDE 2.5 MG: 2.5 TABLET, FILM COATED ORAL at 08:52

## 2017-09-21 RX ADMIN — DIAZEPAM 2 MG: 2 TABLET ORAL at 00:54

## 2017-09-21 RX ADMIN — DOCUSATE SODIUM 100 MG: 100 CAPSULE ORAL at 20:53

## 2017-09-21 RX ADMIN — ANTACID TABLETS 500 MG: 500 TABLET, CHEWABLE ORAL at 09:00

## 2017-09-21 RX ADMIN — INSULIN LISPRO 3 UNITS: 100 INJECTION, SOLUTION INTRAVENOUS; SUBCUTANEOUS at 17:19

## 2017-09-21 RX ADMIN — AMLODIPINE BESYLATE 5 MG: 5 TABLET ORAL at 20:50

## 2017-09-21 RX ADMIN — OXYCODONE HYDROCHLORIDE 10 MG: 10 TABLET, FILM COATED, EXTENDED RELEASE ORAL at 20:46

## 2017-09-21 RX ADMIN — STANDARDIZED SENNA CONCENTRATE AND DOCUSATE SODIUM 1 TABLET: 8.6; 5 TABLET, FILM COATED ORAL at 20:51

## 2017-09-21 RX ADMIN — ACETAMINOPHEN 500 MG: 500 TABLET ORAL at 20:50

## 2017-09-21 RX ADMIN — CARVEDILOL 12.5 MG: 6.25 TABLET, FILM COATED ORAL at 08:49

## 2017-09-21 RX ADMIN — ACETAMINOPHEN 500 MG: 500 TABLET ORAL at 14:53

## 2017-09-21 RX ADMIN — INSULIN LISPRO 3 UNITS: 100 INJECTION, SOLUTION INTRAVENOUS; SUBCUTANEOUS at 11:39

## 2017-09-21 RX ADMIN — ACETAMINOPHEN 500 MG: 500 TABLET ORAL at 08:50

## 2017-09-21 ASSESSMENT — COGNITIVE AND FUNCTIONAL STATUS - GENERAL
WALKING IN HOSPITAL ROOM: A LITTLE
DRESSING REGULAR LOWER BODY CLOTHING: A LITTLE
DRESSING REGULAR UPPER BODY CLOTHING: A LITTLE
HELP NEEDED FOR BATHING: A LITTLE
SUGGESTED CMS G CODE MODIFIER DAILY ACTIVITY: CK
TURNING FROM BACK TO SIDE WHILE IN FLAT BAD: A LITTLE
TOILETING: A LITTLE
MOBILITY SCORE: 18
DAILY ACTIVITIY SCORE: 19
STANDING UP FROM CHAIR USING ARMS: A LITTLE
PERSONAL GROOMING: A LITTLE
CLIMB 3 TO 5 STEPS WITH RAILING: A LITTLE
MOVING TO AND FROM BED TO CHAIR: A LITTLE
MOVING FROM LYING ON BACK TO SITTING ON SIDE OF FLAT BED: A LITTLE
SUGGESTED CMS G CODE MODIFIER MOBILITY: CK

## 2017-09-21 ASSESSMENT — GAIT ASSESSMENTS
GAIT LEVEL OF ASSIST: STAND BY ASSIST
DEVIATION: BRADYKINETIC
ASSISTIVE DEVICE: FRONT WHEEL WALKER
DISTANCE (FEET): 250

## 2017-09-21 ASSESSMENT — PAIN SCALES - GENERAL
PAINLEVEL_OUTOF10: 1
PAINLEVEL_OUTOF10: 7
PAINLEVEL_OUTOF10: 0
PAINLEVEL_OUTOF10: 1
PAINLEVEL_OUTOF10: 3

## 2017-09-21 ASSESSMENT — LIFESTYLE VARIABLES
DO YOU DRINK ALCOHOL: NO
DO YOU DRINK ALCOHOL: NO
EVER_SMOKED: YES
DO YOU DRINK ALCOHOL: NO

## 2017-09-21 NOTE — DISCHARGE PLANNING
Medical Social Worker    MARSHALL paged TIAN Potter, as he put in a face to face for HH, but not an order.

## 2017-09-21 NOTE — CARE PLAN
Problem: Pain Management  Goal: Pain level will decrease to patient's comfort goal  Outcome: PROGRESSING SLOWER THAN EXPECTED  Medicated with scheduled and PRN's pain medications ( See MAR). Non pharmacologic interventions provided, hot pack applied to lower back, waffle wedge cushion to buttocks, HOB elevated to 45 degrees.    Problem: Safety  Goal: Will remain free from falls    Intervention: Implement fall precautions  Safety and fall precautions in place.  Patient assisted out bed to bathroom and back.  Bed alarm activated.  Call light within reach.

## 2017-09-21 NOTE — THERAPY
"Physical Therapy Treatment completed.   Bed Mobility:  Supine to Sit: Contact Guard Assist  Transfers: Sit to Stand: Stand by Assist  Gait: Level Of Assist: Stand by Assist with Front-Wheel Walker       Plan of Care: Will benefit from Physical Therapy 5 times per week  Discharge Recommendations: Equipment: Front Wheeled Walker. Post-acute therapy Discharge to home with outpatient or home health for additional skilled therapy services.     See \"Rehab Therapy-Acute\" Patient Summary Report for complete documentation.     Pt presenting today w/ better pain control helping w/ functional mobility. Pt stating that 15-20 min hot pack on low back helps him get up and moving for the rest of the day. Pt given education on performing a platform step w/ a FWW w/ pt able to demonstrate at CGA. Pt able to ambulate w/ FWW and no LOB. Pt will benefit from outpatient/home health upon DC from acute care.  "

## 2017-09-21 NOTE — THERAPY
"Occupational Therapy Treatment completed with focus on ADLs, ADL transfers and patient education.  Functional Status:  Pt seen for OT tx. Pt up EOB at beginning of session. Discussed following precautions during ADLs and ADL transfers. Pt reports that he just was up amb in hallway w/ daughter prior to arrival. Pt and pt's daughter reporting that he owns equipment that he needs for home. Pt encouraged to continue amb. Pt will have 24/7 assist from daughter as needed upon appropriate medical d/c home.   Plan of Care: Will benefit from Occupational Therapy 3 times per week  Discharge Recommendations:  Equipment Will Continue to Assess for Equipment Needs. Post-acute therapy Discharge to home with outpatient or home health for additional skilled therapy services.    See \"Rehab Therapy-Acute\" Patient Summary Report for complete documentation.   "

## 2017-09-21 NOTE — PROGRESS NOTES
"Neurosurgery Progress Note    Doing much better today  No RLE pain.  His RLE sensation and strength has returned.   Has ambulated to BR x 2 and in halls once  Passing gas, no BM  Tolerating PO  Occasional \"pain attacks\" in low back, groins     Exam:  VSS  A&Ox4, NAD  NM: 5/5 hip flexion, knee extension, knee flexion, plantar flexion, dorsiflexion, EHL  Sensation intact and equal throughout all four extremities.   Abdomen: soft, non-tender  Non-labored breathing on room air, normal respiratory effort  Capillary refill in all four extremities <2 seconds  No LE edema, erythema, cyanosis, clubbing  Calves non-tender to compression bilat  Incision CDI x 2 without erythema, drainage.   Ac in place.                 BP  Min: 118/67  Max: 146/76  Pulse  Av.9  Min: 88  Max: 111  Resp  Av.3  Min: 3  Max: 28  Temp  Av.4 °C (97.5 °F)  Min: 36.1 °C (97 °F)  Max: 36.9 °C (98.4 °F)  SpO2  Av.5 %  Min: 48 %  Max: 100 %    No Data Recorded        Recent Labs      17   0203   SODIUM  139   POTASSIUM  4.7   CHLORIDE  102   CO2  28   GLUCOSE  152*   BUN  29*               Intake/Output       17 07 - 17 0659 17 07 - 17 0659      [id number] 2934-8658 Total 9360-8838 1583-9318 Total       Intake    P.O.  --  200 200  --  -- --    P.O. -- 200 200 -- -- --    Total Intake -- 200 200 -- -- --       Output    Urine  --  1500 1500  --  -- --    Indwelling Cathether -- 1500 1500 -- -- --    Stool  --  -- --  --  -- --    Number of Times Stooled -- 0 x 0 x 0 x -- 0 x    Total Output -- 1500 1500 -- -- --       Net I/O     -- -1300 -1300 -- -- --            Intake/Output Summary (Last 24 hours) at 17 0914  Last data filed at 17 0600   Gross per 24 hour   Intake              200 ml   Output             1500 ml   Net            -1300 ml            • oxyCODONE CR  10 mg Q12HRS     • diazepam  2 mg Q8HRS PRN     • hydrocodone/acetaminophen  1-2 Tab Q4HRS PRN      Or   • " oxycodone-acetaminophen  1-2 Tab Q4HRS PRN     • amlodipine  5 mg BID     • benazepril  40 mg QHS     • gabapentin  1,800 mg BID     • gemfibrozil  600 mg BID     • indapamide  2.5 mg QAM     • metformin  1,000 mg BID WITH MEALS     • ropinirole  0.5 mg QHS PRN     • tamsulosin  0.8 mg QHS     • Pharmacy Consult Request  1 Each PRN     • MD ALERT...Do not administer NSAIDS or ASPIRIN unless ORDERED By Neurosurgery  1 Each PRN     • docusate sodium  100 mg BID     • senna-docusate  1 Tab Nightly     • senna-docusate  1 Tab Q24HRS PRN     • polyethylene glycol/lytes  1 Packet BID PRN     • magnesium hydroxide  30 mL QDAY PRN     • bisacodyl  10 mg Q24HRS PRN     • fleet  1 Each Once PRN     • NS   Continuous 83 mL/hr at 09/18/17 2131   • acetaminophen  500 mg Q6HRS     • ondansetron  4 mg Q4HRS PRN     • ondansetron  4 mg Q4HRS PRN     • diphenhydrAMINE  25 mg Q6HRS PRN      Or   • diphenhydrAMINE  25 mg Q6HRS PRN     • cyclobenzaprine  10 mg TID     • temazepam  15 mg HS PRN - MR X 1     • clonidine  0.1 mg Q4HRS PRN     • benzocaine-menthol  1 Lozenge Q2HRS PRN     • calcium carbonate  500 mg BID     • vitamin D  5,000 Units QHS     • glucose 4 g  16 g Q15 MIN PRN      And   • dextrose 50%  25 mL Q15 MIN PRN     • carvedilol  12.5 mg BID WITH MEALS     • insulin lispro  3-14 Units 4X/DAY ACHS         Assessment and Plan:  POD 3 s/p L2-3 XLIF, lumbar laminectomy  Had urinary obstruction with blood clot treated by urology.     Plan  D/C varghese today, monitor urinary output   D/C dexamethasone, valium  Pain meds PRN  Diet as tolerated  I/S, wean O2.   Ambulate   MOM today for constipation.   D/C planning for home tomorrow with HH, FWW.

## 2017-09-21 NOTE — PROGRESS NOTES
Patient observed on rounds sleeping comfortable even and unlabored respirations noted.  Wedge cushion and hot pack provided per patient's request.  Safety and comfort measures maintained.  Call light within reach.

## 2017-09-21 NOTE — FACE TO FACE
Face to Face Supporting Documentation - Home Health    The encounter with this patient was in whole or in part the primary reason for home health admission.    Date of encounter:   Patient:                    MRN:                       YOB: 2019  Zhang Cheung  [id number]  1950     Home health to see patient for:  Skilled Nursing care for assessment, interventions & education, Home health aide and Physical Therapy evaluation and treatment    Skilled need for:  Surgical Aftercare  lumbar fusion    Skilled nursing interventions to include:  Wound Care    Homebound status evidenced by:  Need the aid of supportive devices such as crutches, canes, wheelchairs or walkers or Needs the assistance of another person in order to leave the home. Leaving home requires a considerable and taxing effort. There is a normal inability to leave the home.    Community Physician to provide follow up care: Tori Rai M.D.     Optional Interventions? No      I certify the face to face encounter for this home health care referral meets the CMS requirements and the encounter/clinical assessment with the patient was, in whole, or in part, for the medical condition(s) listed above, which is the primary reason for home health care. Based on my clinical findings: the service(s) are medically necessary, support the need for home health care, and the homebound criteria are met.  I certify that this patient has had a face to face encounter by the nurse practitioner working collaboratively with me.  MARTHA MarieP. - NPI: [id number]

## 2017-09-22 VITALS
HEART RATE: 80 BPM | SYSTOLIC BLOOD PRESSURE: 118 MMHG | OXYGEN SATURATION: 90 % | TEMPERATURE: 97.3 F | DIASTOLIC BLOOD PRESSURE: 69 MMHG | HEIGHT: 66 IN | WEIGHT: 241.4 LBS | RESPIRATION RATE: 18 BRPM | BODY MASS INDEX: 38.8 KG/M2

## 2017-09-22 LAB
GLUCOSE BLD-MCNC: 142 MG/DL (ref 65–99)
GLUCOSE BLD-MCNC: 154 MG/DL (ref 65–99)

## 2017-09-22 PROCEDURE — A9270 NON-COVERED ITEM OR SERVICE: HCPCS | Performed by: NURSE PRACTITIONER

## 2017-09-22 PROCEDURE — A9270 NON-COVERED ITEM OR SERVICE: HCPCS | Performed by: PHYSICIAN ASSISTANT

## 2017-09-22 PROCEDURE — 700111 HCHG RX REV CODE 636 W/ 250 OVERRIDE (IP): Performed by: NURSE PRACTITIONER

## 2017-09-22 PROCEDURE — 700102 HCHG RX REV CODE 250 W/ 637 OVERRIDE(OP): Performed by: NURSE PRACTITIONER

## 2017-09-22 PROCEDURE — 82962 GLUCOSE BLOOD TEST: CPT

## 2017-09-22 PROCEDURE — 700112 HCHG RX REV CODE 229: Performed by: PHYSICIAN ASSISTANT

## 2017-09-22 PROCEDURE — 700102 HCHG RX REV CODE 250 W/ 637 OVERRIDE(OP): Performed by: PHYSICIAN ASSISTANT

## 2017-09-22 PROCEDURE — 51798 US URINE CAPACITY MEASURE: CPT

## 2017-09-22 RX ORDER — OXYCODONE AND ACETAMINOPHEN 10; 325 MG/1; MG/1
1-2 TABLET ORAL EVERY 4 HOURS PRN
Qty: 120 TAB | Refills: 0 | Status: ON HOLD | OUTPATIENT
Start: 2017-09-22 | End: 2017-10-01

## 2017-09-22 RX ORDER — MORPHINE SULFATE 15 MG/1
15 TABLET, FILM COATED, EXTENDED RELEASE ORAL EVERY 12 HOURS
Qty: 35 TAB | Refills: 0 | Status: SHIPPED | OUTPATIENT
Start: 2017-09-22 | End: 2018-05-21

## 2017-09-22 RX ORDER — PSEUDOEPHEDRINE HCL 30 MG
100 TABLET ORAL 2 TIMES DAILY
Qty: 60 CAP | Refills: 1 | Status: ON HOLD | OUTPATIENT
Start: 2017-09-22 | End: 2017-10-01

## 2017-09-22 RX ORDER — CYCLOBENZAPRINE HCL 10 MG
10 TABLET ORAL 3 TIMES DAILY
Qty: 90 TAB | Refills: 0 | Status: ON HOLD | OUTPATIENT
Start: 2017-09-22 | End: 2017-10-01

## 2017-09-22 RX ADMIN — CYCLOBENZAPRINE 10 MG: 10 TABLET, FILM COATED ORAL at 08:59

## 2017-09-22 RX ADMIN — ANTACID TABLETS 500 MG: 500 TABLET, CHEWABLE ORAL at 08:58

## 2017-09-22 RX ADMIN — OXYCODONE HYDROCHLORIDE AND ACETAMINOPHEN 2 TABLET: 10; 325 TABLET ORAL at 01:38

## 2017-09-22 RX ADMIN — ACETAMINOPHEN 500 MG: 500 TABLET ORAL at 04:04

## 2017-09-22 RX ADMIN — GABAPENTIN 1800 MG: 300 CAPSULE ORAL at 08:59

## 2017-09-22 RX ADMIN — INSULIN LISPRO 3 UNITS: 100 INJECTION, SOLUTION INTRAVENOUS; SUBCUTANEOUS at 12:38

## 2017-09-22 RX ADMIN — INDAPAMIDE 2.5 MG: 2.5 TABLET, FILM COATED ORAL at 09:00

## 2017-09-22 RX ADMIN — ACETAMINOPHEN 500 MG: 500 TABLET ORAL at 08:59

## 2017-09-22 RX ADMIN — AMLODIPINE BESYLATE 5 MG: 5 TABLET ORAL at 08:58

## 2017-09-22 RX ADMIN — METFORMIN HYDROCHLORIDE 1000 MG: 500 TABLET, FILM COATED ORAL at 08:59

## 2017-09-22 RX ADMIN — GEMFIBROZIL 600 MG: 600 TABLET ORAL at 08:58

## 2017-09-22 RX ADMIN — ENOXAPARIN SODIUM 30 MG: 100 INJECTION SUBCUTANEOUS at 10:51

## 2017-09-22 RX ADMIN — OXYCODONE HYDROCHLORIDE 10 MG: 10 TABLET, FILM COATED, EXTENDED RELEASE ORAL at 08:58

## 2017-09-22 RX ADMIN — BISACODYL 10 MG: 10 SUPPOSITORY RECTAL at 06:18

## 2017-09-22 RX ADMIN — CARVEDILOL 12.5 MG: 6.25 TABLET, FILM COATED ORAL at 08:58

## 2017-09-22 RX ADMIN — OXYCODONE HYDROCHLORIDE AND ACETAMINOPHEN 2 TABLET: 10; 325 TABLET ORAL at 06:18

## 2017-09-22 RX ADMIN — DOCUSATE SODIUM 100 MG: 100 CAPSULE ORAL at 08:58

## 2017-09-22 RX ADMIN — MAGNESIUM HYDROXIDE 30 ML: 400 SUSPENSION ORAL at 06:19

## 2017-09-22 RX ADMIN — OXYCODONE HYDROCHLORIDE AND ACETAMINOPHEN 2 TABLET: 10; 325 TABLET ORAL at 14:04

## 2017-09-22 ASSESSMENT — PAIN SCALES - GENERAL
PAINLEVEL_OUTOF10: 7
PAINLEVEL_OUTOF10: 7
PAINLEVEL_OUTOF10: 8
PAINLEVEL_OUTOF10: 5
PAINLEVEL_OUTOF10: 7
PAINLEVEL_OUTOF10: 7

## 2017-09-22 NOTE — DISCHARGE INSTRUCTIONS
Discharge Instructions    Discharged to home by car with relative. Discharged via wheelchair, hospital escort: Yes.  Special equipment needed: Walker    Be sure to schedule a follow-up appointment with your primary care doctor or any specialists as instructed.     Discharge Plan:   Influenza Vaccine Indication: Patient Refuses    I understand that a diet low in cholesterol, fat, and sodium is recommended for good health. Unless I have been given specific instructions below for another diet, I accept this instruction as my diet prescription.   Other diet: regular    Special Instructions: None    · Is patient discharged on Warfarin / Coumadin?   No     · Is patient Post Blood Transfusion?  No        INCISION CARE:  OK to shower with incision covered or uncovered. After shower, pat incision dry and cover with gauze and tape if draining. OK to leave open to air if not draining.       RESTRICTIONS:  Continue to wear your brace as directed   No lifting greater than 10 pounds, no repetitive bending or twisting  No driving for two weeks, no driving while on narcotic medication or muscle relaxers  No aspirin, blood thinners, NSAIDS (non-steroidal anti-inflammatory medications - aleve, motrin, ibuprofen, celebrex) for two weeks      RECOMMENDATIONS:  Over the counter stool softeners daily while on narcotics  Ambulate often to prevent blood clots in your legs  Continue incentive spirometer hourly   Follow up at [facility] in 2 weeks after surgery.     Please call [phone] to confirm the date, location, and time of your follow up appointment that was made for you.     Continue to wear brace as directed, may remove for showering.   No bending, lifting or twisting.   Daily OTC laxative while on narcotics.   No aspirin, NSAID or blood thinners x 2 weeks.   Ambulate often to prevent DVT   Continue incentive spirometer hourly   F/u with [facility] in 2 weeks        Depression / Suicide Risk    As you are  discharged from this [facility] facility, it is important to learn how to keep safe from harming yourself.    Recognize the warning signs:  · Abrupt changes in personality, positive or negative- including increase in energy   · Giving away possessions  · Change in eating patterns- significant weight changes-  positive or negative  · Change in sleeping patterns- unable to sleep or sleeping all the time   · Unwillingness or inability to communicate  · Depression  · Unusual sadness, discouragement and loneliness  · Talk of wanting to die  · Neglect of personal appearance   · Rebelliousness- reckless behavior  · Withdrawal from people/activities they love  · Confusion- inability to concentrate     If you or a loved one observes any of these behaviors or has concerns about self-harm, here's what you can do:  · Talk about it- your feelings and reasons for harming yourself  · Remove any means that you might use to hurt yourself (examples: pills, rope, extension cords, firearm)  · Get professional help from the community (Mental Health, Substance Abuse, psychological counseling)  · Do not be alone:Call your Safe Contact- someone whom you trust who will be there for you.  · Call your local CRISIS HOTLINE 780-8853 or 204-070-8911  · Call your local Children's Mobile Crisis Response Team [place] (857) 119-4119 or www."[employer]"  · Call the toll free National Suicide Prevention Hotlines   · National Suicide Prevention Lifeline 578-207-TTXU (5083)  · National Hope Line Network 800-SUICIDE (957-5762)

## 2017-09-22 NOTE — PROGRESS NOTES
Patient report received. Assumed care.    Patient AO4, breathing normally, no distress noted.     Denies N/T. Motor strength equal BUE and BLE.     Pain reported at 7/10 on lower back. Medicated per MAR    LFA18 IV is patent with no signs of infection/infiltration/phlebitis    Patient voids well, tolerates diet, normal bowel sounds.    Skin intact. Dressing CDI with no signs of infrection.     POC discussed, no further needs at this time, call light within reach, bed alarm armed.

## 2017-09-22 NOTE — DISCHARGE SUMMARY
DATE OF ADMISSION:  09/18/2017    DATE OF DISCHARGE:  09/22/2017    ADMITTING DIAGNOSES:  L1-L4 stenosis with L2-L3 markedly collapsed disk with   coronal imbalance and markedly eroded disk space.  In addition, there was   severe stenosis at L2-L3, severe stenosis on the right at L4-L5 and bilateral   L2 pars defect.    DISCHARGE DIAGNOSES:  L1-L4 stenosis with L2-L3 markedly collapsed disk with   coronal imbalance and markedly eroded disk space.  In addition, there was   severe stenosis at L2-L3, severe stenosis on the right at L4-L5 and bilateral   L2 pars defect.    PRINCIPAL PROCEDURES PERFORMED:  1.  L2-L3 extreme lateral interbody fusion with plate.  2.  L2-L3 laminectomy, instrumentation and fusion.  3.  L2-L5 lumbar laminectomy.    PROCEDURE PERFORMED BY:  Yemi Sharma MD    ASSISTANT:  Kelly Ribeiro PA-C    COMPLICATIONS:  There were no complications.    HOSPITAL COURSE:  Patient was extubated and brought to the recovery room and   then to the neurosurgery floor.  On postop day 1, the patient stated that he   had improvement in his right lower extremity pain; however, he had increased   numbness in the right ankle, which was not present prior to surgery.  It had   resolved from the onset, which occurred in the immediate postop period in PACU   that was improved by the morning of his initial evaluation on postop day 1.    He had decreased strength in his lower extremities except for 4+/5 right   dorsiflexion.  His Ac was continued as the patient had an emergent urology   consult in the immediate postop period as his Ac catheter was clogged and   required urology consult as nursing was unable to post-void adequately.    Bladder scan prior to flushing the Ac showed 900 mL.  Per urologist, which   was Dr. Nhan Martin, the patient was to remain with the Ac in for at   least 48 hours.  On postop day 2, the patient stated that he continued to have   improvement in the right lower extremity  pain and the right lower extremity   weakness, which he had immediately after surgery was continuing to resolve.    He is complaining bitterly of pain all over, especially in the groin,   bilateral lower extremities and incisional pain in his back with increased   movement.  His exam is intact, although his pain was very difficult to control   on oral medication, required use of IV medications throughout the day of   postop day 2.  On postop day 3, the patient was doing well with addition of MS   Contin.  He stated that he had no right lower extremity pain.  His sensation   in the right lower extremities had returned.  He is passing gas, but did not   have a bowel movement.  He was generally well controlled on oral medication.    His Ac was discontinued on postop day 3 and patient was able to void   without any difficulty.  He was admitted for another night to continue to   monitor for urine output as well as pain control given the difficulty   controlling the patient's pain and the first 2 days after surgery.  On   postoperative day 4, the patient was seen and evaluated.  He stated that his   pain was well controlled on oral medication.  He was able to void a full void   without any urinary retention or hesitancy.  Patient met criteria for safe   discharge to home on postop day 4 with the following physical exam.    PHYSICAL EXAMINATION:  VITAL SIGNS:  Stable.  GENERAL:  Well developed, well nourished, in no apparent distress.  NEUROMUSCULAR:  A 5/5 hip flexion, knee extension, knee flexion,   plantarflexion, dorsiflexion, EHL.  Sensation intact and equal throughout all   4 extremities.  ABDOMEN:  Soft, nontender.  PULMONARY:  Nonlabored breathing on room air with normal respiratory effort.  EXTREMITIES:  Lower extremities, no lower extremity edema, erythema, cyanosis   or clubbing.  Calves nontender to compression bilaterally.  Incision is clean,   dry, and intact x2 without any erythema or drainage.  Patient  is able to   ambulate on his own.    DISCHARGE INSTRUCTIONS:  Patient will be discharged with the following   instructions; ambulate often to prevent DVTs.  Continue incentive spirometer   hourly daily, over-the-counter stool softener while on narcotic medication.    No NSAIDs, aspirin, anticoagulants for 2 weeks.  No driving for 2 weeks, no   driving while on narcotic medication or muscle relaxers.  [facility]   with [facility].  Please call office to confirm date,   time, and location of appointment that was made for you.       ____________________________________     LAURI Royal / THAO    DD:  09/22/2017 09:18:48  DT:  09/22/2017 12:57:02    D#:  [id number]  Job#:  [id number]    cc: ALYCIA STEARNS MD

## 2017-09-22 NOTE — CARE PLAN
Problem: Safety  Goal: Will remain free from injury  Outcome: PROGRESSING AS EXPECTED      Patient remains free from injury/fall. Patient call for assistance. Bed alarm on, call light and belongings within reach    Problem: Infection  Goal: Will remain free from infection  Outcome: PROGRESSING AS EXPECTED    Patient assessed for signs of infection. Patient is afebrile. Vital signs monitored.Laboratory values WNL. Educated patient on signs and symptoms. IV line negative for infection Patient AO4, patient breathing normally with clear sounds, voids well with no retention or signs of infection, Skin and incision intact with no signs of infection.     Problem: Mobility  Goal: Risk for activity intolerance will decrease  Outcome: PROGRESSING AS EXPECTED    Patient assessed for mobility concerns. Patient on 1-1 healthcare assistance walker. Educated on benefits and interventions for improved mobility. Patient continues to tolerates daily activities. Pain management is in place at regular intervals. PT and OT involved. Patient ambulated in the pm, tolerated well.

## 2017-09-22 NOTE — PROGRESS NOTES
"Neurosurgery Progress Note    Doing very well, ambulating well.  No RLE pain.  His RLE sensation and strength has returned.   Had BM  Voiding without problem  Tolerating PO  \"pain attacks\" have been much less severe.     Exam:  VSS  A&Ox4, NAD  NM: 5/5 hip flexion, knee extension, knee flexion, plantar flexion, dorsiflexion, EHL  Sensation intact and equal throughout all four extremities.   Abdomen: soft, non-tender  Non-labored breathing on room air, normal respiratory effort  Capillary refill in all four extremities <2 seconds  No LE edema, erythema, cyanosis, clubbing  Calves non-tender to compression bilat  Incision CDI x 2 without erythema, drainage.                    BP  Min: 118/67  Max: 146/76  Pulse  Av.9  Min: 88  Max: 111  Resp  Av.3  Min: 3  Max: 28  Temp  Av.4 °C (97.5 °F)  Min: 36.1 °C (97 °F)  Max: 36.9 °C (98.4 °F)  SpO2  Av.5 %  Min: 48 %  Max: 100 %    No Data Recorded        No results for input(s): SODIUM, POTASSIUM, CHLORIDE, CO2, GLUCOSE, BUN, CPKTOTAL in the last 72 hours.            Intake/Output       17 - 17 - 17 0659       Total  Total       Intake    P.O.  --  750 750  --  -- --    P.O. -- 750 750 -- -- --    Total Intake -- 750 750 -- -- --       Output    Urine  650  -- 650  --  -- --    Number of Times Voided -- 2 x 2 x -- -- --    Indwelling Cathether 400 -- 400 -- -- --    Void (ml) 250 -- 250 -- -- --    Stool  --  -- --  --  -- --    Number of Times Stooled 0 x -- 0 x 1 x -- 1 x    Total Output 650 -- 650 -- -- --       Net I/O     -650 750 100 -- -- --            Intake/Output Summary (Last 24 hours) at 17 0945  Last data filed at 17 0600   Gross per 24 hour   Intake              750 ml   Output              250 ml   Net              500 ml       Bladder Scan Results (ml): 42    • enoxaparin (LOVENOX) injection  30 mg Q12HRS     • magnesium hydroxide  30 mL Q6HRS     • " pneumococcal 13-Reta Conj Vacc  0.5 mL Once PRN     • oxyCODONE CR  10 mg Q12HRS     • diazepam  2 mg Q8HRS PRN     • hydrocodone/acetaminophen  1-2 Tab Q4HRS PRN      Or   • oxycodone-acetaminophen  1-2 Tab Q4HRS PRN     • amlodipine  5 mg BID     • benazepril  40 mg QHS     • gabapentin  1,800 mg BID     • gemfibrozil  600 mg BID     • indapamide  2.5 mg QAM     • metformin  1,000 mg BID WITH MEALS     • ropinirole  0.5 mg QHS PRN     • tamsulosin  0.8 mg QHS     • Pharmacy Consult Request  1 Each PRN     • MD ALERT...Do not administer NSAIDS or ASPIRIN unless ORDERED By Neurosurgery  1 Each PRN     • docusate sodium  100 mg BID     • senna-docusate  1 Tab Nightly     • senna-docusate  1 Tab Q24HRS PRN     • polyethylene glycol/lytes  1 Packet BID PRN     • magnesium hydroxide  30 mL QDAY PRN     • bisacodyl  10 mg Q24HRS PRN     • fleet  1 Each Once PRN     • NS   Continuous 83 mL/hr at 09/18/17 2131   • acetaminophen  500 mg Q6HRS     • ondansetron  4 mg Q4HRS PRN     • ondansetron  4 mg Q4HRS PRN     • diphenhydrAMINE  25 mg Q6HRS PRN      Or   • diphenhydrAMINE  25 mg Q6HRS PRN     • cyclobenzaprine  10 mg TID     • temazepam  15 mg HS PRN - MR X 1     • clonidine  0.1 mg Q4HRS PRN     • benzocaine-menthol  1 Lozenge Q2HRS PRN     • calcium carbonate  500 mg BID     • vitamin D  5,000 Units QHS     • glucose 4 g  16 g Q15 MIN PRN      And   • dextrose 50%  25 mL Q15 MIN PRN     • carvedilol  12.5 mg BID WITH MEALS     • insulin lispro  3-14 Units 4X/DAY ACHS         Assessment and Plan:  POD 4 s/p L2-3 XLIF, lumbar laminectomy  Had urinary obstruction with blood clot treated by urology.     Plan  D/C today with FWW, HH    Discussed d/c instructions  INCISION CARE:  OK to shower with incision covered or uncovered. After shower, pat incision dry and cover with gauze and tape if draining. OK to leave open to air if not draining.      RESTRICTIONS:  Continue to wear your brace as directed   No lifting greater than  10 pounds, no repetitive bending or twisting  No driving for two weeks, no driving while on narcotic medication or muscle relaxers  No aspirin, blood thinners, NSAIDS (non-steroidal anti-inflammatory medications - aleve, motrin, ibuprofen, celebrex) for two weeks     RECOMMENDATIONS:  Over the counter stool softeners daily while on narcotics  Ambulate often to prevent blood clots in your legs  Continue incentive spirometer hourly   Follow up at [facility] in 2 weeks after surgery.    Please call [phone] to confirm the date, location, and time of your follow up appointment that was made for you.

## 2017-09-22 NOTE — PROGRESS NOTES
Discharge instructions and prescriptions given to pt and children. All questions answered. Pt left via wheelchair with hospital escort and wife. All belongings taken. Bladder scan at 17mL after patient voided.

## 2017-09-22 NOTE — CARE PLAN
Problem: Urinary Elimination:  Goal: Ability to reestablish a normal urinary elimination pattern will improve  Outcome: PROGRESSING AS EXPECTED  Pt voiding regularly. Bladder scan orders received and will document results in note.

## 2017-09-22 NOTE — PROGRESS NOTES
Received report from night shift RN. Assumed care of patient. Pt assessed and stable. VSS. Patient reports 7/10 pain at this time.  Administered medication for pain.  Discussed plan of care for day with patient and received verbal understanding. Call light within reach, strip alarm refused, bed in low position.

## 2017-09-22 NOTE — DISCHARGE PLANNING
Medical Social Work     SW went to bedside and obtained choice for HH. Pt choice was [facility]. Choice form faxed to [facility].

## 2017-09-26 ENCOUNTER — APPOINTMENT (OUTPATIENT)
Dept: RADIOLOGY | Facility: MEDICAL CENTER | Age: 67
DRG: 917 | End: 2017-09-26
Attending: EMERGENCY MEDICINE
Payer: MEDICARE

## 2017-09-26 ENCOUNTER — RESOLUTE PROFESSIONAL BILLING HOSPITAL PROF FEE (OUTPATIENT)
Dept: HOSPITALIST | Facility: MEDICAL CENTER | Age: 67
End: 2017-09-26
Payer: MEDICARE

## 2017-09-26 ENCOUNTER — APPOINTMENT (OUTPATIENT)
Dept: RADIOLOGY | Facility: MEDICAL CENTER | Age: 67
DRG: 917 | End: 2017-09-26
Payer: MEDICARE

## 2017-09-26 ENCOUNTER — HOSPITAL ENCOUNTER (INPATIENT)
Facility: MEDICAL CENTER | Age: 67
LOS: 5 days | DRG: 917 | End: 2017-10-01
Attending: EMERGENCY MEDICINE | Admitting: INTERNAL MEDICINE
Payer: MEDICARE

## 2017-09-26 DIAGNOSIS — E87.1 HYPONATREMIA: ICD-10-CM

## 2017-09-26 DIAGNOSIS — N28.9 ACUTE RENAL INSUFFICIENCY: ICD-10-CM

## 2017-09-26 DIAGNOSIS — R09.02 HYPOXIA: ICD-10-CM

## 2017-09-26 PROBLEM — I95.9 HYPOTENSION: Status: ACTIVE | Noted: 2017-09-26

## 2017-09-26 PROBLEM — N17.9 AKI (ACUTE KIDNEY INJURY) (HCC): Status: ACTIVE | Noted: 2017-09-26

## 2017-09-26 LAB
ALBUMIN SERPL BCP-MCNC: 3.1 G/DL (ref 3.2–4.9)
ALBUMIN SERPL BCP-MCNC: 3.6 G/DL (ref 3.2–4.9)
ALBUMIN/GLOB SERPL: 1.1 G/DL
ALBUMIN/GLOB SERPL: 1.1 G/DL
ALP SERPL-CCNC: 103 U/L (ref 30–99)
ALP SERPL-CCNC: 85 U/L (ref 30–99)
ALT SERPL-CCNC: 13 U/L (ref 2–50)
ALT SERPL-CCNC: 14 U/L (ref 2–50)
ANION GAP SERPL CALC-SCNC: 10 MMOL/L (ref 0–11.9)
ANION GAP SERPL CALC-SCNC: 8 MMOL/L (ref 0–11.9)
AST SERPL-CCNC: 48 U/L (ref 12–45)
AST SERPL-CCNC: 53 U/L (ref 12–45)
BASOPHILS # BLD AUTO: 0.3 % (ref 0–1.8)
BASOPHILS # BLD: 0.03 K/UL (ref 0–0.12)
BILIRUB SERPL-MCNC: 0.3 MG/DL (ref 0.1–1.5)
BILIRUB SERPL-MCNC: 0.4 MG/DL (ref 0.1–1.5)
BUN SERPL-MCNC: 89 MG/DL (ref 8–22)
BUN SERPL-MCNC: 91 MG/DL (ref 8–22)
CALCIUM SERPL-MCNC: 8.3 MG/DL (ref 8.5–10.5)
CALCIUM SERPL-MCNC: 9.3 MG/DL (ref 8.5–10.5)
CHLORIDE SERPL-SCNC: 89 MMOL/L (ref 96–112)
CHLORIDE SERPL-SCNC: 93 MMOL/L (ref 96–112)
CO2 SERPL-SCNC: 25 MMOL/L (ref 20–33)
CO2 SERPL-SCNC: 31 MMOL/L (ref 20–33)
CREAT SERPL-MCNC: 3.71 MG/DL (ref 0.5–1.4)
CREAT SERPL-MCNC: 3.86 MG/DL (ref 0.5–1.4)
DEPRECATED D DIMER PPP IA-ACNC: 504 NG/ML(D-DU)
EKG IMPRESSION: NORMAL
EOSINOPHIL # BLD AUTO: 0.05 K/UL (ref 0–0.51)
EOSINOPHIL NFR BLD: 0.5 % (ref 0–6.9)
ERYTHROCYTE [DISTWIDTH] IN BLOOD BY AUTOMATED COUNT: 45.1 FL (ref 35.9–50)
GFR SERPL CREATININE-BSD FRML MDRD: 16 ML/MIN/1.73 M 2
GFR SERPL CREATININE-BSD FRML MDRD: 16 ML/MIN/1.73 M 2
GLOBULIN SER CALC-MCNC: 2.8 G/DL (ref 1.9–3.5)
GLOBULIN SER CALC-MCNC: 3.3 G/DL (ref 1.9–3.5)
GLUCOSE SERPL-MCNC: 113 MG/DL (ref 65–99)
GLUCOSE SERPL-MCNC: 116 MG/DL (ref 65–99)
HCT VFR BLD AUTO: 33.8 % (ref 42–52)
HGB BLD-MCNC: 11.2 G/DL (ref 14–18)
IMM GRANULOCYTES # BLD AUTO: 0.05 K/UL (ref 0–0.11)
IMM GRANULOCYTES NFR BLD AUTO: 0.5 % (ref 0–0.9)
LACTATE BLD-SCNC: 1.2 MMOL/L (ref 0.5–2)
LACTATE BLD-SCNC: 1.7 MMOL/L (ref 0.5–2)
LYMPHOCYTES # BLD AUTO: 1.4 K/UL (ref 1–4.8)
LYMPHOCYTES NFR BLD: 12.9 % (ref 22–41)
MAGNESIUM SERPL-MCNC: 3.1 MG/DL (ref 1.5–2.5)
MCH RBC QN AUTO: 30.6 PG (ref 27–33)
MCHC RBC AUTO-ENTMCNC: 33.1 G/DL (ref 33.7–35.3)
MCV RBC AUTO: 92.3 FL (ref 81.4–97.8)
MONOCYTES # BLD AUTO: 0.97 K/UL (ref 0–0.85)
MONOCYTES NFR BLD AUTO: 8.9 % (ref 0–13.4)
NEUTROPHILS # BLD AUTO: 8.37 K/UL (ref 1.82–7.42)
NEUTROPHILS NFR BLD: 76.9 % (ref 44–72)
NRBC # BLD AUTO: 0 K/UL
NRBC BLD AUTO-RTO: 0 /100 WBC
OSMOLALITY SERPL: 306 MOSM/KG H2O (ref 278–298)
PLATELET # BLD AUTO: 327 K/UL (ref 164–446)
PMV BLD AUTO: 10.5 FL (ref 9–12.9)
POTASSIUM SERPL-SCNC: 4.9 MMOL/L (ref 3.6–5.5)
POTASSIUM SERPL-SCNC: 5 MMOL/L (ref 3.6–5.5)
PROT SERPL-MCNC: 5.9 G/DL (ref 6–8.2)
PROT SERPL-MCNC: 6.9 G/DL (ref 6–8.2)
RBC # BLD AUTO: 3.66 M/UL (ref 4.7–6.1)
SODIUM SERPL-SCNC: 128 MMOL/L (ref 135–145)
SODIUM SERPL-SCNC: 128 MMOL/L (ref 135–145)
WBC # BLD AUTO: 10.9 K/UL (ref 4.8–10.8)

## 2017-09-26 PROCEDURE — 96361 HYDRATE IV INFUSION ADD-ON: CPT

## 2017-09-26 PROCEDURE — 51798 US URINE CAPACITY MEASURE: CPT

## 2017-09-26 PROCEDURE — 84145 PROCALCITONIN (PCT): CPT

## 2017-09-26 PROCEDURE — 700105 HCHG RX REV CODE 258: Performed by: STUDENT IN AN ORGANIZED HEALTH CARE EDUCATION/TRAINING PROGRAM

## 2017-09-26 PROCEDURE — 83605 ASSAY OF LACTIC ACID: CPT | Mod: 91

## 2017-09-26 PROCEDURE — 82436 ASSAY OF URINE CHLORIDE: CPT

## 2017-09-26 PROCEDURE — 84156 ASSAY OF PROTEIN URINE: CPT

## 2017-09-26 PROCEDURE — 84300 ASSAY OF URINE SODIUM: CPT

## 2017-09-26 PROCEDURE — 93005 ELECTROCARDIOGRAM TRACING: CPT | Performed by: STUDENT IN AN ORGANIZED HEALTH CARE EDUCATION/TRAINING PROGRAM

## 2017-09-26 PROCEDURE — 71010 DX-CHEST-PORTABLE (1 VIEW): CPT

## 2017-09-26 PROCEDURE — 82570 ASSAY OF URINE CREATININE: CPT

## 2017-09-26 PROCEDURE — 80053 COMPREHEN METABOLIC PANEL: CPT

## 2017-09-26 PROCEDURE — 99291 CRITICAL CARE FIRST HOUR: CPT

## 2017-09-26 PROCEDURE — 770022 HCHG ROOM/CARE - ICU (200)

## 2017-09-26 PROCEDURE — 83930 ASSAY OF BLOOD OSMOLALITY: CPT

## 2017-09-26 PROCEDURE — 96360 HYDRATION IV INFUSION INIT: CPT

## 2017-09-26 PROCEDURE — 85025 COMPLETE CBC W/AUTO DIFF WBC: CPT

## 2017-09-26 PROCEDURE — 87040 BLOOD CULTURE FOR BACTERIA: CPT

## 2017-09-26 PROCEDURE — 85379 FIBRIN DEGRADATION QUANT: CPT

## 2017-09-26 PROCEDURE — 700102 HCHG RX REV CODE 250 W/ 637 OVERRIDE(OP): Performed by: STUDENT IN AN ORGANIZED HEALTH CARE EDUCATION/TRAINING PROGRAM

## 2017-09-26 PROCEDURE — 36415 COLL VENOUS BLD VENIPUNCTURE: CPT

## 2017-09-26 PROCEDURE — 700105 HCHG RX REV CODE 258: Performed by: EMERGENCY MEDICINE

## 2017-09-26 PROCEDURE — 83735 ASSAY OF MAGNESIUM: CPT

## 2017-09-26 PROCEDURE — 81001 URINALYSIS AUTO W/SCOPE: CPT

## 2017-09-26 PROCEDURE — 87086 URINE CULTURE/COLONY COUNT: CPT

## 2017-09-26 PROCEDURE — 303105 HCHG CATHETER EXTRA

## 2017-09-26 PROCEDURE — 51702 INSERT TEMP BLADDER CATH: CPT

## 2017-09-26 PROCEDURE — 84133 ASSAY OF URINE POTASSIUM: CPT

## 2017-09-26 RX ORDER — SODIUM CHLORIDE 9 MG/ML
INJECTION, SOLUTION INTRAVENOUS CONTINUOUS
Status: DISCONTINUED | OUTPATIENT
Start: 2017-09-26 | End: 2017-09-28

## 2017-09-26 RX ORDER — ACETAMINOPHEN 325 MG/1
650 TABLET ORAL EVERY 6 HOURS PRN
Status: DISCONTINUED | OUTPATIENT
Start: 2017-09-26 | End: 2017-10-01 | Stop reason: HOSPADM

## 2017-09-26 RX ORDER — BISACODYL 10 MG
10 SUPPOSITORY, RECTAL RECTAL
Status: DISCONTINUED | OUTPATIENT
Start: 2017-09-26 | End: 2017-10-01 | Stop reason: HOSPADM

## 2017-09-26 RX ORDER — SODIUM CHLORIDE 9 MG/ML
1000 INJECTION, SOLUTION INTRAVENOUS ONCE
Status: COMPLETED | OUTPATIENT
Start: 2017-09-26 | End: 2017-09-27

## 2017-09-26 RX ORDER — POLYETHYLENE GLYCOL 3350 17 G/17G
1 POWDER, FOR SOLUTION ORAL
Status: DISCONTINUED | OUTPATIENT
Start: 2017-09-26 | End: 2017-10-01 | Stop reason: HOSPADM

## 2017-09-26 RX ORDER — SODIUM CHLORIDE 9 MG/ML
1000 INJECTION, SOLUTION INTRAVENOUS ONCE
Status: COMPLETED | OUTPATIENT
Start: 2017-09-26 | End: 2017-09-26

## 2017-09-26 RX ORDER — DEXTROSE MONOHYDRATE 25 G/50ML
25 INJECTION, SOLUTION INTRAVENOUS
Status: DISCONTINUED | OUTPATIENT
Start: 2017-09-26 | End: 2017-10-01 | Stop reason: HOSPADM

## 2017-09-26 RX ORDER — AMOXICILLIN 250 MG
2 CAPSULE ORAL 2 TIMES DAILY
Status: DISCONTINUED | OUTPATIENT
Start: 2017-09-26 | End: 2017-10-01 | Stop reason: HOSPADM

## 2017-09-26 RX ORDER — HEPARIN SODIUM 5000 [USP'U]/ML
5000 INJECTION, SOLUTION INTRAVENOUS; SUBCUTANEOUS EVERY 8 HOURS
Status: DISCONTINUED | OUTPATIENT
Start: 2017-09-26 | End: 2017-10-01 | Stop reason: HOSPADM

## 2017-09-26 RX ADMIN — SODIUM CHLORIDE: 9 INJECTION, SOLUTION INTRAVENOUS at 22:20

## 2017-09-26 RX ADMIN — SODIUM CHLORIDE 1000 ML: 9 INJECTION, SOLUTION INTRAVENOUS at 20:59

## 2017-09-26 RX ADMIN — SODIUM CHLORIDE 1000 ML: 9 INJECTION, SOLUTION INTRAVENOUS at 18:00

## 2017-09-26 ASSESSMENT — ENCOUNTER SYMPTOMS
ABDOMINAL PAIN: 0
BLURRED VISION: 0
FEVER: 0
HALLUCINATIONS: 1
FOCAL WEAKNESS: 0
BLOOD IN STOOL: 0
BACK PAIN: 1
DIZZINESS: 0
SHORTNESS OF BREATH: 1
DOUBLE VISION: 0
CONSTIPATION: 0
COUGH: 0
PALPITATIONS: 0
HEADACHES: 0
VOMITING: 0
WHEEZING: 0
WEAKNESS: 1
NAUSEA: 0
CHILLS: 0
MEMORY LOSS: 1
SORE THROAT: 0
DIARRHEA: 0
MYALGIAS: 0

## 2017-09-26 ASSESSMENT — PAIN SCALES - GENERAL: PAINLEVEL_OUTOF10: 0

## 2017-09-26 ASSESSMENT — LIFESTYLE VARIABLES
EVER_SMOKED: YES
DO YOU DRINK ALCOHOL: NO

## 2017-09-26 ASSESSMENT — COPD QUESTIONNAIRES
HAVE YOU SMOKED AT LEAST 100 CIGARETTES IN YOUR ENTIRE LIFE: YES
DURING THE PAST 4 WEEKS HOW MUCH DID YOU FEEL SHORT OF BREATH: NONE/LITTLE OF THE TIME
COPD SCREENING SCORE: 5
DO YOU EVER COUGH UP ANY MUCUS OR PHLEGM?: YES, A FEW DAYS A WEEK OR MONTH

## 2017-09-27 ENCOUNTER — APPOINTMENT (OUTPATIENT)
Dept: RADIOLOGY | Facility: MEDICAL CENTER | Age: 67
DRG: 917 | End: 2017-09-27
Attending: INTERNAL MEDICINE
Payer: MEDICARE

## 2017-09-27 PROBLEM — J96.01 ACUTE RESPIRATORY FAILURE WITH HYPOXIA AND HYPERCAPNIA (HCC): Status: ACTIVE | Noted: 2017-09-27

## 2017-09-27 PROBLEM — E87.1 HYPONATREMIA: Status: ACTIVE | Noted: 2017-09-27

## 2017-09-27 PROBLEM — J96.02 ACUTE RESPIRATORY FAILURE WITH HYPOXIA AND HYPERCAPNIA (HCC): Status: ACTIVE | Noted: 2017-09-27

## 2017-09-27 LAB
ALBUMIN SERPL BCP-MCNC: 2.9 G/DL (ref 3.2–4.9)
ALBUMIN/GLOB SERPL: 1.1 G/DL
ALP SERPL-CCNC: 79 U/L (ref 30–99)
ALT SERPL-CCNC: 14 U/L (ref 2–50)
ANION GAP SERPL CALC-SCNC: 11 MMOL/L (ref 0–11.9)
ANION GAP SERPL CALC-SCNC: 7 MMOL/L (ref 0–11.9)
ANION GAP SERPL CALC-SCNC: NORMAL MMOL/L (ref 0–11.9)
APPEARANCE UR: CLEAR
AST SERPL-CCNC: 44 U/L (ref 12–45)
BACTERIA #/AREA URNS HPF: NEGATIVE /HPF
BASE EXCESS BLDA CALC-SCNC: -2 MMOL/L (ref -4–3)
BASOPHILS # BLD AUTO: 0.4 % (ref 0–1.8)
BASOPHILS # BLD: 0.03 K/UL (ref 0–0.12)
BILIRUB SERPL-MCNC: 0.3 MG/DL (ref 0.1–1.5)
BILIRUB UR QL STRIP.AUTO: NEGATIVE
BODY TEMPERATURE: ABNORMAL DEGREES
BUN SERPL-MCNC: 83 MG/DL (ref 8–22)
BUN SERPL-MCNC: 95 MG/DL (ref 8–22)
BUN SERPL-MCNC: NORMAL MG/DL (ref 8–22)
CALCIUM SERPL-MCNC: 7.7 MG/DL (ref 8.5–10.5)
CALCIUM SERPL-MCNC: 8 MG/DL (ref 8.5–10.5)
CALCIUM SERPL-MCNC: NORMAL MG/DL (ref 8.5–10.5)
CHLORIDE SERPL-SCNC: 100 MMOL/L (ref 96–112)
CHLORIDE SERPL-SCNC: 98 MMOL/L (ref 96–112)
CHLORIDE SERPL-SCNC: NORMAL MMOL/L (ref 96–112)
CHLORIDE UR-SCNC: 18 MMOL/L
CO2 BLDA-SCNC: 27 MMOL/L (ref 20–33)
CO2 SERPL-SCNC: 22 MMOL/L (ref 20–33)
CO2 SERPL-SCNC: 24 MMOL/L (ref 20–33)
CO2 SERPL-SCNC: NORMAL MMOL/L (ref 20–33)
COLOR UR: ABNORMAL
CORTIS SERPL-MCNC: 17.1 UG/DL (ref 0–23)
CREAT SERPL-MCNC: 2.55 MG/DL (ref 0.5–1.4)
CREAT SERPL-MCNC: 3.67 MG/DL (ref 0.5–1.4)
CREAT SERPL-MCNC: NORMAL MG/DL (ref 0.5–1.4)
CREAT UR-MCNC: 200.9 MG/DL
EOSINOPHIL # BLD AUTO: 0.24 K/UL (ref 0–0.51)
EOSINOPHIL NFR BLD: 2.8 % (ref 0–6.9)
EPI CELLS #/AREA URNS HPF: NEGATIVE /HPF
ERYTHROCYTE [DISTWIDTH] IN BLOOD BY AUTOMATED COUNT: 46.1 FL (ref 35.9–50)
GFR SERPL CREATININE-BSD FRML MDRD: 16 ML/MIN/1.73 M 2
GFR SERPL CREATININE-BSD FRML MDRD: 25 ML/MIN/1.73 M 2
GFR SERPL CREATININE-BSD FRML MDRD: NORMAL ML/MIN/1.73 M 2
GLOBULIN SER CALC-MCNC: 2.7 G/DL (ref 1.9–3.5)
GLUCOSE BLD-MCNC: 125 MG/DL (ref 65–99)
GLUCOSE BLD-MCNC: 136 MG/DL (ref 65–99)
GLUCOSE BLD-MCNC: 151 MG/DL (ref 65–99)
GLUCOSE BLD-MCNC: 254 MG/DL (ref 65–99)
GLUCOSE BLD-MCNC: 273 MG/DL (ref 65–99)
GLUCOSE SERPL-MCNC: 114 MG/DL (ref 65–99)
GLUCOSE SERPL-MCNC: 193 MG/DL (ref 65–99)
GLUCOSE SERPL-MCNC: NORMAL MG/DL (ref 65–99)
GLUCOSE UR STRIP.AUTO-MCNC: NEGATIVE MG/DL
HCO3 BLDA-SCNC: 25.2 MMOL/L (ref 17–25)
HCT VFR BLD AUTO: 28.4 % (ref 42–52)
HGB BLD-MCNC: 9.3 G/DL (ref 14–18)
HYALINE CASTS #/AREA URNS LPF: ABNORMAL /LPF
IMM GRANULOCYTES # BLD AUTO: 0.04 K/UL (ref 0–0.11)
IMM GRANULOCYTES NFR BLD AUTO: 0.5 % (ref 0–0.9)
KETONES UR STRIP.AUTO-MCNC: NEGATIVE MG/DL
LEUKOCYTE ESTERASE UR QL STRIP.AUTO: NEGATIVE
LYMPHOCYTES # BLD AUTO: 1.6 K/UL (ref 1–4.8)
LYMPHOCYTES NFR BLD: 18.8 % (ref 22–41)
MAGNESIUM SERPL-MCNC: 2.9 MG/DL (ref 1.5–2.5)
MCH RBC QN AUTO: 31.1 PG (ref 27–33)
MCHC RBC AUTO-ENTMCNC: 32.7 G/DL (ref 33.7–35.3)
MCV RBC AUTO: 95 FL (ref 81.4–97.8)
MICRO URNS: ABNORMAL
MONOCYTES # BLD AUTO: 1.06 K/UL (ref 0–0.85)
MONOCYTES NFR BLD AUTO: 12.4 % (ref 0–13.4)
NEUTROPHILS # BLD AUTO: 5.56 K/UL (ref 1.82–7.42)
NEUTROPHILS NFR BLD: 65.1 % (ref 44–72)
NITRITE UR QL STRIP.AUTO: NEGATIVE
NRBC # BLD AUTO: 0 K/UL
NRBC BLD AUTO-RTO: 0 /100 WBC
O2/TOTAL GAS SETTING VFR VENT: 12 %
PCO2 BLDA: 52.6 MMHG (ref 26–37)
PCO2 TEMP ADJ BLDA: 51.1 MMHG (ref 26–37)
PH BLDA: 7.29 [PH] (ref 7.4–7.5)
PH TEMP ADJ BLDA: 7.3 [PH] (ref 7.4–7.5)
PH UR STRIP.AUTO: 5 [PH]
PLATELET # BLD AUTO: 264 K/UL (ref 164–446)
PMV BLD AUTO: 10.5 FL (ref 9–12.9)
PO2 BLDA: 93 MMHG (ref 64–87)
PO2 TEMP ADJ BLDA: 89 MMHG (ref 64–87)
POTASSIUM SERPL-SCNC: 4.5 MMOL/L (ref 3.6–5.5)
POTASSIUM SERPL-SCNC: 4.5 MMOL/L (ref 3.6–5.5)
POTASSIUM SERPL-SCNC: NORMAL MMOL/L (ref 3.6–5.5)
POTASSIUM UR-SCNC: 57.9 MMOL/L
PROCALCITONIN SERPL-MCNC: 0.79 NG/ML
PROT SERPL-MCNC: 5.6 G/DL (ref 6–8.2)
PROT UR QL STRIP: 30 MG/DL
PROT UR-MCNC: 44.8 MG/DL (ref 0–15)
RBC # BLD AUTO: 2.99 M/UL (ref 4.7–6.1)
RBC # URNS HPF: ABNORMAL /HPF
RBC UR QL AUTO: ABNORMAL
SAO2 % BLDA: 96 % (ref 93–99)
SODIUM SERPL-SCNC: 131 MMOL/L (ref 135–145)
SODIUM SERPL-SCNC: 131 MMOL/L (ref 135–145)
SODIUM SERPL-SCNC: NORMAL MMOL/L (ref 135–145)
SODIUM UR-SCNC: 27 MMOL/L
SP GR UR STRIP.AUTO: 1.02
SPECIMEN DRAWN FROM PATIENT: ABNORMAL
UROBILINOGEN UR STRIP.AUTO-MCNC: 0.2 MG/DL
WBC # BLD AUTO: 8.5 K/UL (ref 4.8–10.8)
WBC #/AREA URNS HPF: ABNORMAL /HPF

## 2017-09-27 PROCEDURE — 80048 BASIC METABOLIC PNL TOTAL CA: CPT | Mod: 91

## 2017-09-27 PROCEDURE — A9270 NON-COVERED ITEM OR SERVICE: HCPCS | Performed by: STUDENT IN AN ORGANIZED HEALTH CARE EDUCATION/TRAINING PROGRAM

## 2017-09-27 PROCEDURE — 93970 EXTREMITY STUDY: CPT

## 2017-09-27 PROCEDURE — 700105 HCHG RX REV CODE 258: Performed by: STUDENT IN AN ORGANIZED HEALTH CARE EDUCATION/TRAINING PROGRAM

## 2017-09-27 PROCEDURE — 700102 HCHG RX REV CODE 250 W/ 637 OVERRIDE(OP): Performed by: STUDENT IN AN ORGANIZED HEALTH CARE EDUCATION/TRAINING PROGRAM

## 2017-09-27 PROCEDURE — 80053 COMPREHEN METABOLIC PANEL: CPT

## 2017-09-27 PROCEDURE — 700105 HCHG RX REV CODE 258: Performed by: INTERNAL MEDICINE

## 2017-09-27 PROCEDURE — 700102 HCHG RX REV CODE 250 W/ 637 OVERRIDE(OP): Performed by: INTERNAL MEDICINE

## 2017-09-27 PROCEDURE — A9270 NON-COVERED ITEM OR SERVICE: HCPCS | Performed by: INTERNAL MEDICINE

## 2017-09-27 PROCEDURE — 82962 GLUCOSE BLOOD TEST: CPT | Mod: 91

## 2017-09-27 PROCEDURE — 770022 HCHG ROOM/CARE - ICU (200)

## 2017-09-27 PROCEDURE — 76775 US EXAM ABDO BACK WALL LIM: CPT

## 2017-09-27 PROCEDURE — 700101 HCHG RX REV CODE 250: Performed by: INTERNAL MEDICINE

## 2017-09-27 PROCEDURE — 82803 BLOOD GASES ANY COMBINATION: CPT

## 2017-09-27 PROCEDURE — 700111 HCHG RX REV CODE 636 W/ 250 OVERRIDE (IP): Performed by: STUDENT IN AN ORGANIZED HEALTH CARE EDUCATION/TRAINING PROGRAM

## 2017-09-27 PROCEDURE — 85025 COMPLETE CBC W/AUTO DIFF WBC: CPT

## 2017-09-27 PROCEDURE — 83735 ASSAY OF MAGNESIUM: CPT

## 2017-09-27 PROCEDURE — 82533 TOTAL CORTISOL: CPT

## 2017-09-27 PROCEDURE — 36600 WITHDRAWAL OF ARTERIAL BLOOD: CPT

## 2017-09-27 RX ORDER — SODIUM CHLORIDE 9 MG/ML
500 INJECTION, SOLUTION INTRAVENOUS ONCE
Status: COMPLETED | OUTPATIENT
Start: 2017-09-27 | End: 2017-09-27

## 2017-09-27 RX ORDER — MIDODRINE HYDROCHLORIDE 5 MG/1
5 TABLET ORAL
Status: DISCONTINUED | OUTPATIENT
Start: 2017-09-27 | End: 2017-09-30

## 2017-09-27 RX ORDER — SODIUM CHLORIDE 9 MG/ML
1000 INJECTION, SOLUTION INTRAVENOUS ONCE
Status: DISCONTINUED | OUTPATIENT
Start: 2017-09-27 | End: 2017-09-27

## 2017-09-27 RX ORDER — GABAPENTIN 100 MG/1
100 CAPSULE ORAL 3 TIMES DAILY
Status: DISCONTINUED | OUTPATIENT
Start: 2017-09-27 | End: 2017-09-28

## 2017-09-27 RX ADMIN — SODIUM CHLORIDE 150 ML: 9 INJECTION, SOLUTION INTRAVENOUS at 06:34

## 2017-09-27 RX ADMIN — STANDARDIZED SENNA CONCENTRATE AND DOCUSATE SODIUM 2 TABLET: 8.6; 5 TABLET, FILM COATED ORAL at 20:50

## 2017-09-27 RX ADMIN — SODIUM CHLORIDE: 9 INJECTION, SOLUTION INTRAVENOUS at 18:05

## 2017-09-27 RX ADMIN — NOREPINEPHRINE BITARTRATE 5 MCG/MIN: 1 INJECTION INTRAVENOUS at 02:50

## 2017-09-27 RX ADMIN — HEPARIN SODIUM 5000 UNITS: 5000 INJECTION, SOLUTION INTRAVENOUS; SUBCUTANEOUS at 00:33

## 2017-09-27 RX ADMIN — GABAPENTIN 100 MG: 100 CAPSULE ORAL at 20:50

## 2017-09-27 RX ADMIN — MIDODRINE HYDROCHLORIDE 5 MG: 5 TABLET ORAL at 17:30

## 2017-09-27 RX ADMIN — INSULIN LISPRO 3 UNITS: 100 INJECTION, SOLUTION INTRAVENOUS; SUBCUTANEOUS at 13:00

## 2017-09-27 RX ADMIN — SODIUM CHLORIDE: 9 INJECTION, SOLUTION INTRAVENOUS at 14:00

## 2017-09-27 RX ADMIN — SODIUM CHLORIDE 500 ML: 9 INJECTION, SOLUTION INTRAVENOUS at 14:00

## 2017-09-27 RX ADMIN — SODIUM CHLORIDE: 9 INJECTION, SOLUTION INTRAVENOUS at 21:00

## 2017-09-27 RX ADMIN — STANDARDIZED SENNA CONCENTRATE AND DOCUSATE SODIUM 2 TABLET: 8.6; 5 TABLET, FILM COATED ORAL at 00:34

## 2017-09-27 RX ADMIN — INSULIN LISPRO 3 UNITS: 100 INJECTION, SOLUTION INTRAVENOUS; SUBCUTANEOUS at 20:50

## 2017-09-27 RX ADMIN — MIDODRINE HYDROCHLORIDE 5 MG: 5 TABLET ORAL at 11:09

## 2017-09-27 RX ADMIN — HEPARIN SODIUM 5000 UNITS: 5000 INJECTION, SOLUTION INTRAVENOUS; SUBCUTANEOUS at 17:56

## 2017-09-27 RX ADMIN — SODIUM CHLORIDE 500 ML: 9 INJECTION, SOLUTION INTRAVENOUS at 08:00

## 2017-09-27 ASSESSMENT — PAIN SCALES - GENERAL
PAINLEVEL_OUTOF10: 0

## 2017-09-27 ASSESSMENT — ENCOUNTER SYMPTOMS
FEVER: 0
HEADACHES: 0
NAUSEA: 0
ABDOMINAL PAIN: 0
SHORTNESS OF BREATH: 0
CHILLS: 0
COUGH: 0

## 2017-09-27 ASSESSMENT — PATIENT HEALTH QUESTIONNAIRE - PHQ9
SUM OF ALL RESPONSES TO PHQ QUESTIONS 1-9: 0
2. FEELING DOWN, DEPRESSED, IRRITABLE, OR HOPELESS: NOT AT ALL
1. LITTLE INTEREST OR PLEASURE IN DOING THINGS: NOT AT ALL
SUM OF ALL RESPONSES TO PHQ9 QUESTIONS 1 AND 2: 0

## 2017-09-27 ASSESSMENT — COPD QUESTIONNAIRES
HAVE YOU SMOKED AT LEAST 100 CIGARETTES IN YOUR ENTIRE LIFE: YES
COPD SCREENING SCORE: 5
DURING THE PAST 4 WEEKS HOW MUCH DID YOU FEEL SHORT OF BREATH: NONE/LITTLE OF THE TIME
DO YOU EVER COUGH UP ANY MUCUS OR PHLEGM?: YES, A FEW DAYS A WEEK OR MONTH

## 2017-09-27 ASSESSMENT — LIFESTYLE VARIABLES: DO YOU DRINK ALCOHOL: NO

## 2017-09-27 NOTE — ASSESSMENT & PLAN NOTE
- Elevated BUN and Cr on admission   - S/p varghese cath placement  - US renal showed no evidence of hydronephrosis (9/27)  - FeNa 0.4% c/w pre-renal injury   - Continue IV fluids and add midodrine   - Marked improvement

## 2017-09-27 NOTE — ED PROVIDER NOTES
ED Provider Note    Scribed for Heydi Balderas M.D. by Selena Fernandez. 9/26/2017, 5:59 PM.    Primary care provider: Tori Rai M.D.  Means of arrival: Ambulance  History obtained from: Patient  History limited by: None    CHIEF COMPLAINT  Chief Complaint   Patient presents with   • ALOC     Onset last night.  Family called EMS last night because pt was mildly disoriented.  Told at that time to not take any more pain meds, as it was assumed he was possibly confused because of pain meds.  This afternoon at approx 1530, pt became ALOC.  Called EMS.     • Hypoxemia     69% RA on arrival, combative with EMS. 94% on 4L.  Metation improved after O2.  GCS 15 on arrival to ER.  Recent lumbar surgery with Dr. Sharma 9/18/17.  Incision clean, dry, intact.         HPI  Zhang Cheung is a 67 y.o. male who presents to the Emergency Department with altered mental status onset yesterday. The patient is POD #8 s/p lumbar fusion and laminectomy with Dr. Sharma on 9/18/17. Per daughter, the patient was difficult to rouse from sleep around 2:00am last night and was exhibiting slurred speech. She called EMS and was told his symptoms were likely due to his pain medications. He continued to be drowsy this morning and had some generalized weakness, but was able to perform his usual daily activities until the afternoon. His neurosurgeon was contacted and the patient was instructed to reduce his pain medication dose. At 3:30 PM today, he then became more altered and was more difficult to rouse from sleep. When EMS arrived on scene, he was confused and combative. The patient had a SpO2 of 69% on room air when EMS arrived, but he improved to 94% after being placed on 4L oxygen. He denies abdominal pain, chest pain, fever, headache, vision changes, leg swelling, focal weakness, or other symptoms. The patient has been taking two extended release morphine tablets (15mg), three Flexeril 10mg tablets and four Percocet 10-325mg tablets per day,  last dose at 10:00 PM last night. His daughter notes he did require a dose of Narcan while in the hospital following his surgery, due to low oxygen saturation. He has chronic back pain, left shoulder pain, and right knee pain.     REVIEW OF SYSTEMS  Pertinent positives include altered mental status, slurred speech, confusion, generalized weakness, chronic knee pain, chronic back pain, chronic shoulder pain. Pertinent negatives include no abdominal pain, chest pain, fever, headache, vision changes, leg swelling, focal weakness. All other systems reviewed and negative. C.    PAST MEDICAL HISTORY   has a past medical history of Arrhythmia; Arthritis; Diabetes; High cholesterol; Hyperlipidemia; Hypertension (2014); Pain (04/11/14); Restless leg; Sleep apnea; Snoring; and Unspecified urinary incontinence.    SURGICAL HISTORY   has a past surgical history that includes lumbar laminectomy diskectomy (5/2/2012); lumbar laminectomy diskectomy (2/19/2014); trans urethral resection prostate (3/27/2014); lumbar laminectomy diskectomy (4/22/2014); recovery (1/13/2016); other orthopedic surgery (2003/2007/2008); other orthopedic surgery; other orthopedic surgery (03/12); other orthopedic surgery (02/14); other; other; other (2014); extreme lateral interbody fusion (Left, 9/18/2017); lumbar fusion posterior (9/18/2017); and lumbar laminectomy diskectomy (9/18/2017).    SOCIAL HISTORY  Social History   Substance Use Topics   • Smoking status: Former Smoker     Packs/day: 0.75     Years: 10.00     Types: Cigarettes     Quit date: 4/24/2008   • Smokeless tobacco: Never Used   • Alcohol use No      History   Drug Use No       FAMILY HISTORY  Family History   Problem Relation Age of Onset   • Sleep Apnea Neg Hx        CURRENT MEDICATIONS  Home Medications     Reviewed by Danielle Soriano (Pharmacy Tech) on 09/26/17 at 1922  Med List Status: Complete   Medication Last Dose Status   amlodipine (NORVASC) 5 MG TABS 9/26/2017 Active  "  benazepril (LOTENSIN) 40 MG tablet 9/25/2017 Active   Carvedilol Phosphate (COREG CR) 40 MG CP24 9/26/2017 Active   Cholecalciferol (VITAMIN D3) 5000 UNITS Cap 9/25/2017 Active   cyclobenzaprine (FLEXERIL) 10 MG Tab 9/25/2017 Active   docusate sodium 100 MG Cap 9/25/2017 Active   gabapentin (NEURONTIN) 600 MG tablet 9/26/2017 Active   gemfibrozil (LOPID) 600 MG TABS 9/25/2017 Active   indapamide (LOZOL) 2.5 MG TABS 9/25/2017 Active   insulin aspart protamine-insulin aspart (NOVOLOG MIX 70/30) (70-30) 100 UNIT/ML injection 9/26/2017 Active   metformin (GLUCOPHAGE) 1000 MG tablet 9/26/2017 Active   morphine SR (MS CONTIN) 15 MG Tab CR tablet 9/25/2017 Active   Multiple Vitamins-Minerals (MULTIVITAMIN PO) 9/25/2017 Active   Omega-3 Fatty Acids (FISH OIL TRIPLE STRENGTH) 1400 MG Cap 9/25/2017 Active   oxycodone-acetaminophen (PERCOCET-10)  MG Tab 9/25/2017 Active   ropinirole (REQUIP) 0.5 MG Tab unknown Active   tamsulosin (FLOMAX) 0.4 MG capsule 9/25/2017 Active                ALLERGIES  Allergies   Allergen Reactions   • Pollen Extract      Local pollen, primarily Rab[place] Brush       PHYSICAL EXAM  Vital Signs: BP (!) 89/47   Pulse 65   Temp 37.1 °C (98.8 °F)   Resp 18   Ht 1.676 m (5' 6\")   Wt 104.3 kg (230 lb)   SpO2 94%   BMI 37.12 kg/m²   Constitutional: Alert, no acute distress  HENT: Normocephalic, atraumatic, moist mucus membranes  Eyes: Pinpoint pupils, normal conjunctiva, non-icteric  Neck: Supple, normal range of motion, no stridor  Cardiovascular: Regular rate, no cyanosis. Exam limited by body habitus.   Pulmonary: No respiratory distress, normal work of breathing, no accessory muscle usage, No wheezing.  Pulmonary exam limited by body habitus.  Abdomen: Soft, non tender, no peritoneal signs, bowel sounds are present.   Skin: 2 well healing incisions with mild surrounding bruising, no drainage, normal post-operative appearance.   Back: No pain with active range of motion  Musculoskeletal: " "Normal range of motion in all extremities, no swelling or deformity noted  Neurologic: Alert, oriented, answering questions appropriately, does state at times that he feels as though the \"walls are moving\"  Psychiatric: Normal and appropriate mood and affect    DIAGNOSTIC STUDIES/PROCEDURES:    LABS  Labs Reviewed   CBC WITH DIFFERENTIAL - Abnormal; Notable for the following:        Result Value    WBC 10.9 (*)     RBC 3.66 (*)     Hemoglobin 11.2 (*)     Hematocrit 33.8 (*)     MCHC 33.1 (*)     Neutrophils-Polys 76.90 (*)     Lymphocytes 12.90 (*)     Neutrophils (Absolute) 8.37 (*)     Monos (Absolute) 0.97 (*)     All other components within normal limits   COMP METABOLIC PANEL - Abnormal; Notable for the following:     Sodium 128 (*)     Chloride 89 (*)     Glucose 116 (*)     Bun 91 (*)     Creatinine 3.86 (*)     AST(SGOT) 53 (*)     Alkaline Phosphatase 103 (*)     All other components within normal limits   ESTIMATED GFR - Abnormal; Notable for the following:     GFR If  19 (*)     GFR If Non  16 (*)     All other components within normal limits   LACTIC ACID   BLOOD CULTURE    Narrative:     Per Hospital Policy: Only change Specimen Src: to \"Line\" if  specified by physician order.   BLOOD CULTURE    Narrative:     Per Hospital Policy: Only change Specimen Src: to \"Line\" if  specified by physician order.   LACTIC ACID   URINALYSIS   URINE CULTURE(NEW)     All labs reviewed by me.    Radiology results revealed:   DX-CHEST-PORTABLE (1 VIEW)   Final Result      No acute cardiac or pulmonary abnormalities are identified.           COURSE & MEDICAL DECISION MAKING  Pertinent Labs & Imaging studies reviewed. (See chart for details)    Differential diagnoses include but are not limited to: Electrolyte abnormality, narcotic side effect, anemia, hypercapnia    5:59 PM - Patient seen and examined at bedside. I explained to the patient I will order labs and a chest x-ray to evaluate. " Ordered DX-chest, lactic acid, CBC, CMP, blood culture, urinalysis, urine culture to evaluate his symptoms. The patient will be resuscitated with 1L NS IV for hypotension.    6:55 PM Paged [facility].     7:06 PM Patient's case was discussed with [facility]. They agree to admit the patient.     7:12 PM Recheck: Patient is resting comfortably. I updated him on his results and explained that he will be admitted for further care and evaluation. He understands and agrees.     Decision Making:  This is a 67 y.o. year old male who presents with intermittent episodes of altered mental status and difficulty waking from sleep. Additionally he has had low room air oxygen saturations recorded by EMS. He is not usually on chronic pain medications, has been taking high doses of pain medication since discharge after a recent laminectomy. Over a 12 hour period yesterday he took 30 mg of extended release morphine, 30 mg of Flexeril, and 40 mg of oxycodone. On physical exam he has pinpoint pupils, he is hypotensive with blood pressure 89/47. He is mentating well but states every now and then that he feels as though the room is moving or the walls are melting.    On laboratory evaluation he has a elevated white blood count to 10.9, previous is 5.3 on 8/30/17. Sodium is low at 128, previously 139 on 9/19/17. Additionally he does appear to have new onset renal insufficiency with creatinine of 3.86, previously 0.93 on 9/19/17. Lactic acid is normal at 1.7. Chest x-ray is negative for acute process.    He is treated with fluid bolus on arrival to the emergency department. On reassessment, hypotension has improved significantly, current blood pressure is 108/56. On further discussion with the patient and his family it sounds as though he may have had some decreased oral intake while taking the pain medications. Suspect his new onset renal insufficiency is prerenal. He does appear to be fluid responsive regarding his  blood pressure. He has had no history of chest pain, no chest pain in the emergency department, doubt cardiac etiology. He has had no fevers, less concerning for SIRS, sepsis or other infectious etiology. Do not believe antibiotics are indicated at this time.    Plan at this time is for admission to [facility] internal medicine service for continued fluid resuscitation, correction of electrolyte imbalance and supportive care for renal insufficiency as well as correction of creatinine. Additionally he will require continuous pulse oximetry as he may still have some opiate medications in his system given his history of sleep apnea. Case discussed with [facility] internal medicine physician who kindly agrees to admit the patient.    DISPOSITION:  Patient will be admitted to [facility] Family Medicine in guarded condition.     FINAL IMPRESSION  1. Acute renal insufficiency    2. Hyponatremia    3. Hypoxia          Selena BOUDREAUX (Scribe), am scribing for, and in the presence of, Heydi Balderas M.D..    Electronically signed by: Selena Fernandez (Scribe), 9/26/2017    IHeydi M.D. personally performed the services described in this documentation, as scribed by Selena Fernandez in my presence, and it is both accurate and complete.    The note accurately reflects work and decisions made by me.  Heydi Balderas  9/26/2017  7:38 PM

## 2017-09-27 NOTE — ED NOTES
Pt had the urge to void, stood at side of bed, but was unable to void. States he's had this problem since his recent spinal surgery on the 18th. Daughter at bedside. Pt's BP remains low, 80s/40s. Second liter bolus infusing. SpO2 94% on 6L NC. Daughter at bedside.

## 2017-09-27 NOTE — SENIOR ADMIT NOTE
Senior Admit Note    Zhang Cheung 67 y.o. male presented to the ED with complaint of ALOC. Patient underwent laminectomy on 09/18/17 for lumbar fusion. Patient daughter reported that patient was doing fine after his surgery but today he was unable to arouse and she did sternal rub and it took her 10 minutes to arouse patient. Patient has been taking pain medication after his surgery and his last pain medication use was 10:00 PM last night. He required Narcan when he was in hospital for surgery as he was on pain medications. Patient has been having genearlized weakness and he is using walker now. When EMS came to see him he was combative and his oxygen saturation was 69%. Patient daughter called her brother and then patient came to the ED.     ED course:     Patient was found to be hypotensive and she didn't receive Narcan either en route to hospital or in ED. After receiving IVF patietn blood pressure increased to 108/56. His blood pressure on lower side and even after receiving 2L of IVF bolus his blood pressure still in 80s. He also developed MEREDITH on this presentation and his MEREDITH didn't respond even after IVF. I spoke with ICU resident about this patient and he agreed to admit this patient to ICU as patient SBP is still in 70s.

## 2017-09-27 NOTE — H&P
Internal Medicine Admitting History and Physical    Name Zhang Cheung     1950   Age/Sex 67 y.o. male   MRN [id number]   Code Status DNR     After 5PM or if no immediate response to page, please call for cross-coverage  Attending/Team: Dr. Owusu/Bill See Patient List for primary contact information  Call [phone] to page    1st Call - Day Intern (R1):   Dr. Bowden 2nd Call - Day Sr. Resident (R2/R3):   Dr. Godfrey        Chief Complaint:  Altered Mental Status, hypoxemia     HPI:    Mr. Cheung is a pleasant 67 year old male with PMHx of HTN, IDDM, and recent lumbar fusion and laminectomy (17) who presents with 2 days of altered mental status. Patient is alert and oriented during interview however daughter provided much of the story that has occurred over the last 2 days. Daughter states that patient was discharged Friday and was doing well, yesterday evening  she attempted to wake him at 10:30pm to assess his needs and she was unable to wake him for 10 minutes, and when he awoke he was incoherent, EMS was called and assessed the patient and thought it was secondary to his pain medications, they were instructed to not use any more pain meds until he cleared. Per daughter patient improved over night with Q1H checks and slept till 10:30 this morning . He woke up had a small breakfast and sat in his recliner and fell back asleep. She attempted to wake him again at 3:30 and patient would again not wake up, she attempted sternal rub which finally woke the patient after 20-30 minutes but again he was confused and he did not recognize his daughter or his son and was speaking incoherently. EMS arrived and he was saturating at 69% on RA and Altered, patient was then brought to the hospital.   During interview patient is alert and oriented however he does states he doesn't remember much of what has occurred over the last several days. He denies confusion but feels fatigued and weak, he also  has a pronounced intermittent jerking like- tremor of bilateral upper extremities and his lower jaw, both him and daughter state this is new. His last does of pain medications was last night at 10:00 pm. Per daughter he was on a high amount of opiate medications s/p surgery.     Patient denies any headaches, lightheadedness, chest pain, SOB, abdominal pain, numbness or tingling. He does endorse a very dry mouth but denies thirst. He has not urinated since earlier this morning.     It was mentioned that during his prior hospitalization for the back surgery, patient did require 1 dose of Narcan due to opiate induced hypoxia, daughter was not present but states he was difficult to arouse and was hypoxic during that episode.     On admission, patient found to have MEREDITH and was given (2) 1L bolus of NS in ED, his blood pressure has been tenuous ranging in the 70-80's systolic. He has also required 6L of Oxygen, he does not use home Oxygen.   Patient and daughter deny any history of chronic kidney disease and states that he has been consuming food and water since discharge however over the last 24 hours his consumption has decreased.     Review of Systems   Constitutional: Negative for chills, fever and malaise/fatigue.   HENT: Negative for congestion and sore throat.    Eyes: Negative for blurred vision and double vision.   Respiratory: Positive for shortness of breath. Negative for cough and wheezing.    Cardiovascular: Negative for chest pain, palpitations and leg swelling.   Gastrointestinal: Negative for abdominal pain, blood in stool, constipation, diarrhea, melena, nausea and vomiting.   Genitourinary: Negative for dysuria, frequency and urgency.   Musculoskeletal: Positive for back pain. Negative for myalgias.   Skin: Negative for rash.   Neurological: Positive for weakness. Negative for dizziness, focal weakness and headaches.   Psychiatric/Behavioral: Positive for hallucinations and memory loss.             Past  "Medical History:   Past Medical History:   Diagnosis Date   • Pain 04/11/14    back pain increases with activity   • Hypertension 2014    well  controlled   • Arrhythmia     A Flutter   • Arthritis     generalized   • Diabetes     IDDM   • High cholesterol    • Hyperlipidemia    • Restless leg    • Sleep apnea     CPAP.  8/30/17 - not using CPAP due to back pain \"unable to lay on back\".   • Snoring    • Unspecified urinary incontinence     \" enlarged prostate\"       Past Surgical History:  Past Surgical History:   Procedure Laterality Date   • EXTREME LATERAL INTERBODY FUSION Left 9/18/2017    Procedure: EXTREME LATERAL INTERBODY FUSION L2-3 W/PLATE;  Surgeon: Yemi Sharma M.D.;  Location: SURGERY [facility];  Service: Neurosurgery   • LUMBAR FUSION POSTERIOR  9/18/2017    Procedure: LUMBAR FUSION POSTERIOR L2-3 INSTRUMENTED;  Surgeon: Yemi Sharma M.D.;  Location: SURGERY [facility];  Service: Neurosurgery   • LUMBAR LAMINECTOMY DISKECTOMY  9/18/2017    Procedure: LUMBAR LAMINECTOMY DISKECTOMY;  Surgeon: Yemi Sharma M.D.;  Location: SURGERY [facility];  Service: Neurosurgery   • RECOVERY  1/13/2016    Procedure: IR Deep bone biopsy L2-L3 with general anesthesia-DAYANA;  Surgeon: Sag[facility] Surgery;  [place] [facility] [place]  Service:    • LUMBAR LAMINECTOMY DISKECTOMY  4/22/2014    Performed by Mazin Long M.D. at SURGERY [facility]   • TRANS URETHRAL RESECTION PROSTATE  3/27/2014    Performed by Kyle Guzman M.D. at [facility]   • LUMBAR LAMINECTOMY DISKECTOMY  2/19/2014    Performed by Mazin Long M.D. at [facility]   • OTHER ORTHOPEDIC SURGERY  02/14    L2-L3 Laminectomy   • OTHER  2014    transection supraorbital nerve   • LUMBAR LAMINECTOMY DISKECTOMY  5/2/2012    Performed by MAZIN LONG at [facility]   • OTHER ORTHOPEDIC SURGERY  03/12    L3-L5 Laminectomy   • OTHER      laser eye susrgery   • OTHER      " carpectomy   • OTHER ORTHOPEDIC SURGERY  2003/2007/2008    left shoulder replacement/with corrections   • OTHER ORTHOPEDIC SURGERY      right knee replacement/manipulation       Current Outpatient Medications:  Home Medications     Reviewed by Danielle Soriano (Pharmacy Tech) on 09/26/17 at 1922  Med List Status: Complete   Medication Last Dose Status   amlodipine (NORVASC) 5 MG TABS 9/26/2017 Active   benazepril (LOTENSIN) 40 MG tablet 9/25/2017 Active   Carvedilol Phosphate (COREG CR) 40 MG CP24 9/26/2017 Active   Cholecalciferol (VITAMIN D3) 5000 UNITS Cap 9/25/2017 Active   cyclobenzaprine (FLEXERIL) 10 MG Tab 9/25/2017 Active   docusate sodium 100 MG Cap 9/25/2017 Active   gabapentin (NEURONTIN) 600 MG tablet 9/26/2017 Active   gemfibrozil (LOPID) 600 MG TABS 9/25/2017 Active   indapamide (LOZOL) 2.5 MG TABS 9/25/2017 Active   insulin aspart protamine-insulin aspart (NOVOLOG MIX 70/30) (70-30) 100 UNIT/ML injection 9/26/2017 Active   metformin (GLUCOPHAGE) 1000 MG tablet 9/26/2017 Active   morphine SR (MS CONTIN) 15 MG Tab CR tablet 9/25/2017 Active   Multiple Vitamins-Minerals (MULTIVITAMIN PO) 9/25/2017 Active   Omega-3 Fatty Acids (FISH OIL TRIPLE STRENGTH) 1400 MG Cap 9/25/2017 Active   oxycodone-acetaminophen (PERCOCET-10)  MG Tab 9/25/2017 Active   ropinirole (REQUIP) 0.5 MG Tab unknown Active   tamsulosin (FLOMAX) 0.4 MG capsule 9/25/2017 Active                Medication Allergy/Sensitivities:  Allergies   Allergen Reactions   • Pollen Extract      Local pollen, primarily [place]         Family History:  Family History   Problem Relation Age of Onset   • Sleep Apnea Neg Hx        Social History:  Social History     Social History   • Marital status: Single     Spouse name: N/A   • Number of children: N/A   • Years of education: N/A     Occupational History   • Not on file.     Social History Main Topics   • Smoking status: Former Smoker     Packs/day: 0.75     Years: 10.00     Types:  "Cigarettes     Quit date: 4/24/2008   • Smokeless tobacco: Never Used   • Alcohol use No   • Drug use: No   • Sexual activity: Not on file     Other Topics Concern   • Not on file     Social History Narrative   • No narrative on file     Living situation: Patient lives with daughter, wife passed several years back from a pulmonary embolism post surgery.   PCP : Tori Rai M.D.        Physical Exam     Vitals:    09/26/17 2120 09/26/17 2128 09/26/17 2220 09/26/17 2238   BP:       Pulse:   63 64   Resp:   20    Temp:       SpO2: 90% 95% 94% 94%   Weight:       Height:         Body mass index is 37.12 kg/m².  BP (!) 89/47   Pulse 64   Temp 37.1 °C (98.8 °F)   Resp 20   Ht 1.676 m (5' 6\")   Wt 104.3 kg (230 lb)   SpO2 94%   BMI 37.12 kg/m²   O2 therapy: Pulse Oximetry: 94 %, O2 (LPM): 6, O2 Delivery: Nasal Cannula    Physical Exam   Constitutional: He is oriented to person, place, and time and well-developed, well-nourished, and in no distress. No distress.   HENT:   Head: Normocephalic and atraumatic.   Mouth/Throat: Oropharynx is clear and moist. No oropharyngeal exudate.   Eyes: Conjunctivae and EOM are normal. Pupils are equal, round, and reactive to light. No scleral icterus.   Neck: Neck supple. No JVD present.   Large neck circumferance   Cardiovascular: Normal rate, regular rhythm, normal heart sounds and intact distal pulses.  Exam reveals no gallop and no friction rub.    No murmur heard.  Pulmonary/Chest: No respiratory distress. He has no wheezes.   Increased respiratory effort, decreased breath sounds in bilateral lower bases    Abdominal: Soft. Bowel sounds are normal. He exhibits no distension. There is no tenderness. There is no rebound.   Musculoskeletal: Normal range of motion. He exhibits no edema or tenderness.   Incisions present on left lower back and midline back, no surrounding erythema or drainage, no fluctuance    Lymphadenopathy:     He has no cervical adenopathy. "   Neurological: He is alert and oriented to person, place, and time. No cranial nerve deficit. He exhibits normal muscle tone.   + intermittent jerking tremors of upper extremities and jaw   Skin: Skin is warm and dry. No rash noted. He is not diaphoretic. No erythema. No pallor.   Psychiatric:   Poor memory, pt is alert and oriented but per daughter still not acting at his baseline             Data Review       Old Records Request:   Deferred  Current Records review and summary: Completed    Lab Data Review:  Recent Results (from the past 24 hour(s))   Lactic acid (lactate)    Collection Time: 09/26/17  5:50 PM   Result Value Ref Range    Lactic Acid 1.7 0.5 - 2.0 mmol/L   CBC WITH DIFFERENTIAL    Collection Time: 09/26/17  5:50 PM   Result Value Ref Range    WBC 10.9 (H) 4.8 - 10.8 K/uL    RBC 3.66 (L) 4.70 - 6.10 M/uL    Hemoglobin 11.2 (L) 14.0 - 18.0 g/dL    Hematocrit 33.8 (L) 42.0 - 52.0 %    MCV 92.3 81.4 - 97.8 fL    MCH 30.6 27.0 - 33.0 pg    MCHC 33.1 (L) 33.7 - 35.3 g/dL    RDW 45.1 35.9 - 50.0 fL    Platelet Count 327 164 - 446 K/uL    MPV 10.5 9.0 - 12.9 fL    Neutrophils-Polys 76.90 (H) 44.00 - 72.00 %    Lymphocytes 12.90 (L) 22.00 - 41.00 %    Monocytes 8.90 0.00 - 13.40 %    Eosinophils 0.50 0.00 - 6.90 %    Basophils 0.30 0.00 - 1.80 %    Immature Granulocytes 0.50 0.00 - 0.90 %    Nucleated RBC 0.00 /100 WBC    Neutrophils (Absolute) 8.37 (H) 1.82 - 7.42 K/uL    Lymphs (Absolute) 1.40 1.00 - 4.80 K/uL    Monos (Absolute) 0.97 (H) 0.00 - 0.85 K/uL    Eos (Absolute) 0.05 0.00 - 0.51 K/uL    Baso (Absolute) 0.03 0.00 - 0.12 K/uL    Immature Granulocytes (abs) 0.05 0.00 - 0.11 K/uL    NRBC (Absolute) 0.00 K/uL   COMP METABOLIC PANEL    Collection Time: 09/26/17  5:50 PM   Result Value Ref Range    Sodium 128 (L) 135 - 145 mmol/L    Potassium 5.0 3.6 - 5.5 mmol/L    Chloride 89 (L) 96 - 112 mmol/L    Co2 31 20 - 33 mmol/L    Anion Gap 8.0 0.0 - 11.9    Glucose 116 (H) 65 - 99 mg/dL    Bun 91 (HH) 8 -  22 mg/dL    Creatinine 3.86 (H) 0.50 - 1.40 mg/dL    Calcium 9.3 8.5 - 10.5 mg/dL    AST(SGOT) 53 (H) 12 - 45 U/L    ALT(SGPT) 14 2 - 50 U/L    Alkaline Phosphatase 103 (H) 30 - 99 U/L    Total Bilirubin 0.4 0.1 - 1.5 mg/dL    Albumin 3.6 3.2 - 4.9 g/dL    Total Protein 6.9 6.0 - 8.2 g/dL    Globulin 3.3 1.9 - 3.5 g/dL    A-G Ratio 1.1 g/dL   ESTIMATED GFR    Collection Time: 09/26/17  5:50 PM   Result Value Ref Range    GFR If  19 (A) >60 mL/min/1.73 m 2    GFR If Non  16 (A) >60 mL/min/1.73 m 2   Lactic acid (lactate)    Collection Time: 09/26/17  9:58 PM   Result Value Ref Range    Lactic Acid 1.2 0.5 - 2.0 mmol/L   OSMOLALITY SERUM    Collection Time: 09/26/17  9:58 PM   Result Value Ref Range    Osmolality Serum 306 (H) 278 - 298 mOsm/kg H2O   Magnesium    Collection Time: 09/26/17  9:58 PM   Result Value Ref Range    Magnesium 3.1 (H) 1.5 - 2.5 mg/dL   COMP METABOLIC PANEL    Collection Time: 09/26/17  9:58 PM   Result Value Ref Range    Sodium 128 (L) 135 - 145 mmol/L    Potassium 4.9 3.6 - 5.5 mmol/L    Chloride 93 (L) 96 - 112 mmol/L    Co2 25 20 - 33 mmol/L    Anion Gap 10.0 0.0 - 11.9    Glucose 113 (H) 65 - 99 mg/dL    Bun 89 (HH) 8 - 22 mg/dL    Creatinine 3.71 (H) 0.50 - 1.40 mg/dL    Calcium 8.3 (L) 8.5 - 10.5 mg/dL    AST(SGOT) 48 (H) 12 - 45 U/L    ALT(SGPT) 13 2 - 50 U/L    Alkaline Phosphatase 85 30 - 99 U/L    Total Bilirubin 0.3 0.1 - 1.5 mg/dL    Albumin 3.1 (L) 3.2 - 4.9 g/dL    Total Protein 5.9 (L) 6.0 - 8.2 g/dL    Globulin 2.8 1.9 - 3.5 g/dL    A-G Ratio 1.1 g/dL   ESTIMATED GFR    Collection Time: 09/26/17  9:58 PM   Result Value Ref Range    GFR If  20 (A) >60 mL/min/1.73 m 2    GFR If Non  16 (A) >60 mL/min/1.73 m 2       Imaging/Procedures Review:    ndependant Imaging Review: Completed  DX-CHEST-PORTABLE (1 VIEW)   Final Result      No acute cardiac or pulmonary abnormalities are identified.      US-RENAL    (Results  Pending)        EK17  EKG Independant Review: Completed  QTc:428, HR: 73, SINUS RHYTHM   RSR' IN V1 OR V2, RIGHT VCD OR RVH   ABNORMAL T, CONSIDER ISCHEMIA, LATERAL LEADS    (X) Records reviewed and summarized in current documentation             Assessment/Plan     No new Assessment & Plan notes have been filed under this hospital service since the last note was generated.  Service: Hospital Medicine    67 year old male with PMHx of HTN, atrial fibrillation s/p ablation, DM2, BENEDICT and recent lumbar fusion with laminectomy who presents with altered mental status, MEREDITH, hypoxia and hypotension concerning for dehydration vs sepsis vs DVT vs opiate overdose    # AMS  - may be secondary to high dose opiate medications vs uremic encephalopathy vs dehydration vs infection   - A&O x 3 on admission  - held all opiate medications   - BUN elevated at 93, 2L NS IVF decreased to 89  - SIRS 2/4 (tachypnea 20, WBC 10.9 with neurtrophilic predominance) no tachycardia, afebrile  - procalcitonin pending   - Blood Cx pending  - LA 1.2  - surgical incisions without obvious signs of infection   - cont. To monitor     # MEREDITH  - no hx of CKD, hx of MEREDITH in  A.fib with RVR improved to baseline   - BUN/Cr 91/3.86 (ratio >20 suggesting pre-renal cause)  - (2) 1L bolus of NS given in ED, IVF 150ml/hr   - repeat BUN/Cr 89/3.71  - no UO since am   - bladder scan pending   - renal US pending   - consider nephrology consult if no improvement with IVF     # Hypoxia  - pt requiring 6L O2, no home O2 use  - no tachycardia   - wells score 1.5 with low risk, however due to unexplained hypoxia D-dimer ordered and pending   - no opiate medications in 24 hr   - EKG pending   - CXR: no cardiac or pulmonary abnormalities   - RT protocol ordered     # Hypotension   - systolic BP 70-80 depsite 2L bolus fluid resuscitation   - likely secondary to hypovolemia, pt appears dehydrated   - continue IVF   - consider repeat EKG to assess for cardiac  dysfunction   - pt will be admitted to ICU     # Hyponatremia  - likely secondary to MEREDITH vs SIADH 2/2 recent surgery   - Na 128  - serum osmolality elevated at 306   - UA pending  - Urine lytes pending   - Cont. IVF    # Hx of Sleep Apnea  - may be contributing to hypoxia  - non-compliant with CPAP 2/2 having to sleep on stomach for back issues   - RT protocol ordered, consider use of CPAP during admission of clinically indicated     # DM2  -  on admission   - Ha1c 8  - held home metformin  - ISS, hypoglycemic protocol   - POC finger sticks     # HTN  - hypotension on admission  - hold all home BP meds  - restart as clinically indicated     # Recent Back Sx  - no signs of infection   - held all pain medications including muscle relaxer due to AMS and hypotension   - no urinary or fecal incontinence  - strength equal in both LEs      Secondary to patients hypotension despite fluid resuscitation and unknown cause hypoxia, patient was felt to be unstable for the floor, he will be transferred to the ICU for further care. [facility] ICU resident is aware and excepting of patient.     Anticipated Hospital stay:  >2 midnights      Quality Measures    Reviewed items::  EKG reviewed, Labs reviewed, Medications reviewed and Radiology images reviewed  Ac catheter::  No Ac  DVT prophylaxis pharmacological::  Heparin

## 2017-09-27 NOTE — PROGRESS NOTES
Pulmonary Critical Care Progress Note        DOS:  9/27/2017    Chief Complaint: dyspnea, hypotension    History of Present Illness: 67 y.o. Male, recent lumbar laminectomy/fusion 9/18, DM, HTN, BENEDICT (non-compliant with CPAP), admitted 9/26 with ALOC, MEREDITH, hypoxia and hypotension    ROS:  Respiratory: positive shortness of breath, Cardiac: negative palpitations, GI: negative nausea.  All other systems negative.    Interval Events:  24 hour interval history reviewed    - A/o x 3, somnolent today   - int tremor, no sz   - SR/SB   - Norepinephrine 5 mcg/min   - 5L, 92-93%   - non-complinant with home cpap   - NS and bolus   - heparin SC   - no abx, WBC 8, BCx's neg       PFSH:  No change.    Respiratory:     Pulse Oximetry: 97 %  Chest Tube Drains:          Exam: diminished breath sounds moderate  ImagingAvailable data reviewed   Recent Labs      09/27/17   0831   ISTATAPH  7.287*   ISTATAPCO2  52.6*   ISTATAPO2  93*   ISTATATCO2  27   [id number]  96   ISTATARTHCO3  25.2*   ISTATARTBE  -2   ISTATTEMP  97.4 F   ISTATFIO2  12   ISTATSPEC  Arterial   ISTATAPHTC  7.297*   [id number]  89*       HemoDynamics:  Pulse: 61, Heart Rate (Monitored): 62  Blood Pressure : (!) 89/47, NIBP: (!) 82/46       Exam: regular rate and rhythm  Imaging: Available data reviewed        Neuro:  GCS Total Brian Coma Score: 15       Exam: no focal deficits noted  Imaging: Available data reviewed    Fluids:  Intake/Output       09/25/17 0700 - 09/26/17 0659 (Not Admitted) 09/26/17 0700 - 09/27/17 0659 09/27/17 0700 - 09/28/17 0659      1910-9256 0913-9351 Total 9241-0700 7104-9752 Total 4954-4805 2405-2237 Total       Intake    I.V.  --  -- --  --  29.8 29.8  687.6  -- 687.6    Norepinephrine Volume -- -- -- -- 29.8 29.8 37.6 -- 37.6    IV Volume (IV Intake) -- -- -- -- -- -- 650 -- 650    Total Intake -- -- -- -- 29.8 29.8 687.6 -- 687.6       Output    Urine  --  -- --  --  1550 1550  --  -- --    Indwelling Cathether -- -- -- -- 1550 1550 --  -- --    Stool  --  -- --  --  -- --  --  -- --    Number of Times Stooled -- -- -- -- 0 x 0 x -- -- --    Total Output -- -- -- -- 1550 1550 -- -- --       Net I/O     -- -- -- -- -1520.2 -1520.2 687.6 -- 687.6        Weight: 104.2 kg (229 lb 11.5 oz)  Recent Labs      17   SODIUM  128*  128*  131*   POTASSIUM  5.0  4.9  4.5   CHLORIDE  89*  93*  98   CO2  31  25  22   BUN  91*  89*  95*   CREATININE  3.86*  3.71*  3.67*   MAGNESIUM   --   3.1*   --    CALCIUM  9.3  8.3*  7.7*       GI/Nutrition:  Exam: abdomen is soft and non-tender  Imaging: Available data reviewed  taking PO  Liver Function  Recent Labs      17   ALTSGPT  14  13  14   ASTSGOT  53*  48*  44   ALKPHOSPHAT  103*  85  79   TBILIRUBIN  0.4  0.3  0.3   GLUCOSE  116*  113*  114*       Heme:  Recent Labs      17   RBC  3.66*  2.99*   HEMOGLOBIN  11.2*  9.3*   HEMATOCRIT  33.8*  28.4*   PLATELETCT  327  264       Infectious Disease:  Temp  Av.8 °C (98.3 °F)  Min: 36.6 °C (97.9 °F)  Max: 37.1 °C (98.8 °F)  Micro: reviewed  Recent Labs      17   WBC  10.9*   --   8.5   NEUTSPOLYS  76.90*   --   65.10   LYMPHOCYTES  12.90*   --   18.80*   MONOCYTES  8.90   --   12.40   EOSINOPHILS  0.50   --   2.80   BASOPHILS  0.30   --   0.40   ASTSGOT  53*  48*  44   ALTSGPT  14  13  14   ALKPHOSPHAT  103*  85  79   TBILIRUBIN  0.4  0.3  0.3     Current Facility-Administered Medications   Medication Dose Frequency Provider Last Rate Last Dose   • norepinephrine (LEVOPHED) 8 mg in  mL Infusion  5 mcg/min Continuous Leobardo Andrade M.D. 9.4 mL/hr at 17 0250 5 mcg/min at 17 0250   • NS (BOLUS) infusion 500 mL  500 mL Once Ziyad Zamora M.D.       • senna-docusate (PERICOLACE or SENOKOT S) 8.6-50 MG per tablet 2 Tab  2 Tab BID Korina Stephenson M.D.   2 Tab at 17 0034     And   • polyethylene glycol/lytes (MIRALAX) PACKET 1 Packet  1 Packet QDAY PRN Korina Stephenson M.D.        And   • magnesium hydroxide (MILK OF MAGNESIA) suspension 30 mL  30 mL QDAY PRN Korina Stephenson M.D.        And   • bisacodyl (DULCOLAX) suppository 10 mg  10 mg QDAY PRN Korina Stephenson M.D.       • Respiratory Care per Protocol   Continuous RT Korina Stephenson M.D.       • NS infusion   Continuous Ziyad Zamora M.D. 175 mL/hr at 09/27/17 0800     • heparin injection 5,000 Units  5,000 Units Q8HRS Korina Stephenson M.D.   Stopped at 09/27/17 0600   • acetaminophen (TYLENOL) tablet 650 mg  650 mg Q6HRS PRN Korina Stephenson M.D.       • insulin lispro (HUMALOG) injection 1-6 Units  1-6 Units 4X/DAY ACHS Korina Stephenosn M.D.   Stopped at 09/26/17 2100   • glucose 4 g chewable tablet 16 g  16 g Q15 MIN PRN Korina Stephenson M.D.        And   • dextrose 50% (D50W) injection 25 mL  25 mL Q15 MIN PRN Korina Stephenson M.D.         Last reviewed on 9/27/2017  1:20 AM by Jackie Santos R.N.    Quality  Measures:  Core Measures & Quality Metrics    Assessment/Plan:  Acute Hypoxic and hypercapnic Respiratory Failure    - likely multifactorial with mild hypoventilation, encephalopathy, sleep apnea   - monitor closely in ICU today; Patient remains a risk for further respiratory deterioration and possible need for intubation   - Discussed with RT in detail   - BiPAP as needed  Metabolic Encephalopathy - related to numerous meds in setting of luisa  Hypotension   - Currently under epinephrine infusion, low-dose   - Doubt sepsis   - No evidence for cardiogenic shock   - Suspect a combination of narcotic effect and intravascular volume depletion   - We'll check cortisol level and supplement with stress dose if needed   - Related renal be added today  Acute Kidney Injury    - pre-renal +   - 2ndary to urinary retention/obstruction  Obstructive uropathy secondary to urinary retention   Hyponatremia   Recent L2-3  fusion 9/18/2017  Sleep Apnea not on cpap - likely exacerbating above issues  Hx of A flutter s/p eps and ablation 4/25/2017  IDDM  Chronic Htn   - Hold antihypertensives due to hypotension now, resume slowly  DLD  Chronic Pain    I have seen and examined the patient today.  I have reviewed the medical record, laboratory data, imaging and all relevant studies.  I have discussed the plan of care with the Internal Medicine Resident and agree with the note and plan as documented.       Discussed patient condition and risk of morbidity and/or mortality with RN, RT and QA team.    The patient remains critically ill.  Critical care time = 34 minutes in directly providing and coordinating critical care and extensive data review.  No time overlap and excludes procedures.

## 2017-09-27 NOTE — ASSESSMENT & PLAN NOTE
- Acute hypoxemic hypercapnic respiratory failure   - Most likely due narcotic use and BENEDICT  - No evidence of lung disease or pulm edema   - Negative DVT on US, low suspicion for PE   - Pending echo

## 2017-09-27 NOTE — PROGRESS NOTES
[facility] Internal Medicine Interval Note    Name Zhang Cheung     1950   Age/Sex 67 y.o. male   MRN [id number]   Code Status DNR     After 5PM or if no immediate response to page, please call for cross-coverage  Attending/Team: JARRETT Monreal/ Dr. Bland  Call [phone] to page   1st Call - Day Intern (R1):    2nd Call - Day Sr. Resident (R2/R3):   Sera       Chief complaint/ reason for interval visit (Primary Diagnosis)   Patient was admitted for AMS after recent laminectomy on 2017 and narcotic use, with  found to have SUMMER, hypotension and MEREDITH    Interval Problem Daily Status Update    - : Still on oxygen, ABG showed respiratory acidosis, will check and cortisol and add midodrine, continue fluid therapy and titrate pressors     Review of Systems   Constitutional: Negative for chills and fever.   Respiratory: Negative for cough and shortness of breath.    Cardiovascular: Negative for chest pain and leg swelling.   Gastrointestinal: Negative for abdominal pain and nausea.   Neurological: Negative for headaches.       Consultants/Specialty  PMA    Disposition  ICU for ARF type II, MEREDITH and hypotension     Quality Measures  Quality-Core Measures        Physical Exam       Vitals:    17 0200 17 0300 17 0400 17 0830   BP:       Pulse: 60 61     Resp: 13 (!) 25     Temp:   36.6 °C (97.9 °F)    SpO2: 96% 90%  97%   Weight:       Height:         Body mass index is 37.08 kg/m². Weight: 104.2 kg (229 lb 11.5 oz)  Oxygen Therapy:  Pulse Oximetry: 97 %, O2 (LPM): 6, O2 Delivery: Nasal Cannula    Physical Exam   Constitutional: He is oriented to person, place, and time and well-developed, well-nourished, and in no distress. No distress.   HENT:   Head: Normocephalic and atraumatic.   Eyes: Conjunctivae are normal.   Neck: Neck supple.   Cardiovascular: Normal rate and regular rhythm.    Murmur heard.  Pulmonary/Chest: Effort normal. No stridor. He has wheezes.    End expiratory wheezes    Abdominal: Soft. Bowel sounds are normal. There is no tenderness.   Musculoskeletal: He exhibits no edema.   Neurological: He is oriented to person, place, and time.   Somnolent, responds to vocal stimuli   No focal deficits    Skin: Skin is dry. He is not diaphoretic.         Lab Data Review:      9/27/2017  10:39 AM    Recent Labs      09/26/17 1750 09/26/17 2158 09/27/17 0424   SODIUM  128*  128*  131*   POTASSIUM  5.0  4.9  4.5   CHLORIDE  89*  93*  98   CO2  31  25  22   BUN  91*  89*  95*   CREATININE  3.86*  3.71*  3.67*   MAGNESIUM   --   3.1*   --    CALCIUM  9.3  8.3*  7.7*       Recent Labs      09/26/17 1750 09/26/17 2158 09/27/17   0424   ALTSGPT  14  13  14   ASTSGOT  53*  48*  44   ALKPHOSPHAT  103*  85  79   TBILIRUBIN  0.4  0.3  0.3   GLUCOSE  116*  113*  114*       Recent Labs      09/26/17 1750 09/27/17   0424   RBC  3.66*  2.99*   HEMOGLOBIN  11.2*  9.3*   HEMATOCRIT  33.8*  28.4*   PLATELETCT  327  264       Recent Labs      09/26/17 1750 09/26/17 2158 09/27/17 0424   WBC  10.9*   --   8.5   NEUTSPOLYS  76.90*   --   65.10   LYMPHOCYTES  12.90*   --   18.80*   MONOCYTES  8.90   --   12.40   EOSINOPHILS  0.50   --   2.80   BASOPHILS  0.30   --   0.40   ASTSGOT  53*  48*  44   ALTSGPT  14  13  14   ALKPHOSPHAT  103*  85  79   TBILIRUBIN  0.4  0.3  0.3           Assessment/Plan     * Acute respiratory failure with hypoxia and hypercapnia (CMS-HCC)- (present on admission)   Assessment & Plan    - Acute hypoxemic hypercapnic respiratory failure   - Most likely due narcotic use and BENEDICT  - Hx of AFR during last admission requiring narcan use   - No evidence of lung disease or pulm edema   - No need for BiPAP at this point   - Continue Oxygen therapy, will discuss CPAP with patient         Hypotension   Assessment & Plan    - BP of 60s/40s on admission  - Unclear etiology, suspect hypovolemia and meds related   - Normal echo back in 3/2017, no  evidence of hypervolemia   - Check cortisol level  - Hold home coreg, amlodipine and narcotics   - Continue IV fluids and add midodrine   - Titrate levophed (peripheral line) to MAP>55  - Will place CVC if continues to require pressors         MEREDITH (acute kidney injury) (CMS-[facility])   Assessment & Plan    - Elevated BUN and Cr on admission   - S/p varghese cath placement  - US renal showed no evidence of hydronephrosis (9/27)  - FeNa 0.4% c/w pre-renal injury   - Continue IV fluids and add midodrine   - Monitor BMP        Hyponatremia- (present on admission)   Assessment & Plan    - Suspect hypovolemic hyponatremia   - Continue IV fluids   - Monitor BMP          Lumbar stenosis- (present on admission)   Assessment & Plan    - S/p laminectomy (L2-5) and fusion (9/18/2017)  - Clean surgical scar on exam   - Hold narcotic meds due AMS         Benign prostatic hyperplasia- (present on admission)   Assessment & Plan    - S/p varghese placement   - Adequate urine output   - Flomax after BP improves         Type 2 DM- (present on admission)   Assessment & Plan    - Last HbA1c of 8 (8/2017)  - Hold metformin due MEREDITH  - Continue SS and hypoglycemia protocol

## 2017-09-27 NOTE — PROGRESS NOTES
"Date of Service: 9/26/2017    [facility] Gold Team Attending Note  (see full Resident note dictated in EPIC)    Blood pressure (!) 89/47, pulse 64, temperature 36.8 °C (98.2 °F), resp. rate 20, height 1.676 m (5' 6\"), weight 104.2 kg (229 lb 11.5 oz), SpO2 91 %.  Gen: mildly lethargic but maintains lucid conversation without needing redirection  Heent: nc, at, anicteric, pupils 2mm b/l  Cvs: rrr  Resp: diminished throughout  Abdo: s, nt, obese  Ext: no edema  Neuro: mildly lethargic, oriented x 3, moves all extremities    Assessment:  - Acute Hypoxic Respiratory Failure - likely multifactorial with mild hypoventilation, encephalopathy, sleep apnea  - Metabolic Encephalopathy - related to numerous meds in setting of luisa  - Acute Kidney Injury - 2ndary to urinary retention/obstruction  - Obstructive uropathy secondary to urinary retention   - Hypotension - not clinically sepsis, suspect related to meds + luisa  - Hyponatremia   - Recent L2-3 fusion 9/18/2017  - Sleep Apnea not on cpap - likely exacerbating above issues  - Hx of A flutter s/p eps and ablation 4/25/2017  - IDDM  - Chronic Htn  - DLD  - Chronic Pain    Plan:  - varghese in place  - hold anti htn meds  - hold neurontin for now (restart at low dose once clinically improved to keep from withdraw)  - hold oral hypoglycemics  - no morphine  - ok for oxycodone once more awake for prn pain management  - flomax  - ivf  - glucose control with ssi  - may need central line for pressors if no improvement with ivf      I have seen and examined the patient today.  I have reviewed the medical record, laboratory data, imaging and all relevant studies.  I have discussed the plan of care with the Internal Medicine Resident and agree with the note and plan as documented.         Total critical care time, not including billable procedures 40 minutes.    "

## 2017-09-27 NOTE — ED NOTES
Pt transported to [facility] ICU with 2 RNs via [facility] on monitor. Reported to Jackie NGUYEN.

## 2017-09-27 NOTE — ED NOTES
Med rec completed per pt's family at bedside  Allergies reviewed  No oral ABX in last 30 days, but received IV ABX during admit for surgery this month  Per family, pt was instructed by his MD to reduce some of his medications starting today  MS Contin from BID to QD  Flexeril from TID to BID  Percocet from 1-2 tabs Q4H PRN to 1 tab Q4H PRN  Also was taken off of aspirin 81mg, tramadol and norco after surgery on 9/12/17

## 2017-09-27 NOTE — ASSESSMENT & PLAN NOTE
- S/p varghese placement on admission (retainin >500 ml)  - Adequate urine output   - Flomax after BP improves   - Voiding trials prior to discharge

## 2017-09-27 NOTE — ED NOTES
Zhang Cheung 67 y.o. male   BIB [facility] from home.    Chief Complaint   Patient presents with   • ALOC     Onset last night.  Family called EMS last night because pt was mildly disoriented.  Told at that time to not take any more pain meds, as it was assumed he was possibly confused because of pain meds.  This afternoon at approx 1530, pt became ALOC.  Called EMS.     • Hypoxemia     69% RA on arrival, combative with EMS. 94% on 4L.  Metation improved after O2.  GCS 15 on arrival to ER.  Recent lumbar surgery with Dr. Sharma 9/18/17.  Incision clean, dry, intact.        Pt answers A&O questions appropriately, but occasionally speaks nonsensically.  Sepsis score 4.  PIV placed.  Blood and BC x1 drawn and sent to lab.

## 2017-09-27 NOTE — ASSESSMENT & PLAN NOTE
- S/p laminectomy (L2-5) and fusion (9/18/2017)  - Clean surgical scar on exam   - Hold narcotic meds due AMS   - Continue gabapentin

## 2017-09-27 NOTE — ED NOTES
1000ml drained from Ac cath. Pt A&Ox4 at this time, calm. Daughter remains at bedside. Awaiting transport to ICU.

## 2017-09-27 NOTE — ASSESSMENT & PLAN NOTE
- Last HbA1c of 8 (8/2017)  - Hold metformin due MEREDITH  - Continue SS and hypoglycemia protocol

## 2017-09-27 NOTE — ED NOTES
"Repeat blood work sent to lab. Rv'wd POC with Pt and daughter. Pt with odd \"loopy\" affect per daughter. Pt very shaky.   "

## 2017-09-27 NOTE — ED NOTES
[facility] team at bedside again. Ac cath placed without difficulty, draining darwin urine. Urine sent to lab. Updated Pt and daughter on POC.

## 2017-09-27 NOTE — ASSESSMENT & PLAN NOTE
- BP of 60s/40s on admission  - Unclear etiology, suspect hypovolemia and meds related   - Normal echo back in 3/2017, no evidence of hypervolemia   - Normal cortisol level   - Hold home coreg, amlodipine, thiazide and narcotics   - Decrease IV fluids   - Taper off midodrine as tolerated

## 2017-09-27 NOTE — ED NOTES
Dr. Stephenson updated on Pt's VS and notified that Pt stood at side of bed but was unable to void. She will come assess Pt at bedside.

## 2017-09-28 ENCOUNTER — APPOINTMENT (OUTPATIENT)
Dept: RADIOLOGY | Facility: MEDICAL CENTER | Age: 67
DRG: 917 | End: 2017-09-28
Attending: INTERNAL MEDICINE
Payer: MEDICARE

## 2017-09-28 PROBLEM — R41.0 DELIRIUM: Status: ACTIVE | Noted: 2017-09-28

## 2017-09-28 LAB
ALBUMIN SERPL BCP-MCNC: 3 G/DL (ref 3.2–4.9)
ALBUMIN/GLOB SERPL: 1.1 G/DL
ALP SERPL-CCNC: 84 U/L (ref 30–99)
ALT SERPL-CCNC: 11 U/L (ref 2–50)
ANION GAP SERPL CALC-SCNC: 7 MMOL/L (ref 0–11.9)
AST SERPL-CCNC: 35 U/L (ref 12–45)
BASOPHILS # BLD AUTO: 0.4 % (ref 0–1.8)
BASOPHILS # BLD: 0.03 K/UL (ref 0–0.12)
BILIRUB SERPL-MCNC: 0.2 MG/DL (ref 0.1–1.5)
BUN SERPL-MCNC: 60 MG/DL (ref 8–22)
CALCIUM SERPL-MCNC: 7.9 MG/DL (ref 8.5–10.5)
CHLORIDE SERPL-SCNC: 110 MMOL/L (ref 96–112)
CO2 SERPL-SCNC: 21 MMOL/L (ref 20–33)
CREAT SERPL-MCNC: 1.26 MG/DL (ref 0.5–1.4)
EOSINOPHIL # BLD AUTO: 0.28 K/UL (ref 0–0.51)
EOSINOPHIL NFR BLD: 3.9 % (ref 0–6.9)
ERYTHROCYTE [DISTWIDTH] IN BLOOD BY AUTOMATED COUNT: 47.9 FL (ref 35.9–50)
GFR SERPL CREATININE-BSD FRML MDRD: 57 ML/MIN/1.73 M 2
GLOBULIN SER CALC-MCNC: 2.7 G/DL (ref 1.9–3.5)
GLUCOSE BLD-MCNC: 124 MG/DL (ref 65–99)
GLUCOSE BLD-MCNC: 138 MG/DL (ref 65–99)
GLUCOSE BLD-MCNC: 150 MG/DL (ref 65–99)
GLUCOSE BLD-MCNC: 212 MG/DL (ref 65–99)
GLUCOSE SERPL-MCNC: 116 MG/DL (ref 65–99)
HCT VFR BLD AUTO: 30.1 % (ref 42–52)
HGB BLD-MCNC: 9.5 G/DL (ref 14–18)
IMM GRANULOCYTES # BLD AUTO: 0.05 K/UL (ref 0–0.11)
IMM GRANULOCYTES NFR BLD AUTO: 0.7 % (ref 0–0.9)
LV EJECT FRACT  99904: 65
LV EJECT FRACT MOD 2C 99903: 78.75
LV EJECT FRACT MOD 4C 99902: 65.67
LV EJECT FRACT MOD BP 99901: 70.38
LYMPHOCYTES # BLD AUTO: 1.08 K/UL (ref 1–4.8)
LYMPHOCYTES NFR BLD: 15.2 % (ref 22–41)
MCH RBC QN AUTO: 30.6 PG (ref 27–33)
MCHC RBC AUTO-ENTMCNC: 31.6 G/DL (ref 33.7–35.3)
MCV RBC AUTO: 97.1 FL (ref 81.4–97.8)
MONOCYTES # BLD AUTO: 0.83 K/UL (ref 0–0.85)
MONOCYTES NFR BLD AUTO: 11.7 % (ref 0–13.4)
NEUTROPHILS # BLD AUTO: 4.83 K/UL (ref 1.82–7.42)
NEUTROPHILS NFR BLD: 68.1 % (ref 44–72)
NRBC # BLD AUTO: 0 K/UL
NRBC BLD AUTO-RTO: 0 /100 WBC
PLATELET # BLD AUTO: 226 K/UL (ref 164–446)
PMV BLD AUTO: 10.4 FL (ref 9–12.9)
POTASSIUM SERPL-SCNC: 4.7 MMOL/L (ref 3.6–5.5)
PROT SERPL-MCNC: 5.7 G/DL (ref 6–8.2)
RBC # BLD AUTO: 3.1 M/UL (ref 4.7–6.1)
SODIUM SERPL-SCNC: 138 MMOL/L (ref 135–145)
WBC # BLD AUTO: 7.1 K/UL (ref 4.8–10.8)

## 2017-09-28 PROCEDURE — 80053 COMPREHEN METABOLIC PANEL: CPT

## 2017-09-28 PROCEDURE — 700111 HCHG RX REV CODE 636 W/ 250 OVERRIDE (IP): Performed by: INTERNAL MEDICINE

## 2017-09-28 PROCEDURE — 85025 COMPLETE CBC W/AUTO DIFF WBC: CPT

## 2017-09-28 PROCEDURE — A4357 BEDSIDE DRAINAGE BAG: HCPCS | Performed by: INTERNAL MEDICINE

## 2017-09-28 PROCEDURE — 93306 TTE W/DOPPLER COMPLETE: CPT | Mod: 26 | Performed by: INTERNAL MEDICINE

## 2017-09-28 PROCEDURE — 700111 HCHG RX REV CODE 636 W/ 250 OVERRIDE (IP): Performed by: STUDENT IN AN ORGANIZED HEALTH CARE EDUCATION/TRAINING PROGRAM

## 2017-09-28 PROCEDURE — 302307 BAG SINGLE USE 2000ML COLLECT: Performed by: INTERNAL MEDICINE

## 2017-09-28 PROCEDURE — 700105 HCHG RX REV CODE 258: Performed by: INTERNAL MEDICINE

## 2017-09-28 PROCEDURE — 93306 TTE W/DOPPLER COMPLETE: CPT

## 2017-09-28 PROCEDURE — A9270 NON-COVERED ITEM OR SERVICE: HCPCS | Performed by: INTERNAL MEDICINE

## 2017-09-28 PROCEDURE — 700102 HCHG RX REV CODE 250 W/ 637 OVERRIDE(OP): Performed by: INTERNAL MEDICINE

## 2017-09-28 PROCEDURE — 82962 GLUCOSE BLOOD TEST: CPT

## 2017-09-28 PROCEDURE — 770006 HCHG ROOM/CARE - MED/SURG/GYN SEMI*

## 2017-09-28 RX ORDER — GABAPENTIN 100 MG/1
200 CAPSULE ORAL 3 TIMES DAILY
Status: DISCONTINUED | OUTPATIENT
Start: 2017-09-28 | End: 2017-09-28

## 2017-09-28 RX ORDER — MORPHINE SULFATE 4 MG/ML
2 INJECTION, SOLUTION INTRAMUSCULAR; INTRAVENOUS ONCE
Status: COMPLETED | OUTPATIENT
Start: 2017-09-28 | End: 2017-09-28

## 2017-09-28 RX ORDER — TAMSULOSIN HYDROCHLORIDE 0.4 MG/1
0.4 CAPSULE ORAL
Status: DISCONTINUED | OUTPATIENT
Start: 2017-09-28 | End: 2017-10-01 | Stop reason: HOSPADM

## 2017-09-28 RX ORDER — MORPHINE SULFATE 15 MG/1
15 TABLET, FILM COATED, EXTENDED RELEASE ORAL EVERY 12 HOURS
Status: DISCONTINUED | OUTPATIENT
Start: 2017-09-28 | End: 2017-10-01 | Stop reason: HOSPADM

## 2017-09-28 RX ORDER — GABAPENTIN 300 MG/1
300 CAPSULE ORAL 3 TIMES DAILY
Status: DISCONTINUED | OUTPATIENT
Start: 2017-09-28 | End: 2017-10-01 | Stop reason: HOSPADM

## 2017-09-28 RX ADMIN — GABAPENTIN 100 MG: 100 CAPSULE ORAL at 09:09

## 2017-09-28 RX ADMIN — GABAPENTIN 300 MG: 300 CAPSULE ORAL at 20:24

## 2017-09-28 RX ADMIN — HYDROCORTISONE SODIUM SUCCINATE 50 MG: 100 INJECTION, POWDER, FOR SOLUTION INTRAMUSCULAR; INTRAVENOUS at 17:34

## 2017-09-28 RX ADMIN — MIDODRINE HYDROCHLORIDE 5 MG: 5 TABLET ORAL at 09:09

## 2017-09-28 RX ADMIN — MIDODRINE HYDROCHLORIDE 5 MG: 5 TABLET ORAL at 17:34

## 2017-09-28 RX ADMIN — HEPARIN SODIUM 5000 UNITS: 5000 INJECTION, SOLUTION INTRAVENOUS; SUBCUTANEOUS at 16:12

## 2017-09-28 RX ADMIN — MORPHINE SULFATE 2 MG: 4 INJECTION INTRAVENOUS at 11:20

## 2017-09-28 RX ADMIN — GABAPENTIN 300 MG: 300 CAPSULE ORAL at 16:12

## 2017-09-28 RX ADMIN — MIDODRINE HYDROCHLORIDE 5 MG: 5 TABLET ORAL at 11:21

## 2017-09-28 RX ADMIN — MORPHINE SULFATE 15 MG: 15 TABLET, EXTENDED RELEASE ORAL at 16:12

## 2017-09-28 RX ADMIN — HYDROCORTISONE SODIUM SUCCINATE 50 MG: 100 INJECTION, POWDER, FOR SOLUTION INTRAMUSCULAR; INTRAVENOUS at 11:19

## 2017-09-28 RX ADMIN — HEPARIN SODIUM 5000 UNITS: 5000 INJECTION, SOLUTION INTRAVENOUS; SUBCUTANEOUS at 05:54

## 2017-09-28 RX ADMIN — TAMSULOSIN HYDROCHLORIDE 0.4 MG: 0.4 CAPSULE ORAL at 16:12

## 2017-09-28 RX ADMIN — SODIUM CHLORIDE: 9 INJECTION, SOLUTION INTRAVENOUS at 03:11

## 2017-09-28 RX ADMIN — MORPHINE SULFATE 2 MG: 4 INJECTION INTRAVENOUS at 14:03

## 2017-09-28 RX ADMIN — HEPARIN SODIUM 5000 UNITS: 5000 INJECTION, SOLUTION INTRAVENOUS; SUBCUTANEOUS at 20:25

## 2017-09-28 ASSESSMENT — PAIN SCALES - GENERAL
PAINLEVEL_OUTOF10: 0
PAINLEVEL_OUTOF10: 5
PAINLEVEL_OUTOF10: 0
PAINLEVEL_OUTOF10: 0
PAINLEVEL_OUTOF10: 8
PAINLEVEL_OUTOF10: 8
PAINLEVEL_OUTOF10: 9
PAINLEVEL_OUTOF10: 0
PAINLEVEL_OUTOF10: 0
PAINLEVEL_OUTOF10: 5
PAINLEVEL_OUTOF10: 5

## 2017-09-28 ASSESSMENT — ENCOUNTER SYMPTOMS
ABDOMINAL PAIN: 0
FEVER: 0
COUGH: 0
SHORTNESS OF BREATH: 0
HEADACHES: 0
NAUSEA: 0
CHILLS: 0

## 2017-09-28 NOTE — CARE PLAN
Problem: Safety  Goal: Free from accidental injury    Intervention: Assess for Fall Risk  Fall Risk has been assessed and documented.      Problem: Pain  Goal: Alleviation of Pain or a reduction in pain to the patient's comfort goal  Pt grimaces in pain with movement and turns but refuses pain medication at this time.

## 2017-09-28 NOTE — ASSESSMENT & PLAN NOTE
- Suspect ICU delirium   - Frequent orientation  - Maintain day/night cycle   - Avoid benzo and consider Seroquel

## 2017-09-28 NOTE — PROGRESS NOTES
Pulmonary Critical Care Progress Note        DOS:  9/28/2017    Chief Complaint: dyspnea, hypotension    History of Present Illness: 67 y.o. Male, recent lumbar laminectomy/fusion 9/18, DM, HTN, BENEDICT (non-compliant with CPAP), admitted 9/26 with ALOC, MEREDITH, hypoxia and hypotension    ROS:  Respiratory: positive shortness of breath, Cardiac: negative palpitations, GI: negative nausea.  All other systems negative.    Interval Events:  24 hour interval history reviewed    - remains off norepi this am   - alexandre PO diet   - some bloody o/p varghese   - , decrease IVF   - 3 lpm n/c   - midodrine 5 TID   - MEREDITH improving   - add stress dose steroid x 48 hours   - bring home CPAP   - sutures   - OK to move out of ICU    Yesterday's Events   - A/o x 3, somnolent today   - int tremor, no sz   - SR/SB   - Norepinephrine 5 mcg/min   - 5L, 92-93%   - non-complinant with home cpap   - NS and bolus   - heparin SC   - no abx, WBC 8, BCx's neg       PFSH:  No change.    Respiratory:     Pulse Oximetry: 96 %    Exam: diminished breath sounds moderate  ImagingAvailable data reviewed   Recent Labs      09/27/17   0831   ISTATAPH  7.287*   ISTATAPCO2  52.6*   ISTATAPO2  93*   ISTATATCO2  27   MDVQAQG1LAT  96   ISTATARTHCO3  25.2*   ISTATARTBE  -2   ISTATTEMP  97.4 F   ISTATFIO2  12   ISTATSPEC  Arterial   ISTATAPHTC  7.297*   AOAATWIJ7PM  89*       HemoDynamics:  Pulse: 66, Heart Rate (Monitored): 65  NIBP: 105/61       Exam: regular rate and rhythm  Imaging: Available data reviewed        Neuro:  GCS Total [place] Coma Score: 15       Exam: no focal deficits noted  Imaging: Available data reviewed    Fluids:  Intake/Output       09/26/17 0700 - 09/27/17 0659 09/27/17 0700 - 09/28/17 0659 09/28/17 0700 - 09/29/17 0659      [id number] 9610-5001 Total 0869-8649 1207-8757 Total 7498-9238 0574-0203 Total       Intake    P.O.  --  -- --  600  290 890  --  -- --    P.O. -- -- -- 600 290 890 -- -- --    I.V.  --  29.8 29.8  3091.4  2250 5341.4   --  -- --    Norepinephrine Volume -- 29.8 29.8 41.4 -- 41.4 -- -- --    IV Volume (IV Intake) -- -- -- 2050 2250 4300 -- -- --    IV Volume (Normal Saline Bolus) -- -- -- 1000 -- 1000 -- -- --    Total Intake -- 29.8 29.8 3691.4 2540 6231.4 -- -- --       Output    Urine  --  1550 1550  2200  3450 5650  --  -- --    Indwelling Cathether -- 1550 1550 2200 3450 5650 -- -- --    Stool  --  -- --  --  -- --  --  -- --    Number of Times Stooled -- 0 x 0 x -- 0 x 0 x -- -- --    Total Output -- 1550 1550 2200 3450 5650 -- -- --       Net I/O     -- -1520.2 -1520.2 1491.4 -910 581.4 -- -- --           Recent Labs      174  17   1300  17   0602   SODIUM  128*  RR  131*  131*  138   POTASSIUM  4.9  RR  4.5  4.5  4.7   CHLORIDE  93*  RR  98  100  110   CO2  25  RR  22  24  21   BUN  89*  RR  95*  83*  60*   CREATININE  3.71*  RR  3.67*  2.55*  1.26   MAGNESIUM  3.1*  2.9*   --    --    CALCIUM  8.3*  RR  7.7*  8.0*  7.9*       GI/Nutrition:  Exam: abdomen is soft and non-tender  Imaging: Available data reviewed  taking PO  Liver Function  Recent Labs      17   1300  17   0602   ALTSGPT  13  14   --   11   ASTSGOT  48*  44   --   35   ALKPHOSPHAT  85  79   --   84   TBILIRUBIN  0.3  0.3   --   0.2   GLUCOSE  113*  RR  114*  193*  116*       Heme:  Recent Labs      17   17517   0424  17   0602   RBC  3.66*  2.99*  3.10*   HEMOGLOBIN  11.2*  9.3*  9.5*   HEMATOCRIT  33.8*  28.4*  30.1*   PLATELETCT  327  264  226       Infectious Disease:  Temp  Av °C (98.6 °F)  Min: 36.6 °C (97.8 °F)  Max: 37.2 °C (99 °F)  Micro: reviewed  Recent Labs      17   1750  17   2158  17   0424  17   0602   WBC  10.9*   --   8.5  7.1   NEUTSPOLYS  76.90*   --   65.10  68.10   LYMPHOCYTES  12.90*   --   18.80*  15.20*   MONOCYTES  8.90   --   12.40  11.70   EOSINOPHILS  0.50   --   2.80  3.90   BASOPHILS   0.30   --   0.40  0.40   ASTSGOT  53*  48*  44  35   ALTSGPT  14  13  14  11   ALKPHOSPHAT  103*  85  79  84   TBILIRUBIN  0.4  0.3  0.3  0.2     Current Facility-Administered Medications   Medication Dose Frequency Provider Last Rate Last Dose   • midodrine (PROAMATINE) tablet 5 mg  5 mg TID WITH MEALS Conor Bland M.D.   5 mg at 09/27/17 1730   • norepinephrine (LEVOPHED) 8 mg in  mL Infusion  0-30 mcg/min Continuous Ziyad Zamora M.D.   Stopped at 09/27/17 1100   • gabapentin (NEURONTIN) capsule 100 mg  100 mg TID Ziyad Zamora M.D.   100 mg at 09/27/17 2050   • senna-docusate (PERICOLACE or SENOKOT S) 8.6-50 MG per tablet 2 Tab  2 Tab BID Korina Stephenson M.D.   2 Tab at 09/27/17 2050    And   • polyethylene glycol/lytes (MIRALAX) PACKET 1 Packet  1 Packet QDAY PRN Korina Stephenson M.D.        And   • magnesium hydroxide (MILK OF MAGNESIA) suspension 30 mL  30 mL QDAY PRN Korina Stephenson M.D.        And   • bisacodyl (DULCOLAX) suppository 10 mg  10 mg QDAY PRN Korina Stephenson M.D.       • Respiratory Care per Protocol   Continuous RT Korina Stephenson M.D.       • NS infusion   Continuous Ziyad Zamora M.D. 175 mL/hr at 09/28/17 0311     • heparin injection 5,000 Units  5,000 Units Q8HRS Korina Stephenson M.D.   5,000 Units at 09/28/17 0554   • acetaminophen (TYLENOL) tablet 650 mg  650 mg Q6HRS PRN Korina Stephenson M.D.       • insulin lispro (HUMALOG) injection 1-6 Units  1-6 Units 4X/DAY ACHS Korina Stephenson M.D.   Stopped at 09/28/17 0604   • glucose 4 g chewable tablet 16 g  16 g Q15 MIN PRN Korina Stephenson M.D.        And   • dextrose 50% (D50W) injection 25 mL  25 mL Q15 MIN PRN Korina Stephenson M.D.         Last reviewed on 9/27/2017  1:20 AM by Jackie Santos R.N.    Quality  Measures:  Medications reviewed, Labs reviewed and Radiology images reviewed                      Assessment/Plan:  Acute Hypoxic and hypercapnic Respiratory Failure    - likely  multifactorial with mild hypoventilation, encephalopathy, sleep apnea   - now improving; weaning down FiO2   - Discussed with RT in detail   - BiPAP as needed  Metabolic Encephalopathy - related to numerous meds in setting of luisa   - resolved  Hypotension   - off norepi   - Doubt sepsis   - No evidence for cardiogenic shock   - Suspect a combination of narcotic effect and intravascular volume depletion   - short course of stress dose solucortef for mild LINDER, cortisol 17 on pressors   - wean off midodrine   Acute Kidney Injury    - pre-renal +   - 2ndary to urinary retention/obstruction  Obstructive uropathy secondary to urinary retention   Hyponatremia   Recent L2-3 fusion 9/18/2017  Sleep Apnea - non-compliant   - family to bring CPAP to hospital   - f/u o/p  Hx of A flutter s/p eps and ablation 4/25/2017  IDDM  Chronic Htn   - Hold antihypertensives due to hypotension now, resume slowly  DLD  Chronic Pain    OK to move out of ICU today  I have seen and examined the patient today.  I have reviewed the medical record, laboratory data, imaging and all relevant studies.  I have discussed the plan of care with the Internal Medicine Resident and agree with the note and plan as documented.       Discussed patient condition and risk of morbidity and/or mortality with RN, RT and QA team.

## 2017-09-28 NOTE — PROGRESS NOTES
Pt irritable stating he doesn't know what is going on with him. RN reviewed admission information with pt. Pt disagrees with why he was admitted. RN notified Dr. Zamora of pt frustration and MD in to talk with pt

## 2017-09-28 NOTE — PROGRESS NOTES
Pt refused x-ray that was ordered today around 1 pm because he wants a doctor to come and talk to him about why he needs it.  Pt is frustrated that doctors keep ordering things without informing him they are ordering them and why.

## 2017-09-28 NOTE — PROGRESS NOTES
Daughter at bedside able to complete admit profile with her assistance. Pt and daughter aware of transfer. Attempted to call report to Shaun NGUYEN. Pt belongings, meds and chart packed ready for transfer

## 2017-09-28 NOTE — PROGRESS NOTES
Pt care assumed from night RN, labs/ meds/ and orders reviewed, lines, drains and IV's verified, VSS. Pt assessment completed. Pt irritable about not know what is going on. POC explained. No c/o pain at this time

## 2017-09-28 NOTE — PROGRESS NOTES
Patient arrived to floor via transport in ICU bed. Patient is settled. CBI running at this time, OP per I/O sheet. PIVx2 in place per LDAs, patent and SL. Patient c/o pain 9/10 in his back and legs, medicated per MAR with new MS contin orders, no PRN at this time. Back midline incision is CDI, sutures to come our POD#11 (surgery on 9/18). Orders reviewed. Hourly rounding implemented.

## 2017-09-28 NOTE — CARE PLAN
Problem: Communication  Goal: The ability to communicate needs accurately and effectively will improve  Outcome: PROGRESSING AS EXPECTED  Teach back method utilized in reinforcing teaching on proper use of call light when needing assistance.     Problem: Venous Thromboembolism (VTW)/Deep Vein Thrombosis (DVT) Prevention:  Goal: Patient will participate in Venous Thrombosis (VTE)/Deep Vein Thrombosis (DVT)Prevention Measures  Outcome: PROGRESSING AS EXPECTED  Educated patient on use of SCDs and anticoagulation therapy.

## 2017-09-28 NOTE — PROGRESS NOTES
CBI placed at this time. Pt tolerated poorly. Dr. Zamora notified. Once time pain medication provided for pt comfort.

## 2017-09-28 NOTE — PROGRESS NOTES
[facility] [place] [facility] Interval Note    Name Zhang Cheung     1950   Age/Sex 67 y.o. male   MRN [id number]   Code Status DNR     After 5PM or if no immediate response to page, please call for cross-coverage  Attending/Team: JARRETT Monreal/ Dr. Bland  Call [phone] to page   1st Call - Day Intern (R1):    2nd Call - Day Sr. Resident (R2/R3):   Sera       Chief complaint/ reason for interval visit (Primary Diagnosis)   67 y.o. Male, recent lumbar laminectomy/fusion , DM, HTN, BENEDICT (non-compliant with CPAP), admitted  with ALOC, MEREDITH, hypoxia and hypotension    Interval Problem Daily Status Update    - : Improving gradually on fluids and midodrine, tapered off levophed   - : Doing well this morning, SBP improving at 100s range, marked improvement of kidney function, will discontinue fluid therapy and aim to taper off midodrine, pending echo, will ask daughter to bring CPAP machine and start stress dose steroids   Update: patient seems to have intermittent confusion and delirium that requires frequent orientation, in addition to penile pain, will insert new varghese with CBI    Review of Systems   Constitutional: Negative for chills and fever.   Respiratory: Negative for cough and shortness of breath.    Cardiovascular: Negative for chest pain and leg swelling.   Gastrointestinal: Negative for abdominal pain and nausea.   Neurological: Negative for headaches.       Consultants/Specialty  PMA    Disposition  Stable to transfer to medical floor     Quality Measures    Reviewed items::  EKG reviewed, Radiology images reviewed, Labs reviewed and Medications reviewed  DVT prophylaxis pharmacological::  Heparin  Ulcer Prophylaxis::  Not indicated          Physical Exam       Vitals:    17 0700 17 0800 17 0900 17 1000   BP:       Pulse: 67 68 68 75   Resp: 14 (!) 24 14 17   Temp:  36.7 °C (98 °F)     SpO2: 97% 97% 94% 91%   Weight:       Height:          Body mass index is 37.08 kg/m².    Oxygen Therapy:  Pulse Oximetry: 91 %, O2 (LPM): 3, O2 Delivery: Silicone Nasal Cannula    Physical Exam   Constitutional: He is oriented to person, place, and time and well-developed, well-nourished, and in no distress. No distress.   HENT:   Head: Normocephalic and atraumatic.   Eyes: Conjunctivae are normal.   Neck: Neck supple.   Cardiovascular: Normal rate and regular rhythm.    Murmur heard.  Distant heart sounds    Pulmonary/Chest: Effort normal. No stridor. No respiratory distress. He has no rales.   Diminished breath sounds    Abdominal: Soft. Bowel sounds are normal. There is no tenderness.   Musculoskeletal: He exhibits no edema.   Neurological: He is alert and oriented to person, place, and time.   No focal deficits    Skin: Skin is dry. He is not diaphoretic.       Lab Data Review:      9/27/2017  10:39 AM    Recent Labs      09/26/17 2158 09/27/17 0424  09/27/17   1300  09/28/17   0602   SODIUM  128*  RR  131*  131*  138   POTASSIUM  4.9  RR  4.5  4.5  4.7   CHLORIDE  93*  RR  98  100  110   CO2  25  RR  22  24  21   BUN  89*  RR  95*  83*  60*   CREATININE  3.71*  RR  3.67*  2.55*  1.26   MAGNESIUM  3.1*  2.9*   --    --    CALCIUM  8.3*  RR  7.7*  8.0*  7.9*       Recent Labs      09/26/17 2158 09/27/17 0424  09/27/17   1300  09/28/17   0602   ALTSGPT  13  14   --   11   ASTSGOT  48*  44   --   35   ALKPHOSPHAT  85  79   --   84   TBILIRUBIN  0.3  0.3   --   0.2   GLUCOSE  113*  RR  114*  193*  116*       Recent Labs      09/26/17 1750 09/27/17   0424  09/28/17   0602   RBC  3.66*  2.99*  3.10*   HEMOGLOBIN  11.2*  9.3*  9.5*   HEMATOCRIT  33.8*  28.4*  30.1*   PLATELETCT  327  264  226       Recent Labs      09/26/17   1750 09/26/17 2158 09/27/17   0424  09/28/17   0602   WBC  10.9*   --   8.5  7.1   NEUTSPOLYS  76.90*   --   65.10  68.10   LYMPHOCYTES  12.90*   --   18.80*  15.20*   MONOCYTES  8.90   --   12.40  11.70   EOSINOPHILS  0.50    --   2.80  3.90   BASOPHILS  0.30   --   0.40  0.40   ASTSGOT  53*  48*  44  35   ALTSGPT  14  13  14  11   ALKPHOSPHAT  103*  85  79  84   TBILIRUBIN  0.4  0.3  0.3  0.2           Assessment/Plan     Acute respiratory failure with hypoxia and hypercapnia (CMS-[facility])- (present on admission)   Assessment & Plan    - Acute hypoxemic hypercapnic respiratory failure   - Most likely due narcotic use and BENEDICT  - No evidence of lung disease or pulm edema   - Negative DVT on US, low suspicion for PE   - Pending echo         Hypotension- (present on admission)   Assessment & Plan    - BP of 60s/40s on admission  - Unclear etiology, suspect hypovolemia and meds related   - Normal echo back in 3/2017, no evidence of hypervolemia   - Normal cortisol level   - Hold home coreg, amlodipine, thiazide and narcotics   - Decrease IV fluids   - Taper off midodrine as tolerated         MEREDITH (acute kidney injury) (CMS-HCC)- (present on admission)   Assessment & Plan    - Elevated BUN and Cr on admission   - S/p varghese cath placement  - US renal showed no evidence of hydronephrosis (9/27)  - FeNa 0.4% c/w pre-renal injury   - Continue IV fluids and add midodrine   - Marked improvement         Hyponatremia- (present on admission)   Assessment & Plan    - Hypovolemic hyponatremia   - Resolved   - CTM         Lumbar stenosis- (present on admission)   Assessment & Plan    - S/p laminectomy (L2-5) and fusion (9/18/2017)  - Clean surgical scar on exam   - Hold narcotic meds due AMS   - Continue gabapentin        Delirium- (present on admission)   Assessment & Plan    - Suspect ICU delirium   - Frequent orientation  - Maintain day/night cycle   - Avoid benzo and consider Seroquel         Benign prostatic hyperplasia- (present on admission)   Assessment & Plan    - S/p varghese placement on admission (retainin >500 ml)  - Adequate urine output   - Flomax after BP improves   - Voiding trials prior to discharge         Type 2 DM- (present on admission)    Assessment & Plan    - Last HbA1c of 8 (8/2017)  - Hold metformin due MEREDITH  - Continue SS and hypoglycemia protocol

## 2017-09-29 LAB
ALBUMIN SERPL BCP-MCNC: 3.2 G/DL (ref 3.2–4.9)
ALBUMIN/GLOB SERPL: 1 G/DL
ALP SERPL-CCNC: 82 U/L (ref 30–99)
ALT SERPL-CCNC: 12 U/L (ref 2–50)
ANION GAP SERPL CALC-SCNC: 8 MMOL/L (ref 0–11.9)
AST SERPL-CCNC: 24 U/L (ref 12–45)
BACTERIA UR CULT: NORMAL
BASOPHILS # BLD AUTO: 0.5 % (ref 0–1.8)
BASOPHILS # BLD: 0.04 K/UL (ref 0–0.12)
BILIRUB SERPL-MCNC: 0.4 MG/DL (ref 0.1–1.5)
BUN SERPL-MCNC: 31 MG/DL (ref 8–22)
CALCIUM SERPL-MCNC: 9 MG/DL (ref 8.5–10.5)
CHLORIDE SERPL-SCNC: 106 MMOL/L (ref 96–112)
CO2 SERPL-SCNC: 26 MMOL/L (ref 20–33)
CREAT SERPL-MCNC: 0.91 MG/DL (ref 0.5–1.4)
EOSINOPHIL # BLD AUTO: 0.04 K/UL (ref 0–0.51)
EOSINOPHIL NFR BLD: 0.5 % (ref 0–6.9)
ERYTHROCYTE [DISTWIDTH] IN BLOOD BY AUTOMATED COUNT: 45.7 FL (ref 35.9–50)
GFR SERPL CREATININE-BSD FRML MDRD: >60 ML/MIN/1.73 M 2
GLOBULIN SER CALC-MCNC: 3.1 G/DL (ref 1.9–3.5)
GLUCOSE BLD-MCNC: 170 MG/DL (ref 65–99)
GLUCOSE SERPL-MCNC: 128 MG/DL (ref 65–99)
HCT VFR BLD AUTO: 34.6 % (ref 42–52)
HGB BLD-MCNC: 11 G/DL (ref 14–18)
IMM GRANULOCYTES # BLD AUTO: 0.02 K/UL (ref 0–0.11)
IMM GRANULOCYTES NFR BLD AUTO: 0.3 % (ref 0–0.9)
LYMPHOCYTES # BLD AUTO: 0.7 K/UL (ref 1–4.8)
LYMPHOCYTES NFR BLD: 9.2 % (ref 22–41)
MCH RBC QN AUTO: 30.5 PG (ref 27–33)
MCHC RBC AUTO-ENTMCNC: 31.8 G/DL (ref 33.7–35.3)
MCV RBC AUTO: 95.8 FL (ref 81.4–97.8)
MONOCYTES # BLD AUTO: 0.56 K/UL (ref 0–0.85)
MONOCYTES NFR BLD AUTO: 7.3 % (ref 0–13.4)
NEUTROPHILS # BLD AUTO: 6.27 K/UL (ref 1.82–7.42)
NEUTROPHILS NFR BLD: 82.2 % (ref 44–72)
NRBC # BLD AUTO: 0 K/UL
NRBC BLD AUTO-RTO: 0 /100 WBC
PLATELET # BLD AUTO: 269 K/UL (ref 164–446)
PMV BLD AUTO: 10.4 FL (ref 9–12.9)
POTASSIUM SERPL-SCNC: 4 MMOL/L (ref 3.6–5.5)
PROT SERPL-MCNC: 6.3 G/DL (ref 6–8.2)
RBC # BLD AUTO: 3.61 M/UL (ref 4.7–6.1)
SIGNIFICANT IND 70042: NORMAL
SITE SITE: NORMAL
SODIUM SERPL-SCNC: 140 MMOL/L (ref 135–145)
SOURCE SOURCE: NORMAL
WBC # BLD AUTO: 7.6 K/UL (ref 4.8–10.8)

## 2017-09-29 PROCEDURE — 97161 PT EVAL LOW COMPLEX 20 MIN: CPT

## 2017-09-29 PROCEDURE — 99233 SBSQ HOSP IP/OBS HIGH 50: CPT | Mod: GC | Performed by: HOSPITALIST

## 2017-09-29 PROCEDURE — 82962 GLUCOSE BLOOD TEST: CPT

## 2017-09-29 PROCEDURE — G8978 MOBILITY CURRENT STATUS: HCPCS | Mod: CH

## 2017-09-29 PROCEDURE — G8980 MOBILITY D/C STATUS: HCPCS | Mod: CH

## 2017-09-29 PROCEDURE — 770006 HCHG ROOM/CARE - MED/SURG/GYN SEMI*

## 2017-09-29 PROCEDURE — 700111 HCHG RX REV CODE 636 W/ 250 OVERRIDE (IP): Performed by: INTERNAL MEDICINE

## 2017-09-29 PROCEDURE — 700102 HCHG RX REV CODE 250 W/ 637 OVERRIDE(OP): Performed by: INTERNAL MEDICINE

## 2017-09-29 PROCEDURE — G8979 MOBILITY GOAL STATUS: HCPCS | Mod: CH

## 2017-09-29 PROCEDURE — A9270 NON-COVERED ITEM OR SERVICE: HCPCS | Performed by: INTERNAL MEDICINE

## 2017-09-29 PROCEDURE — 700111 HCHG RX REV CODE 636 W/ 250 OVERRIDE (IP): Performed by: STUDENT IN AN ORGANIZED HEALTH CARE EDUCATION/TRAINING PROGRAM

## 2017-09-29 PROCEDURE — 700102 HCHG RX REV CODE 250 W/ 637 OVERRIDE(OP): Performed by: STUDENT IN AN ORGANIZED HEALTH CARE EDUCATION/TRAINING PROGRAM

## 2017-09-29 PROCEDURE — 80053 COMPREHEN METABOLIC PANEL: CPT

## 2017-09-29 PROCEDURE — 36415 COLL VENOUS BLD VENIPUNCTURE: CPT

## 2017-09-29 PROCEDURE — 85025 COMPLETE CBC W/AUTO DIFF WBC: CPT

## 2017-09-29 PROCEDURE — A9270 NON-COVERED ITEM OR SERVICE: HCPCS | Performed by: STUDENT IN AN ORGANIZED HEALTH CARE EDUCATION/TRAINING PROGRAM

## 2017-09-29 RX ADMIN — MORPHINE SULFATE 15 MG: 15 TABLET, EXTENDED RELEASE ORAL at 08:12

## 2017-09-29 RX ADMIN — STANDARDIZED SENNA CONCENTRATE AND DOCUSATE SODIUM 2 TABLET: 8.6; 5 TABLET, FILM COATED ORAL at 08:12

## 2017-09-29 RX ADMIN — HEPARIN SODIUM 5000 UNITS: 5000 INJECTION, SOLUTION INTRAVENOUS; SUBCUTANEOUS at 22:00

## 2017-09-29 RX ADMIN — MIDODRINE HYDROCHLORIDE 5 MG: 5 TABLET ORAL at 13:18

## 2017-09-29 RX ADMIN — HYDROCORTISONE SODIUM SUCCINATE 25 MG: 100 INJECTION, POWDER, FOR SOLUTION INTRAMUSCULAR; INTRAVENOUS at 23:24

## 2017-09-29 RX ADMIN — HEPARIN SODIUM 5000 UNITS: 5000 INJECTION, SOLUTION INTRAVENOUS; SUBCUTANEOUS at 13:19

## 2017-09-29 RX ADMIN — MIDODRINE HYDROCHLORIDE 5 MG: 5 TABLET ORAL at 21:39

## 2017-09-29 RX ADMIN — GABAPENTIN 300 MG: 300 CAPSULE ORAL at 21:00

## 2017-09-29 RX ADMIN — TAMSULOSIN HYDROCHLORIDE 0.4 MG: 0.4 CAPSULE ORAL at 08:12

## 2017-09-29 RX ADMIN — HYDROCORTISONE SODIUM SUCCINATE 50 MG: 100 INJECTION, POWDER, FOR SOLUTION INTRAMUSCULAR; INTRAVENOUS at 02:04

## 2017-09-29 RX ADMIN — GABAPENTIN 300 MG: 300 CAPSULE ORAL at 08:13

## 2017-09-29 RX ADMIN — MIDODRINE HYDROCHLORIDE 5 MG: 5 TABLET ORAL at 08:12

## 2017-09-29 RX ADMIN — MORPHINE SULFATE 15 MG: 15 TABLET, EXTENDED RELEASE ORAL at 21:39

## 2017-09-29 RX ADMIN — HEPARIN SODIUM 5000 UNITS: 5000 INJECTION, SOLUTION INTRAVENOUS; SUBCUTANEOUS at 05:34

## 2017-09-29 ASSESSMENT — COGNITIVE AND FUNCTIONAL STATUS - GENERAL
SUGGESTED CMS G CODE MODIFIER MOBILITY: CH
MOBILITY SCORE: 24

## 2017-09-29 ASSESSMENT — PAIN SCALES - GENERAL
PAINLEVEL_OUTOF10: 5
PAINLEVEL_OUTOF10: 2

## 2017-09-29 ASSESSMENT — ENCOUNTER SYMPTOMS
SHORTNESS OF BREATH: 0
BACK PAIN: 1
NAUSEA: 0
STRIDOR: 0
ABDOMINAL PAIN: 0
FEVER: 0
BLURRED VISION: 0
DIARRHEA: 0
FOCAL WEAKNESS: 0
DIZZINESS: 0
COUGH: 0
MYALGIAS: 0
EYE PAIN: 0
HEADACHES: 0
CHILLS: 0
SENSORY CHANGE: 0
CONSTIPATION: 0

## 2017-09-29 ASSESSMENT — GAIT ASSESSMENTS
GAIT LEVEL OF ASSIST: MODIFIED INDEPENDENT
DISTANCE (FEET): 150

## 2017-09-29 ASSESSMENT — LIFESTYLE VARIABLES: SUBSTANCE_ABUSE: 0

## 2017-09-29 NOTE — THERAPY
"Physical Therapy Evaluation completed.   Bed Mobility:  Supine to Sit: Modified Independent  Transfers: Sit to Stand: Modified Independent  Gait: Level Of Assist: Modified Independent with No Equipment Needed       Plan of Care: Patient with no further skilled PT needs in the acute care setting at this time  Discharge Recommendations: Equipment: No Equipment Needed. Post-acute therapy Discharge to home with outpatient or home health for additional skilled therapy services.    See \"Rehab Therapy-Acute\" Patient Summary Report for complete documentation.   Pt moving well and reprots he feels he can go home or to SNF, \"whatever gets me out of here\". Pt able to do all mobilityn with mod I. pt has shower bench, FWW, bidet and 24/7 help from daughter as long as needed. pt is suppose to wear a back brace while OOB but he does not want to because \"its unnecessary\" and reprots not wearing it at home. PT asked pt to have daughter bring in his back brace. pt noncompliant w/ spinal prec while in bed while bending and twisting to fing a good position to lay due to pain of catheter. reeducated to the importance of spinal prec but pt not open to education. no further need for skilled acute PT services.   "

## 2017-09-29 NOTE — DIETARY
"Nutrition Services: Patient seen for poor PO intake.  Patient reported no change in appetite prior to admit.  He stated it has just been since admit.  He c/o nausea and inability to take nausea medications.  He stated he eats light meals at home and since he's not physically active and very uncomfortable here(c/o penile pain), he stated he doesn't feel like eating.  Patient stated he drinks Glucerna at home and was happy to try a Boost Glucose control while here.  MD order in place.    67 y.o. male with admitting DX of Acute kidney injury, Hypotension    Height: 167.6 cm (5' 6\")  Weight: 104.2 kg (229 lb 11.5 oz)  Body mass index is 37.08 kg/m².   No weight loss noted prior to admit.    Patient is on a Diabetic diet  Labs, medications and past medical history reviewed.    Recommend:  Encourage PO intake.  Will add Boost glucose control.  Nutrition Representative will continue to see him for ongoing meal and snack preferences.  RD following.      "

## 2017-09-29 NOTE — PROGRESS NOTES
Cookie Yanez Fall Risk Assessment:     Last Known Fall: No falls  Mobility: Use of assistive device/requires assist of two people  Medications: Cardiovascular or central nervous system meds  Mental Status/LOC/Awareness: Awake, alert, and oriented to date, place, and person  Toileting Needs: Use of catheters or diversion devices  Volume/Electrolyte Status: No problems  Communication/Sensory: Visual (Glasses)/hearing deficit  Behavior: Depression/anxiety  Cookie Yanez Fall Risk Total: 10  Fall Risk Level: LOW RISK    Universal Fall Precautions:  call light/belongings in reach, bed in low position and locked, wheelchairs and assistive devices out of sight, siderails up x 2, use non-slip footwear, adequate lighting, clutter free and spill free environment, educate on level of risk, educate to call for assistance    Fall Risk Level Interventions:   TRIAL (TELE 8, NEURO, MED SAM 5) Low Fall Risk Interventions  Place yellow fall risk ID band on patient: refused  Provide patient/family education based on risk assessment: completed  Educate patient/family to call staff for assistance when getting out of bed: completed  Place fall precaution signage outside patient door: verified      Patient Specific Interventions:     Medication: review medications with patient and family  Mental Status/LOC/Awareness: not applicable  Toileting: monitor intake and output/use of appropriate interventions  Volume/Electrolyte Status: ensure patient remains hydrated and advance diet as tolerated  Communication/Sensory: update plan of care on whiteboard  Behavioral: engage patient in daily activities  Mobility: ensure bed is locked and in lowest position

## 2017-09-29 NOTE — PROGRESS NOTES
[facility] paged multiple times to update them regarding patient's UOP. No call returned. UOP as charted in flowsheets.

## 2017-09-29 NOTE — PROGRESS NOTES
Report received. Assumed care, assessment complete. Pt is A & O x 4.  Pt reports discomfort from catheter. Fall precautions and appropriate signs in place. Pt oriented to unit routine, call light/phone system and RN extension number provided. Pt educated regarding fall precautions. Bed alarm refused with education. Pt denies any additional needs at this time. Call light within reach.

## 2017-09-29 NOTE — PROGRESS NOTES
Having trouble with his room mate,c/o not having enough rest for roommate watching TV all day all night.  Able to transfer Pt to room 523 with room mate really quiet.  Re oriented in his new room and advises to continue to call for any assistance needed.

## 2017-09-29 NOTE — CARE PLAN
Problem: Elimination  Goal: Regular urinary elimination    Intervention: Urinary Catheter care and maintenance  CBI in place draining pale yellow urine. [facility] paged for update, no call returned. See flowsheets for UOP.

## 2017-09-29 NOTE — CARE PLAN
Problem: Knowledge Deficit  Goal: Knowledge of disease process/condition, treatment plan, diagnostic tests, and medications will improve    Intervention: Explain information regarding disease process/condition, treatment plan, diagnostic tests, and medications and document in education  Pt educated on CBI       Problem: Mobility  Goal: Risk for activity intolerance will decrease    Intervention: Encourage patient to increase activity level in collaboration with Interdisciplinary Team  Pt unable to ambulate due to back and penis discomfort, PT/OT ordered

## 2017-09-29 NOTE — PROGRESS NOTES
[facility] [place] [facility] Interval [place]    Name Zhang Cheung     1950   Age/Sex 67 y.o. male   MRN [id number]   Code Status DNR     After 5PM or if no immediate response to page, please call for cross-coverage  Attending/Team: Jeb / Dwayne team  Call [phone] to page   1st Call - Day Intern (R1):   Verónica 2nd Call - Day Sr. Resident (R2/R3):   Mutasher        Chief complaint/ reason for interval visit    hypoxia and hypotension, with MEREDITH, ALOC, and BENEDICT,   who recently had surgery (laminectomy/fusion )    Interval Problem Daily Status Update    Patient complaining about his room-mate, and the quality of care that the nursing staff provides.    He notes penile pain, due to varghese (still with irrigation).   He states he can get-up with assistance, but painful.      Review of Systems   Constitutional: Negative for chills and fever.   HENT: Negative for congestion.    Eyes: Negative for blurred vision and pain.   Respiratory: Negative for cough, shortness of breath and stridor.    Cardiovascular: Negative for chest pain and leg swelling.   Gastrointestinal: Negative for abdominal pain, constipation, diarrhea and nausea.   Genitourinary:        Vargehse in place, with irrigation.  Penile pain, attributed to the varghese.    Musculoskeletal: Positive for back pain. Negative for myalgias.   Skin: Negative for rash.   Neurological: Negative for dizziness, sensory change, focal weakness and headaches.   Psychiatric/Behavioral: Negative for substance abuse.        Does note narcotic use, but for back pain and recent surgery.        Consultants/Specialty  PMA    Disposition  Stable to transfer to medical floor     Quality Measures    Reviewed items::  Labs reviewed and Medications reviewed  Varghese catheter::  Urinary Tract Retention or Urinary Tract Obstruction  DVT prophylaxis pharmacological::  Heparin  Ulcer Prophylaxis::  Not indicated          Physical Exam       Vitals:     09/28/17 1540 09/28/17 1915 09/29/17 0350 09/29/17 0700   BP: 136/72 139/66 136/72 151/68   Pulse: 67 70 67 73   Resp: 18 16 18 17   Temp: 36.5 °C (97.7 °F) 36.8 °C (98.3 °F) 36.4 °C (97.5 °F) 36.6 °C (97.9 °F)   SpO2: 95% 97% 95% 90%   Weight:       Height:         Body mass index is 37.08 kg/m².    Oxygen Therapy:  Pulse Oximetry: 90 %, O2 (LPM): 4, O2 Delivery: Nasal Cannula    Physical Exam   Constitutional: He is oriented to person, place, and time and well-developed, well-nourished, and in no distress. No distress.   HENT:   Head: Normocephalic and atraumatic.   Eyes: Conjunctivae and EOM are normal.   Neck: Neck supple. No tracheal deviation present.   Cardiovascular: Normal rate and regular rhythm.    Distant heart sounds    Pulmonary/Chest: Effort normal. No stridor. No respiratory distress. He has no wheezes. He has no rales.   Diminished breath sounds / shallow breathing.    Abdominal: Soft. Bowel sounds are normal. There is no tenderness. There is no rebound and no guarding.   Musculoskeletal: He exhibits no edema or tenderness.   Neurological: He is alert and oriented to person, place, and time.   No focal deficits    Skin: Skin is dry. No rash noted. He is not diaphoretic. No erythema.   Psychiatric: Judgment normal.   Very critical of nursing staff (difficult to say if accurate assessment or hypersensitive to situation).       Lab Data Review:      9/27/2017  10:39 AM    Recent Labs      09/26/17   2158  09/27/17   0424  09/27/17   1300  09/28/17   0602  09/29/17   0502   SODIUM  128*  RR  131*  131*  138  140   POTASSIUM  4.9  RR  4.5  4.5  4.7  4.0   CHLORIDE  93*  RR  98  100  110  106   CO2  25  RR  22  24  21  26   BUN  89*  RR  95*  83*  60*  31*   CREATININE  3.71*  RR  3.67*  2.55*  1.26  0.91   MAGNESIUM  3.1*  2.9*   --    --    --    CALCIUM  8.3*  RR  7.7*  8.0*  7.9*  9.0       Recent Labs      09/27/17   0424  09/27/17   1300  09/28/17   0602  09/29/17   0502   [facility]   [facility]    [facility]  12   ASTSGOT  44   --   35  24   ALKPHOSPHAT  79   --   84  82   TBILIRUBIN  0.3   --   0.2  0.4   GLUCOSE  RR  114*  193*  116*  128*       Recent Labs      09/27/17   0424  09/28/17   0602  09/29/17   0502   RBC  2.99*  3.10*  3.61*   HEMOGLOBIN  9.3*  9.5*  11.0*   HEMATOCRIT  28.4*  30.1*  34.6*   PLATELETCT  264  226  269       Recent Labs      09/27/17   0424  09/28/17   0602  09/29/17   0502   WBC  8.5  7.1  7.6   NEUTSPOLYS  65.10  68.10  82.20*   LYMPHOCYTES  18.80*  15.20*  9.20*   MONOCYTES  12.40  11.70  7.30   EOSINOPHILS  2.80  3.90  0.50   BASOPHILS  0.40  0.40  0.50   ASTSGOT  44  35  24   ALTSGPT  14  11  12   ALKPHOSPHAT  79  84  82   TBILIRUBIN  0.3  0.2  0.4           Assessment / Plan     Acute respiratory failure with hypoxia and hypercapnia    Assessment & Plan    - Acute hypoxemic hypercapnic respiratory failure   - Most likely due narcotic use and BENEDICT  - No evidence of lung disease or pulm edema   - Negative DVT on US, low suspicion for PE   - Pending echo .....        Hypotension    Assessment & Plan    - BP of 60s/40s on admission  - Unclear etiology, suspect hypovolemia and meds related   - Normal echo back in 3/2017, no evidence of hypervolemia   - Normal cortisol level  (9/27/17)  - Hold home coreg, amlodipine, thiazide and narcotics   - Decreased/stopped maintenance IV fluids   - plan to Taper off midodrine as tolerated   - Decreasing his hydrocortisone, to 25 mg, Q8H.        MEREDITH (acute kidney injury) (CMS-HCC)- (present on admission)   Assessment & Plan    - Elevated BUN and Cr on admission   - S/p varghese cath placement  - US renal showed no evidence of hydronephrosis (9/27)  - FeNa 0.4% c/w pre-renal injury   - was on IV fluids and then midodrine   - Marked improvement - Cr finally in normal range today        Hyponatremia- (present on admission)   Assessment & Plan    - Hypovolemic hyponatremia   - Resolved   - CTM         Lumbar stenosis- (present on admission)   Assessment &  Plan    - S/p laminectomy (L2-5) and fusion (9/18/2017)  - Clean surgical scar on exam   - careful limit of narcotic meds (MS contin ER 15 mg)  - Continue gabapentin        Delirium- (present on admission)   Assessment & Plan    - Suspect ICU delirium -- improved./resolving.  - Frequent orientation  - Maintain day/night cycle   - Avoid benzo and (consider Seroquel - if required, ... less likely)         Benign prostatic hyperplasia- (present on admission)   Assessment & Plan    - S/p varghese placement on admission (retainin >500 ml)  - Adequate urine output   - Flomax after BP improved   - consider Voiding trials prior to discharge   - had clotting after varghese placement.   - still getting bladder irrigation, until tomorrow.         Type 2 DM- (present on admission)   Assessment & Plan    - Last HbA1c of 8 (8/2017)  - Hold metformin due MEREDITH  - Continue SS and hypoglycemia protocol

## 2017-09-29 NOTE — RESPIRATORY CARE
COPD EDUCATION by COPD CLINICAL EDUCATOR  9/29/2017 at 7:05 AM by Deanna Muniz     Patient reviewed by COPD education team. Patient does not qualify for COPD program.

## 2017-09-30 LAB
ALBUMIN SERPL BCP-MCNC: 3.6 G/DL (ref 3.2–4.9)
ALBUMIN/GLOB SERPL: 1.1 G/DL
ALP SERPL-CCNC: 98 U/L (ref 30–99)
ALT SERPL-CCNC: 11 U/L (ref 2–50)
ANION GAP SERPL CALC-SCNC: 8 MMOL/L (ref 0–11.9)
AST SERPL-CCNC: 27 U/L (ref 12–45)
BASOPHILS # BLD AUTO: 0.5 % (ref 0–1.8)
BASOPHILS # BLD: 0.04 K/UL (ref 0–0.12)
BILIRUB SERPL-MCNC: 0.4 MG/DL (ref 0.1–1.5)
BUN SERPL-MCNC: 23 MG/DL (ref 8–22)
CALCIUM SERPL-MCNC: 9.5 MG/DL (ref 8.5–10.5)
CHLORIDE SERPL-SCNC: 104 MMOL/L (ref 96–112)
CO2 SERPL-SCNC: 30 MMOL/L (ref 20–33)
CREAT SERPL-MCNC: 1.06 MG/DL (ref 0.5–1.4)
EOSINOPHIL # BLD AUTO: 0.19 K/UL (ref 0–0.51)
EOSINOPHIL NFR BLD: 2.2 % (ref 0–6.9)
ERYTHROCYTE [DISTWIDTH] IN BLOOD BY AUTOMATED COUNT: 46.1 FL (ref 35.9–50)
GFR SERPL CREATININE-BSD FRML MDRD: >60 ML/MIN/1.73 M 2
GLOBULIN SER CALC-MCNC: 3.3 G/DL (ref 1.9–3.5)
GLUCOSE BLD-MCNC: 117 MG/DL (ref 65–99)
GLUCOSE BLD-MCNC: 120 MG/DL (ref 65–99)
GLUCOSE BLD-MCNC: 121 MG/DL (ref 65–99)
GLUCOSE BLD-MCNC: 134 MG/DL (ref 65–99)
GLUCOSE BLD-MCNC: 148 MG/DL (ref 65–99)
GLUCOSE SERPL-MCNC: 101 MG/DL (ref 65–99)
HCT VFR BLD AUTO: 36.4 % (ref 42–52)
HGB BLD-MCNC: 12 G/DL (ref 14–18)
IMM GRANULOCYTES # BLD AUTO: 0.03 K/UL (ref 0–0.11)
IMM GRANULOCYTES NFR BLD AUTO: 0.3 % (ref 0–0.9)
LYMPHOCYTES # BLD AUTO: 1.25 K/UL (ref 1–4.8)
LYMPHOCYTES NFR BLD: 14.3 % (ref 22–41)
MAGNESIUM SERPL-MCNC: 1.7 MG/DL (ref 1.5–2.5)
MCH RBC QN AUTO: 31.3 PG (ref 27–33)
MCHC RBC AUTO-ENTMCNC: 33 G/DL (ref 33.7–35.3)
MCV RBC AUTO: 95 FL (ref 81.4–97.8)
MONOCYTES # BLD AUTO: 0.96 K/UL (ref 0–0.85)
MONOCYTES NFR BLD AUTO: 11 % (ref 0–13.4)
NEUTROPHILS # BLD AUTO: 6.29 K/UL (ref 1.82–7.42)
NEUTROPHILS NFR BLD: 71.7 % (ref 44–72)
NRBC # BLD AUTO: 0 K/UL
NRBC BLD AUTO-RTO: 0 /100 WBC
PLATELET # BLD AUTO: 319 K/UL (ref 164–446)
PMV BLD AUTO: 10.6 FL (ref 9–12.9)
POTASSIUM SERPL-SCNC: 4.3 MMOL/L (ref 3.6–5.5)
PROT SERPL-MCNC: 6.9 G/DL (ref 6–8.2)
RBC # BLD AUTO: 3.83 M/UL (ref 4.7–6.1)
SODIUM SERPL-SCNC: 142 MMOL/L (ref 135–145)
WBC # BLD AUTO: 8.8 K/UL (ref 4.8–10.8)

## 2017-09-30 PROCEDURE — 700102 HCHG RX REV CODE 250 W/ 637 OVERRIDE(OP): Performed by: INTERNAL MEDICINE

## 2017-09-30 PROCEDURE — 80053 COMPREHEN METABOLIC PANEL: CPT

## 2017-09-30 PROCEDURE — 85025 COMPLETE CBC W/AUTO DIFF WBC: CPT

## 2017-09-30 PROCEDURE — 700111 HCHG RX REV CODE 636 W/ 250 OVERRIDE (IP): Performed by: STUDENT IN AN ORGANIZED HEALTH CARE EDUCATION/TRAINING PROGRAM

## 2017-09-30 PROCEDURE — 82962 GLUCOSE BLOOD TEST: CPT

## 2017-09-30 PROCEDURE — 83735 ASSAY OF MAGNESIUM: CPT

## 2017-09-30 PROCEDURE — 36415 COLL VENOUS BLD VENIPUNCTURE: CPT

## 2017-09-30 PROCEDURE — A9270 NON-COVERED ITEM OR SERVICE: HCPCS | Performed by: INTERNAL MEDICINE

## 2017-09-30 PROCEDURE — 770006 HCHG ROOM/CARE - MED/SURG/GYN SEMI*

## 2017-09-30 PROCEDURE — 99232 SBSQ HOSP IP/OBS MODERATE 35: CPT | Mod: GC | Performed by: HOSPITALIST

## 2017-09-30 RX ORDER — MIDODRINE HYDROCHLORIDE 5 MG/1
2.5 TABLET ORAL 2 TIMES DAILY
Status: DISCONTINUED | OUTPATIENT
Start: 2017-09-30 | End: 2017-10-01

## 2017-09-30 RX ORDER — MAGNESIUM SULFATE HEPTAHYDRATE 40 MG/ML
2 INJECTION, SOLUTION INTRAVENOUS ONCE
Status: COMPLETED | OUTPATIENT
Start: 2017-09-30 | End: 2017-09-30

## 2017-09-30 RX ADMIN — MORPHINE SULFATE 15 MG: 15 TABLET, EXTENDED RELEASE ORAL at 21:12

## 2017-09-30 RX ADMIN — HEPARIN SODIUM 5000 UNITS: 5000 INJECTION, SOLUTION INTRAVENOUS; SUBCUTANEOUS at 06:12

## 2017-09-30 RX ADMIN — GABAPENTIN 300 MG: 300 CAPSULE ORAL at 09:11

## 2017-09-30 RX ADMIN — MIDODRINE HYDROCHLORIDE 5 MG: 5 TABLET ORAL at 09:11

## 2017-09-30 RX ADMIN — GABAPENTIN 300 MG: 300 CAPSULE ORAL at 15:19

## 2017-09-30 RX ADMIN — HEPARIN SODIUM 5000 UNITS: 5000 INJECTION, SOLUTION INTRAVENOUS; SUBCUTANEOUS at 21:13

## 2017-09-30 RX ADMIN — HEPARIN SODIUM 5000 UNITS: 5000 INJECTION, SOLUTION INTRAVENOUS; SUBCUTANEOUS at 15:19

## 2017-09-30 RX ADMIN — HYDROCORTISONE SODIUM SUCCINATE 25 MG: 100 INJECTION, POWDER, FOR SOLUTION INTRAMUSCULAR; INTRAVENOUS at 06:11

## 2017-09-30 RX ADMIN — MAGNESIUM SULFATE IN WATER 2 G: 40 INJECTION, SOLUTION INTRAVENOUS at 09:12

## 2017-09-30 RX ADMIN — TAMSULOSIN HYDROCHLORIDE 0.4 MG: 0.4 CAPSULE ORAL at 09:11

## 2017-09-30 RX ADMIN — MORPHINE SULFATE 15 MG: 15 TABLET, EXTENDED RELEASE ORAL at 09:11

## 2017-09-30 RX ADMIN — GABAPENTIN 300 MG: 300 CAPSULE ORAL at 21:12

## 2017-09-30 ASSESSMENT — ENCOUNTER SYMPTOMS
EYE PAIN: 0
FOCAL WEAKNESS: 0
HEADACHES: 0
MYALGIAS: 0
DIZZINESS: 0
COUGH: 0
DIARRHEA: 0
ABDOMINAL PAIN: 0
STRIDOR: 0
SENSORY CHANGE: 0
NAUSEA: 0
CHILLS: 0
BLURRED VISION: 0
SHORTNESS OF BREATH: 0
CONSTIPATION: 0
BACK PAIN: 1
FEVER: 0

## 2017-09-30 ASSESSMENT — LIFESTYLE VARIABLES: SUBSTANCE_ABUSE: 0

## 2017-09-30 ASSESSMENT — PATIENT HEALTH QUESTIONNAIRE - PHQ9
1. LITTLE INTEREST OR PLEASURE IN DOING THINGS: NOT AT ALL
SUM OF ALL RESPONSES TO PHQ9 QUESTIONS 1 AND 2: 0
SUM OF ALL RESPONSES TO PHQ QUESTIONS 1-9: 0
2. FEELING DOWN, DEPRESSED, IRRITABLE, OR HOPELESS: NOT AT ALL

## 2017-09-30 ASSESSMENT — PAIN SCALES - GENERAL
PAINLEVEL_OUTOF10: 1
PAINLEVEL_OUTOF10: 1

## 2017-09-30 NOTE — PROGRESS NOTES
[facility] [place] [facility] [place]    Name Zhang Cheung     1950   Age/Sex 67 y.o. male   MRN [id number]   Code Status DNR     After 5PM or if no immediate response to page, please call for cross-coverage  Attending/Team: Jeb / Dwayne team  Call [phone] to page   1st Call - Day Intern (R1):   Verónica 2nd Call - Day Sr. Resident (R2/R3):   Mutasher        Chief complaint/ reason for interval visit    hypoxia and hypotension, with MEREDITH, ALOC, and BENEDICT,   who recently had surgery (laminectomy/fusion )    Interval Problem Daily Status Update    He notes penile pain, due to varghese (still with irrigation).   -Patient happy about the room change.    -Able to get up and around well, but should be using his back brace.  -His daughter should bring the brace in today.   -No new s/s.  Understands plan for CBI and decreasing steroids.       Review of Systems   Constitutional: Negative for chills and fever.   HENT: Negative for congestion.    Eyes: Negative for blurred vision and pain.   Respiratory: Negative for cough, shortness of breath and stridor.    Cardiovascular: Negative for chest pain and leg swelling.   Gastrointestinal: Negative for abdominal pain, constipation, diarrhea and nausea.   Genitourinary:        Varghese in place, with irrigation.  Penile pain, attributed to the varghese.    Musculoskeletal: Positive for back pain. Negative for myalgias.   Skin: Negative for rash.   Neurological: Negative for dizziness, sensory change, focal weakness and headaches.   Psychiatric/Behavioral: Negative for substance abuse.        Does note narcotic use, but for back pain and recent surgery.        Consultants/Specialty  PMA    Disposition  Stable to transfer to medical floor     Quality Measures    Reviewed items::  Labs reviewed and Medications reviewed  Varghese catheter::  Urinary Tract Retention or Urinary Tract Obstruction  DVT prophylaxis pharmacological::  Heparin  Ulcer  Prophylaxis::  Not indicated          Physical Exam       Vitals:    09/29/17 1636 09/29/17 1955 09/30/17 0425 09/30/17 0800   BP: 142/62 111/84 145/91 (!) 165/77   Pulse: 70 75 67 63   Resp: 16 18 17 17   Temp: 36.9 °C (98.5 °F) 36.7 °C (98 °F) 36.6 °C (97.8 °F) 37 °C (98.6 °F)   SpO2: 93% 92% 93% 91%   Weight:   103.4 kg (227 lb 15.3 oz)    Height:         Body mass index is 36.79 kg/m². Weight: 103.4 kg (227 lb 15.3 oz)  Oxygen Therapy:  Pulse Oximetry: 93 %, O2 (LPM): 0, O2 Delivery: None (Room Air)      Physical Exam   Constitutional: He is oriented to person, place, and time and well-developed, well-nourished, and in no distress. No distress.   HENT:   Head: Normocephalic and atraumatic.   Eyes: Conjunctivae and EOM are normal.   Neck: Neck supple. No tracheal deviation present.   Cardiovascular: Normal rate and regular rhythm.    Somewhat distant heart sounds, but no obvious murmur.    Pulmonary/Chest: Effort normal. No stridor. No respiratory distress. He has no wheezes. He has no rales.   Abdominal: Soft. Bowel sounds are normal. There is no tenderness. There is no rebound and no guarding.   Musculoskeletal: He exhibits no edema or tenderness.   Neurological: He is alert and oriented to person, place, and time.   Skin: Skin is dry. No rash noted. He is not diaphoretic. No erythema.   Psychiatric: Mood, affect and judgment normal.   Better mood today.        Lab Data Review:      9/27/2017  10:39 AM    Recent Labs      09/28/17   0602  09/29/17   0502  09/30/17   0149   SODIUM  138  140  142   POTASSIUM  4.7  4.0  4.3   CHLORIDE  110  106  104   CO2  21  26  30   BUN  60*  31*  23*   CREATININE  1.26  0.91  1.06   MAGNESIUM   --    --   1.7   CALCIUM  7.9*  9.0  9.5       Recent Labs      09/28/17   0602  09/29/17   0502  09/30/17   0149   ALTSGPT  11  12  11   ASTSGOT  35  24  27   ALKPHOSPHAT  84  82  98   TBILIRUBIN  0.2  0.4  0.4   GLUCOSE  116*  128*  101*       Recent Labs      09/28/17   0602   09/29/17   0502  09/30/17   0149   RBC  3.10*  3.61*  3.83*   HEMOGLOBIN  9.5*  11.0*  12.0*   HEMATOCRIT  30.1*  34.6*  36.4*   PLATELETCT  226  269  319       Recent Labs      09/28/17   0602  09/29/17   0502  09/30/17   0149   WBC  7.1  7.6  8.8   NEUTSPOLYS  68.10  82.20*  71.70   LYMPHOCYTES  15.20*  9.20*  14.30*   MONOCYTES  11.70  7.30  11.00   EOSINOPHILS  3.90  0.50  2.20   BASOPHILS  0.40  0.50  0.50   ASTSGOT  35  24  27   ALTSGPT  11  12  11   ALKPHOSPHAT  84  82  98   TBILIRUBIN  0.2  0.4  0.4       Echo - 9/28/17:   Normal left ventricular systolic function.   Left ventricular ejection fraction is visually estimated to be 65%.   Mild tricuspid regurgitation.    Estimated right ventricular systolic pressure is 35 mmHg.        Assessment / Plan     Acute respiratory failure with hypoxia and hypercapnia    Assessment & Plan    - Acute hypoxemic hypercapnic respiratory failure   - Most likely due narcotic use and BENEDICT  - No evidence of lung disease or pulm edema   - Negative DVT on US, low suspicion for PE   - echo without significant finding (EF~65%, mild TR).         Hypotension  - resolving   Assessment & Plan    - BP of 60s/40s on admission  - Unclear etiology, suspect hypovolemia and meds related   - Normal echo back in 3/2017, no evidence of hypervolemia   - Normal cortisol level  (9/27/17)  - Hold home coreg, amlodipine, thiazide and narcotics   - Decreased/stopped maintenance IV fluids   - plan to Taper off midodrine as tolerated   - stopped hydrocortisone, today.   - improved BP, actually HTN with /77.   - decreasing midodrine, and added holding parameters for SBP>130.         MEREDITH (acute kidney injury) (CMS-formerly [facility])- resolved   Assessment & Plan    - Elevated BUN and Cr on admission   - S/p varghese cath placement  - US renal showed no evidence of hydronephrosis (9/27)  - FeNa 0.4% c/w pre-renal injury   - was on IV fluids and then midodrine   - Marked improvement - Cr in normal range         Hyponatremia- resolved   Assessment & Plan    - Hypovolemic hyponatremia   - Resolved   - CTM         Lumbar stenosis- s/p laminectomy   Assessment & Plan    - S/p laminectomy (L2-5) and fusion (9/18/2017)  - Clean surgical scar on exam   - careful limit of narcotic meds (MS contin ER 15 mg)  - Continue gabapentin        Delirium- resolved   Assessment & Plan    - Suspect ICU delirium / with prior hypoxia.  - Frequent orientation, and  Maintain day/night cycle   - Avoid benzo   - Currently doing well. / resolved.         Benign prostatic hyperplasia- (present on admission)   Assessment & Plan    - S/p varghese placement on admission (retainin >500 ml)  - Adequate urine output   - Flomax since BP improved   - consider Voiding trials prior to discharge   - had clotting after varghese placement.   - still getting bladder irrigation, for rest of today / tonight.   - plan to stop irrigation and remove varghese in morning.       Type 2 DM- (present on admission)   Assessment & Plan    - Last HbA1c of 8 (8/2017)  - Hold metformin due MEREDITH  - Continue SS and hypoglycemia protocol

## 2017-09-30 NOTE — PROGRESS NOTES
"Feeling much better after getting enough rest.  Still on Continuous Bladder irrigation.  Pt stated \" this tube/cath is so uncomfortable, But I feel much better, thanks for finding me a new room\".  "

## 2017-09-30 NOTE — PROGRESS NOTES
Pt up to the bathroom and states he had a BM. FSBS is 170 and no insulin available at this time for 2 units

## 2017-10-01 VITALS
TEMPERATURE: 98 F | RESPIRATION RATE: 17 BRPM | OXYGEN SATURATION: 96 % | BODY MASS INDEX: 36.64 KG/M2 | HEIGHT: 66 IN | DIASTOLIC BLOOD PRESSURE: 81 MMHG | SYSTOLIC BLOOD PRESSURE: 172 MMHG | WEIGHT: 227.96 LBS | HEART RATE: 71 BPM

## 2017-10-01 LAB
ALBUMIN SERPL BCP-MCNC: 3.4 G/DL (ref 3.2–4.9)
ALBUMIN/GLOB SERPL: 1.1 G/DL
ALP SERPL-CCNC: 90 U/L (ref 30–99)
ALT SERPL-CCNC: 12 U/L (ref 2–50)
ANION GAP SERPL CALC-SCNC: 9 MMOL/L (ref 0–11.9)
AST SERPL-CCNC: 30 U/L (ref 12–45)
BACTERIA BLD CULT: NORMAL
BACTERIA BLD CULT: NORMAL
BASOPHILS # BLD AUTO: 0.6 % (ref 0–1.8)
BASOPHILS # BLD: 0.05 K/UL (ref 0–0.12)
BILIRUB SERPL-MCNC: 0.5 MG/DL (ref 0.1–1.5)
BUN SERPL-MCNC: 22 MG/DL (ref 8–22)
CALCIUM SERPL-MCNC: 9.2 MG/DL (ref 8.5–10.5)
CHLORIDE SERPL-SCNC: 106 MMOL/L (ref 96–112)
CO2 SERPL-SCNC: 28 MMOL/L (ref 20–33)
CREAT SERPL-MCNC: 0.95 MG/DL (ref 0.5–1.4)
EOSINOPHIL # BLD AUTO: 0.3 K/UL (ref 0–0.51)
EOSINOPHIL NFR BLD: 3.4 % (ref 0–6.9)
ERYTHROCYTE [DISTWIDTH] IN BLOOD BY AUTOMATED COUNT: 44.6 FL (ref 35.9–50)
GFR SERPL CREATININE-BSD FRML MDRD: >60 ML/MIN/1.73 M 2
GLOBULIN SER CALC-MCNC: 3.1 G/DL (ref 1.9–3.5)
GLUCOSE BLD-MCNC: 101 MG/DL (ref 65–99)
GLUCOSE SERPL-MCNC: 115 MG/DL (ref 65–99)
HCT VFR BLD AUTO: 34.4 % (ref 42–52)
HGB BLD-MCNC: 11.3 G/DL (ref 14–18)
IMM GRANULOCYTES # BLD AUTO: 0.04 K/UL (ref 0–0.11)
IMM GRANULOCYTES NFR BLD AUTO: 0.5 % (ref 0–0.9)
LYMPHOCYTES # BLD AUTO: 1.49 K/UL (ref 1–4.8)
LYMPHOCYTES NFR BLD: 16.9 % (ref 22–41)
MAGNESIUM SERPL-MCNC: 1.7 MG/DL (ref 1.5–2.5)
MCH RBC QN AUTO: 30.5 PG (ref 27–33)
MCHC RBC AUTO-ENTMCNC: 32.8 G/DL (ref 33.7–35.3)
MCV RBC AUTO: 93 FL (ref 81.4–97.8)
MONOCYTES # BLD AUTO: 0.69 K/UL (ref 0–0.85)
MONOCYTES NFR BLD AUTO: 7.8 % (ref 0–13.4)
NEUTROPHILS # BLD AUTO: 6.25 K/UL (ref 1.82–7.42)
NEUTROPHILS NFR BLD: 70.8 % (ref 44–72)
NRBC # BLD AUTO: 0 K/UL
NRBC BLD AUTO-RTO: 0 /100 WBC
PLATELET # BLD AUTO: 296 K/UL (ref 164–446)
PMV BLD AUTO: 10.3 FL (ref 9–12.9)
POTASSIUM SERPL-SCNC: 3.7 MMOL/L (ref 3.6–5.5)
PROT SERPL-MCNC: 6.5 G/DL (ref 6–8.2)
RBC # BLD AUTO: 3.7 M/UL (ref 4.7–6.1)
SIGNIFICANT IND 70042: NORMAL
SIGNIFICANT IND 70042: NORMAL
SITE SITE: NORMAL
SITE SITE: NORMAL
SODIUM SERPL-SCNC: 143 MMOL/L (ref 135–145)
SOURCE SOURCE: NORMAL
SOURCE SOURCE: NORMAL
WBC # BLD AUTO: 8.8 K/UL (ref 4.8–10.8)

## 2017-10-01 PROCEDURE — 700102 HCHG RX REV CODE 250 W/ 637 OVERRIDE(OP): Performed by: STUDENT IN AN ORGANIZED HEALTH CARE EDUCATION/TRAINING PROGRAM

## 2017-10-01 PROCEDURE — A9270 NON-COVERED ITEM OR SERVICE: HCPCS | Performed by: INTERNAL MEDICINE

## 2017-10-01 PROCEDURE — 80053 COMPREHEN METABOLIC PANEL: CPT

## 2017-10-01 PROCEDURE — 99239 HOSP IP/OBS DSCHRG MGMT >30: CPT | Mod: GC | Performed by: HOSPITALIST

## 2017-10-01 PROCEDURE — A9270 NON-COVERED ITEM OR SERVICE: HCPCS | Performed by: STUDENT IN AN ORGANIZED HEALTH CARE EDUCATION/TRAINING PROGRAM

## 2017-10-01 PROCEDURE — 700111 HCHG RX REV CODE 636 W/ 250 OVERRIDE (IP): Performed by: STUDENT IN AN ORGANIZED HEALTH CARE EDUCATION/TRAINING PROGRAM

## 2017-10-01 PROCEDURE — 82962 GLUCOSE BLOOD TEST: CPT | Mod: 91

## 2017-10-01 PROCEDURE — 83735 ASSAY OF MAGNESIUM: CPT

## 2017-10-01 PROCEDURE — 85025 COMPLETE CBC W/AUTO DIFF WBC: CPT

## 2017-10-01 PROCEDURE — 700102 HCHG RX REV CODE 250 W/ 637 OVERRIDE(OP): Performed by: INTERNAL MEDICINE

## 2017-10-01 PROCEDURE — 36415 COLL VENOUS BLD VENIPUNCTURE: CPT

## 2017-10-01 RX ORDER — AMLODIPINE BESYLATE 2.5 MG/1
2.5 TABLET ORAL 2 TIMES DAILY
Qty: 30 TAB | Refills: 0 | Status: SHIPPED | OUTPATIENT
Start: 2017-10-01 | End: 2018-05-21 | Stop reason: SDUPTHER

## 2017-10-01 RX ORDER — BENAZEPRIL HYDROCHLORIDE 20 MG/1
40 TABLET ORAL DAILY
Status: DISCONTINUED | OUTPATIENT
Start: 2017-10-01 | End: 2017-10-01 | Stop reason: HOSPADM

## 2017-10-01 RX ORDER — GABAPENTIN 300 MG/1
300 CAPSULE ORAL 3 TIMES DAILY
Qty: 90 CAP | Refills: 0 | Status: SHIPPED | OUTPATIENT
Start: 2017-10-01 | End: 2018-05-21 | Stop reason: SDUPTHER

## 2017-10-01 RX ORDER — BENAZEPRIL HYDROCHLORIDE 40 MG/1
40 TABLET ORAL DAILY
Qty: 30 TAB | Refills: 0 | Status: SHIPPED | OUTPATIENT
Start: 2017-10-01 | End: 2018-05-21 | Stop reason: SDUPTHER

## 2017-10-01 RX ORDER — POTASSIUM CHLORIDE 20 MEQ/1
20 TABLET, EXTENDED RELEASE ORAL ONCE
Status: COMPLETED | OUTPATIENT
Start: 2017-10-01 | End: 2017-10-01

## 2017-10-01 RX ORDER — MAGNESIUM SULFATE HEPTAHYDRATE 40 MG/ML
2 INJECTION, SOLUTION INTRAVENOUS ONCE
Status: COMPLETED | OUTPATIENT
Start: 2017-10-01 | End: 2017-10-01

## 2017-10-01 RX ORDER — AMLODIPINE BESYLATE 5 MG/1
2.5 TABLET ORAL 2 TIMES DAILY
Status: DISCONTINUED | OUTPATIENT
Start: 2017-10-01 | End: 2017-10-01 | Stop reason: HOSPADM

## 2017-10-01 RX ADMIN — MORPHINE SULFATE 15 MG: 15 TABLET, EXTENDED RELEASE ORAL at 08:55

## 2017-10-01 RX ADMIN — MAGNESIUM SULFATE IN WATER 2 G: 40 INJECTION, SOLUTION INTRAVENOUS at 09:00

## 2017-10-01 RX ADMIN — HEPARIN SODIUM 5000 UNITS: 5000 INJECTION, SOLUTION INTRAVENOUS; SUBCUTANEOUS at 06:15

## 2017-10-01 RX ADMIN — AMLODIPINE BESYLATE 2.5 MG: 5 TABLET ORAL at 15:15

## 2017-10-01 RX ADMIN — TAMSULOSIN HYDROCHLORIDE 0.4 MG: 0.4 CAPSULE ORAL at 08:54

## 2017-10-01 RX ADMIN — POTASSIUM CHLORIDE 20 MEQ: 1500 TABLET, EXTENDED RELEASE ORAL at 08:54

## 2017-10-01 RX ADMIN — GABAPENTIN 300 MG: 300 CAPSULE ORAL at 14:53

## 2017-10-01 RX ADMIN — ACETAMINOPHEN 650 MG: 325 TABLET, FILM COATED ORAL at 02:13

## 2017-10-01 RX ADMIN — GABAPENTIN 300 MG: 300 CAPSULE ORAL at 08:54

## 2017-10-01 RX ADMIN — BENAZEPRIL HYDROCHLORIDE 40 MG: 20 TABLET, COATED ORAL at 11:30

## 2017-10-01 ASSESSMENT — PAIN SCALES - GENERAL
PAINLEVEL_OUTOF10: 1
PAINLEVEL_OUTOF10: 0
PAINLEVEL_OUTOF10: 0

## 2017-10-01 NOTE — CARE PLAN
Problem: Safety  Goal: Will remain free from falls  Outcome: PROGRESSING AS EXPECTED  Patient instructed to use the call bell for assistance, supervised with ambulation, needs met    Problem: Pain Management  Goal: Pain level will decrease to patient's comfort goal  Outcome: PROGRESSING AS EXPECTED  Patient medicated with Tylenol for left thigh pain with good effect

## 2017-10-01 NOTE — PROGRESS NOTES
Anxious, aggravated due to room mate. bp elevated. Pt. Wants to go home. Call to [facility]. Plan to retake bp. Pt. Taken to family room to rest. bp remains elevated.

## 2017-10-01 NOTE — PROGRESS NOTES
Report received, pt care assumed.  Pt resting comfortably in bed, denies needs. Plan of care reviewed with patient, no further questions.  Ac catheter in place, patent and draining clear yellow urine, CBI infusing very slow, no hematuria. Bed in low position, call light in reach, will continue to monitor.

## 2017-10-01 NOTE — PROGRESS NOTES
Cookie Yanez Fall Risk Assessment:     Last Known Fall: No falls  Mobility: No limitations  Medications: No meds  Mental Status/LOC/Awareness: Awake, alert, and oriented to date, place, and person  Toileting Needs: Use of catheters or diversion devices  Volume/Electrolyte Status: No problems  Communication/Sensory: Visual (Glasses)/hearing deficit  Behavior: Appropriate behavior  Cookie Yanez Fall Risk Total: 5  Fall Risk Level: NO RISK    Universal Fall Precautions:  call light/belongings in reach, bed in low position and locked, wheelchairs and assistive devices out of sight, siderails up x 2, use non-slip footwear, adequate lighting, clutter free and spill free environment, educate on level of risk, educate to call for assistance    Fall Risk Level Interventions:   TRIAL (TELE 8, NEURO, MED SAM 5) Low Fall Risk Interventions  Place yellow fall risk ID band on patient: refused  Provide patient/family education based on risk assessment: completed  Educate patient/family to call staff for assistance when getting out of bed: completed  Place fall precaution signage outside patient door: verified      Patient Specific Interventions:     Medication: review medications with patient and family  Mental Status/LOC/Awareness: reorient patient, reinforce falls education, check on patient hourly and reinforce the use of call light  Toileting: monitor intake and output/use of appropriate interventions, instruct male patients prone to dizziness to void while sitting, instruct patient/family on the use of grab bars and instruct patient/family on the need to call for assistance when toileting  Volume/Electrolyte Status: monitor blood sugars and maintain appropriate blood sugar levels if diabetic and monitor abnormal lab values  Communication/Sensory: ensure proper positioning when transferrng/ambulating  Behavioral: encourage patient to voice feelings and administer medication as ordered  Mobility: dangle prior to standing and  ensure bed is locked and in lowest position

## 2017-10-01 NOTE — DISCHARGE SUMMARY
Internal Medicine Discharge Summary    Admit Date:  9/26/2017       Discharge Date:   10/1/2017    Service:      R Internal Medicine [facility] Team  Attending Physician(s):    Jeb   Senior Resident(s):    Pasha   Zeus Resident(s):     Suzette     Primary Diagnosis:   Hypoxemic and hypercapnic respiratory failure     Secondary Diagnoses:                Acute kidney injury  Hypotension   Lumbar stenosis   Hyponatremia   Type 2 diabetes mellitus  Benign prostatic hyperplasia   Delirium     Hospital Summary (Brief Narrative):       A 67 years old male with past medical history of type 2 diabetes mellitus, hypertension, obstructive sleep apnea and chronic back pain status post recent lumbar laminectomy/fusion on September 18.  Admitted to the intensive care unit on 9/26/2017 for altered mental status, and was found to have acute kidney injury and hypoxemic hypercapnic respiratory failure in addition to hypotension. Patient was managed with aggressive fluid resuscitation, midodrine therapy, short term pressors with levophed through peripheral line and stress dose of steroids. All his blood pressure medications (amlodipine, coreg, benazepril and indapamide) and pain medications (morphine, percocet and flexeril) were held on admission. His kidney function and blood pressure improved and patient was tapered off levophed and fluid therapy. Patient was restarted on gabapentin and morphine for pain management, flomax for urinary retention and midodrine dose was tapered down. He was transferred to the medical floor on 09/28/2017; his blood pressure improved and he was weaned off the midodrine on 09/30/2017.  His blood pressure improved, and even became mildly hypertensive; so on 10/1/17, he was restarted on half his prior dose of benazepril and amlodipine.  He is being discharged on these medications (and not his other hypertensive medications), with specific instructions to follow-up with his PCP (per his  request).  He states that he can see his PCP tomorrow morning (with a walk-in wait-list), and is scheduled to follow-up with his neurosurgeon on the morning after that (less than 2 days from now).      He was dehydrated and hypotensive initially, and presented with MEREDITH, with a creatinine of 3.86.  After hydration (and other treatment described above), this improved and his current creatinine level is 0.95.      The patient had urinary retention on admission, which was complicated by a traumatic Ac catheter placement. Urology were consulted and a new Ac was placed. The patient was started on continuous bladder irrigation until his urine cleared up. Ac catheter has been removed on 10/1/17, and he has been able to urinate clear urine without pain, difficulty, or any signs of blood.     The patient will need outpatient pulmonology follow up for a new CPAP machine. He has previously qualified for a CPAP, and will need to have his out-patient physicians obtain this for him (even if he needs a new sleep study).      Additionally, he will need outpatient follow up with neurosurgery, for the laminectomy he had (prior to this admission) on 9/18/17.  He states that he has an appointment on Tuesday morning (less than 2 days from now).        Patient /Hospital Summary (Details -- Problem Oriented) :          Acute respiratory failure with hypoxia and hypercapnia    Assessment & Plan     - Acute hypoxemic hypercapnic respiratory failure   - Most likely due narcotic use and BENEDICT  - No evidence of lung disease or pulm edema   - Negative DVT on US, low suspicion for PE   - echo without significant finding (EF~65%, mild TR).        Hypotension  - resolved .....   Now demonstrating mild Hypertension.   Assessment & Plan     - BP of 60s/40s on admission  - Unclear etiology, suspect hypovolemia and meds related   - Normal echo back in 3/2017, no evidence of hypervolemia   - Normal cortisol level  (9/27/17)  - Hold home coreg,  amlodipine, thiazide and narcotics   - Decreased/stopped maintenance IV fluids   - plan to Taper off midodrine as tolerated   - stopped hydrocortisone, (9/29/17).   - stopped midodrine (9/30/17)  - improved BP, actually HTN occasionally.   - restarted (half of home-dose) of benazepril (40, daily).  - also now restarting (half of home-dose) of amlodipine (2.5, BID).  - monitor BP.        MEREDITH (acute kidney injury) (CMS-[facility])- resolved   Assessment & Plan     - Elevated BUN and Cr on admission   - S/p varghese cath placement  - US renal showed no evidence of hydronephrosis (9/27)  - FeNa 0.4% c/w pre-renal injury   - was on IV fluids and then midodrine   - Marked improvement - Cr still in normal range  - This should be followed-up by his PCP.        Hyponatremia- resolved   Assessment & Plan     - Hypovolemic hyponatremia   - Resolved        Lumbar stenosis- s/p laminectomy   Assessment & Plan     - S/p laminectomy (L2-5) and fusion (9/18/2017)  - Clean surgical scar on exam   - careful limit of narcotic meds (MS contin ER 15 mg)  - Continue gabapentin       Delirium- resolved   Assessment & Plan     - Suspect ICU delirium / with prior hypoxia.  - Frequent orientation, and  Maintain day/night cycle   - Avoid benzo   - Currently doing well. / resolved.        Benign prostatic hyperplasia- (present on admission)   Assessment & Plan     - S/p varghese placement on admission (retainin >500 ml)  - Adequate urine output   - Flomax since BP improved   - consider Voiding trials prior to discharge   - had clotting after varghese placement.   - still getting bladder irrigation, for rest of today / tonight.   - stoped irrigation and removed varghese in morning. (10/1/17).  - Patient states he was able to urinate without pain, difficulty, or hematuria.        Type 2 DM- (present on admission)   Assessment & Plan     - Last HbA1c of 8 (8/2017)  - Held metformin due to MEREDITH  - during admission he did not require SSI,          and home dosing was  unusual combination.   - he is to follow-up with his PCP for further management.        Consultants:     Pulmonary / ICU    Imaging/ Testing:        Echo (9/28/17) - Normal left ventricular systolic function.  Left ventricular ejection fraction is visually estimated to be 65%.  Mild tricuspid regurgitation.  Estimated right ventricular systolic pressure is 35 mmHg.      Discharge Medications:         Medication Reconciliation: Completed       Medication List      START taking these medications      Instructions   gabapentin 300 MG Caps  Commonly known as:  NEURONTIN  Replaces:  gabapentin 600 MG tablet   Take 1 Cap by mouth 3 times a day.  Dose:  300 mg        CHANGE how you take these medications      Instructions   amlodipine 2.5 MG Tabs  What changed:  · medication strength  · how much to take  Commonly known as:  NORVASC   Take 1 Tab by mouth 2 Times a Day.  Dose:  2.5 mg     benazepril 40 MG tablet  What changed:  how much to take  Commonly known as:  LOTENSIN   Take 1 Tab by mouth every day.  Dose:  40 mg        CONTINUE taking these medications      Instructions   FISH OIL TRIPLE STRENGTH 1400 MG Caps   Take 1 Cap by mouth every day.  Dose:  1 Cap     gemfibrozil 600 MG Tabs  Commonly known as:  LOPID   Take 600 mg by mouth 2 times a day.  Dose:  600 mg     morphine ER 15 MG Tbcr tablet  Commonly known as:  MS CONTIN   Doctor's comments:  BID for two weeks, then QHS for one week.  Take 1 Tab by mouth every 12 hours.  Dose:  15 mg     MULTIVITAMIN PO   Take 1 Tab by mouth every day.  Dose:  1 Tab     tamsulosin 0.4 MG capsule  Commonly known as:  FLOMAX   Take 0.8 mg by mouth every bedtime.  Dose:  0.8 mg     vitamin D3 5000 units Caps   Take 1 Cap by mouth every day.  Dose:  1 Cap        STOP taking these medications    COREG CR 40 MG Cp24  Generic drug:  Carvedilol Phosphate     cyclobenzaprine 10 MG Tabs  Commonly known as:  FLEXERIL      MG Caps     gabapentin 600 MG tablet  Commonly known as:   NEURONTIN  Replaced by:  gabapentin 300 MG Caps     indapamide 2.5 MG Tabs  Commonly known as:  LOZOL     metformin 1000 MG tablet  Commonly known as:  GLUCOPHAGE     oxycodone-acetaminophen  MG Tabs  Commonly known as:  PERCOCET-10     ropinirole 0.5 MG Tabs  Commonly known as:  REQUIP        ASK your doctor about these medications      Instructions   NOVOLOG MIX 70/30 (70-30) 100 UNIT/ML injection  Generic drug:  insulin aspart protamine-insulin aspart   Inject 5-22 Units as instructed 3 times a day before meals. 100 = 5 units 120= 7 units 184 = 9 units 200 = 12 units Pt can go as high as 22 units if needed  Dose:  5-22 Units             Disposition:   Discharge home, with specific instructions for quick and regular follow-up.     Diet:   diabetic    Activity:   As tolerated    Instructions:       The patient was instructed to return to the ER in the event of worsening symptoms. I have counseled the patient on the importance of compliance and the patient has agreed to proceed with all medical recommendations and follow up plan indicated above.  Including the need for quick and regular follow-ups with his PCP.   The patient understands that all medications come with benefits and risks. Risks may include permanent injury or death and these risks can be minimized with close reassessment and monitoring.          Follow up appointment details :      Patient states he has an appointment with his neurosurgeon on Tuesday morning.   He states he can see his PCP tomorrow morning (with limited numbers of walk-in appointments, via a sign-in sheet).     Pending Studies:        None pending  He may need a new sleep study, if not able to use his last one.     Summary of follow up issues:   Neurosurgery for post-laminectomy care.   PCP for managing his hypertension medications, and deciding when to restart his metformin (as it was held for MEREDITH, so PCP may wish to get a new BMP).  He will also need to get a new CPAP machine.   He has had a prior study for this, but his PCP or pulmonologist may need to refer him for a new one.     Time spent on discharge day patient visit, preparing discharge paperwork and arranging for patient follow up.  Discharge Time (Minutes) :    45        Condition on Discharge  - good  ______________________________________________________________________    Interval history/exam for day of discharge:     -Able to get up and around well, now using his back brace.  -Patient happy about having the varghese removed, this morning.   -Off of steroids and midodrine, has had mild hypertension.   -will restart on some of his HTN meds.      Review of Systems   Constitutional: Negative for chills and fever.   HENT: Negative for congestion.    Eyes: Negative for blurred vision and pain.   Respiratory: Negative for cough, shortness of breath and stridor.    Cardiovascular: Negative for chest pain and leg swelling.   Gastrointestinal: Negative for abdominal pain, constipation, diarrhea and nausea.   Genitourinary:    Varghese Removed this morning.   Musculoskeletal: Positive for back pain (s/p laminectomy).  Negative for myalgias.   Skin: Negative for rash.   Neurological: Negative for dizziness, sensory change, focal weakness and headaches.   Psychiatric/Behavioral: Negative for substance abuse.        Does note narcotic use, but for back pain and recent surgery.        Vitals:    10/01/17 0730 10/01/17 1200 10/01/17 1426 10/01/17 1658   BP: 152/70 152/76 (!) 177/90 (!) 172/81   Pulse: 69  71    Resp: 17 17    Temp: 36.7 °C (98 °F)  36.7 °C (98 °F)    SpO2: 96%  96%    Weight:       Height:         Weight/BMI: Body mass index is 36.79 kg/m².  Pulse Oximetry: 96 %, O2 (LPM): 0, O2 Delivery: None (Room Air)    Physical Exam   Constitutional: He is oriented to person, place, and time and   well-developed, well-nourished, and in no distress.    Head: Normocephalic and atraumatic.   Eyes: Conjunctivae and EOM are normal.   Neck: Neck  supple. No tracheal deviation present.   Cardiovascular: Normal rate and regular rhythm.    Somewhat distant heart sounds, but no obvious murmur.    Pulmonary/Chest: Effort normal. No stridor. No respiratory distress. He has no wheezes. He has no rales.   Abdominal: Soft. Bowel sounds are normal. There is no tenderness. There is no rebound and no guarding.   Musculoskeletal: He exhibits no pedal edema or arm/leg tenderness.   Well healing scar over midline of low back.    No significant erythema or edema around prior surgical site.   Neurological: He is alert and oriented to person, place, and time.   Skin: Skin is dry. No rash noted. He is not diaphoretic. No erythema.   Psychiatric: Mood somewhat variable, affect and judgment normal.        Most Recent Labs:    Lab Results   Component Value Date/Time    WBC 8.8 10/01/2017 03:00 AM    RBC 3.70 (L) 10/01/2017 03:00 AM    HEMOGLOBIN 11.3 (L) 10/01/2017 03:00 AM    HEMATOCRIT 34.4 (L) 10/01/2017 03:00 AM    MCV 93.0 10/01/2017 03:00 AM    MCH 30.5 10/01/2017 03:00 AM    MCHC 32.8 (L) 10/01/2017 03:00 AM    MPV 10.3 10/01/2017 03:00 AM    NEUTSPOLYS 70.80 10/01/2017 03:00 AM    LYMPHOCYTES 16.90 (L) 10/01/2017 03:00 AM    MONOCYTES 7.80 10/01/2017 03:00 AM    EOSINOPHILS 3.40 10/01/2017 03:00 AM    EOSINOPHILS 3 01/13/2016 09:25 AM    BASOPHILS 0.60 10/01/2017 03:00 AM    HYPOCHROMIA 1+ 03/18/2014 08:11 AM      Lab Results   Component Value Date/Time    SODIUM 143 10/01/2017 03:00 AM    POTASSIUM 3.7 10/01/2017 03:00 AM    CHLORIDE 106 10/01/2017 03:00 AM    CO2 28 10/01/2017 03:00 AM    GLUCOSE 115 (H) 10/01/2017 03:00 AM    BUN 22 10/01/2017 03:00 AM    CREATININE 0.95 10/01/2017 03:00 AM    CREATININE 0.9 01/09/2008 12:15 PM      Lab Results   Component Value Date/Time    ALTSGPT 12 10/01/2017 03:00 AM    ASTSGOT 30 10/01/2017 03:00 AM    ALKPHOSPHAT 90 10/01/2017 03:00 AM    TBILIRUBIN 0.5 10/01/2017 03:00 AM    DBILIRUBIN <0.1 11/17/2015 10:00 AM    ALBUMIN 3.4  10/01/2017 03:00 AM    GLOBULIN 3.1 10/01/2017 03:00 AM    INR 0.97 08/30/2017 07:55 AM    MACROCYTOSIS 1+ 03/18/2014 08:11 AM     Lab Results   Component Value Date/Time    PROTHROMBTM 13.2 08/30/2017 07:55 AM    INR 0.97 08/30/2017 07:55 AM

## 2017-10-02 LAB — GLUCOSE BLD-MCNC: 168 MG/DL (ref 65–99)

## 2017-10-02 NOTE — DOCUMENTATION QUERY
DOCUMENTATION QUERY    PROVIDERS: Please select “Cosign w/ note”to reply to query.    To better represent the severity of illness of your patient, please review the following information and exercise your independent professional judgment in responding to this query.     DVT vs Opiate Overdose was documented in the History and Physical, Progress Notes and Discharge Summary.    Patient was noted by daughter to not use any more pain medications when unable to wake patient for 30 min prior to admission.     Patient was also noted during his prior hospitalization for the back surgery, patient did require 1 dose of Narcan due to opiate induced hypoxia.     Based upon the clinical findings, risk factors, and treatment, can the relationship between these two conditions be further specified?    • There is no relationship between Long Term Pain medications and AMS, Respiratory Failure & Hypotension during this admission    • The Acute Respiratory failure with hypoxia and hypotension, with MEREDITH, ALOC, and BENEDICT is secondary to the Opiate Overdose of Long Term Pain Medications at home  • Other explanation of clinical findings  • Findings of no clinical significance  • Unable to determine        The medical record reflects the following:   Clinical Findings  Pt admitted with AMS, Hypoxemia, had previous surgery in which had to be given Narcan after surgery. Presents with AMS after daughter could not wake patient twice. He is on a high amount of opiates   Treatment  IV fluids, O2 6L on admit   Risk Factors  Hx of needing Narcan due to high dose of Pain medications    Location within medical record H&P, Progress Notes & DC summary      Thank you for your time.   Sincerely,   Pratibha Izaguirre RN  Clinical Documentation Improvement Specialist   [phone]  [email or web address]

## 2017-10-02 NOTE — DISCHARGE INSTRUCTIONS
Discharge Instructions    Discharged to home by car with relative. Discharged via wheelchair, hospital escort: Yes.  Special equipment needed: Not Applicable    Be sure to schedule a follow-up appointment with your primary care doctor or any specialists as instructed.     Discharge Plan:   Diet Plan: Discussed  Activity Level: Discussed  Confirmed Follow up Appointment: Patient to Call and Schedule Appointment  Confirmed Symptoms Management: Discussed  Medication Reconciliation Updated: Yes  Influenza Vaccine Indication: Patient Refuses    I understand that a diet low in cholesterol, fat, and sodium is recommended for good health. Unless I have been given specific instructions below for another diet, I accept this instruction as my diet prescription.   Other diet: Diabetic    Special Instructions: None    · Is patient discharged on Warfarin / Coumadin?   No     · Is patient Post Blood Transfusion?  No    Depression / Suicide Risk    As you are discharged from this [facility] facility, it is important to learn how to keep safe from harming yourself.    Recognize the warning signs:  · Abrupt changes in personality, positive or negative- including increase in energy   · Giving away possessions  · Change in eating patterns- significant weight changes-  positive or negative  · Change in sleeping patterns- unable to sleep or sleeping all the time   · Unwillingness or inability to communicate  · Depression  · Unusual sadness, discouragement and loneliness  · Talk of wanting to die  · Neglect of personal appearance   · Rebelliousness- reckless behavior  · Withdrawal from people/activities they love  · Confusion- inability to concentrate     If you or a loved one observes any of these behaviors or has concerns about self-harm, here's what you can do:  · Talk about it- your feelings and reasons for harming yourself  · Remove any means that you might use to hurt yourself (examples: pills, rope, extension cords,  firearm)  · Get professional help from the community (Mental Health, Substance Abuse, psychological counseling)  · Do not be alone:Call your Safe Contact- someone whom you trust who will be there for you.  · Call your local CRISIS HOTLINE 002-5990 or 034-525-8169  · Call your local Children's Mobile Crisis Response Team [place] (969) 788-3337 or www.[employer]  · Call the toll free National Suicide Prevention Hotlines   · National Suicide Prevention Lifeline 710-924-SLXA (6957)  · National Hope Line Network 800-SUICIDE (323-8828)

## 2017-10-23 ENCOUNTER — HOSPITAL ENCOUNTER (OUTPATIENT)
Dept: RADIOLOGY | Facility: MEDICAL CENTER | Age: 67
End: 2017-10-23
Attending: NEUROLOGICAL SURGERY
Payer: MEDICARE

## 2017-10-23 DIAGNOSIS — M48.061 SPINAL STENOSIS, LUMBAR REGION, WITHOUT NEUROGENIC CLAUDICATION: ICD-10-CM

## 2017-10-23 PROCEDURE — 72110 X-RAY EXAM L-2 SPINE 4/>VWS: CPT

## 2017-10-31 ENCOUNTER — PHYSICAL THERAPY (OUTPATIENT)
Dept: PHYSICAL THERAPY | Facility: REHABILITATION | Age: 67
End: 2017-10-31
Attending: NEUROLOGICAL SURGERY
Payer: MEDICARE

## 2017-10-31 DIAGNOSIS — Z98.1 S/P LUMBAR FUSION: ICD-10-CM

## 2017-10-31 PROCEDURE — 97162 PT EVAL MOD COMPLEX 30 MIN: CPT

## 2017-10-31 PROCEDURE — G8979 MOBILITY GOAL STATUS: HCPCS | Mod: CH

## 2017-10-31 PROCEDURE — 97110 THERAPEUTIC EXERCISES: CPT

## 2017-10-31 PROCEDURE — G8978 MOBILITY CURRENT STATUS: HCPCS | Mod: CI

## 2017-10-31 ASSESSMENT — ENCOUNTER SYMPTOMS
PAIN SCALE AT HIGHEST: 3
EXACERBATED BY: SLEEPING
PAIN LOCATION: BILATERAL LATERAL HIPS
EXACERBATED BY: LIFTING
QUALITY: TIGHT
PAIN SCALE: 0
PAIN TIMING: INTERMITTENT
QUALITY: TIGHTNESS
QUALITY: ACHING
EXACERBATED BY: BENDING
PAIN SCALE AT LOWEST: 0

## 2017-10-31 NOTE — OP THERAPY EVALUATION
Outpatient Physical Therapy  INITIAL EVALUATION    [facility] [place] [address], [address]  [place] [address]    Date of Evaluation: 10/31/2017    Patient: Zhang Cheung  YOB: 1950  MRN: [id number]     Referring Provider: Yemi Sharma M.D.  [id number]  [place] [address]   Referring Diagnosis Spinal stenosis, lumbar region without neurogenic claudication [M48.061]     Time Calculation  Start time: 0900  Stop time: 1000 Time Calculation (min): 60 minutes     Physical Therapy Occurrence Codes    Date physical therapy care plan established or reviewed:  10/31/17   Date physical therapy treatment started:  10/31/17             Chief Complaint: Back Injury and Back Problem    Visit Diagnoses     ICD-10-CM   1. S/P lumbar fusion Z98.1         Subjective:   History of Present Illness:     Date of surgery:  2017    Mechanism of injury:  Chronic low back pain/multiple back surgeries and sciatic pain RLE. S/p lumbar fusion L2-3, L2-L5 Lumbar Laminectomy secondary to severe lumbar stenosis L1-L4.  Severe spondylosis throughout spine.  Severe drug reaction post op to pain   Sleep disturbance:  Interrupted sleep (restless leg syndrome)  Pain:     Current pain ratin    At best pain ratin    At worst pain rating:  3    Location:  Bilateral lateral hips    Quality:  Aching, tightness and tight    Pain timing:  Intermittent    Aggravating factors:  Sleeping, lifting and bending    Pain Comments::  Pain is described as tightness, aching bilateral hips   Treatments:     Previous treatment:  Medication  Activities of Daily Living:     Patient reported ADL status: Don/doffing shoes and socks  Not exercising formally since surgery  Has recumbent bike    Patient Goals:     Patient goals for therapy:  Increased motion, increased strength, independence with ADLs/IADLs and return to sport/leisure activities       Past Medical History:   Diagnosis Date   • Arrhythmia   "   A Flutter   • Arthritis     generalized   • Diabetes     IDDM   • High cholesterol    • Hyperlipidemia    • Hypertension 2014    well  controlled   • Pain 04/11/14    back pain increases with activity   • Restless leg    • Sleep apnea     CPAP.  8/30/17 - not using CPAP due to back pain \"unable to lay on back\".   • Snoring    • Unspecified urinary incontinence     \" enlarged prostate\"     Past Surgical History:   Procedure Laterality Date   • EXTREME LATERAL INTERBODY FUSION Left 9/18/2017    Procedure: EXTREME LATERAL INTERBODY FUSION L2-3 W/PLATE;  Surgeon: Yemi Sharma M.D.;  Location: SURGERY [facility];  Service: Neurosurgery   • LUMBAR FUSION POSTERIOR  9/18/2017    Procedure: LUMBAR FUSION POSTERIOR L2-3 INSTRUMENTED;  Surgeon: Yemi Sharma M.D.;  Location: SURGERY [facility];  Service: Neurosurgery   • LUMBAR LAMINECTOMY DISKECTOMY  9/18/2017    Procedure: LUMBAR LAMINECTOMY DISKECTOMY;  Surgeon: Yemi Sharma M.D.;  Location: SURGERY [facility];  Service: Neurosurgery   • RECOVERY  1/13/2016    Procedure: IR Deep bone biopsy L2-L3 with general anesthesia-DAYANA;  Surgeon: [facility];  Location: SURGERY [facility]POST PROC [facility];  Service:    • LUMBAR LAMINECTOMY DISKECTOMY  4/22/2014    Performed by Mazin Long M.D. at SURGERY [facility]   • TRANS URETHRAL RESECTION PROSTATE  3/27/2014    Performed by Kyle Guzman M.D. at [facility]   • LUMBAR LAMINECTOMY DISKECTOMY  2/19/2014    Performed by Mazin Long M.D. at SURGERY [facility]   • OTHER ORTHOPEDIC SURGERY  02/14    L2-L3 Laminectomy   • OTHER  2014    transection supraorbital nerve   • LUMBAR LAMINECTOMY DISKECTOMY  5/2/2012    Performed by MAZIN LONG at [facility]   • OTHER ORTHOPEDIC SURGERY  03/12    L3-L5 Laminectomy   • OTHER      laser eye susrgery   • OTHER      carpectomy   • OTHER ORTHOPEDIC SURGERY  2003/2007/2008    left shoulder replacement/with corrections "   • OTHER ORTHOPEDIC SURGERY      right knee replacement/manipulation     Social History   Substance Use Topics   • Smoking status: Former Smoker     Packs/day: 0.75     Years: 10.00     Types: Cigarettes     Quit date: 4/24/2008   • Smokeless tobacco: Never Used   • Alcohol use No     Family and Occupational History     Social History   • Marital status: Single     Spouse name: N/A   • Number of children: N/A   • Years of education: N/A       Objective     Static Posture     Lumbar Spine   Decreased lordosis.     Observations   Central spine     Positive for hypolordosis.    Neurological Testing     Reflexes   Left   Patellar (L4): absent (0)  Achilles (S1): normal (2+)    Right   Patellar (L4): absent (0)  Achilles (S1): normal (2+)    Myotome testing   Lumbar (left)   All left lumbar myotomes within normal limits    Lumbar (right)   All right lumbar myotomes within normal limits    Dermatome testing   Lumbar (left)   All left lumbar dermatomes intact    Lumbar (right)   All right lumbar dermatomes intact    Tenderness     Additional Tenderness Details  No tenderness    Active Range of Motion     Lumbar   Flexion: decreased  Extension: decreased  Left lateral flexion: decreased  Right lateral flexion: decreased  Left rotation: decreased  Right rotation: decreased    Additional Active Range of Motion Details  Fused L2-3    Strength:      Abdominals   Lower abdominals: Able to maintain neutral statically    Lower extremities   Normal left lower extremity strength  Normal right lower extremity strength    Additional Strength Details  Decreased core/ lumbar stabilization           Therapeutic Exercises:     1. Ta stabilization initiation    2. Posterior pelvivc tilt, 1 x 10 with 10 sec hold    3. Ta activation with march and BKFO, 1 x 15 with cuff    4. Posture re ed with sit to stand    5. Bridge with stabilization    6. Ther ex 15 min        Assessment, Response and Plan:   Impairments: abnormal muscle firing, lacks  appropriate home exercise program, limited ADL's and pain with function    Assessment details:  Patient presents with impaired lumbar rom, abdominal strength and impaired ability to dress/and don/doff socks and shoes secondary to s/p lumbar fusion and laminectomy.  Pt will benefit from skilled PT to meet goals stated below.  Prognosis: good    Goals:   Short Term Goals:   Patient able to dress and don/doff shoes with 50% less symptoms  Patient able to walk greater than 1 mile daily for exercise  Patient able to demonstrate good body mechanics with sit to stand and lifting during ADLs      Long Term Goals:    Independent with home exercise program  Asymptomatic with ADLS   Long term goal time span:  4-6 weeks    Plan:   Therapy options:  Physical therapy treatment to continue  Planned therapy interventions:  Patient education, neuromuscular re-education, manual therapy, modalities, therapeutic activities, therapeutic exercise, ergonomics and ADL self/home management  Frequency:  2x week  Duration in visits:  10  Discussed with:  Patient      Functional Limitation G-Codes and Severity Modifiers  Current: Mobility: Current (): CI   Goal: Mobility: Goal (): CH       Referring provider co-signature:  I have reviewed this plan of care and my co-signature certifies the need for services.  Certification Dates:   From 10/31/17    To 1/31/17    Physician Signature: ________________________________ Date: ______________

## 2017-11-02 ENCOUNTER — PHYSICAL THERAPY (OUTPATIENT)
Dept: PHYSICAL THERAPY | Facility: REHABILITATION | Age: 67
End: 2017-11-02
Attending: NEUROLOGICAL SURGERY
Payer: MEDICARE

## 2017-11-02 DIAGNOSIS — Z98.1 HISTORY OF LUMBAR FUSION: ICD-10-CM

## 2017-11-02 PROCEDURE — 97110 THERAPEUTIC EXERCISES: CPT

## 2017-11-02 PROCEDURE — 97112 NEUROMUSCULAR REEDUCATION: CPT

## 2017-11-02 NOTE — OP THERAPY DAILY TREATMENT
Outpatient Physical Therapy  DAILY TREATMENT     [facility] [place] [id number] [facility], [address]  [place] [address]    Date: 11/02/2017    Patient: Zhang Cheung  YOB: 1950  MRN: [id number]     Time Calculation             Chief Complaint: Back Problem    Visit #: 2    Subjective abdominal muscles are tired from new exercises.  Worked out in yard 2 + hours after eval and had increased right lateral back soreness.  0/10 VAS currently.    Objective      Therapeutic Exercises:     1. Nu step L4 10 min    2. Ta stabilization     3. Body mechanics/ hip hinge with sait to stand    4. TB lateral walk out with band    5. Bridge on ball 10 second hold    6. Ball rolls with abd stab    8. Ther ex 20 min    Treatments:    1. Neuro re-education, 15 min, Ta stab and body mechanics      Assessment, Response and Plan:   Assessment details:  Asymptomatic throughout session.  Good progress with Ta activation supine .     Plan:   Planned therapy interventions:  Home exercise program, ergonomics, modalities, manual therapy, neuromuscular re-education, patient education and therapeutic exercise  Other planned therapy interventions:  Core stability/endurance   Frequency:  1x week  Discussed with:  Patient

## 2017-11-07 ENCOUNTER — PHYSICAL THERAPY (OUTPATIENT)
Dept: PHYSICAL THERAPY | Facility: REHABILITATION | Age: 67
End: 2017-11-07
Attending: NEUROLOGICAL SURGERY
Payer: MEDICARE

## 2017-11-07 DIAGNOSIS — Z98.1 HISTORY OF LUMBAR FUSION: ICD-10-CM

## 2017-11-07 PROCEDURE — 97140 MANUAL THERAPY 1/> REGIONS: CPT

## 2017-11-07 PROCEDURE — 97110 THERAPEUTIC EXERCISES: CPT

## 2017-11-07 NOTE — OP THERAPY DAILY TREATMENT
Outpatient Physical Therapy  DAILY TREATMENT     [facility] [place] [id number], [address]  [place] [address]    Date: 11/07/2017    Patient: Zhang Cheung  YOB: 1950  MRN: [id number]     Time Calculation  Start time: 1000  Stop time: 1045 Time Calculation (min): 45 minutes     Chief Complaint: Back Problem    Visit #: 3    Subjective  No pain Lumbar region but increased soreness post there ex last session.  Been riding recumbent bike at home .    Objective      Therapeutic Exercises:     1. Nu step L4 10 min    2. Ta stabilization march, ball roll    3. Body mechanics/ hip hinge with sait to stand    4. Ball wall squat, 1 x 25    5. Bridge on ball 10 second hold, 10 x 10 sec    7. Alternate arm lift quadruped , 1 x 15    8. Ther ex 25 min    Treatments:    1. Manual therapy, 10 min, IASTM lumbar PS, lower thoracic PS , QL      Assessment, Response and Plan:   Assessment details:  Patient may be loading spine excessively at home with household chores such as vacuuming, lifting etc.causing soreness.   Good compliance with home program. 0/10 pain post treatment.  Prognosis: good      Plan:   Therapy options:  Physical therapy treatment to continue  Planned therapy interventions:  Patient education, manual therapy, neuromuscular re-education, therapeutic exercise, modalities, home exercise program, therapeutic activities and ergonomics  Other planned therapy interventions:  Progress core stability, body mechanics  Frequency:  1x week  Duration in visits:  7  Discussed with:  Patient

## 2017-11-14 ENCOUNTER — PHYSICAL THERAPY (OUTPATIENT)
Dept: PHYSICAL THERAPY | Facility: REHABILITATION | Age: 67
End: 2017-11-14
Attending: NEUROLOGICAL SURGERY
Payer: MEDICARE

## 2017-11-14 DIAGNOSIS — Z98.1 S/P LUMBAR FUSION: ICD-10-CM

## 2017-11-14 PROCEDURE — 97110 THERAPEUTIC EXERCISES: CPT

## 2017-11-14 PROCEDURE — 97535 SELF CARE MNGMENT TRAINING: CPT | Mod: XU

## 2017-11-14 PROCEDURE — 97530 THERAPEUTIC ACTIVITIES: CPT

## 2017-11-14 NOTE — OP THERAPY DAILY TREATMENT
Outpatient Physical Therapy  DAILY TREATMENT     [facility] [address], [address]  [place] [address]    Date: 11/14/2017    Patient: Zhang Cheung  YOB: 1950  MRN: [id number]     Time Calculation             Chief Complaint: No chief complaint on file.    Visit #: 4    Subjective:   History of Present Illness:     Mechanism of injury:  No pain in lumbar. Decreased leg strength. Staying active with walking on even pavement, and doing housework. Back was bothered when he carried leaf blower around, recovered in a day.       Objective      Therapeutic Exercises:     1. Nu step L4 10 min    2. Ta stabilization march, ball roll, 10 each    3. Body mechanics/ hip hinge with sait to stand    4. Ball wall squat, 1 x 25 hold due to increased quad soreness    5. Bridge on ball 10 second hold, 10 x 10 sec    7. Alternate arm lift quadruped , 1 x 15 hold for next session    8. TA step out band, 3 steps and green band 10 reps bilateral    9. Shuttle squat, full resistance and 3 x 15 reps    10. Sidelying clams, 1 x 15 each, add band resistance    11. Ther ex 48 min    Treatments:    1. Develop home exercise program, 8 min      Assessment/Response/Plan

## 2017-11-21 ENCOUNTER — PHYSICAL THERAPY (OUTPATIENT)
Dept: PHYSICAL THERAPY | Facility: REHABILITATION | Age: 67
End: 2017-11-21
Attending: NEUROLOGICAL SURGERY
Payer: MEDICARE

## 2017-11-21 DIAGNOSIS — Z98.1 S/P LUMBAR FUSION: ICD-10-CM

## 2017-11-21 PROCEDURE — 97110 THERAPEUTIC EXERCISES: CPT

## 2017-11-21 PROCEDURE — 97140 MANUAL THERAPY 1/> REGIONS: CPT

## 2017-11-21 NOTE — OP THERAPY DAILY TREATMENT
"Outpatient Physical Therapy  DAILY TREATMENT     [facility] [place] [address] [facility], [address]  [place] [address]    Date: 11/21/2017    Patient: Zhang Cheung  YOB: 1950  MRN: [id number]     Time Calculation  Start time: 0900  Stop time: 0840 Time Calculation (min): 1420 minutes     Chief Complaint: No chief complaint on file.    Visit #: 5    Subjective:   History of Present Illness:     Mechanism of injury:  \"all this exercising seems to be creating some soreness in the back muscles. Feels like getting stronger with exercise and it's not hurting to do them\".       Objective      Therapeutic Exercises (CPT 98689):     Total treatment time spent on Therapeutic Exercises: 35 minutes.      1. Nu step L4 10 min, 9 min, did 11/21    2. Ta stabilization march, ball roll, 10 each    3. Body mechanics/ hip hinge with sait to stand    4. Ball wall squat, 1 x 25 hold due to increased quad soreness    5. Bridge on ball 10 second hold, 10 x 10 sec    6. Lumbar AROM 3-d pelvis, 20 each     7. Alternate arm lift quadruped , 1 x 15 hold for next session    8. TA step out band, 3 steps and green band 10 reps bilateral    9. Shuttle squat, full resistance and 3 x 15 reps    10. Sidelying clams, 1 x 15 each, add band resistance    11. HS stretch belt, Did 11/21    12. Cat/camel, did 11/21 diminished extension    Therapeutic Treatments and Modalities:     1. Manual Therapy (CPT 28743), 9 min minutes, lumbar, fascial mobilization       Assessment, Response and Plan:   Assessment details:  Stiff and poor recruitment for lumbar extensors and was incorrectly performing HS stretch with too much lumbar flexion, instructed in supine HS with good response        "

## 2017-11-27 ENCOUNTER — HOSPITAL ENCOUNTER (OUTPATIENT)
Dept: RADIOLOGY | Facility: MEDICAL CENTER | Age: 67
End: 2017-11-27
Attending: NEUROLOGICAL SURGERY
Payer: MEDICARE

## 2017-11-27 DIAGNOSIS — R52 PAIN: ICD-10-CM

## 2017-11-27 PROCEDURE — 72110 X-RAY EXAM L-2 SPINE 4/>VWS: CPT

## 2017-11-28 ENCOUNTER — PHYSICAL THERAPY (OUTPATIENT)
Dept: PHYSICAL THERAPY | Facility: REHABILITATION | Age: 67
End: 2017-11-28
Attending: NEUROLOGICAL SURGERY
Payer: MEDICARE

## 2017-11-28 DIAGNOSIS — Z98.1 S/P LUMBAR FUSION: ICD-10-CM

## 2017-11-28 PROCEDURE — 97530 THERAPEUTIC ACTIVITIES: CPT

## 2017-11-28 PROCEDURE — 97535 SELF CARE MNGMENT TRAINING: CPT | Mod: XU

## 2017-11-28 PROCEDURE — 97110 THERAPEUTIC EXERCISES: CPT

## 2017-11-28 NOTE — OP THERAPY DAILY TREATMENT
Outpatient Physical Therapy  DAILY TREATMENT     [facility] [address] [facility], [address]  [place] [address]    Date: 11/28/2017    Patient: Zhang Cheung  YOB: 1950  MRN: [id number]     Time Calculation             Chief Complaint: No chief complaint on file.    Visit #: 6    SUBJECTIVE:  Moved up to 30 min recumbent every other day. No pain in back.     OBJECTIVE:  Current objective measures: Quad dominant squatting, decreased recruitment of lumbar extensors with cat/camel.       Therapeutic Exercises (CPT 78673):     Total treatment time spent on Therapeutic Exercises: 48 minutes.      1. Nu step L4 10 min, 9 min    3. Body mechanics/ hip hinge with sait to stand, Quad dominant    4. Ball wall squat, 2 x 10 , cues for form    5. Bridge on ball 10 second hold, 10 x 10 sec, didn't do 11/28    6. Lumbar AROM 3-d pelvis, 20 each     7. Alternate arm lift quadruped , 1 x 15 hold for next session    8. TA step out band, 3 steps and blue band 10 reps bilateral    9. Quad stretch standing lunge    10. Sidelying clams, 1 x 15 each, add band resistance    11. HS stretch belt    12. Cat/camel, poor extensors        Pain rating before treatment: 0  Pain rating after treatment: 0    ASSESSMENT:   Response to treatment: Complained of discomfort in anterior thighs after previous ball wall squat, identified he was doing a quad dominant squat and with modification tolerated much better.     PLAN/RECOMMENDATIONS:   Plan for treatment: therapy treatment to continue next visit.  Planned interventions for next visit: continue with current treatment.

## 2017-12-05 ENCOUNTER — PHYSICAL THERAPY (OUTPATIENT)
Dept: PHYSICAL THERAPY | Facility: REHABILITATION | Age: 67
End: 2017-12-05
Attending: NEUROLOGICAL SURGERY
Payer: MEDICARE

## 2017-12-05 DIAGNOSIS — Z98.1 S/P LUMBAR FUSION: ICD-10-CM

## 2017-12-05 PROCEDURE — 97530 THERAPEUTIC ACTIVITIES: CPT

## 2017-12-05 PROCEDURE — 97535 SELF CARE MNGMENT TRAINING: CPT | Mod: XU

## 2017-12-05 PROCEDURE — 97110 THERAPEUTIC EXERCISES: CPT

## 2017-12-05 NOTE — OP THERAPY DAILY TREATMENT
Outpatient Physical Therapy  DAILY TREATMENT     [facility] [place] [id number] [facility], [address]  [place] [address]    Date: 12/05/2017    Patient: Zhang Cheung  YOB: 1950  MRN: [id number]     Time Calculation             Chief Complaint: No chief complaint on file.    Visit #: 7    SUBJECTIVE:  Went to MD and new xrays done of spine, per patient report spine is healing well into the hardware.     OBJECTIVE:  Current objective measures: see discharge summary      Therapeutic Exercises (CPT 04470):     Total treatment time spent on Therapeutic Exercises: 35 minutes.      1. Nu step L4 10 min, 9 min    3. Body mechanics/ hip hinge with sait to stand    4. Ball wall squat, 2 x 10 , cues for form    5. Bridge on ball 10 second hold, 10 x 10 sec    6. Lumbar AROM 3-d pelvis, 20 each     7. Alternate arm lift quadruped , 1 x 15 hold for next session    8. TA step out band, 3 steps and blue band 10 reps bilateral    9. Quad stretch standing lunge    10. Sidelying clams, 1 x 15 each    11. HS stretch belt    12. Cat/camel    Therapeutic Treatments and Modalities:     1. Functional Training, Self Care (CPT 94145), 10 minutes, lift mechanics to protect back, HEP, answer long term rehab questions      Pain rating before treatment: 0  Pain rating after treatment: 0    ASSESSMENT:   Response to treatment: Discharging from PT today, demonstrated independence with program. Does not have any low back pain or radicular symptoms.     PLAN/RECOMMENDATIONS:   Plan for treatment: discharge patient due to accomplished goals.  Planned interventions for next visit: functional training/self care (CPT 30938) Discharge from Pt.

## 2018-01-03 ENCOUNTER — HOSPITAL ENCOUNTER (OUTPATIENT)
Dept: LAB | Facility: MEDICAL CENTER | Age: 68
End: 2018-01-03
Attending: FAMILY MEDICINE
Payer: MEDICARE

## 2018-01-03 ENCOUNTER — HOSPITAL ENCOUNTER (OUTPATIENT)
Dept: LAB | Facility: MEDICAL CENTER | Age: 68
End: 2018-01-03
Attending: PSYCHIATRY & NEUROLOGY
Payer: MEDICARE

## 2018-01-03 LAB
ALBUMIN SERPL BCP-MCNC: 4.1 G/DL (ref 3.2–4.9)
ALBUMIN/GLOB SERPL: 1.3 G/DL
ALP SERPL-CCNC: 102 U/L (ref 30–99)
ALT SERPL-CCNC: 12 U/L (ref 2–50)
ANION GAP SERPL CALC-SCNC: 11 MMOL/L (ref 0–11.9)
AST SERPL-CCNC: 28 U/L (ref 12–45)
BASOPHILS # BLD AUTO: 0.8 % (ref 0–1.8)
BASOPHILS # BLD: 0.04 K/UL (ref 0–0.12)
BILIRUB SERPL-MCNC: 0.4 MG/DL (ref 0.1–1.5)
BUN SERPL-MCNC: 34 MG/DL (ref 8–22)
CALCIUM SERPL-MCNC: 10 MG/DL (ref 8.5–10.5)
CHLORIDE SERPL-SCNC: 104 MMOL/L (ref 96–112)
CO2 SERPL-SCNC: 26 MMOL/L (ref 20–33)
CREAT SERPL-MCNC: 0.89 MG/DL (ref 0.5–1.4)
EOSINOPHIL # BLD AUTO: 0.28 K/UL (ref 0–0.51)
EOSINOPHIL NFR BLD: 5.3 % (ref 0–6.9)
ERYTHROCYTE [DISTWIDTH] IN BLOOD BY AUTOMATED COUNT: 49.1 FL (ref 35.9–50)
EST. AVERAGE GLUCOSE BLD GHB EST-MCNC: 160 MG/DL
FOLATE SERPL-MCNC: 22.2 NG/ML
GFR SERPL CREATININE-BSD FRML MDRD: >60 ML/MIN/1.73 M 2
GLOBULIN SER CALC-MCNC: 3.2 G/DL (ref 1.9–3.5)
GLUCOSE SERPL-MCNC: 123 MG/DL (ref 65–99)
HBA1C MFR BLD: 7.2 % (ref 0–5.6)
HCT VFR BLD AUTO: 45.5 % (ref 42–52)
HGB BLD-MCNC: 14.9 G/DL (ref 14–18)
IMM GRANULOCYTES # BLD AUTO: 0.01 K/UL (ref 0–0.11)
IMM GRANULOCYTES NFR BLD AUTO: 0.2 % (ref 0–0.9)
LYMPHOCYTES # BLD AUTO: 1.37 K/UL (ref 1–4.8)
LYMPHOCYTES NFR BLD: 25.9 % (ref 22–41)
MAGNESIUM SERPL-MCNC: 1.9 MG/DL (ref 1.5–2.5)
MCH RBC QN AUTO: 30.3 PG (ref 27–33)
MCHC RBC AUTO-ENTMCNC: 32.7 G/DL (ref 33.7–35.3)
MCV RBC AUTO: 92.5 FL (ref 81.4–97.8)
MONOCYTES # BLD AUTO: 0.51 K/UL (ref 0–0.85)
MONOCYTES NFR BLD AUTO: 9.6 % (ref 0–13.4)
NEUTROPHILS # BLD AUTO: 3.08 K/UL (ref 1.82–7.42)
NEUTROPHILS NFR BLD: 58.2 % (ref 44–72)
NRBC # BLD AUTO: 0 K/UL
NRBC BLD-RTO: 0 /100 WBC
PLATELET # BLD AUTO: 243 K/UL (ref 164–446)
PMV BLD AUTO: 11.5 FL (ref 9–12.9)
POTASSIUM SERPL-SCNC: 3.9 MMOL/L (ref 3.6–5.5)
PROT SERPL-MCNC: 7.3 G/DL (ref 6–8.2)
RBC # BLD AUTO: 4.92 M/UL (ref 4.7–6.1)
SODIUM SERPL-SCNC: 141 MMOL/L (ref 135–145)
VIT B12 SERPL-MCNC: 267 PG/ML (ref 211–911)
WBC # BLD AUTO: 5.3 K/UL (ref 4.8–10.8)

## 2018-01-03 PROCEDURE — 83735 ASSAY OF MAGNESIUM: CPT | Mod: GA

## 2018-01-03 PROCEDURE — 82607 VITAMIN B-12: CPT

## 2018-01-03 PROCEDURE — 36415 COLL VENOUS BLD VENIPUNCTURE: CPT

## 2018-01-03 PROCEDURE — 82746 ASSAY OF FOLIC ACID SERUM: CPT

## 2018-01-03 PROCEDURE — 84156 ASSAY OF PROTEIN URINE: CPT

## 2018-01-03 PROCEDURE — 84160 ASSAY OF PROTEIN ANY SOURCE: CPT

## 2018-01-03 PROCEDURE — 83036 HEMOGLOBIN GLYCOSYLATED A1C: CPT | Mod: GA

## 2018-01-03 PROCEDURE — 84165 PROTEIN E-PHORESIS SERUM: CPT

## 2018-01-03 PROCEDURE — 80053 COMPREHEN METABOLIC PANEL: CPT

## 2018-01-03 PROCEDURE — 82784 ASSAY IGA/IGD/IGG/IGM EACH: CPT

## 2018-01-03 PROCEDURE — 86334 IMMUNOFIX E-PHORESIS SERUM: CPT

## 2018-01-03 PROCEDURE — 85025 COMPLETE CBC W/AUTO DIFF WBC: CPT

## 2018-01-05 LAB
ALBUMIN SERPL-MCNC: 4.49 G/DL (ref 3.75–5.01)
ALPHA1 GLOB SERPL ELPH-MCNC: 0.26 G/DL (ref 0.19–0.46)
ALPHA2 GLOB SERPL ELPH-MCNC: 0.84 G/DL (ref 0.48–1.05)
B-GLOBULIN SERPL ELPH-MCNC: 1.04 G/DL (ref 0.48–1.1)
GAMMA GLOB SERPL ELPH-MCNC: 0.87 G/DL (ref 0.62–1.51)
IGA SERPL-MCNC: 289 MG/DL (ref 68–408)
IGG SERPL-MCNC: 838 MG/DL (ref 768–1632)
IGM SERPL-MCNC: 45 MG/DL (ref 35–263)
INTERPRETATION SERPL IFE-IMP: ABNORMAL
INTERPRETATION SERPL IFE-IMP: ABNORMAL
PATHOLOGY STUDY: ABNORMAL
PROT SERPL-MCNC: 7.5 G/DL (ref 6–8.3)
PROT UR-MCNC: 22.8 MG/DL (ref 0–15)

## 2018-03-04 ENCOUNTER — HOSPITAL ENCOUNTER (OUTPATIENT)
Dept: RADIOLOGY | Facility: MEDICAL CENTER | Age: 68
End: 2018-03-04
Attending: NEUROLOGICAL SURGERY
Payer: MEDICARE

## 2018-03-04 DIAGNOSIS — M48.061 SPINAL STENOSIS, LUMBAR REGION, WITHOUT NEUROGENIC CLAUDICATION: ICD-10-CM

## 2018-03-04 DIAGNOSIS — M54.5 LOW BACK PAIN, UNSPECIFIED BACK PAIN LATERALITY, UNSPECIFIED CHRONICITY, WITH SCIATICA PRESENCE UNSPECIFIED: ICD-10-CM

## 2018-03-04 DIAGNOSIS — M47.817 LUMBOSACRAL SPONDYLOSIS WITHOUT MYELOPATHY: ICD-10-CM

## 2018-03-04 PROCEDURE — 72110 X-RAY EXAM L-2 SPINE 4/>VWS: CPT

## 2018-03-28 ENCOUNTER — HOSPITAL ENCOUNTER (OUTPATIENT)
Dept: LAB | Facility: MEDICAL CENTER | Age: 68
End: 2018-03-28
Attending: UROLOGY
Payer: MEDICARE

## 2018-03-28 LAB — PSA SERPL-MCNC: 5.57 NG/ML (ref 0–4)

## 2018-03-28 PROCEDURE — 84153 ASSAY OF PSA TOTAL: CPT | Mod: GA

## 2018-03-28 PROCEDURE — 36415 COLL VENOUS BLD VENIPUNCTURE: CPT | Mod: GA

## 2018-04-21 ENCOUNTER — HOSPITAL ENCOUNTER (OUTPATIENT)
Dept: LAB | Facility: MEDICAL CENTER | Age: 68
End: 2018-04-21
Attending: UROLOGY
Payer: MEDICARE

## 2018-04-21 LAB
BUN SERPL-MCNC: 40 MG/DL (ref 8–22)
CREAT SERPL-MCNC: 1.09 MG/DL (ref 0.5–1.4)

## 2018-04-21 PROCEDURE — 84520 ASSAY OF UREA NITROGEN: CPT

## 2018-04-21 PROCEDURE — 36415 COLL VENOUS BLD VENIPUNCTURE: CPT

## 2018-04-21 PROCEDURE — 82565 ASSAY OF CREATININE: CPT

## 2018-05-16 ENCOUNTER — HOSPITAL ENCOUNTER (OUTPATIENT)
Dept: RADIOLOGY | Facility: MEDICAL CENTER | Age: 68
End: 2018-05-16
Attending: UROLOGY
Payer: MEDICARE

## 2018-05-16 DIAGNOSIS — N28.1 ACQUIRED CYST OF KIDNEY: ICD-10-CM

## 2018-05-16 PROCEDURE — 700117 HCHG RX CONTRAST REV CODE 255: Performed by: UROLOGY

## 2018-05-16 PROCEDURE — 74170 CT ABD WO CNTRST FLWD CNTRST: CPT

## 2018-05-16 RX ADMIN — IOHEXOL 100 ML: 350 INJECTION, SOLUTION INTRAVENOUS at 09:26

## 2018-05-21 ENCOUNTER — OFFICE VISIT (OUTPATIENT)
Dept: MEDICAL GROUP | Facility: MEDICAL CENTER | Age: 68
End: 2018-05-21
Payer: MEDICARE

## 2018-05-21 VITALS
TEMPERATURE: 98.2 F | OXYGEN SATURATION: 92 % | WEIGHT: 238 LBS | DIASTOLIC BLOOD PRESSURE: 74 MMHG | HEIGHT: 66 IN | SYSTOLIC BLOOD PRESSURE: 132 MMHG | RESPIRATION RATE: 20 BRPM | HEART RATE: 76 BPM | BODY MASS INDEX: 38.25 KG/M2

## 2018-05-21 DIAGNOSIS — E11.9 TYPE 2 DIABETES MELLITUS WITHOUT COMPLICATION, WITH LONG-TERM CURRENT USE OF INSULIN (HCC): ICD-10-CM

## 2018-05-21 DIAGNOSIS — M25.512 BILATERAL SHOULDER PAIN, UNSPECIFIED CHRONICITY: ICD-10-CM

## 2018-05-21 DIAGNOSIS — I48.3 TYPICAL ATRIAL FLUTTER (HCC): ICD-10-CM

## 2018-05-21 DIAGNOSIS — N40.1 BENIGN PROSTATIC HYPERPLASIA WITH URINARY HESITANCY: ICD-10-CM

## 2018-05-21 DIAGNOSIS — M48.061 SPINAL STENOSIS OF LUMBAR REGION, UNSPECIFIED WHETHER NEUROGENIC CLAUDICATION PRESENT: ICD-10-CM

## 2018-05-21 DIAGNOSIS — M54.89 OTHER BACK PAIN, UNSPECIFIED CHRONICITY: ICD-10-CM

## 2018-05-21 DIAGNOSIS — M25.511 BILATERAL SHOULDER PAIN, UNSPECIFIED CHRONICITY: ICD-10-CM

## 2018-05-21 DIAGNOSIS — Z79.4 TYPE 2 DIABETES MELLITUS WITHOUT COMPLICATION, WITH LONG-TERM CURRENT USE OF INSULIN (HCC): ICD-10-CM

## 2018-05-21 DIAGNOSIS — M25.531 RIGHT WRIST PAIN: ICD-10-CM

## 2018-05-21 DIAGNOSIS — R39.11 BENIGN PROSTATIC HYPERPLASIA WITH URINARY HESITANCY: ICD-10-CM

## 2018-05-21 DIAGNOSIS — G62.9 NEUROPATHY: ICD-10-CM

## 2018-05-21 DIAGNOSIS — Z23 NEED FOR VACCINATION: ICD-10-CM

## 2018-05-21 DIAGNOSIS — I10 ESSENTIAL HYPERTENSION: ICD-10-CM

## 2018-05-21 DIAGNOSIS — M79.671 BILATERAL FOOT PAIN: ICD-10-CM

## 2018-05-21 DIAGNOSIS — M79.672 BILATERAL FOOT PAIN: ICD-10-CM

## 2018-05-21 PROBLEM — E87.1 HYPONATREMIA: Status: RESOLVED | Noted: 2017-09-27 | Resolved: 2018-05-21

## 2018-05-21 PROBLEM — N17.9 AKI (ACUTE KIDNEY INJURY) (HCC): Status: RESOLVED | Noted: 2017-09-26 | Resolved: 2018-05-21

## 2018-05-21 PROBLEM — L40.9 PSORIASIS: Status: RESOLVED | Noted: 2018-05-21 | Resolved: 2018-05-21

## 2018-05-21 PROBLEM — L40.9 PSORIASIS: Status: ACTIVE | Noted: 2018-05-21

## 2018-05-21 PROBLEM — J96.01 ACUTE RESPIRATORY FAILURE WITH HYPOXIA AND HYPERCAPNIA (HCC): Status: RESOLVED | Noted: 2017-09-27 | Resolved: 2018-05-21

## 2018-05-21 PROBLEM — J96.02 ACUTE RESPIRATORY FAILURE WITH HYPOXIA AND HYPERCAPNIA (HCC): Status: RESOLVED | Noted: 2017-09-27 | Resolved: 2018-05-21

## 2018-05-21 PROBLEM — N19 RENAL FAILURE: Status: RESOLVED | Noted: 2017-03-16 | Resolved: 2018-05-21

## 2018-05-21 PROBLEM — I95.9 HYPOTENSION: Status: RESOLVED | Noted: 2017-09-26 | Resolved: 2018-05-21

## 2018-05-21 PROBLEM — R41.0 DELIRIUM: Status: RESOLVED | Noted: 2017-09-28 | Resolved: 2018-05-21

## 2018-05-21 PROCEDURE — G0009 ADMIN PNEUMOCOCCAL VACCINE: HCPCS | Performed by: FAMILY MEDICINE

## 2018-05-21 PROCEDURE — 99215 OFFICE O/P EST HI 40 MIN: CPT | Mod: 25 | Performed by: FAMILY MEDICINE

## 2018-05-21 PROCEDURE — 90670 PCV13 VACCINE IM: CPT | Performed by: FAMILY MEDICINE

## 2018-05-21 RX ORDER — BENAZEPRIL HYDROCHLORIDE 40 MG/1
40 TABLET ORAL DAILY
Qty: 30 TAB | Refills: 0 | Status: SHIPPED | OUTPATIENT
Start: 2018-05-21 | End: 2019-02-19 | Stop reason: SDUPTHER

## 2018-05-21 RX ORDER — CELECOXIB 200 MG/1
200 CAPSULE ORAL DAILY
COMMUNITY
End: 2018-07-22 | Stop reason: SDUPTHER

## 2018-05-21 RX ORDER — AMLODIPINE BESYLATE 2.5 MG/1
10 TABLET ORAL DAILY
Qty: 90 TAB | Refills: 3 | Status: SHIPPED | OUTPATIENT
Start: 2018-05-21 | End: 2019-02-19

## 2018-05-21 RX ORDER — GABAPENTIN 300 MG/1
CAPSULE ORAL
Qty: 1200 CAP | Refills: 3 | Status: SHIPPED | OUTPATIENT
Start: 2018-05-21 | End: 2019-05-13 | Stop reason: SDUPTHER

## 2018-05-21 ASSESSMENT — PATIENT HEALTH QUESTIONNAIRE - PHQ9: CLINICAL INTERPRETATION OF PHQ2 SCORE: 0

## 2018-05-21 NOTE — ASSESSMENT & PLAN NOTE
Nearly controlled   Lab Results   Component Value Date/Time    HBA1C 7.2 (H) 01/03/2018 11:11 AM      Labs follow up with DM RN and myself     Needs to do retina screen

## 2018-05-21 NOTE — PROGRESS NOTES
"This medical record contains text that has been entered with the assistance of computer voice recognition and dictation software.  Therefore, it may contain unintended errors in text, spelling, punctuation, or grammar        Chief Complaint   Patient presents with   • Establish Care       Zhang Cheung is a 68 y.o. male here evaluation and management of: diabetes HTN neuropathy and new issue foot pain       HPI:     Pt is switching to [facility] from private primary care provider group   He takes a lot of time to tell me in detail his dissatisfaction with his previous providers group not with the physician per say but with the office management billing and with [employer] (which he attributes) to his previous office, he also tells me about his dissatisfaction in an orthopaedic surgeon who recommend trial of conservative management first but was more pleased when he flew to [facility] for consultation and was offered surgery immediately, he also wants to talk about his chronic foot pain burning sensation (was frustrated that his lower extremity pain was described as \"neuropathy\" but then later found out part of his pain was lumbar radiculopathy.  Today he continues to have pain pins and needles on the bottom of his feet but states \"I assume there is some arthritis too and we need to get to the bottom of it\"        Current Outpatient Prescriptions   Medication Sig Dispense Refill   • celecoxib (CELEBREX) 200 MG Cap Take 200 mg by mouth every day.     • amLODIPine (NORVASC) 2.5 MG Tab Take 4 Tabs by mouth every day. 90 Tab 3   • benazepril (LOTENSIN) 40 MG tablet Take 1 Tab by mouth every day. 30 Tab 0   • gabapentin (NEURONTIN) 300 MG Cap Take 1200mg PO TID 1200 Cap 3   • metFORMIN (GLUCOPHAGE) 500 MG Tab Take 2 Tabs by mouth 2 times a day, with meals. 180 Tab 3   • Cholecalciferol (VITAMIN D3) 5000 UNITS Cap Take 1 Cap by mouth every day.     • insulin aspart protamine-insulin aspart (NOVOLOG MIX 70/30) (70-30) 100 UNIT/ML " injection Inject 8-10 Units as instructed 3 times a day before meals. 100 = 5 units  120= 7 units  184 = 9 units  200 = 12 units  Pt can go as high as 22 units if needed     • tamsulosin (FLOMAX) 0.4 MG capsule Take 0.8 mg by mouth every bedtime.     • Omega-3 Fatty Acids (FISH OIL TRIPLE STRENGTH) 1400 MG Cap Take 1 Cap by mouth every day.     • Multiple Vitamins-Minerals (MULTIVITAMIN PO) Take 1 Tab by mouth every day.     • gemfibrozil (LOPID) 600 MG TABS Take 600 mg by mouth 2 times a day.       No current facility-administered medications for this visit.      Patient Active Problem List    Diagnosis Date Noted   • Lumbar stenosis 09/12/2017     Priority: Medium   • Typical atrial flutter (HCC) 04/25/2017     Priority: Medium   • Essential hypertension 10/13/2013     Priority: Medium   • Dyslipidemia 10/13/2013     Priority: Medium   • Benign prostatic hyperplasia 03/27/2014     Priority: Low   • Degeneration of lumbar or lumbosacral intervertebral disc 02/19/2014     Priority: Low   • Type 2 DM 10/13/2013     Priority: Low   • CKD (chronic kidney disease) stage 2, GFR 60-89 ml/min 10/13/2013     Priority: Low   • Benign prostatic hyperplasia with urinary hesitancy 05/21/2018   • Right wrist pain 05/21/2018   • Neuropathy (HCC) 05/21/2018   • Bilateral shoulder pain 05/21/2018   • Bilateral foot pain 05/21/2018   • Stenosis, spinal, lumbar 09/18/2017     Past Surgical History:   Procedure Laterality Date   • EXTREME LATERAL INTERBODY FUSION Left 9/18/2017    Procedure: EXTREME LATERAL INTERBODY FUSION L2-3 W/PLATE;  Surgeon: Yemi Sharma M.D.;  Location: SURGERY [facility];  Service: Neurosurgery   • LUMBAR FUSION POSTERIOR  9/18/2017    Procedure: LUMBAR FUSION POSTERIOR L2-3 INSTRUMENTED;  Surgeon: Yemi Sharma M.D.;  Location: [facility];  Service: Neurosurgery   • LUMBAR LAMINECTOMY DISKECTOMY  9/18/2017    Procedure: LUMBAR LAMINECTOMY DISKECTOMY;  Surgeon: Yemi Sharma M.D.;   "Location: SURGERY [facility];  Service: Neurosurgery   • RECOVERY  1/13/2016    Procedure: IR Deep bone biopsy L2-L3 with general anesthesia-DAYANA;  Surgeon: [facility];  [place] [facility] [place]  Service:    • LUMBAR LAMINECTOMY DISKECTOMY  4/22/2014    Performed by Mazin Long M.D. at SURGERY [facility]   • TRANS URETHRAL RESECTION PROSTATE  3/27/2014    Performed by Kyle Guzman M.D. at [facility]   • LUMBAR LAMINECTOMY DISKECTOMY  2/19/2014    Performed by Mazin Long M.D. at [facility]   • OTHER ORTHOPEDIC SURGERY  02/14    L2-L3 Laminectomy   • OTHER  2014    transection supraorbital nerve   • LUMBAR LAMINECTOMY DISKECTOMY  5/2/2012    Performed by MAZIN LONG at [facility]   • OTHER ORTHOPEDIC SURGERY  03/12    L3-L5 Laminectomy   • OTHER      laser eye susrgery   • OTHER      carpectomy   • OTHER ORTHOPEDIC SURGERY  2003/2007/2008    left shoulder replacement/with corrections   • OTHER ORTHOPEDIC SURGERY      right knee replacement/manipulation      Social History   Substance Use Topics   • Smoking status: Former Smoker     Packs/day: 0.75     Years: 10.00     Types: Cigarettes     Quit date: 4/24/2008   • Smokeless tobacco: Never Used   • Alcohol use No     Family History   Problem Relation Age of Onset   • Sleep Apnea Neg Hx            ROS    all review of system completed and negative except for those listed above     Objective:     Blood pressure 132/74, pulse 76, temperature 36.8 °C (98.2 °F), resp. rate 20, height 1.676 m (5' 6\"), weight 108 kg (238 lb), SpO2 92 %. Body mass index is 38.41 kg/m².  Physical Exam:        GEN: comfortable, alert and oriented, well nourished, well developed, in no apparent distress   HEENT: NCAT, eyes: pupils equal and reactive, sclera white, EOMIT, good dentition  HEART: limbs warm and well perfused, regular rate, no JVD, no lower extremity edema  LUNGS: speaking in full sentences, " not in apparent respiratory distress, no audible wheezes  MSK: normal tone and bulk, no swelling of the joints, gait steady and normal       Assessment and Plan:   The following treatment plan was discussed        Problem List Items Addressed This Visit     Essential hypertension     At goal continue ACEI CCB          Relevant Medications    amLODIPine (NORVASC) 2.5 MG Tab    benazepril (LOTENSIN) 40 MG tablet    Typical atrial flutter (HCC)     Stable no issues   Needs to est with cardiology     s/p ablation              Relevant Medications    amLODIPine (NORVASC) 2.5 MG Tab    benazepril (LOTENSIN) 40 MG tablet    Other Relevant Orders    REFERRAL TO CARDIOLOGY    DX-FOOT-COMPLETE 3+ LEFT    DX-FOOT-COMPLETE 3+ RIGHT    Lumbar stenosis     Stable   s/p surgery   Referral to physiatry if any issues arise   Surgery was in September last year               Benign prostatic hyperplasia with urinary hesitancy    Relevant Orders    DX-FOOT-COMPLETE 3+ LEFT    DX-FOOT-COMPLETE 3+ RIGHT    Right wrist pain     h/o ?carpel tunnel surgery         Neuropathy (HCC)     Not well controlled on gabapentin  Confusion picture because he has had sciatica in the past too and spinal cord stenosis as well as diabetes and could also have arthritis, he would like to investigate this further  - I suggest x rays follow up to discuss this further also I suggest physiatry referral and primary care will manage diabetes                Relevant Medications    amLODIPine (NORVASC) 2.5 MG Tab    benazepril (LOTENSIN) 40 MG tablet    gabapentin (NEURONTIN) 300 MG Cap    Other Relevant Orders    PNEUMOCOCCAL CONJUGATE VACCINE 13-VALENT (Completed)    DX-FOOT-COMPLETE 3+ LEFT    DX-FOOT-COMPLETE 3+ RIGHT    REFERRAL TO PODIATRY    Bilateral shoulder pain     Has bilateral shoulder pain wrist pain hip pain   He would like to see ortho   Does not sound like PMR picture to me as symptoms were not acute onset but rather gradual and involve poximal as  "well as distal joints                   Relevant Orders    REFERRAL TO ORTHOPEDICS    Bilateral foot pain     todays evaluation was limited in that patient is poor historian and although I attempted to redirect him multiple times spent majority of visit describing frustrations with previous providers.  He is also very challenging in that he is looking for \"black and white\" answers with management plan and diagnosis while I explain that many symptoms can have multiple contributing factors (such as diabetic neuropathy AND lumbar radiculopathy)     Plan   X rays  Can get ortho /physiatry/and podietry involved  Will get DM RM involved  Follow up 1 mo     Over 45 minutes spent with patient face to face, greater than 50% time spent with plan/coordination of care regarding that which is discussed in the HPI and A&P             Relevant Orders    REFERRAL TO PODIATRY    Type 2 DM     Nearly controlled   Lab Results   Component Value Date/Time    HBA1C 7.2 (H) 01/03/2018 11:11 AM      Labs follow up with DM RN and myself     Needs to do retina screen             Relevant Medications    amLODIPine (NORVASC) 2.5 MG Tab    benazepril (LOTENSIN) 40 MG tablet    gabapentin (NEURONTIN) 300 MG Cap    metFORMIN (GLUCOPHAGE) 500 MG Tab    Other Relevant Orders    LDL, DIRECT    HDL CHOLESTEROL    CHOLESTEROL    BASIC METABOLIC PANEL    MICROALBUMIN CREAT RATIO URINE    HEMOGLOBIN A1C    REFERRAL TO OPTOMETRY    PNEUMOCOCCAL CONJUGATE VACCINE 13-VALENT (Completed)    DX-FOOT-COMPLETE 3+ LEFT    DX-FOOT-COMPLETE 3+ RIGHT      Other Visit Diagnoses     Need for vaccination        Relevant Medications    amLODIPine (NORVASC) 2.5 MG Tab    benazepril (LOTENSIN) 40 MG tablet    gabapentin (NEURONTIN) 300 MG Cap    Other Relevant Orders    PNEUMOCOCCAL CONJUGATE VACCINE 13-VALENT (Completed)    DX-FOOT-COMPLETE 3+ LEFT    DX-FOOT-COMPLETE 3+ RIGHT    Other back pain, unspecified chronicity        Relevant Medications    celecoxib (CELEBREX) " 200 MG Cap    Other Relevant Orders    REFERRAL TO PHYSIATRY (PMR)    DX-FOOT-COMPLETE 3+ LEFT    DX-FOOT-COMPLETE 3+ RIGHT    REFERRAL TO ORTHOPEDICS                Instructed to follow up if symptoms worsen or fail to improve, ER/UC precautions discussed as well    Cherry Long MD  [facility], Family Medicine   [address] [place] [address] [address]   [place] [address]  Phone: [phone]

## 2018-05-21 NOTE — ASSESSMENT & PLAN NOTE
Stable   s/p surgery   Referral to physiatry if any issues arise   Surgery was in September last year

## 2018-05-21 NOTE — ASSESSMENT & PLAN NOTE
"todays evaluation was limited in that patient is poor historian and although I attempted to redirect him multiple times spent majority of visit describing frustrations with previous providers.  He is also very challenging in that he is looking for \"black and white\" answers with management plan and diagnosis while I explain that many symptoms can have multiple contributing factors (such as diabetic neuropathy AND lumbar radiculopathy)     Plan   X rays  Can get ortho /physiatry/and podietry involved  Will get DM RM involved  Follow up 1 mo     Over 45 minutes spent with patient face to face, greater than 50% time spent with plan/coordination of care regarding that which is discussed in the HPI and A&P      "

## 2018-05-21 NOTE — ASSESSMENT & PLAN NOTE
Not well controlled on gabapentin  Confusion picture because he has had sciatica in the past too and spinal cord stenosis as well as diabetes and could also have arthritis, he would like to investigate this further  - I suggest x rays follow up to discuss this further also I suggest physiatry referral and primary care will manage diabetes

## 2018-05-21 NOTE — LETTER
[facility]  Cherry Long M.D.  [address]  [place] [address]  Fax: [phone]   Authorization for Release/Disclosure of   Protected Health Information   Name: SAMUEL AGARWAL : 1950 SSN: [id number]   Address: [address] [place] [address] [address] [place] [address] Phone:    [phone] (home)    I authorize the entity listed below to release/disclose the PHI below to:   [facility]/Cherry Long M.D. and Cherry Long M.D.   Provider or Entity Name:     Address   City, State, Zip   Phone:    Fax:   Reason for request: continuity of care   Information to be released:    [  ] LAST COLONOSCOPY,  including any PATH REPORT and follow-up  [  ] LAST FIT/COLOGUARD RESULT [  ] LAST DEXA  [  ] LAST MAMMOGRAM  [  ] LAST PAP  [  ] LAST LABS [  ] RETINA EXAM REPORT  [  ] IMMUNIZATION RECORDS  [  ] Release all info      [  ] Check here and initial the line next to each item to release ALL health information INCLUDING  _____ Care and treatment for drug and / or alcohol abuse  _____ HIV testing, infection status, or AIDS  _____ Genetic Testing    DATES OF SERVICE OR TIME PERIOD TO BE DISCLOSED: _____________  I understand and acknowledge that:  * This Authorization may be revoked at any time by you in writing, except if your health information has already been used or disclosed.  * Your health information that will be used or disclosed as a result of you signing this authorization could be re-disclosed by the recipient. If this occurs, your re-disclosed health information may no longer be protected by State or Federal laws.  * You may refuse to sign this Authorization. Your refusal will not affect your ability to obtain treatment.  * This Authorization becomes effective upon signing and will  on (date) __________.      If no date is indicated, this Authorization will  one (1) year from the signature date.    Name: Samuel Agarwal    Signature:   Date:     2018       PLEASE FAX REQUESTED RECORDS BACK  TO: [phone]

## 2018-05-21 NOTE — ASSESSMENT & PLAN NOTE
Has bilateral shoulder pain wrist pain hip pain   He would like to see ortho   Does not sound like PMR picture to me as symptoms were not acute onset but rather gradual and involve poximal as well as distal joints

## 2018-05-22 NOTE — PROGRESS NOTES
"Cardiology/Electrophysiology Follow-up Note      Subjective:   Chief Complaint:   Chief Complaint   Patient presents with   • Atrial Flutter     pcp requested check due to ablation done about a year ago, why was amlodipine dosage changed from 5mg to 2.5mg?       Zhang Cheung is a 68 y.o. male who presents today for follow up atrial flutter, remote history of flutter ablation (4/25/17).    Has seen Dr. Torres in the past.  Was seen By YUVAL Petit 8/2017, reportedly doing well at that time.  He has a past history significant for HTN, CKD, DM II, back pain S/P surgical intervention by Dr. Sharma.       Today in follow up he states that he has been doing well from a cardiovascular standpoint.  He has not felt any palpitations, states HR at home has been under good controlled.  He denies chest pain, dizziness, palpitations, pre syncope or syncope, dyspnea, PND, orthopnea, or lower extremity edema.      He states that his blood pressure at home has been under good control.  States average 130s/70s.  He did not bring in log today or BP machine to review.     Patient endorses medication compliance.  He states that he reviewed a call from the pharmacy with some changes to his hypertension medications that he is questioning.        Past Medical History:   Diagnosis Date   • Arrhythmia     A Flutter   • Arthritis     generalized   • Diabetes     IDDM   • Diabetic neuropathy (HCC)    • High cholesterol    • Hyperlipidemia    • Hypertension 2014    well  controlled   • Pain 04/11/14    back pain increases with activity   • Restless leg    • Sleep apnea     CPAP.  8/30/17 - not using CPAP due to back pain \"unable to lay on back\".   • Snoring    • Unspecified urinary incontinence     \" enlarged prostate\"     Past Surgical History:   Procedure Laterality Date   • EXTREME LATERAL INTERBODY FUSION Left 9/18/2017    Procedure: EXTREME LATERAL INTERBODY FUSION L2-3 W/PLATE;  Surgeon: Yemi Sharma M.D.;  Location: SURGERY [facility]; "  Service: Neurosurgery   • LUMBAR FUSION POSTERIOR  9/18/2017    Procedure: LUMBAR FUSION POSTERIOR L2-3 INSTRUMENTED;  Surgeon: Yemi Sharma M.D.;  Location: SURGERY [facility];  Service: Neurosurgery   • LUMBAR LAMINECTOMY DISKECTOMY  9/18/2017    Procedure: LUMBAR LAMINECTOMY DISKECTOMY;  Surgeon: Yemi Sharma M.D.;  Location: SURGERY [facility];  Service: Neurosurgery   • RECOVERY  1/13/2016    Procedure: IR Deep bone biopsy L2-L3 with general anesthesia-DAYANA;  Surgeon: [facility];  Location: SURGERY PRE-POST PROC UNIT [facility];  Service:    • LUMBAR LAMINECTOMY DISKECTOMY  4/22/2014    Performed by Mazin Long M.D. at SURGERY [facility]   • TRANS URETHRAL RESECTION PROSTATE  3/27/2014    Performed by Kyle Guzman M.D. at [facility]   • LUMBAR LAMINECTOMY DISKECTOMY  2/19/2014    Performed by Mazin Long M.D. at SURGERY [facility]   • OTHER ORTHOPEDIC SURGERY  02/14    L2-L3 Laminectomy   • OTHER  2014    transection supraorbital nerve   • LUMBAR LAMINECTOMY DISKECTOMY  5/2/2012    Performed by MAZIN LONG at SURGERY [facility]   • OTHER ORTHOPEDIC SURGERY  03/12    L3-L5 Laminectomy   • OTHER      laser eye susrgery   • OTHER      carpectomy   • OTHER ORTHOPEDIC SURGERY  2003/2007/2008    left shoulder replacement/with corrections   • OTHER ORTHOPEDIC SURGERY      right knee replacement/manipulation     Family History   Problem Relation Age of Onset   • Sleep Apnea Neg Hx      Social History     Social History   • Marital status: Single     Spouse name: N/A   • Number of children: N/A   • Years of education: N/A     Occupational History   • Not on file.     Social History Main Topics   • Smoking status: Former Smoker     Packs/day: 0.75     Years: 10.00     Types: Cigarettes     Quit date: 4/24/2008   • Smokeless tobacco: Never Used   • Alcohol use No   • Drug use: No   • Sexual activity: Not on file     Other Topics Concern   • Not on file  "    Social History Narrative   • No narrative on file     Allergies   Allergen Reactions   • Other Drug      Opioid pain killers.   • Pollen Extract      Local pollen, primarily [place] Douglas       Current Outpatient Prescriptions   Medication Sig Dispense Refill   • celecoxib (CELEBREX) 200 MG Cap Take 200 mg by mouth every day.     • amLODIPine (NORVASC) 2.5 MG Tab Take 4 Tabs by mouth every day. 90 Tab 3   • benazepril (LOTENSIN) 40 MG tablet Take 1 Tab by mouth every day. 30 Tab 0   • gabapentin (NEURONTIN) 300 MG Cap Take 1200mg PO TID 1200 Cap 3   • metFORMIN (GLUCOPHAGE) 500 MG Tab Take 2 Tabs by mouth 2 times a day, with meals. 180 Tab 3   • Cholecalciferol (VITAMIN D3) 5000 UNITS Cap Take 1 Cap by mouth every day.     • insulin aspart protamine-insulin aspart (NOVOLOG MIX 70/30) (70-30) 100 UNIT/ML injection Inject 8-10 Units as instructed 3 times a day before meals. 100 = 5 units  120= 7 units  184 = 9 units  200 = 12 units  Pt can go as high as 22 units if needed     • tamsulosin (FLOMAX) 0.4 MG capsule Take 0.8 mg by mouth every bedtime.     • Omega-3 Fatty Acids (FISH OIL TRIPLE STRENGTH) 1400 MG Cap Take 1 Cap by mouth every day.     • Multiple Vitamins-Minerals (MULTIVITAMIN PO) Take 1 Tab by mouth every day.     • gemfibrozil (LOPID) 600 MG TABS Take 600 mg by mouth 2 times a day.       No current facility-administered medications for this visit.        ROS  All others systems reviewed and negative.     Objective:     Blood pressure 122/82, pulse 84, height 1.676 m (5' 6\"), weight 107.5 kg (237 lb), SpO2 95 %. Body mass index is 38.25 kg/m².    Physical Exam   General: No acute distress. Well nourished.  HEENT: Normocephalic, Atraumatic.  EOM grossly intact, PEERLA.   Neck:  No JVD, no bruits, trachea midline  CVS: RRR. Normal S1, S2. No M/R/G. No LE edema.  2+ radial pulses, 2+ DT pulses  Resp: CTAB. No wheezing or crackles/rhonchi. Normal respiratory effort.  Abdomen: Soft, NT.  MSK/Ext: No clubbing " or cyanosis.  Skin: Warm and dry, no rashes.  Neurological: CN III-XII grossly intact. No focal deficits.   Psych: A&O x 3, appropriate affect, good judgement    Echo dated 9/28/17:   Prior study done on 03/14/17; compared to the report of the study done   - there has been no significant change.   Normal left ventricular systolic function.  Left ventricular ejection fraction is visually estimated to be 65%.  Mild tricuspid regurgitation.  Estimated right ventricular systolic pressure is 35 mmHg.    Assessment:     1. Typical atrial flutter (HCC)     2. Essential hypertension     3. Chronic kidney disease, unspecified CKD stage         Medical Decision Making:  Today's Assessment / Status / Plan:   1. Atrial Flutter S/P ablation 4/2017.  - Patient is clinically doing well post ablation without recurrence of arrhythmia.  He reports no palpitations and normal heart rates at home.  RRR in office today.     He will continue to monitor pulse rate at home and can call with any concerns.   - We discussed that ablation successful about 90-95% of the time.  Off meds at this time.    2. HTN:  - BP is well controlled in office today.  - He does have questions about the medication changes that were made to his Amlodipine and Benazapril.  HTN is managed by his PCP.  He would like to continue taking Amlodipine 5 mg BID instead of 2.5 mg 4x/day.  I encouraged him to contact PCP in regards to this.  - Adived to bring BP cuff with readings to next appts.    3. CKD:  - Cr and BUN stable from lab 4/21/18.     Plan reviewed in detail with the patient and he verbalizes understanding and is in agreement.   F/U PRN.  Collaborating MD/ADD: JEAN MARIE Bragg.

## 2018-05-23 ENCOUNTER — OFFICE VISIT (OUTPATIENT)
Dept: CARDIOLOGY | Facility: MEDICAL CENTER | Age: 68
End: 2018-05-23
Payer: MEDICARE

## 2018-05-23 VITALS
WEIGHT: 237 LBS | HEIGHT: 66 IN | OXYGEN SATURATION: 95 % | HEART RATE: 84 BPM | BODY MASS INDEX: 38.09 KG/M2 | SYSTOLIC BLOOD PRESSURE: 122 MMHG | DIASTOLIC BLOOD PRESSURE: 82 MMHG

## 2018-05-23 DIAGNOSIS — I10 ESSENTIAL HYPERTENSION: ICD-10-CM

## 2018-05-23 DIAGNOSIS — N18.9 CHRONIC KIDNEY DISEASE, UNSPECIFIED CKD STAGE: ICD-10-CM

## 2018-05-23 DIAGNOSIS — I48.3 TYPICAL ATRIAL FLUTTER (HCC): ICD-10-CM

## 2018-05-23 PROCEDURE — 99213 OFFICE O/P EST LOW 20 MIN: CPT | Performed by: NURSE PRACTITIONER

## 2018-05-23 NOTE — LETTER
"     [facility]   [address], [address]  [place] [address]  Phone: [phone]  Fax: [phone]              Zhang Cheung  1950    Encounter Date: 5/23/2018    JOIE Tamayo          PROGRESS NOTE:  Cardiology/Electrophysiology Follow-up Note      Subjective:   Chief Complaint:   Chief Complaint   Patient presents with   • Atrial Flutter     pcp requested check due to ablation done about a year ago, why was amlodipine dosage changed from 5mg to 2.5mg?       Zhang Cheung is a 68 y.o. male who presents today for follow up atrial flutter, remote history of flutter ablation (4/25/17).    Has seen Dr. Torres in the past.  Was seen By YUVAL Petit 8/2017, reportedly doing well at that time.  He has a past history significant for HTN, CKD, DM II, back pain S/P surgical intervention by Dr. Sharma.       Today in follow up he states that he has been doing well from a cardiovascular standpoint.  He has not felt any palpitations, states HR at home has been under good controlled.  He denies chest pain, dizziness, palpitations, pre syncope or syncope, dyspnea, PND, orthopnea, or lower extremity edema.      He states that his blood pressure at home has been under good control.  States average 130s/70s.  He did not bring in log today or BP machine to review.     Patient endorses medication compliance.  He states that he reviewed a call from the pharmacy with some changes to his hypertension medications that he is questioning.        Past Medical History:   Diagnosis Date   • Arrhythmia     A Flutter   • Arthritis     generalized   • Diabetes     IDDM   • Diabetic neuropathy (HCC)    • High cholesterol    • Hyperlipidemia    • Hypertension 2014    well  controlled   • Pain 04/11/14    back pain increases with activity   • Restless leg    • Sleep apnea     CPAP.  8/30/17 - not using CPAP due to back pain \"unable to lay on back\".   • Snoring    • Unspecified urinary " "incontinence     \" enlarged prostate\"     Past Surgical History:   Procedure Laterality Date   • EXTREME LATERAL INTERBODY FUSION Left 9/18/2017    Procedure: EXTREME LATERAL INTERBODY FUSION L2-3 W/PLATE;  Surgeon: Yemi Sahrma M.D.;  Location: SURGERY [facility];  Service: Neurosurgery   • LUMBAR FUSION POSTERIOR  9/18/2017    Procedure: LUMBAR FUSION POSTERIOR L2-3 INSTRUMENTED;  Surgeon: Yemi Sharma M.D.;  Location: SURGERY [facility];  Service: Neurosurgery   • LUMBAR LAMINECTOMY DISKECTOMY  9/18/2017    Procedure: LUMBAR LAMINECTOMY DISKECTOMY;  Surgeon: Yemi Sharma M.D.;  Location: SURGERY [facility];  Service: Neurosurgery   • RECOVERY  1/13/2016    Procedure: IR Deep bone biopsy L2-L3 with general anesthesia-DAYANA;  Surgeon: [facility] Surgery;  Location: SURGERY PRE-POST PROC UNIT [facility];  Service:    • LUMBAR LAMINECTOMY DISKECTOMY  4/22/2014    Performed by Mazin Long M.D. at SURGERY [facility]   • TRANS URETHRAL RESECTION PROSTATE  3/27/2014    Performed by Kyle Guzman M.D. at [facility]   • LUMBAR LAMINECTOMY DISKECTOMY  2/19/2014    Performed by Mazin Long M.D. at [facility]   • OTHER ORTHOPEDIC SURGERY  02/14    L2-L3 Laminectomy   • OTHER  2014    transection supraorbital nerve   • LUMBAR LAMINECTOMY DISKECTOMY  5/2/2012    Performed by MAZIN LONG at [facility]   • OTHER ORTHOPEDIC SURGERY  03/12    L3-L5 Laminectomy   • OTHER      laser eye susrgery   • OTHER      carpectomy   • OTHER ORTHOPEDIC SURGERY  2003/2007/2008    left shoulder replacement/with corrections   • OTHER ORTHOPEDIC SURGERY      right knee replacement/manipulation     Family History   Problem Relation Age of Onset   • Sleep Apnea Neg Hx      Social History     Social History   • Marital status: Single     Spouse name: N/A   • Number of children: N/A   • Years of education: N/A     Occupational History   • Not on file.     Social History Main " "Topics   • Smoking status: Former Smoker     Packs/day: 0.75     Years: 10.00     Types: Cigarettes     Quit date: 4/24/2008   • Smokeless tobacco: Never Used   • Alcohol use No   • Drug use: No   • Sexual activity: Not on file     Other Topics Concern   • Not on file     Social History Narrative   • No narrative on file     Allergies   Allergen Reactions   • Other Drug      Opioid pain killers.   • Pollen Extract      Local pollen, primarily [place]       Current Outpatient Prescriptions   Medication Sig Dispense Refill   • celecoxib (CELEBREX) 200 MG Cap Take 200 mg by mouth every day.     • amLODIPine (NORVASC) 2.5 MG Tab Take 4 Tabs by mouth every day. 90 Tab 3   • benazepril (LOTENSIN) 40 MG tablet Take 1 Tab by mouth every day. 30 Tab 0   • gabapentin (NEURONTIN) 300 MG Cap Take 1200mg PO TID 1200 Cap 3   • metFORMIN (GLUCOPHAGE) 500 MG Tab Take 2 Tabs by mouth 2 times a day, with meals. 180 Tab 3   • Cholecalciferol (VITAMIN D3) 5000 UNITS Cap Take 1 Cap by mouth every day.     • insulin aspart protamine-insulin aspart (NOVOLOG MIX 70/30) (70-30) 100 UNIT/ML injection Inject 8-10 Units as instructed 3 times a day before meals. 100 = 5 units  120= 7 units  184 = 9 units  200 = 12 units  Pt can go as high as 22 units if needed     • tamsulosin (FLOMAX) 0.4 MG capsule Take 0.8 mg by mouth every bedtime.     • Omega-3 Fatty Acids (FISH OIL TRIPLE STRENGTH) 1400 MG Cap Take 1 Cap by mouth every day.     • Multiple Vitamins-Minerals (MULTIVITAMIN PO) Take 1 Tab by mouth every day.     • gemfibrozil (LOPID) 600 MG TABS Take 600 mg by mouth 2 times a day.       No current facility-administered medications for this visit.        ROS  All others systems reviewed and negative.     Objective:     Blood pressure 122/82, pulse 84, height 1.676 m (5' 6\"), weight 107.5 kg (237 lb), SpO2 95 %. Body mass index is 38.25 kg/m².    Physical Exam   General: No acute distress. Well nourished.  HEENT: Normocephalic, Atraumatic. "  EOM grossly intact, PEERLA.   Neck:  No JVD, no bruits, trachea midline  CVS: RRR. Normal S1, S2. No M/R/G. No LE edema.  2+ radial pulses, 2+ DT pulses  Resp: CTAB. No wheezing or crackles/rhonchi. Normal respiratory effort.  Abdomen: Soft, NT.  MSK/Ext: No clubbing or cyanosis.  Skin: Warm and dry, no rashes.  Neurological: CN III-XII grossly intact. No focal deficits.   Psych: A&O x 3, appropriate affect, good judgement    Echo dated 9/28/17:   Prior study done on 03/14/17; compared to the report of the study done   - there has been no significant change.   Normal left ventricular systolic function.  Left ventricular ejection fraction is visually estimated to be 65%.  Mild tricuspid regurgitation.  Estimated right ventricular systolic pressure is 35 mmHg.    Assessment:     1. Typical atrial flutter (HCC)     2. Essential hypertension     3. Chronic kidney disease, unspecified CKD stage         Medical Decision Making:  Today's Assessment / Status / Plan:   1. Atrial Flutter S/P ablation 4/2017.  - Patient is clinically doing well post ablation without recurrence of arrhythmia.  He reports no palpitations and normal heart rates at home.  RRR in office today.     He will continue to monitor pulse rate at home and can call with any concerns.   - We discussed that ablation successful about 90-95% of the time.  Off meds at this time.    2. HTN:  - BP is well controlled in office today.  - He does have questions about the medication changes that were made to his Amlodipine and Benazapril.  HTN is managed by his PCP.  He would like to continue taking Amlodipine 5 mg BID instead of 2.5 mg 4x/day.  I encouraged him to contact PCP in regards to this.  - Adived to bring BP cuff with readings to next appts.    3. CKD:  - Cr and BUN stable from lab 4/21/18.     Plan reviewed in detail with the patient and he verbalizes understanding and is in agreement.   F/U PRN.  Collaborating MD/ADD: Jaya Fischer,  JOIE Long M.D.  [id number]  [address] [place] [id number]  VIA In Basket

## 2018-05-24 DIAGNOSIS — Z23 NEED FOR VACCINATION: ICD-10-CM

## 2018-05-24 DIAGNOSIS — G62.9 NEUROPATHY: ICD-10-CM

## 2018-05-24 DIAGNOSIS — E11.9 TYPE 2 DIABETES MELLITUS WITHOUT COMPLICATION, WITH LONG-TERM CURRENT USE OF INSULIN (HCC): ICD-10-CM

## 2018-05-24 DIAGNOSIS — Z79.4 TYPE 2 DIABETES MELLITUS WITHOUT COMPLICATION, WITH LONG-TERM CURRENT USE OF INSULIN (HCC): ICD-10-CM

## 2018-05-24 RX ORDER — AMLODIPINE BESYLATE 5 MG/1
5 TABLET ORAL 2 TIMES DAILY
COMMUNITY
End: 2018-07-18 | Stop reason: SDUPTHER

## 2018-05-25 RX ORDER — AMLODIPINE BESYLATE 2.5 MG/1
10 TABLET ORAL DAILY
Qty: 120 TAB | Refills: 3 | Status: CANCELLED | OUTPATIENT
Start: 2018-05-25

## 2018-05-25 RX ORDER — AMLODIPINE BESYLATE 5 MG/1
5 TABLET ORAL 2 TIMES DAILY
Qty: 180 TAB | Refills: 2 | OUTPATIENT
Start: 2018-05-25

## 2018-05-25 NOTE — TELEPHONE ENCOUNTER
Pt asked if you'd change the rx from:  Amlodipine 2.5mg Sig: Take 4tab QD  Metformin 500 sig: take 2 tab BID   To:  Amlodipine 5mg BID  Metformin 1000mg BID  I pended the orders.... Please sign if okay.

## 2018-05-26 NOTE — TELEPHONE ENCOUNTER
Dr. Long,  The rx for Amlodipine is written for a quantity of 90 tablets and pt is to take 4 tab QD.  Could you send a new rx with a quantity of 120 tabs instead for a 30 day supply?  The Metformin is written for a quantity of 180 but he will take 120/mo. I think this is ok.

## 2018-05-30 ENCOUNTER — HOSPITAL ENCOUNTER (OUTPATIENT)
Dept: LAB | Facility: MEDICAL CENTER | Age: 68
End: 2018-05-30
Attending: FAMILY MEDICINE
Payer: MEDICARE

## 2018-05-30 DIAGNOSIS — E11.9 TYPE 2 DIABETES MELLITUS WITHOUT COMPLICATION, WITH LONG-TERM CURRENT USE OF INSULIN (HCC): ICD-10-CM

## 2018-05-30 DIAGNOSIS — Z79.4 TYPE 2 DIABETES MELLITUS WITHOUT COMPLICATION, WITH LONG-TERM CURRENT USE OF INSULIN (HCC): ICD-10-CM

## 2018-05-30 LAB
ANION GAP SERPL CALC-SCNC: 10 MMOL/L (ref 0–11.9)
BUN SERPL-MCNC: 40 MG/DL (ref 8–22)
CALCIUM SERPL-MCNC: 10 MG/DL (ref 8.5–10.5)
CHLORIDE SERPL-SCNC: 107 MMOL/L (ref 96–112)
CHOLEST SERPL-MCNC: 142 MG/DL (ref 100–199)
CO2 SERPL-SCNC: 22 MMOL/L (ref 20–33)
CREAT SERPL-MCNC: 1.18 MG/DL (ref 0.5–1.4)
CREAT UR-MCNC: 82.2 MG/DL
EST. AVERAGE GLUCOSE BLD GHB EST-MCNC: 169 MG/DL
GLUCOSE SERPL-MCNC: 87 MG/DL (ref 65–99)
HBA1C MFR BLD: 7.5 % (ref 0–5.6)
HDLC SERPL-MCNC: 28 MG/DL
MICROALBUMIN UR-MCNC: 8.5 MG/DL
MICROALBUMIN/CREAT UR: 103 MG/G (ref 0–30)
POTASSIUM SERPL-SCNC: 4 MMOL/L (ref 3.6–5.5)
SODIUM SERPL-SCNC: 139 MMOL/L (ref 135–145)

## 2018-05-30 PROCEDURE — 82043 UR ALBUMIN QUANTITATIVE: CPT

## 2018-05-30 PROCEDURE — 83036 HEMOGLOBIN GLYCOSYLATED A1C: CPT | Mod: GA

## 2018-05-30 PROCEDURE — 82465 ASSAY BLD/SERUM CHOLESTEROL: CPT

## 2018-05-30 PROCEDURE — 83721 ASSAY OF BLOOD LIPOPROTEIN: CPT | Mod: XU

## 2018-05-30 PROCEDURE — 82570 ASSAY OF URINE CREATININE: CPT

## 2018-05-30 PROCEDURE — 80048 BASIC METABOLIC PNL TOTAL CA: CPT

## 2018-05-30 PROCEDURE — 36415 COLL VENOUS BLD VENIPUNCTURE: CPT

## 2018-05-30 PROCEDURE — 83718 ASSAY OF LIPOPROTEIN: CPT

## 2018-06-01 LAB — LDLC SERPL-MCNC: 89 MG/DL (ref 0–129)

## 2018-07-10 RX ORDER — ROPINIROLE 0.5 MG/1
TABLET, FILM COATED ORAL
COMMUNITY
End: 2018-07-11

## 2018-07-10 RX ORDER — GABAPENTIN 600 MG/1
TABLET ORAL
Refills: 5 | COMMUNITY
Start: 2018-06-18 | End: 2018-07-10

## 2018-07-10 RX ORDER — INDAPAMIDE 2.5 MG/1
TABLET ORAL
COMMUNITY
End: 2019-04-12 | Stop reason: SDUPTHER

## 2018-07-11 ENCOUNTER — OFFICE VISIT (OUTPATIENT)
Dept: MEDICAL GROUP | Facility: MEDICAL CENTER | Age: 68
End: 2018-07-11
Payer: MEDICARE

## 2018-07-11 VITALS
HEART RATE: 72 BPM | WEIGHT: 234.4 LBS | DIASTOLIC BLOOD PRESSURE: 70 MMHG | HEIGHT: 66 IN | SYSTOLIC BLOOD PRESSURE: 124 MMHG | BODY MASS INDEX: 37.67 KG/M2

## 2018-07-11 DIAGNOSIS — Z12.5 SCREENING FOR PROSTATE CANCER: ICD-10-CM

## 2018-07-11 DIAGNOSIS — Z12.11 SCREENING FOR COLON CANCER: ICD-10-CM

## 2018-07-11 DIAGNOSIS — M79.672 BILATERAL FOOT PAIN: ICD-10-CM

## 2018-07-11 DIAGNOSIS — E11.9 TYPE 2 DIABETES MELLITUS WITHOUT COMPLICATION, WITHOUT LONG-TERM CURRENT USE OF INSULIN (HCC): ICD-10-CM

## 2018-07-11 DIAGNOSIS — M25.511 BILATERAL SHOULDER PAIN, UNSPECIFIED CHRONICITY: ICD-10-CM

## 2018-07-11 DIAGNOSIS — E66.9 OBESITY (BMI 35.0-39.9 WITHOUT COMORBIDITY): ICD-10-CM

## 2018-07-11 DIAGNOSIS — M79.671 BILATERAL FOOT PAIN: ICD-10-CM

## 2018-07-11 DIAGNOSIS — M25.512 BILATERAL SHOULDER PAIN, UNSPECIFIED CHRONICITY: ICD-10-CM

## 2018-07-11 PROCEDURE — 99214 OFFICE O/P EST MOD 30 MIN: CPT | Performed by: FAMILY MEDICINE

## 2018-07-11 NOTE — ASSESSMENT & PLAN NOTE
No changes  Continue metformin and insuin      Over 40 minutes spent with patient face to face, greater than 50% time spent with plan/coordination of care regarding that which is discussed in the HPI and A&P.

## 2018-07-11 NOTE — PROGRESS NOTES
RN-CDE Note    Subjective:      Health changes since last visit/interval Hx: None    Medications (including changes made today)  Current Outpatient Prescriptions   Medication Sig Dispense Refill   • Multiple Vitamin (MULTI-VITAMIN DAILY PO) Multi Vitamin     • indapamide (LOZOL) 2.5 MG Tab indapamide 2.5 mg tablet     • Multiple Vitamins-Minerals (ICAPS AREDS 2 PO) ICaps AREDS     • Insulin Pen Needle (NOVOFINE) 30G X 8 MM Misc USE FOR INSULIN UP TO 3 TIMES A  Each 3   • ONE TOUCH ULTRA TEST strip TEST BLOOD 3 TIMES DAILY 300 Strip 0   • metformin (GLUCOPHAGE) 1000 MG tablet Take 1,000 mg by mouth 2 times a day, with meals.     • celecoxib (CELEBREX) 200 MG Cap Take 200 mg by mouth every day.     • amLODIPine (NORVASC) 2.5 MG Tab Take 4 Tabs by mouth every day. 90 Tab 3   • benazepril (LOTENSIN) 40 MG tablet Take 1 Tab by mouth every day. 30 Tab 0   • gabapentin (NEURONTIN) 300 MG Cap Take 1200mg PO TID 1200 Cap 3   • metFORMIN (GLUCOPHAGE) 500 MG Tab Take 2 Tabs by mouth 2 times a day, with meals. 180 Tab 3   • Cholecalciferol (VITAMIN D3) 5000 UNITS Cap Take 1 Cap by mouth every day.     • insulin aspart protamine-insulin aspart (NOVOLOG MIX 70/30) (70-30) 100 UNIT/ML injection Inject 8-10 Units as instructed 3 times a day before meals. 100 = 5 units  120= 7 units  184 = 9 units  200 = 12 units  Pt can go as high as 22 units if needed     • tamsulosin (FLOMAX) 0.4 MG capsule Take 0.8 mg by mouth every bedtime.     • Omega-3 Fatty Acids (FISH OIL TRIPLE STRENGTH) 1400 MG Cap Take 1 Cap by mouth every day.     • gemfibrozil (LOPID) 600 MG TABS Take 600 mg by mouth 2 times a day.     • amLODIPine (NORVASC) 5 MG Tab Take 5 mg by mouth 2 Times a Day.       No current facility-administered medications for this visit.        Taking daily ASA: No  Taking above medications as prescribed: yes  SIDE EFFECTS: Patient denies side effects to medications    Exercise: moderate regular exercise, aerobic < 3 days a  "week  Diet: \"healthy\" diet  in general  Patient's body mass index is 37.83 kg/m². Exercise and nutrition counseling were performed at this visit.       Health Maintenance:   Health Maintenance Due   Topic Date Due   • Annual Wellness Visit  1950   • COLONOSCOPY  02/22/1998   • DIABETES MONOFILAMENT / LE EXAM  10/16/2014   • FASTING LIPID PROFILE  10/31/2017       Immunizations:   PPSV23: Up-to-date  Lasxvce95: Up-to-date  Tdap: Up-to-date  Flu: Up-to-date  Hep B: Up-to-date    DM:   Last A1c:   Lab Results   Component Value Date/Time    HBA1C 7.5 (H) 05/30/2018 11:10 AM      A1C GOAL: < 7    Glucose monitoring frequency: 3-4 times a day      Hypoglycemic episodes: no    Last Retinal Exam: on file and up-to-date  Daily Foot Exam: Yes   Routine Dental Exams: Yes    Lab Results   Component Value Date/Time    MALBCRT 103 (H) 05/30/2018 11:10 AM    MICROALBUR 8.5 05/30/2018 11:10 AM        ACR Albumin/Creatinine Ratio goal <30     HTN:   Blood pressure goal <140/<80 .   Currently Rx ACE/ARB: No    Dyslipidemia:    Lab Results   Component Value Date/Time    CHOLSTRLTOT 142 05/30/2018 11:10 AM    LDL 47 10/31/2016 03:26 PM    HDL 28 (A) 05/30/2018 11:10 AM    TRIGLYCERIDE 138 10/31/2016 03:26 PM       Lab Results   Component Value Date/Time    SODIUM 139 05/30/2018 11:10 AM    POTASSIUM 4.0 05/30/2018 11:10 AM    CHLORIDE 107 05/30/2018 11:10 AM    CO2 22 05/30/2018 11:10 AM    GLUCOSE 87 05/30/2018 11:10 AM    BUN 40 (H) 05/30/2018 11:10 AM    CREATININE 1.18 05/30/2018 11:10 AM    CREATININE 0.9 01/09/2008 12:15 PM     Lab Results   Component Value Date/Time    ALKPHOSPHAT 102 (H) 01/03/2018 11:11 AM    ASTSGOT 28 01/03/2018 11:11 AM    ALTSGPT 12 01/03/2018 11:11 AM    TBILIRUBIN 0.4 01/03/2018 11:11 AM        Currently Rx Statin: No    He  reports that he quit smoking about 10 years ago. His smoking use included Cigarettes. He has a 7.50 pack-year smoking history. He has never used smokeless " tobacco.    Objective:     Exam:  Monofilament: done   Monofilament testing with a 10 gram force: sensation intact: intact bilaterally  Visual Inspection: Feet without maceration, ulcers, fissures.  Pedal pulses: decreased on right    Plan:     Discussed and educated on:   - All medications, side effects and compliance (discussed carefully)  - Annual eye examinations at Ophthalmology  - Home glucose monitoring emphasized  - Weight control and daily exercise    Recommended medication changes:       Type 2 DM       No changes  Continue metformin and insuin      Over 40 minutes spent with patient face to face, greater than 50% time spent with plan/coordination of care regarding that which is discussed in the HPI and A&P.      Obesity (BMI 35.0-39.9 without comorbidity)  30 min face to face discussing preventative health and wt loss options    Ref to wt management clinic           Bilateral shoulder pain  Improving with PT         Bilateral foot pain  Saw podietrist got orthotics improving       This note represents my note  Cherry Long M.D.

## 2018-07-11 NOTE — ASSESSMENT & PLAN NOTE
30 min face to face discussing preventative health and wt loss options    Ref to wt management clinic

## 2018-07-18 RX ORDER — AMLODIPINE BESYLATE 5 MG/1
TABLET ORAL
Qty: 180 TAB | Refills: 3 | Status: SHIPPED | OUTPATIENT
Start: 2018-07-18 | End: 2019-05-13

## 2018-07-23 ENCOUNTER — HOSPITAL ENCOUNTER (OUTPATIENT)
Facility: MEDICAL CENTER | Age: 68
End: 2018-07-23
Attending: FAMILY MEDICINE
Payer: MEDICARE

## 2018-07-23 PROCEDURE — 82274 ASSAY TEST FOR BLOOD FECAL: CPT

## 2018-07-23 RX ORDER — CELECOXIB 200 MG/1
CAPSULE ORAL
Qty: 30 CAP | Refills: 2 | Status: SHIPPED | OUTPATIENT
Start: 2018-07-23 | End: 2018-10-16 | Stop reason: SDUPTHER

## 2018-07-29 DIAGNOSIS — Z12.11 SCREENING FOR COLON CANCER: ICD-10-CM

## 2018-07-30 LAB — HEMOCCULT STL QL IA: NEGATIVE

## 2018-08-03 ENCOUNTER — HOSPITAL ENCOUNTER (OUTPATIENT)
Dept: RADIOLOGY | Facility: MEDICAL CENTER | Age: 68
End: 2018-08-03
Attending: ORTHOPAEDIC SURGERY
Payer: MEDICARE

## 2018-08-03 DIAGNOSIS — M16.0 PRIMARY OSTEOARTHRITIS OF BOTH HIPS: ICD-10-CM

## 2018-08-03 PROCEDURE — 20610 DRAIN/INJ JOINT/BURSA W/O US: CPT | Mod: LT

## 2018-08-03 PROCEDURE — 77002 NEEDLE LOCALIZATION BY XRAY: CPT

## 2018-08-03 PROCEDURE — 700111 HCHG RX REV CODE 636 W/ 250 OVERRIDE (IP): Performed by: ORTHOPAEDIC SURGERY

## 2018-08-03 PROCEDURE — 700117 HCHG RX CONTRAST REV CODE 255: Performed by: ORTHOPAEDIC SURGERY

## 2018-08-03 PROCEDURE — 700101 HCHG RX REV CODE 250: Performed by: ORTHOPAEDIC SURGERY

## 2018-08-03 RX ORDER — LIDOCAINE HYDROCHLORIDE 10 MG/ML
20 INJECTION, SOLUTION INFILTRATION; PERINEURAL ONCE
Status: COMPLETED | OUTPATIENT
Start: 2018-08-03 | End: 2018-08-03

## 2018-08-03 RX ORDER — METHYLPREDNISOLONE ACETATE 40 MG/ML
40 INJECTION, SUSPENSION INTRA-ARTICULAR; INTRALESIONAL; INTRAMUSCULAR; SOFT TISSUE ONCE
Status: COMPLETED | OUTPATIENT
Start: 2018-08-03 | End: 2018-08-03

## 2018-08-03 RX ORDER — BUPIVACAINE HYDROCHLORIDE 5 MG/ML
10 INJECTION, SOLUTION EPIDURAL; INTRACAUDAL ONCE
Status: COMPLETED | OUTPATIENT
Start: 2018-08-03 | End: 2018-08-03

## 2018-08-03 RX ADMIN — IOHEXOL 5 ML: 300 INJECTION, SOLUTION INTRAVENOUS at 09:45

## 2018-08-03 RX ADMIN — BUPIVACAINE HYDROCHLORIDE 5 ML: 5 INJECTION, SOLUTION EPIDURAL; INTRACAUDAL; PERINEURAL at 09:45

## 2018-08-03 RX ADMIN — LIDOCAINE HYDROCHLORIDE 5 ML: 10 INJECTION, SOLUTION INFILTRATION; PERINEURAL at 09:15

## 2018-08-03 RX ADMIN — METHYLPREDNISOLONE ACETATE 40 MG: 40 INJECTION, SUSPENSION INTRA-ARTICULAR; INTRALESIONAL; INTRAMUSCULAR; SOFT TISSUE at 09:45

## 2018-08-07 ENCOUNTER — NON-PROVIDER VISIT (OUTPATIENT)
Dept: MEDICAL GROUP | Facility: MEDICAL CENTER | Age: 68
End: 2018-08-07
Payer: MEDICARE

## 2018-08-07 VITALS — WEIGHT: 238.1 LBS | HEIGHT: 66 IN | BODY MASS INDEX: 38.27 KG/M2

## 2018-08-07 PROCEDURE — G0447 BEHAVIOR COUNSEL OBESITY 15M: HCPCS | Performed by: FAMILY MEDICINE

## 2018-08-07 NOTE — PROGRESS NOTES
"IBT INITIAL ASSESMENT    Author: Mariam Duggan . Date & Time created: 8/7/2018  3:03 PM   Visit #: 1  Referring Provider: Cherry Long M.D.  Patient Age: 68 y.o.  Time in/Out:  1326 - 7954    ASSESS:  Vitals:    08/07/18 1503   Weight: 108 kg (238 lb 1.6 oz)   Height: 1.676 m (5' 6\")    Body mass index is 38.43 kg/m². Goal Weight:  200#  The patient states improved health as a motivator for weight loss and has tried diets for weight loss in the past with no lasting success.  Comorbidities include DM, Dyslipidemia, HTN.       Current Dietary/Exercise Habits  1.  How many servings of fruits and vegetables do you eat in a typical day?  3-5  2. How many servings of whole grains do you eat in a typical day?  0-1  3. How many times in a typical week do you eat foods high in fat or eat at a fast food restaurant?  0-1  4. How many times in a typical week do you eat red meat, pork, or processed meat?  0-2  5. How many days a week do you participate in moderately intense physical activity for 30 minutes?  0  6. How many days a week do you participate in vigorously intense physical activity for 20 minutes?  0  7. How many days a week do you do strength training exercise?  0  Estimated Stage of Change   Preparationas evidenced by the patient stating he wants to change and lose weight.    ADVISE:    Physical Activity:  No regular exercise at this time d/t back and hip pain.  He states that he knows he loses weight easily when he can be more active and he wants to find an exercise regimen that would be good for him.  He is considering checking out some places where he can do water aerobics or a class that is meant for senior citizens.       Dietary Guidelines:  Zhang eats for his hunger, usually 2-4x/day.  He likes cooking and cooks all of his meals at home, usually including many vegetables fish.  He does not eat starches d/t having DM and rarely eats fruit because of the high CHO content.    The patient has been " "advised of how weight management and physical activity impacts their health and will help to reduce complications and health risk factors.    AGREE:  Visit #2 goal:   1.  Talk to Dr. Armas about where to go from here.    ASSIST:  Zhang is not willing to keep a food diary because he is \"too busy\" with keeping up his house, yard, taking care of his two dogs, and taking care of himself.  He thinks that he could be eating too much at meals and may try to decrease his portions.    Zhang has a lot of excuses for his weight and does not seem like he wants to do anything other than finding an exercise program at this time.  I did discuss with him about some local gyms that have classes specifically for an aging population where they work on balance and strength and he is not interested in these classes because he does not want to drive too far for them.      Zhang is an ornery fellow that does not seem to want to go out of his way to take care of himself.  He is going to talk to Dr. Armas in October and will contact me after that with a direction that they decide to go in.    ARRANGE:     Return for follow-up in after seeing Dr. Armas   The patient was assisted in making follow-up appointment per orders.  "

## 2018-08-15 RX ORDER — GABAPENTIN 600 MG/1
TABLET ORAL
Qty: 180 TAB | Refills: 5 | Status: SHIPPED | OUTPATIENT
Start: 2018-08-15 | End: 2020-01-14

## 2018-08-15 NOTE — TELEPHONE ENCOUNTER
Was the patient seen in the last year in this department? Yes    Does patient have an active prescription for medications requested? No     Received Request Via: Pharmacy     Future Appointments       Provider Department [place]    10/4/2018 7:50 AM Jody Armas M.D. [facility]

## 2018-10-02 ENCOUNTER — OFFICE VISIT (OUTPATIENT)
Dept: URGENT CARE | Facility: CLINIC | Age: 68
End: 2018-10-02
Payer: MEDICARE

## 2018-10-02 VITALS
BODY MASS INDEX: 39.37 KG/M2 | HEIGHT: 66 IN | RESPIRATION RATE: 16 BRPM | WEIGHT: 245 LBS | TEMPERATURE: 97.3 F | HEART RATE: 78 BPM | SYSTOLIC BLOOD PRESSURE: 110 MMHG | DIASTOLIC BLOOD PRESSURE: 82 MMHG | OXYGEN SATURATION: 93 %

## 2018-10-02 DIAGNOSIS — H61.22 IMPACTED CERUMEN, LEFT EAR: ICD-10-CM

## 2018-10-02 PROCEDURE — 99213 OFFICE O/P EST LOW 20 MIN: CPT | Performed by: PHYSICIAN ASSISTANT

## 2018-10-02 NOTE — PATIENT INSTRUCTIONS
Cerumen Plug  A cerumen plug is having too much wax in your ear canal. The outer ear canal is lined with hairs and glands that secrete wax. This wax is called cerumen. This protects the ear canal. It also helps prevent material from entering the ear. Too much wax can cause a feeling of fullness in the ears, decreased hearing, ringing in the ears, or an earache. Sometimes your caregiver will remove a cerumen plug with an instrument called a curette. Or he/she may flush the ear canal with warm water from a syringe to remove the wax. You may simply be sent home to follow the home care instructions below for wax removal.  Generally ear wax does not have to be removed unless it is causing a problem such as one of those listed above. When too much wax is causing a problem, the following are a few home remedies which can be used to help this problem.  HOME CARE INSTRUCTIONS   · Put a couple drops of glycerin, baby oil, or mineral oil in the ear a couple times of day. Do this every day for several days. After putting the drops in, you will need to lay with the affected ear pointing up for a couple minutes. This allows the drops to remain in the canal and run down to the area of wax blockage. This will soften the wax plug. It may also make your hearing worse as the wax softens and blocks the canal even more.  · After a couple days, you may gently flush the ear canal with warm water from a syringe. Do this by pulling your ear up and back with your head tilted slightly forward and towards a pan to catch the water. This is most easily done with a helper. You can also accomplish the same thing by letting the shower beat into your ear canal to wash the wax out. Sometimes this will not be immediately successful. You will have to return to the first step of using the oil to further soften the wax. Then resume washing the ear canal out with a syringe or shower.  · Following removal of the wax, put ten to twenty drops of rubbing  alcohol into the outer ears. This will dry the canal and prevent an infection.  · Do not irrigate or wash out your ears if you have had a perforated ear drum or mastoid surgery.  SEEK IMMEDIATE MEDICAL CARE IF:   · You are unsuccessful with the above instructions for home care.  · You develop ear pain or drainage from the ear.  MAKE SURE YOU:   · Understand these instructions.  · Will watch your condition.  · Will get help right away if you are not doing well or get worse.  Document Released: 09/12/2002 Document Revised: 03/11/2013 Document Reviewed: 12/09/2009  [employer]® Patient Information ©2014 [employer], [email or web address].

## 2018-10-02 NOTE — PROGRESS NOTES
"Subjective:      Pt is a 68 y.o. male who presents with Ear Fullness (left ear plugged)            HPI  Pt notes left ear fullness which he feels is due to wax build up x 5 days with mild hearing loss. Pt has not taken any Rx medications for this condition. Pt states the pain is a 1/10, aching in nature and worse at night. Pt denies CP, SOB, NVD, paresthesias, headaches, dizziness, change in vision, hives, or other joint pain. The pt's medication list, problem list, and allergies have been evaluated and reviewed during today's visit.    PMH:  Past Medical History:   Diagnosis Date   • Arrhythmia     A Flutter   • Arthritis     generalized   • Diabetes     IDDM   • Diabetic neuropathy (HCC)    • High cholesterol    • Hyperlipidemia    • Hypertension 2014    well  controlled   • Neuropathy (HCC)    • Pain 04/11/14    back pain increases with activity   • Restless leg    • Sleep apnea     CPAP.  8/30/17 - not using CPAP due to back pain \"unable to lay on back\".   • Snoring    • Unspecified urinary incontinence     \" enlarged prostate\"       PSH:  Past Surgical History:   Procedure Laterality Date   • EXTREME LATERAL INTERBODY FUSION Left 9/18/2017    Procedure: EXTREME LATERAL INTERBODY FUSION L2-3 W/PLATE;  Surgeon: Yemi Sharma M.D.;  Location: SURGERY [facility];  Service: Neurosurgery   • LUMBAR FUSION POSTERIOR  9/18/2017    Procedure: LUMBAR FUSION POSTERIOR L2-3 INSTRUMENTED;  Surgeon: Yemi Sharma M.D.;  Location: SURGERY [facility];  Service: Neurosurgery   • LUMBAR LAMINECTOMY DISKECTOMY  9/18/2017    Procedure: LUMBAR LAMINECTOMY DISKECTOMY;  Surgeon: Yemi Sharma M.D.;  Location: SURGERY [facility];  Service: Neurosurgery   • RECOVERY  1/13/2016    Procedure: IR Deep bone biopsy L2-L3 with general anesthesia-DAYANA;  Surgeon: [facility];  Location: SURGERY PRE-POST PROC UNIT [facility];  Service:    • LUMBAR LAMINECTOMY DISKECTOMY  4/22/2014    Performed by Rogers Long M.D. " at SURGERY [facility]   • TRANS URETHRAL RESECTION PROSTATE  3/27/2014    Performed by Kyle Guzman M.D. at SURGERY [facility]   • LUMBAR LAMINECTOMY DISKECTOMY  2014    Performed by Mazin Long M.D. at SURGERY [facility]   • OTHER ORTHOPEDIC SURGERY      L2-L3 Laminectomy   • OTHER      transection supraorbital nerve   • LUMBAR LAMINECTOMY DISKECTOMY  2012    Performed by MAZIN LONG at SURGERY [facility]   • OTHER ORTHOPEDIC SURGERY      L3-L5 Laminectomy   • OTHER      laser eye susrgery   • OTHER      carpectomy   • OTHER ORTHOPEDIC SURGERY      left shoulder replacement/with corrections   • OTHER ORTHOPEDIC SURGERY      right knee replacement/manipulation       Fam Hx:  the patient's family history is not pertinent to their current complaint    Family Status   Relation Status   • Mo    • Fa    • Bro Alive   • Neg Hx (Not Specified)       Soc HX:  Social History     Social History   • Marital status: Single     Spouse name: N/A   • Number of children: N/A   • Years of education: N/A     Occupational History   • Not on file.     Social History Main Topics   • Smoking status: Former Smoker     Packs/day: 0.75     Years: 10.00     Types: Cigarettes     Quit date: 2008   • Smokeless tobacco: Never Used   • Alcohol use No   • Drug use: No   • Sexual activity: Not on file     Other Topics Concern   • Not on file     Social History Narrative   • No narrative on file         Medications:    Current Outpatient Prescriptions:   •  Multiple Vitamin (MULTI-VITAMIN DAILY PO), Multi Vitamin, Disp: , Rfl:   •  indapamide (LOZOL) 2.5 MG Tab, indapamide 2.5 mg tablet, Disp: , Rfl:   •  Multiple Vitamins-Minerals (ICAPS AREDS 2 PO), ICaps AREDS, Disp: , Rfl:   •  Insulin Pen Needle (NOVOFINE) 30G X 8 MM Misc, USE FOR INSULIN UP TO 3 TIMES A DAY, Disp: 300 Each, Rfl: 3  •  ONE TOUCH ULTRA TEST strip, TEST BLOOD 3 TIMES DAILY, Disp: 300 Strip,  Rfl: 0  •  metformin (GLUCOPHAGE) 1000 MG tablet, Take 1,000 mg by mouth 2 times a day, with meals., Disp: , Rfl:   •  amLODIPine (NORVASC) 2.5 MG Tab, Take 4 Tabs by mouth every day., Disp: 90 Tab, Rfl: 3  •  benazepril (LOTENSIN) 40 MG tablet, Take 1 Tab by mouth every day., Disp: 30 Tab, Rfl: 0  •  gabapentin (NEURONTIN) 300 MG Cap, Take 1200mg PO TID, Disp: 1200 Cap, Rfl: 3  •  metFORMIN (GLUCOPHAGE) 500 MG Tab, Take 2 Tabs by mouth 2 times a day, with meals., Disp: 180 Tab, Rfl: 3  •  Cholecalciferol (VITAMIN D3) 5000 UNITS Cap, Take 1 Cap by mouth every day., Disp: , Rfl:   •  insulin aspart protamine-insulin aspart (NOVOLOG MIX 70/30) (70-30) 100 UNIT/ML injection, Inject 8-10 Units as instructed 3 times a day before meals. 100 = 5 units 120= 7 units 184 = 9 units 200 = 12 units Pt can go as high as 22 units if needed, Disp: , Rfl:   •  tamsulosin (FLOMAX) 0.4 MG capsule, Take 0.8 mg by mouth every bedtime., Disp: , Rfl:   •  Omega-3 Fatty Acids (FISH OIL TRIPLE STRENGTH) 1400 MG Cap, Take 1 Cap by mouth every day., Disp: , Rfl:   •  gemfibrozil (LOPID) 600 MG TABS, Take 600 mg by mouth 2 times a day., Disp: , Rfl:   •  gabapentin (NEURONTIN) 600 MG tablet, TAKE 3 TABLETS BY MOUTH 2 TIMES A DAY, Disp: 180 Tab, Rfl: 5  •  celecoxib (CELEBREX) 200 MG Cap, TAKE 1 CAPSULE BY MOUTH EVERYDAY, Disp: 30 Cap, Rfl: 2  •  amLODIPine (NORVASC) 5 MG Tab, TAKE 1 TABLET BY MOUTH 2 TIMES A DAY, Disp: 180 Tab, Rfl: 3      Allergies:  Other drug and Pollen extract    ROS    Constitutional: Negative for fever, chills and malaise/fatigue.   HENT: Positive for ear discomfort, ear fullness and mild hearing loss. Negative for congestion, nosebleeds, sore throat and tinnitus.    Eyes: Negative for blurred vision, double vision and photophobia.   Respiratory: Negative for cough, shortness of breath.    Cardiovascular: Negative for chest pain and palpitations.   Gastrointestinal: Negative for nausea, vomiting, abdominal pain, diarrhea  "and constipation.   Genitourinary: Negative for dysuria and flank pain.   Musculoskeletal: Negative for joint pain and myalgias.   Skin: Negative for itching and rash.   Neurological: Negative for dizziness, tingling, weakness and headaches.   Endo/Heme/Allergies: Does not bruise/bleed easily.   Psychiatric/Behavioral: Negative for depression. The patient is not nervous/anxious.             Objective:     /82 (BP Location: Left arm, Patient Position: Sitting, BP Cuff Size: Adult)   Pulse 78   Temp 36.3 °C (97.3 °F) (Temporal)   Resp 16   Ht 1.676 m (5' 6\")   Wt 111.1 kg (245 lb)   SpO2 93%   BMI 39.54 kg/m²      Physical Exam      Constitutional: PT is oriented to person, place, and time.   HENT:   Head: Normocephalic and atraumatic.   Right Ear: Hearing, tympanic membrane, external ear and ear canal normal.   Left Ear: Initially with blocked cerumen plug and after MA lavage: Hearing, external ear and ear canal normal. Tympanic membrane is wnl.  Nose: Nose normal.   Mouth/Throat: Oropharynx is clear and moist. No oropharyngeal exudate.   Eyes: Conjunctivae normal and EOM are normal. Pupils are equal, round, and reactive to light.   Neck: Normal range of motion. Neck supple. No thyromegaly present.   Cardiovascular: Normal rate, regular rhythm, normal heart sounds and intact distal pulses.  Exam reveals no gallop and no friction rub.    No murmur heard.  Pulmonary/Chest: Effort normal and breath sounds normal. No respiratory distress. PT has no wheezes. PT has no rales. PT exhibits no tenderness.   Abdominal: Soft. Bowel sounds are normal. PT exhibits no distension and no mass. There is no tenderness. There is no rebound and no guarding.   Musculoskeletal: Normal range of motion. PT exhibits no edema and no tenderness.   Neurological: PT is alert and oriented to person, place, and time. No cranial nerve deficit.   Skin: Skin is warm and dry. No rash noted. No erythema.   Psychiatric: PT has a normal mood " and affect. PT behavior is normal. Judgment and thought content normal.          Assessment/Plan:     1. Cerumen impacted in left ear    Pt tolerated well the MA lavage and noted return of hearing once cerumen plug was removed.  Rest, fluids encouraged.  AVS with medical info given.  Pt was in full understanding and agreement with the plan.  Follow-up as needed if symptoms worsen or fail to improve.

## 2018-10-04 ENCOUNTER — OFFICE VISIT (OUTPATIENT)
Dept: HEALTH INFORMATION MANAGEMENT | Facility: MEDICAL CENTER | Age: 68
End: 2018-10-04
Payer: MEDICARE

## 2018-10-04 VITALS
SYSTOLIC BLOOD PRESSURE: 132 MMHG | DIASTOLIC BLOOD PRESSURE: 76 MMHG | HEIGHT: 66 IN | WEIGHT: 239.8 LBS | OXYGEN SATURATION: 94 % | HEART RATE: 90 BPM | BODY MASS INDEX: 38.54 KG/M2

## 2018-10-04 DIAGNOSIS — E66.9 OBESITY (BMI 35.0-39.9 WITHOUT COMORBIDITY): ICD-10-CM

## 2018-10-04 DIAGNOSIS — E11.9 TYPE 2 DIABETES MELLITUS WITHOUT COMPLICATION, WITHOUT LONG-TERM CURRENT USE OF INSULIN (HCC): ICD-10-CM

## 2018-10-04 DIAGNOSIS — R63.5 WEIGHT GAIN: ICD-10-CM

## 2018-10-04 DIAGNOSIS — E78.5 DYSLIPIDEMIA: ICD-10-CM

## 2018-10-04 DIAGNOSIS — I10 ESSENTIAL HYPERTENSION: ICD-10-CM

## 2018-10-04 PROCEDURE — 93000 ELECTROCARDIOGRAM COMPLETE: CPT | Performed by: INTERNAL MEDICINE

## 2018-10-04 PROCEDURE — 99205 OFFICE O/P NEW HI 60 MIN: CPT | Mod: 25 | Performed by: INTERNAL MEDICINE

## 2018-10-04 NOTE — PROGRESS NOTES
Bariatric Medicine H&P  Chief Complaint   Patient presents with   • Weight Gain       Referred by:  Cherry Long M.D.    History of Present Illness:   Zhang Cheung is a 68 y.o.  male who presents for weight management and to help address co-morbidities related to overweight, including T2 DM, HTN, HLD, CKD.    The patient had major back surgery in 2017, followed by an opiate overdose.  He had a lot of trouble sleeping thereafter, was put on a sleeping medication, does not know the name.  He gained 15 pounds.  Since then, his blood glucoses are uncontrolled, elevated and he has required more insulin.  He would like to get back down to his presurgical weight of around 200, where he feels best.  He has had a lot of trouble trying to lose the weight he gained in the last year.    Feels most concerned his activity level too low.  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  He is doing a lot more housework, financial work since his wife  4 years ago.    He feels he eats healthy, was told by dietitian there was nothing to change with regard to his food intake.  For breakfast, he has 1 egg, 2 slices of turkey boyd on a light muffin or bagel.  For lunch, he has a salad, sandwich or leftovers from dinner.  For dinner, he has soup, a large salad and a lot of fish such as cod loins.  He snacks on apples, Ritz crackers, fiber one bar.  He mostly drinks decaffeinated coffee with 2% milk or water.  He almost never eats out, mostly makes his own food.  He does not like greasy foods or fast foods.  He does plan ahead, likes to cook.    Is willing to track his intake with my fitness pal, has never used before.  Is considering weight loss medication and even bariatric surgery.  However, he admits he does not want to eat small amounts of food going forward, thinks he is eating just fine, would  "have trouble adjusting to meal replacements and small food portions.    Was diagnosed with BENEDICT, sleeping well now.  Sleeps on his stomach so unable to wear CPAP device.  Requiring insulin, metformin, with blood glucose running 160s-180s.  Last glycohemoglobin 7.5 5/2018.  Blood pressure controlled on amlodipine, benazepril.  Not on a statin, with last lipid levels close to normal 5/2018.  Last GFR normal 5/2018 but micro alb/creat elevated.    Behavior-Related History:  Binge eating screen: Negative  H/o abuse: Negative     Exercise:   Works outdoors 1-3 times per week     Review of Systems   Joint pain, arthritis, intermittent back pain.  Hayfever, some hearing loss.  Sleep apnea screen: Positive for BENEDICT, unable to wear CPAP as above.  All other ROS were reviewed with patient today and are negative.      PMH/PSH:  I have reviewed the patient's medical, social and family history, allergies, and medications today.  Prior records reviewed.  Personal Hx of Bariatric Surgery: No  Retired .  Daughter lives in [place].  Wife passed away 4 years ago unexpectedly.     Physical Exam:   /76 (BP Location: Right arm, Patient Position: Sitting, BP Cuff Size: Large adult)   Pulse 90   Ht 1.676 m (5' 6\")   Wt 108.8 kg (239 lb 12.8 oz)   SpO2 94%   BMI 38.70 kg/m²   Waist: 50.5 in  Body fat % 53.3  REE 1862 kcal/day    Constitutional: Oriented to person, place, and time and well-developed, well-nourished, and in no distress.    HENT: Mild facial plethora.  No Cushingoid features.  No scalloped tongue.  No dental erosions.  No swollen parotids.  Head: Normocephalic.   Eyes: EOM are normal. Pupils are equal, round, and reactive to light. No periorbital edema.  No lateral thinning of eyebrows.  No vertical nystagmus.  Neck: Normal range of motion. Neck supple. No thyromegaly present. No buffalo hump.  Cardiovascular: Normal rate and regular rhythm.  No murmur heard.  Pulmonary/Chest: Effort normal and breath " sounds normal. No wheezes.   Abdominal: Soft. Bowel sounds are normal. Grade 1 pannus, with firm distended abdomen.   No palpable hepatosplenomegaly.    Musculoskeletal: Normal range of motion. No edema.   Neurological: Alert and oriented to person, place, and time. Normal reflexes. No cranial nerve deficit. No muscle weakness.  Gait normal.   Skin: Warm and dry. Not diaphoretic. No hirsuitism.  No acanthosis nigricans.  Not excessively dry, scaly.  No acne.  No bruising/ecchymosis.  No hyperpigmentation.  Facial xanthomas.  Psychiatric: Mood, memory, affect and judgment normal.     Laboratory:   Prior labs reviewed.  EKG: Normal sinus rhythm, rate 89, slight ST depression in leads V3 through V6, with T wave flattening.  Corrected QT 0.369  Patient asymptomatic.  Ordered and reviewed by me today.      ASSESSMENT/PLAN:  Body mass index is 38.7 kg/m².   Obesity Stage ([place]) 2; Class 2    1. Weight gain     2. Obesity (BMI 35.0-39.9 without comorbidity)     3. Type 2 diabetes mellitus without complication, without long-term current use of insulin (HCC)     4. Dyslipidemia     5. Essential hypertension       The patient's total kcal and CHO intake may be too high for what he can tolerate, given his elevated blood glucose levels.  Higher insulin requirements, can lead to weight gain.  Medication such as gabapentin could also be leading to some weight gain.  Will focus first on tracking intake, better assessing total kcal and CHO intake daily.  Consider weight loss medication, referral to bariatric surgery, but patient appears to be unwilling at this point to change his lifestyle significantly.  Continue to monitor blood glucose, lipids, blood pressure with weight loss; update baseline labs at next visit.    The patient and I have discussed at length and agree to the following recommendations, which are all addressing the above diagnoses:    Weight Goal: 5% wt loss at one month after start (pt goal weight is 200-210  lb)  Diet:   High Protein/Low Carb Meals and 2 snacks between meals daily  >100 g protein, <100 g total carbs daily less than 1600 kcal per day  (Patient given list of better choice foods, what to avoid, was quite upset that he would have to limit some of his food choices)  64+ oz water per day  Track daily intake with my fitness pal, bring in results next visit  Physical Activity: Track steps, will discuss next visit  Risk level for moderate/vigorous exercise program:   Moderate to high given comorbidities  New Rx:   None.  Consider weight loss medication pending course  Behavior change:   Continue to assess patient's readiness to change.  Begin tracking intake.  Follow-up: 2-4 weeks.  Update baseline labs next visit.  Consider dexamethasone suppression test (no prominent physical Cushingoid features).  Repeat physical exam next visit.  Not seeing RD.      Face to face time spent 60 minutes,  with >50% of time devoted to one on one counseling on weight management issues, as documented above.      Patient's body mass index is 38.7 kg/m². Exercise and nutrition counseling were performed at this visit.        Thank you for your referral!

## 2018-10-17 RX ORDER — CELECOXIB 200 MG/1
200 CAPSULE ORAL
Qty: 30 CAP | Refills: 2 | Status: SHIPPED | OUTPATIENT
Start: 2018-10-17 | End: 2018-12-11 | Stop reason: SDUPTHER

## 2018-10-22 ENCOUNTER — HOSPITAL ENCOUNTER (OUTPATIENT)
Dept: LAB | Facility: MEDICAL CENTER | Age: 68
End: 2018-10-22
Attending: UROLOGY
Payer: MEDICARE

## 2018-10-22 LAB
BUN SERPL-MCNC: 35 MG/DL (ref 8–22)
CREAT SERPL-MCNC: 1.13 MG/DL (ref 0.5–1.4)

## 2018-10-22 PROCEDURE — 84520 ASSAY OF UREA NITROGEN: CPT

## 2018-10-22 PROCEDURE — 82565 ASSAY OF CREATININE: CPT

## 2018-10-22 PROCEDURE — 36415 COLL VENOUS BLD VENIPUNCTURE: CPT

## 2018-10-23 ENCOUNTER — OFFICE VISIT (OUTPATIENT)
Dept: HEALTH INFORMATION MANAGEMENT | Facility: MEDICAL CENTER | Age: 68
End: 2018-10-23
Payer: MEDICARE

## 2018-10-23 VITALS
HEART RATE: 95 BPM | WEIGHT: 240.1 LBS | SYSTOLIC BLOOD PRESSURE: 130 MMHG | OXYGEN SATURATION: 99 % | BODY MASS INDEX: 38.59 KG/M2 | HEIGHT: 66 IN | DIASTOLIC BLOOD PRESSURE: 78 MMHG

## 2018-10-23 DIAGNOSIS — E66.9 OBESITY (BMI 35.0-39.9 WITHOUT COMORBIDITY): ICD-10-CM

## 2018-10-23 DIAGNOSIS — R80.9 TYPE 2 DIABETES MELLITUS WITH MICROALBUMINURIA, WITHOUT LONG-TERM CURRENT USE OF INSULIN (HCC): ICD-10-CM

## 2018-10-23 DIAGNOSIS — E78.5 DYSLIPIDEMIA: ICD-10-CM

## 2018-10-23 DIAGNOSIS — I10 ESSENTIAL HYPERTENSION: ICD-10-CM

## 2018-10-23 DIAGNOSIS — E11.29 TYPE 2 DIABETES MELLITUS WITH MICROALBUMINURIA, WITHOUT LONG-TERM CURRENT USE OF INSULIN (HCC): ICD-10-CM

## 2018-10-23 DIAGNOSIS — N18.2 CKD (CHRONIC KIDNEY DISEASE) STAGE 2, GFR 60-89 ML/MIN: ICD-10-CM

## 2018-10-23 PROCEDURE — 99214 OFFICE O/P EST MOD 30 MIN: CPT | Performed by: INTERNAL MEDICINE

## 2018-10-23 NOTE — PROGRESS NOTES
The pt is not interested in continuing IBT program or attending Medical Weight Management classes. Pt will continue to follow-up with Dr. Armas and can discuss this further on his next visit as needed.

## 2018-10-23 NOTE — PROGRESS NOTES
"Bariatric Medicine Follow Up  Chief Complaint   Patient presents with   • Weight Gain   • Hyperglycemia       History of Present Illness:   Zhang Cheung is a 68 y.o. male who presents for weight management follow-up and to help address co-morbidities related to overweight, including LBP, HTN, CKD 2.    During the patient's last visit, the following were discussed and recommended:  Weight Goal: 5% wt loss at one month after start (pt goal weight is 200-210 lb)  Diet:   High Protein/Low Carb Meals and 2 snacks between meals daily  >100 g protein, <100 g total carbs daily less than 1600 kcal per day  (Patient given list of better choice foods, what to avoid, was quite upset that he would have to limit some of his food choices)  64+ oz water per day  Track daily intake with my fitness pal, bring in results next visit  Physical Activity: Track steps, will discuss next visit  Risk level for moderate/vigorous exercise program:   Moderate to high given comorbidities  New Rx:   None.  Consider weight loss medication pending course  Behavior change:   Continue to assess patient's readiness to change.  Begin tracking intake.  Follow-up: 2-4 weeks.  Update baseline labs next visit.  Consider dexamethasone suppression test (no prominent physical Cushingoid features).  Repeat physical exam next visit.  Not seeing RD.    His daughter helped him upload his diet history into My[employer]itnessPal.  Eating a lot of salads with piece of fish or chicken. Mostly vegetarian diet. Realizes he is eating a lot of CHO through what he is eating but \"none\" of the foods are processed. Having apple at night.  Drinking V8 daily.  Having Glucerna or Boost and brownies also.  On the 3 days he tracked, calorie count was between 700-1200 kcal per day, with CHO ranging from  g/day, protein around 60 g/day.    Feels uncontrolled hunger in pm.  Wants to try wt loss medications.  With BP elevated, can try Contrave and monitor BP. Wondering if gabapentin " "leading to wt gain.    Still on high doses of gabapentin, would be willing to reduce the dosing to see if that improves his weight loss.  Also realizes he is on high doses of insulin, with blood glucose 150s-180s especially fasting in the morning.  Recent kidney function shows stability.    Exercise:   Has not been tracking steps or walking.     Review of Systems   Intermittent constipation.  Denies lightheadedness, diaphoresis or hypoglycemia.  All other ROS were reviewed and are otherwise unchanged from my previous visit with patient.    Physical Exam:    /78   Pulse 95   Ht 1.676 m (5' 6\")   Wt 108.9 kg (240 lb 1.6 oz)   SpO2 99%   BMI 38.75 kg/m²   Waist: 49.5 in  Body fat % 39.9  REE 1658 kcal/day    Weight change since last visit: 0 lb   Waist Circum change since last visit: -1 in     Constitutional: Oriented to person, place, and time and well-developed, well-nourished, and in no distress.    Head: Normocephalic.   Cardiovascular: Normal rate and regular rhythm.  No murmur heard.  Pulmonary/Chest: Effort normal and breath sounds normal. No wheezes.   Abdominal: Soft. Bowel sounds are normal. No pannus but markedly distended and firm abdomen.  Firm liver edge.  Musculoskeletal: Normal range of motion. No edema.   Neurological: Alert and oriented to person, place, and time. No muscle weakness.  Gait normal.   Skin: Warm and dry. Not diaphoretic.   Psychiatric: Mood, memory, affect and judgment normal.     Laboratory:   Recent labs reviewed.      ASSESSMENT/PLAN:  Body mass index is 38.75 kg/m².    Obesity Stage ([place]): 2; Class 2    1. Obesity (BMI 35.0-39.9 without comorbidity)     2. CKD (chronic kidney disease) stage 2, GFR 60-89 ml/min     3. Type 2 diabetes mellitus with microalbuminuria, without long-term current use of insulin (HCC)     4. Essential hypertension     5. Dyslipidemia       The patient has begun tracking his intake but is not yet consistent.  In order to reduce his T2 DM, " blood pressure, lipids, he needs to be more consistent with reducing CHO intake and tracking.  Will start weight loss medication, stimulus narrowing.    The patient and I have discussed at length and agree to the following recommendations, which are all addressing the above diagnoses:    Weight Goal: 3-5% wt loss each month (pt goal weight is 200 lb)  Diet: High Protein/Low Carb Meals and 2 snacks between meals daily  1 Premier protein shake per day  >100 g protein, <100 g total carbs daily  64+ oz water per day  Avoid sweet drinks and sodas  Track daily intake with my fitness pal, can also write down if easier  Physical Activity: Start tracking steps  Risk level for moderate/vigorous exercise program: Moderate given relative inactivity  New Rx: Start phentermine/topiramate twice daily  Side Effects: Watch blood pressure.  Consent in chart.  Behavior change: Mindful eating, tracking, stimulus narrowing  Follow-up: One month  Patient not seeing RD    Patient's body mass index is 38.75 kg/m². Exercise and nutrition counseling were performed at this visit.

## 2018-10-23 NOTE — LETTER
Medical Weight Management  Patient Consent Form For Contrave (Naltrexone/buproprion)    I hereby authorize the physician and their staff to assist me in my weight reduction efforts. I understand that my treatment program consists of a balanced diet, a regular exercise program, instruction in behavior alejandro?cation techniques, meeting with a registered dietician, and the use of the appetite suppressant medication Contrave. I also understand that regular medical visits will be necessary while on the medication and that Contrave must be used with caution and under direct supervision of the physician.    Risk of Proposed Treatment: I understand that any medical treatment may involve risks as well as the proposed bene?ts of weight loss. I understand that this authorization is given with the knowledge that the use of Contrave involves risk. Risks of Contrave include but are not limited to suicidal thoughts, sudden decrease in vision, glaucoma, birth defects in your unborn child if you take this medication while pregnant, hypoglycemia.  Risks can also include nervousness, diarrhea, constipation, sleeplessness, headache, tremor, fever, fainting, dry mouth, rash, change in libido, difficulty urinating, shortness of breath, swelling of feet or ankles, tiredness, dizziness, weakness, allergic reactions, psychological imbalances, hallucinations, stomach cramps, high blood pressure, palpitations, arrhythmias, rapid heart rate, hepatitis, yoanna, depression, insomnia. In case of serious side effects or if you become pregnant, stop taking the Contrave and seek immediate medical assistance. I also understand that there are certain health risks associated with remaining overweight or obese, including high blood pressure, diabetes, heart attack and heart disease, arthritis of the joints, sleep apnea, and sudden death. Do not use with alcohol.    I further understand that Contrave should not be used by people who suffer from  glaucoma, seizures, anorexia or bulimia, those with a history of drug dependency, opioid dependency, alcoholism, psychotic illness, uncontrolled hypertension, pregnancy, on MAOI’s, serotonin migraine medications, Wellbutrin or other buproprion-containing medication, or lithium.    While taking Contrave, avoid taking the following medications: Decongestant medications (Sudafed/Pseudoephedrine, Tylenol Sinus, Claritin D, Zyrtec D and Allegra D), Stimulant medications, high doses of caffeine, other weight loss medications, ephedrine MAO inhibitions and alcohol.    Patient Responsibility:   As the patient, I understand it is my responsibility to follow instructions carefully, and to report to the physician any significant medical problems that I think may be related to my weight control program as soon as possible. I agree to notify the physician of any medical problems that I may have or any results of labs/tests ordered and reviewed by any other physician. I further acknowledge that I enter into this program in full knowledge and understanding that no physician, provider, or staff of the weight loss physician has prior knowledge as to whether I would or would not have adverse effects due to the fact that each individual has a different biological and chemical make -up. I understand the purpose of this treatment is to assist me in my desire to decrease my body weight and to maintain this weight loss. I understand my continuing to receive Contrave will be dependent on my progress in weight reduction and weight maintenance.  If I am female, I will need to have a monthly negative pregnancy test in order to receive the next month’s prescription. I understand that a balanced caloric counting program combined with regular exercise without the use of Contrave may likely prove successful if followed, even though I would be hungrier than without the suppressant.    I am also in full understanding that Contrave will be used no  longer than 3 consecutive months. After 3 months of use the medication will be discontinued.     If you and the physician agree to use the medication longer than 3 months or if your BMI has decreased below the Federal Drug Administration's recommended value you will be using the medication in an off-label manner.  Contrave may result in lethargy or depression with abrupt discontinuation and I understand that during the program, medications will be discontinued if:  1.) I become pregnant, try to become pregnant, or suspect that I am pregnant.  2.) I develop a contraindication or serious side effect of the medication.  3.) I do not comply with medical requirements, i.e. visits, med doses, etc.  4.) I fail to lose and/or maintain weight appropriately.  5.) I have a planned surgery. Medications are to be stopped at least 2 weeks prior to any surgical procedure requiring general anesthesia.    Women Only: I understand Contrave should not be taken during pregnancy, due to the chance of damage to the fetus. This has been explained to me fully, and I am aware of the risks involved. To the best of my knowledge, I am not pregnant. I am aware of the precautions that should be taken to avoid pregnancy while I am on the medication. If I become pregnant, I will advise both the physician and my OB/GYN immediately. In addition, Contrave is not to be used while breast feeding.    No Guarantee:  I understand that much of the success of the program will depend on my effort, and that there is no guarantee that the program will be successful.  I understand that I will have to continue with sensible and nutritional eating habits and regular exercise all my life, if I am able to be successful long-term.     Patient Consent/Waiver:   I have read and fully understand this document and authorize and accept the proposed care regardless of the risk. I affirm that my questions have been satisfactorily answered at this time. I realize that I  should not sign this form if all items are not understood by me or if questions have not been answered to my satisfaction. I hereby release the physician and [facility] from any liability associated and connected with my participation in this weight loss program.  I accept the risks as discussed above, in hopes of obtaining desired bene?cial results of weight loss treatment. I understand it is my responsibility to give the physician the name of my primary care physician where labs and/or EKG can be obtained for follow through and interpretation, if need be.     WARNING: If you have any questions as to the risks or hazards of the proposed treatment, or any questions whatsoever concerning the proposed treatment or other possible treatments, ask the physician now before signing this consent form. To conclude, by signing this document you are agreeing to the risks associated with Contrave. You are agreeing that to be successful in your weight loss goals you must alter your lifestyle and adopt healthy eating and exercise patterns. You are agreeing that you are not pregnant or breast feeding. You are agreeing that you understand Contrave may, in rare instances, be addictive. You are agreeing that you must notify the physician of any medical conditions current or that develop while taking Contrave and you are agreeing that this document has been adequately explained to you and that you understand the document in its entirety.    Alternatives to treatment, and no treatment, including the risks and benefits thereof have been discussed with me.      _____________________________________  Patient / Authorized Legal Representative    _____________________________________  Relationship to Patient (if not patient)    _____________________________________  Date/Time    Provider Declaration:   I have explained the contents of this document to the patient and have answered all the patient’s related questions. To the best of my  knowledge, I feel the patient has been adequately informed concerning the bene?ts and risks associated with the use of Contrave, the bene?ts and risks associated with alternative therapies, and the risks concerning an overweight status. After being adequately informed, the patient has consented.      _____________________________________  Physician Signature    _____________________________________  Physician Name (printed)     _____________________________________  Date/Time

## 2018-11-09 ENCOUNTER — HOSPITAL ENCOUNTER (OUTPATIENT)
Dept: RADIOLOGY | Facility: MEDICAL CENTER | Age: 68
End: 2018-11-09
Attending: UROLOGY
Payer: MEDICARE

## 2018-11-09 DIAGNOSIS — N28.1 ACQUIRED CYST OF KIDNEY: ICD-10-CM

## 2018-11-09 PROCEDURE — 700117 HCHG RX CONTRAST REV CODE 255: Performed by: UROLOGY

## 2018-11-09 PROCEDURE — 74178 CT ABD&PLV WO CNTR FLWD CNTR: CPT

## 2018-11-09 RX ADMIN — IOHEXOL 100 ML: 350 INJECTION, SOLUTION INTRAVENOUS at 09:49

## 2018-11-29 ENCOUNTER — TELEPHONE (OUTPATIENT)
Dept: MEDICAL GROUP | Facility: MEDICAL CENTER | Age: 68
End: 2018-11-29

## 2018-11-29 ENCOUNTER — APPOINTMENT (OUTPATIENT)
Dept: MEDICAL GROUP | Facility: MEDICAL CENTER | Age: 68
End: 2018-11-29
Payer: MEDICARE

## 2018-11-29 DIAGNOSIS — M25.539 PAIN IN WRIST, UNSPECIFIED LATERALITY: ICD-10-CM

## 2018-11-29 RX ORDER — ALFUZOSIN HYDROCHLORIDE 10 MG/1
TABLET, EXTENDED RELEASE ORAL
Refills: 3 | COMMUNITY
Start: 2018-11-14 | End: 2023-06-26

## 2018-11-29 RX ORDER — INSULIN ASPART 100 [IU]/ML
INJECTION, SUSPENSION SUBCUTANEOUS
Refills: 1 | COMMUNITY
Start: 2018-10-26 | End: 2019-06-13 | Stop reason: SDUPTHER

## 2018-11-29 NOTE — PROGRESS NOTES
Patient got colonoscopy but I can't see it in EPIC to satisfy the health maintenance. Also best practice is flagging him for fasting lipid profile for diabetic testing. Please advise if he needs to do another blood test or if it was covered under recent test done.

## 2018-11-29 NOTE — TELEPHONE ENCOUNTER
1. Caller Name: Zhang Cheung      Call Back Number: [phone] (home)         Patient approves a detailed voicemail message: yes    Patient has documented wrist pain bilaterally. Hx of LT wrist surgery but now has worsening symptoms numbness, tingling) in RT wrist. Requesting referal to [facility] for evaluation.

## 2018-12-05 ENCOUNTER — OFFICE VISIT (OUTPATIENT)
Dept: HEALTH INFORMATION MANAGEMENT | Facility: MEDICAL CENTER | Age: 68
End: 2018-12-05
Payer: MEDICARE

## 2018-12-05 VITALS
BODY MASS INDEX: 35.97 KG/M2 | OXYGEN SATURATION: 94 % | HEIGHT: 66 IN | SYSTOLIC BLOOD PRESSURE: 130 MMHG | DIASTOLIC BLOOD PRESSURE: 78 MMHG | WEIGHT: 223.8 LBS | HEART RATE: 87 BPM

## 2018-12-05 DIAGNOSIS — R80.9 TYPE 2 DIABETES MELLITUS WITH MICROALBUMINURIA, WITHOUT LONG-TERM CURRENT USE OF INSULIN (HCC): ICD-10-CM

## 2018-12-05 DIAGNOSIS — E11.29 TYPE 2 DIABETES MELLITUS WITH MICROALBUMINURIA, WITHOUT LONG-TERM CURRENT USE OF INSULIN (HCC): ICD-10-CM

## 2018-12-05 DIAGNOSIS — E78.5 DYSLIPIDEMIA: ICD-10-CM

## 2018-12-05 DIAGNOSIS — N18.2 CKD (CHRONIC KIDNEY DISEASE) STAGE 2, GFR 60-89 ML/MIN: ICD-10-CM

## 2018-12-05 DIAGNOSIS — E66.9 OBESITY (BMI 35.0-39.9 WITHOUT COMORBIDITY): ICD-10-CM

## 2018-12-05 DIAGNOSIS — I10 ESSENTIAL HYPERTENSION: ICD-10-CM

## 2018-12-05 PROCEDURE — 99214 OFFICE O/P EST MOD 30 MIN: CPT | Performed by: INTERNAL MEDICINE

## 2018-12-05 NOTE — PROGRESS NOTES
Bariatric Medicine Follow Up  Chief Complaint   Patient presents with   • Weight Gain       History of Present Illness:   Zhang Cheung is a 68 y.o. male who presents for weight management follow-up and to help address co-morbidities related to overweight, including CKD2, T2DM, HTN, HLD.    During the patient's last visit, the following were discussed and recommended:  Weight Goal: 3-5% wt loss each month (pt goal weight is 200 lb)  Diet: High Protein/Low Carb Meals and 2 snacks between meals daily  1 Premier protein shake per day  >100 g protein, <100 g total carbs daily  64+ oz water per day  Avoid sweet drinks and sodas  Track daily intake with my fitness pal, can also write down if easier  Physical Activity: Start tracking steps  Risk level for moderate/vigorous exercise program: Moderate given relative inactivity  New Rx: Start phentermine/topiramate twice daily  Side Effects: Watch blood pressure.  Consent in chart.  Behavior change: Mindful eating, tracking, stimulus narrowing  Follow-up: One month  Patient not seeing RD    Trying to get portion size down, used to measuring wt of foods.  Much fewer cravings for large amounts of food.  Weighing himself in mornings, happy to see progression of 1 lb wt loss per day.  Realizes how much he was overeating.  Enjoys making his own food.  Now eating more at lunch, much less at dinner. Dinner is lean meat with salad. PP I am with almond milk, really likes it now.    Measuring is key for him!    Titrating down gabapentin.  Not helping peripheral neuropathy.  Orthotics not really helping.  Fasting glucose 110, down from 140s,  Adjusting insulin down by 50% since last visit.  No recent GFR.  Blood pressure controlled on amlodipine, benazepril.  Continues vitamin D repletion.    Exercise:   No significant activity, not consistently tracking steps.     Review of Systems   Denies lightheadedness, diaphoresis, hypoglycemic episodes, change in BMs.  All other ROS were reviewed  "and are otherwise unchanged from my previous visit with patient.    Physical Exam:    /78 (BP Location: Right arm, Patient Position: Sitting, BP Cuff Size: Large adult)   Pulse 87   Ht 1.676 m (5' 6\")   Wt 101.5 kg (223 lb 12.8 oz)   SpO2 94%   BMI 36.12 kg/m²   Waist: 47.75 in  Body fat % 34.4  REE 1776 kcal/day    Weight change since last visit: -16 lb (-16 lb total)  Waist Circum change since last visit: -2 in (-3 in total)    Constitutional: Oriented to person, place, and time and well-developed, well-nourished, and in no distress.    Head: Normocephalic.   Abdominal: Soft. Bowel sounds are normal. Less abdominal distention.  Musculoskeletal: Normal range of motion. No edema.   Neurological: Alert and oriented to person, place, and time. No muscle weakness.  Gait normal.   Skin: Warm and dry. Not diaphoretic.   Psychiatric: Mood, memory, affect and judgment normal.     Laboratory:   None new.      ASSESSMENT/PLAN:  Body mass index is 36.12 kg/m².    Obesity Stage ([place]): 2; Class 2    1. Obesity (BMI 35.0-39.9 without comorbidity)     2. Type 2 diabetes mellitus with microalbuminuria, without long-term current use of insulin (HCC)  COMP METABOLIC PANEL    HEMOGLOBIN A1C    BASIC METABOLIC PANEL   3. CKD (chronic kidney disease) stage 2, GFR 60-89 ml/min  COMP METABOLIC PANEL    BASIC METABOLIC PANEL   4. Dyslipidemia     5. Essential hypertension  COMP METABOLIC PANEL    BASIC METABOLIC PANEL     The patient is responding well to Contrave, reducing portion size and CHO intake.  Fasting glucose under much better control.  Update labs to evaluate kidney function with weight loss.  Update lipids, hemoglobin A1c next spring, as Medicare will not pay for more frequent hemoglobin A1c monitoring.  Blood pressure currently well controlled.  Encourage patient to increase activity level.    The patient and I have discussed at length and agree to the following recommendations, which are all addressing the " above diagnoses:    Weight Goal: 3-5% wt loss each month (pt goal weight is 200 lb)  Diet: High Protein/Low Carb Meals and 2 snacks between meals daily  >100 g protein, <100 g total carbs daily if tracking  64+ oz water per day  Continue Premier protein every morning  Track daily intake if possible in my fitness pal, bring in results next visit  Physical Activity: Start tracking steps or consider exercise program  Risk level for moderate/vigorous exercise program: Moderate given current inactivity  New Rx: Continue Contrave one tab bid (patient has enough for another month, he is taking lower dose)  Side Effects: consent in chart  Behavior change: Mindful eating, planning, tracking, increase activity level  Follow-up: 1 month    Patient's body mass index is 36.12 kg/m². Exercise and nutrition counseling were performed at this visit.

## 2018-12-06 ENCOUNTER — OFFICE VISIT (OUTPATIENT)
Dept: MEDICAL GROUP | Facility: MEDICAL CENTER | Age: 68
End: 2018-12-06
Payer: MEDICARE

## 2018-12-06 VITALS
RESPIRATION RATE: 12 BRPM | OXYGEN SATURATION: 96 % | TEMPERATURE: 97.6 F | HEIGHT: 66 IN | BODY MASS INDEX: 36.19 KG/M2 | HEART RATE: 86 BPM | SYSTOLIC BLOOD PRESSURE: 118 MMHG | DIASTOLIC BLOOD PRESSURE: 68 MMHG | WEIGHT: 225.2 LBS

## 2018-12-06 DIAGNOSIS — M25.531 RIGHT WRIST PAIN: ICD-10-CM

## 2018-12-06 DIAGNOSIS — Z71.3 ENCOUNTER FOR WEIGHT LOSS COUNSELING: ICD-10-CM

## 2018-12-06 PROCEDURE — 99214 OFFICE O/P EST MOD 30 MIN: CPT | Performed by: FAMILY MEDICINE

## 2018-12-06 NOTE — LETTER
[facility]  Cherry Long M.D.  [address]  [place] [id number]  Fax: [phone]   Authorization for Release/Disclosure of   Protected Health Information   Name: SAMUEL AGARWAL : 1950 SSN: [id number]   Address: [address] [place] [address] [address] [place] [address] Phone:    [phone] (home)    I authorize the entity listed below to release/disclose the PHI below to:   [facility]/Cherry Long M.D. and Cherry Long M.D.   Provider or Entity Name:  {[facility] COLORECTAL SCREENING LOCATIONS:[id number]}   Reason for request: continuity of care   Information to be released:    [ X ] LAST COLONOSCOPY,  including any PATH REPORT and follow-up  [ X ] LAST FIT/COLOGUARD RESULT [  ] LAST DEXA  [  ] LAST MAMMOGRAM  [  ] LAST PAP  [  ] LAST LABS [  ] RETINA EXAM REPORT  [  ] IMMUNIZATION RECORDS  [  ] Release all info      [  ] Check here and initial the line next to each item to release ALL health information INCLUDING  _____ Care and treatment for drug and / or alcohol abuse  _____ HIV testing, infection status, or AIDS  _____ Genetic Testing    DATES OF SERVICE OR TIME PERIOD TO BE DISCLOSED: _____________  I understand and acknowledge that:  * This Authorization may be revoked at any time by you in writing, except if your health information has already been used or disclosed.  * Your health information that will be used or disclosed as a result of you signing this authorization could be re-disclosed by the recipient. If this occurs, your re-disclosed health information may no longer be protected by State or Federal laws.  * You may refuse to sign this Authorization. Your refusal will not affect your ability to obtain treatment.  * This Authorization becomes effective upon signing and will  on (date) __________.      If no date is indicated, this Authorization will  one (1) year from the signature date.    Name: Samuel Agarwal    Signature:   Date:     2018       PLEASE FAX REQUESTED RECORDS BACK  TO: [phone]

## 2018-12-07 NOTE — PROGRESS NOTES
This medical record contains text that has been entered with the assistance of computer voice recognition and dictation software.  Therefore, it may contain unintended errors in text, spelling, punctuation, or grammar        Chief Complaint   Patient presents with   • Tingling     RT hand; feeling like it fell asleep   • Ear Fullness     LT ear       Zhang Cheung is a 68 y.o. male here evaluation and management of: new issue R finger numbness and ongoing issue of weight loss he is successfully loosing weight with contrave with wt loss center and wants to talk about my opinions with medication and diet and also thoughts about maintenance and avoidance of regain   Regarding his R hand parasthesias numbness first 2 fingers and 1/3 of third  No weakness  Some thenar wrist pain   No injury or inciting event            Current Outpatient Prescriptions   Medication Sig Dispense Refill   • Naltrexone-Bupropion HCl ER (CONTRAVE) 8-90 MG TABLET SR 12 HR 1 tab BID 60 Tab 0   • glucose blood (ONE TOUCH ULTRA TEST) strip TEST BLOOD 3 TIMES DAILY 300 Strip 0   • alfuzosin (UROXATRAL) 10 MG SR tablet TAKE 1 TABLET BY MOUTH EVERYDAY AT BEDTIME  3   • NOVOLOG MIX 70/30 FLEXPEN (70-30) 100 UNIT/ML Suspension Pen-injector INJECT UP TO 20U SUBCUTANEOUSLY EVERYDAY  1   • glucose blood strip OneTouch Ultra Blue Test Strip   TEST BLOOD 3 TIMES DAILY     • Naltrexone-Bupropion HCl ER 8-90 MG TABLET SR 12 HR Take 8-90 mg by mouth See Admin Instructions. Week 1 one tab po QD  Week 2 one tab po BID  Week 3 two tab po QAM, one tab po QPM  Week 4 two tab po  Tab 0   • celecoxib (CELEBREX) 200 MG Cap Take 1 Cap by mouth every day. 30 Cap 2   • gabapentin (NEURONTIN) 600 MG tablet TAKE 3 TABLETS BY MOUTH 2 TIMES A  Tab 5   • amLODIPine (NORVASC) 5 MG Tab TAKE 1 TABLET BY MOUTH 2 TIMES A  Tab 3   • Multiple Vitamin (MULTI-VITAMIN DAILY PO) Multi Vitamin     • indapamide (LOZOL) 2.5 MG Tab indapamide 2.5 mg tablet     • Multiple  Vitamins-Minerals (ICAPS AREDS 2 PO) ICaps AREDS     • Insulin Pen Needle (NOVOFINE) 30G X 8 MM Misc USE FOR INSULIN UP TO 3 TIMES A  Each 3   • metformin (GLUCOPHAGE) 1000 MG tablet Take 1,000 mg by mouth 2 times a day, with meals.     • amLODIPine (NORVASC) 2.5 MG Tab Take 4 Tabs by mouth every day. 90 Tab 3   • benazepril (LOTENSIN) 40 MG tablet Take 1 Tab by mouth every day. 30 Tab 0   • gabapentin (NEURONTIN) 300 MG Cap Take 1200mg PO TID 1200 Cap 3   • metFORMIN (GLUCOPHAGE) 500 MG Tab Take 2 Tabs by mouth 2 times a day, with meals. 180 Tab 3   • Cholecalciferol (VITAMIN D3) 5000 UNITS Cap Take 1 Cap by mouth every day.     • insulin aspart protamine-insulin aspart (NOVOLOG MIX 70/30) (70-30) 100 UNIT/ML injection Inject 8-10 Units as instructed 3 times a day before meals. 100 = 5 units  120= 7 units  184 = 9 units  200 = 12 units  Pt can go as high as 22 units if needed     • tamsulosin (FLOMAX) 0.4 MG capsule Take 0.8 mg by mouth every bedtime.     • Omega-3 Fatty Acids (FISH OIL TRIPLE STRENGTH) 1400 MG Cap Take 1 Cap by mouth every day.     • gemfibrozil (LOPID) 600 MG TABS Take 600 mg by mouth 2 times a day.       No current facility-administered medications for this visit.      Patient Active Problem List    Diagnosis Date Noted   • Lumbar stenosis 09/12/2017     Priority: Medium   • Typical atrial flutter (HCC) 04/25/2017     Priority: Medium   • Essential hypertension 10/13/2013     Priority: Medium   • Dyslipidemia 10/13/2013     Priority: Medium   • Benign prostatic hyperplasia 03/27/2014     Priority: Low   • Degeneration of lumbar or lumbosacral intervertebral disc 02/19/2014     Priority: Low   • Type 2 DM 10/13/2013     Priority: Low   • CKD (chronic kidney disease) stage 2, GFR 60-89 ml/min 10/13/2013     Priority: Low   • Encounter for weight loss counseling 12/06/2018   • Obesity (BMI 35.0-39.9 without comorbidity) 07/11/2018   • Benign prostatic hyperplasia with urinary hesitancy  05/21/2018   • Right wrist pain 05/21/2018   • Neuropathy (HCC) 05/21/2018   • Bilateral shoulder pain 05/21/2018   • Bilateral foot pain 05/21/2018   • Stenosis, spinal, lumbar 09/18/2017     Past Surgical History:   Procedure Laterality Date   • EXTREME LATERAL INTERBODY FUSION Left 9/18/2017    Procedure: EXTREME LATERAL INTERBODY FUSION L2-3 W/PLATE;  Surgeon: Yemi Sharma M.D.;  Location: SURGERY [facility];  Service: Neurosurgery   • LUMBAR FUSION POSTERIOR  9/18/2017    Procedure: LUMBAR FUSION POSTERIOR L2-3 INSTRUMENTED;  Surgeon: Yemi Sharma M.D.;  Location: SURGERY [facility];  Service: Neurosurgery   • LUMBAR LAMINECTOMY DISKECTOMY  9/18/2017    Procedure: LUMBAR LAMINECTOMY DISKECTOMY;  Surgeon: Yemi Sharma M.D.;  Location: SURGERY [facility];  Service: Neurosurgery   • RECOVERY  1/13/2016    Procedure: IR Deep bone biopsy L2-L3 with general anesthesia-DAYANA;  Surgeon: [facility];  Location: SURGERY PRE[place]IT [facility];  Service:    • LUMBAR LAMINECTOMY DISKECTOMY  4/22/2014    Performed by Mazin Long M.D. at SURGERY [facility]   • TRANS URETHRAL RESECTION PROSTATE  3/27/2014    Performed by Kyle Guzman M.D. at [facility]   • LUMBAR LAMINECTOMY DISKECTOMY  2/19/2014    Performed by Mazin Long M.D. at [facility]   • OTHER ORTHOPEDIC SURGERY  02/14    L2-L3 Laminectomy   • OTHER  2014    transection supraorbital nerve   • LUMBAR LAMINECTOMY DISKECTOMY  5/2/2012    Performed by MAZIN LONG at [facility]   • OTHER ORTHOPEDIC SURGERY  03/12    L3-L5 Laminectomy   • OTHER      laser eye susrgery   • OTHER      carpectomy   • OTHER ORTHOPEDIC SURGERY  2003/2007/2008    left shoulder replacement/with corrections   • OTHER ORTHOPEDIC SURGERY      right knee replacement/manipulation      Social History   Substance Use Topics   • Smoking status: Former Smoker     Packs/day: 0.75     Years: 10.00     Types:  "Cigarettes     Quit date: 4/24/2008   • Smokeless tobacco: Never Used   • Alcohol use No     Family History   Problem Relation Age of Onset   • Sleep Apnea Neg Hx            ROS    all review of system completed and negative except for those listed above     Objective:     Blood pressure 118/68, pulse 86, temperature 36.4 °C (97.6 °F), temperature source Temporal, resp. rate 12, height 1.676 m (5' 6\"), weight 102.2 kg (225 lb 3.2 oz), SpO2 96 %. Body mass index is 36.35 kg/m².  Reviewed steady wt loss since October   Physical Exam:        GEN: comfortable, alert and oriented, well nourished, well developed, in no apparent distress   HEENT: NCAT, eyes: pupils equal and reactive, sclera white, EOMIT, good dentition  HEART: limbs warm and well perfused, regular rate, no JVD, no lower extremity edema  LUNGS: speaking in full sentences, not in apparent respiratory distress, no audible wheezes  MSK: normal tone and bulk, no swelling of the joints, gait steady and normal     Hand r side  No swelling deformity edema  R/u/m motor in tact   + finklesteins  + tinnels wrist and phalens      Assessment and Plan:   The following treatment plan was discussed        Problem List Items Addressed This Visit     Right wrist pain     Exam and history most consistent with carpel tunnel  Pain numbness in median nerve distribution   With + tinnels wrist  He is asking for ortho ref  I suggest night time splinting with thumb spicca splint (as he also has e/o dequervaines tenosynovitis)   Follow up with me prn                Encounter for weight loss counseling     I concur with choice of contrave and I discuss the results of the studies  The contrave has wellbutrin which is likely helping to treat his underlying possible anxiety depression too , with his h/o htn stimulent should be avoided or used cautioulsy   He is benefiting from regular appoitments and accountability   I suggest that he continue care with wt loss clinic for at least " 6mo-1 year for maitenance and avoidance of regain or minimizing regain and I also discuss the National weight control registry website for inspiration   Lastly we discuss leptin and grelin     30+ min face to face prev health counseling              Relevant Medications    Naltrexone-Bupropion HCl ER (CONTRAVE) 8-90 MG TABLET SR 12 HR    Other Relevant Orders    REFERRAL TO ORTHOPEDICS                Instructed to follow up if symptoms worsen or fail to improve, ER/UC precautions discussed as well    Cherry Long MD  [facility], Family Medicine   [address] [place] [address]   Brad CRUZ [id number]  Phone: [phone]

## 2018-12-07 NOTE — ASSESSMENT & PLAN NOTE
I concur with choice of contrave and I discuss the results of the studies  The contrave has wellbutrin which is likely helping to treat his underlying possible anxiety depression too , with his h/o htn stimulent should be avoided or used cautioulsy   He is benefiting from regular appoitments and accountability   I suggest that he continue care with wt loss clinic for at least 6mo-1 year for maitenance and avoidance of regain or minimizing regain and I also discuss the National weight control registry website for inspiration   Lastly we discuss leptin and grelin     30+ min face to face prev health counseling

## 2018-12-07 NOTE — ASSESSMENT & PLAN NOTE
Exam and history most consistent with carpel tunnel  Pain numbness in median nerve distribution   With + tinnels wrist  He is asking for ortho ref  I suggest night time splinting with thumb spicca splint (as he also has e/o dequervaines tenosynovitis)   Follow up with me prn

## 2018-12-12 RX ORDER — CELECOXIB 200 MG/1
200 CAPSULE ORAL
Qty: 30 CAP | Refills: 2 | Status: SHIPPED | OUTPATIENT
Start: 2018-12-12 | End: 2019-03-08 | Stop reason: SDUPTHER

## 2018-12-14 ENCOUNTER — HOSPITAL ENCOUNTER (OUTPATIENT)
Dept: LAB | Facility: MEDICAL CENTER | Age: 68
End: 2018-12-14
Attending: INTERNAL MEDICINE
Payer: MEDICARE

## 2018-12-14 DIAGNOSIS — R80.9 TYPE 2 DIABETES MELLITUS WITH MICROALBUMINURIA, WITHOUT LONG-TERM CURRENT USE OF INSULIN (HCC): ICD-10-CM

## 2018-12-14 DIAGNOSIS — N18.2 CKD (CHRONIC KIDNEY DISEASE) STAGE 2, GFR 60-89 ML/MIN: ICD-10-CM

## 2018-12-14 DIAGNOSIS — E11.29 TYPE 2 DIABETES MELLITUS WITH MICROALBUMINURIA, WITHOUT LONG-TERM CURRENT USE OF INSULIN (HCC): ICD-10-CM

## 2018-12-14 DIAGNOSIS — I10 ESSENTIAL HYPERTENSION: ICD-10-CM

## 2018-12-14 LAB
ANION GAP SERPL CALC-SCNC: 10 MMOL/L (ref 0–11.9)
BUN SERPL-MCNC: 34 MG/DL (ref 8–22)
CALCIUM SERPL-MCNC: 10.5 MG/DL (ref 8.5–10.5)
CHLORIDE SERPL-SCNC: 105 MMOL/L (ref 96–112)
CO2 SERPL-SCNC: 25 MMOL/L (ref 20–33)
CREAT SERPL-MCNC: 1.28 MG/DL (ref 0.5–1.4)
FASTING STATUS PATIENT QL REPORTED: NORMAL
GLUCOSE SERPL-MCNC: 122 MG/DL (ref 65–99)
POTASSIUM SERPL-SCNC: 4.1 MMOL/L (ref 3.6–5.5)
SODIUM SERPL-SCNC: 140 MMOL/L (ref 135–145)

## 2018-12-14 PROCEDURE — 80048 BASIC METABOLIC PNL TOTAL CA: CPT

## 2018-12-14 PROCEDURE — 36415 COLL VENOUS BLD VENIPUNCTURE: CPT

## 2018-12-19 ENCOUNTER — TELEPHONE (OUTPATIENT)
Dept: HEALTH INFORMATION MANAGEMENT | Facility: MEDICAL CENTER | Age: 68
End: 2018-12-19

## 2018-12-20 DIAGNOSIS — Z71.3 ENCOUNTER FOR WEIGHT LOSS COUNSELING: ICD-10-CM

## 2018-12-20 NOTE — TELEPHONE ENCOUNTER
Was the patient seen in the last year in this department? Yes    Does patient have an active prescription for medications requested? Yes    Received Request Via: Patient      Pt needs Contrave filled at [facility] mail order pharmacy due to insurance. Thank you.

## 2019-01-01 NOTE — TELEPHONE ENCOUNTER
Was the patient seen in the last year in this department? Yes     Does patient have an active prescription for medications requested? No     Received Request Via: Pharmacy   First name:  FEMALE SHANI                MR # [id number]  Date of Birth: [birth year]	Time of Birth: 306    Birth Weight: 1820g     Admission Date:        Gestational Age: 32.2      Source of admission [ __X ] Inborn     [ __ ]Transport from    [facility]: Neonatologist requested by Dr Mendoza to attend a RC/S under general anesthesia of a 35 y/o  mom at 32.2 weeks GA secondary to PTL and transverse lie.  She had + PNC, is O pos, HBSAg neg, HIV neg, RPR NR, Rubella Imm, GBS pos, hx of Von Willebrand Disease (she was on heparin until a couple of hrs PTD), hx of seizures(Tx with Keppra, switched to Topamax and then D/C'd), hx of depression on Zoloft. 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  L&D:  Mom was admitted to [facility] from [facility] aprox 11 days ago for PROM.  She received a full course of betamethasone 1 week ago.   She was treated with Ampicillin X48 hrs,  Azithromycin X1, and then po amoxicillin.  SROM aprox 10days ago.  Baby born via C/S, breech, floppy, transferred to radiant warmer, dried, stimulated, with improvement in tone and active cry.  Then baby became apneic and with decreased HR for which CPAP +5 was given with good response.  Then Baby's HR decreased again for which PPV was given.  Baby's HR improved and then CPAP +5 was continued. APGAR Scores of 8&9. Baby was transferred to NICU for further evaluation and management.     Social History: No history of alcohol/tobacco exposure obtained  FHx: non-contributory to the condition being treated   ROS: unable to obtain ()     Interval Events:  RA, open crib, tolerating po feeds well. Mom not visiting much b/o transportation issue. Has made arrangements to visit  [ see SW note  ]         Age:13d    LOS:13d    Vital Signs:  T(C): 37.1 ( @ 08:30), Max: 37.1 ( @ 05:30)  HR: 168 ( @ 08:30) (144 - 168)  BP: 73/40 ( @ 08:30) (73/32 - 73/40)  RR: 48 ( @ 08:30) (40 - 54)  SpO2: 100% ( @ 08:30) (98% - 100%)      LABS:                                        15.0   13.0 )-----------( 412             [ @ 03:58]                  43.8  S 57.0%  B 0%  [place] 0%  Myelo 0%  Promyelo 0%  Blasts 0%  Lymph 31.0%  Mono 8.0%  Eos 3.0%  Baso 0.0%  Retic 0%        141  |106  | 6.0    ------------------<96   Ca 10.1 Mg N/A  Ph N/A   [ @ 08:17]  5.4   | 20.0 | 0.50        145  |110  | 9.0    ------------------<99   Ca 10.4 Mg N/A  Ph N/A   [ @ 05:17]  5.2   | 22.0 | 0.56                                             CAPILLARY BLOOD GLUCOSE          ferrous sulfate Oral Liquid - Peds 3.8 milliGRAM(s) Elemental Iron daily  hepatitis B IntraMuscular Vaccine - Peds 0.5 milliLiter(s) once  multivitamin Oral Drops - Peds 1 milliLiter(s) daily      RESPIRATORY SUPPORT:  [ _ ] Mechanical Ventilation:   [ _ ] Nasal Cannula: _ __ _ Liters, FiO2: ___ %  [ _ ]RA        PHYSICAL EXAM:  General:	Awake and active; in no acute distress  Head:		NC/AFOF  Eyes:		Normally set bilaterally. Red reflex ++/++  Ears:		Patent bilaterally, no deformities  Nose/Mouth:	Nares patent, palate intact  Neck:		No masses, intact clavicles  Chest/Lungs:     Breath sounds equal to auscultation. No retractions  CV:		No murmurs appreciated, normal pulses bilaterally  Abdomen:         Soft nontender nondistended, no masses, bowel sounds present. Umbilical stump dry and clean.  :		Normal for gestational age female  Spine:		Intact, no sacral dimples or tags  Anus:		Grossly patent  Extremities:	FROM, no hip clicks  Skin:		Pink, moist membranes; no lesions  Neuro exam:	Appropriate tone, activity    DISCHARGE PLANNING (date and status):  Hep B Vacc : deferred b/o LBW [ Infant now 2 kg; awaiting maternal consent ]  CCHD:	Passed 2/15/19		  : PTD					  Hearing: Passed 19  [place] screen: sent x 1 pre-TPN, 2nd 2/15/19	  Circumcision: n/a  Hip  rec: recommend at 44-46 wk PMA b/o breech  	  Synagis: N/A			  Other Immunizations (with dates):      Neurodevelop eval?	PTD  CPR class done?  Ongoing  	  PVS at DC? yes  TVS at DC?	  FE at DC? yes	    PMD:          Name:  ______________ _             Contact information:  ______________ _  Pharmacy: Name:  ______________ _              Contact information:  ______________ _    Follow-up appointments (list):  PMD  WILLIE  [facility] First name:  FEMALE SHANI                MR # [id number]  Date of Birth: [birth year]	Time of Birth: 306    Birth Weight: 1820g     Admission Date:        Gestational Age: 32.2      Source of admission [ __X ] Inborn     [ __ ]Transport from    [facility]: Neonatologist requested by Dr Mendoza to attend a RC/S under general anesthesia of a 35 y/o  mom at 32.2 weeks GA secondary to PTL and transverse lie.  She had + PNC, is O pos, HBSAg neg, HIV neg, RPR NR, Rubella Imm, GBS pos, hx of Von Willebrand Disease (she was on heparin until a couple of hrs PTD), hx of seizures(Tx with Keppra, switched to Topamax and then D/C'd), hx of depression on Zoloft. 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  L&D:  Mom was admitted to [facility] from [facility] aprox 11 days ago for PROM.  She received a full course of betamethasone 1 week ago.   She was treated with Ampicillin X48 hrs,  Azithromycin X1, and then po amoxicillin.  SROM aprox 10days ago.  Baby born via C/S, breech, floppy, transferred to radiant warmer, dried, stimulated, with improvement in tone and active cry.  Then baby became apneic and with decreased HR for which CPAP +5 was given with good response.  Then Baby's HR decreased again for which PPV was given.  Baby's HR improved and then CPAP +5 was continued. APGAR Scores of 8&9. Baby was transferred to NICU for further evaluation and management.     Social History: No history of alcohol/tobacco exposure obtained  FHx: non-contributory to the condition being treated   ROS: unable to obtain ()     Interval Events:  RA, open crib, tolerating po feeds well. Mom not visiting much b/o transportation issue. Has made arrangements to visit  [ see SW note  ]         Age:13d    LOS:13d    Vital Signs:  T(C): 37.1 ( @ 08:30), Max: 37.1 ( @ 05:30)  HR: 168 ( @ 08:30) (144 - 168)  BP: 73/40 ( @ 08:30) (73/32 - 73/40)  RR: 48 ( @ 08:30) (40 - 54)  SpO2: 100% ( @ 08:30) (98% - 100%)      LABS:                                15.0   13.0 )-----------( 412             [ @ 03:58]                  43.8  S 57.0%  B 0%  [place] 0%  Myelo 0%  Promyelo 0%  Blasts 0%  Lymph 31.0%  Mono 8.0%  Eos 3.0%  Baso 0.0%  Retic 0%      141  |106  | 6.0    ------------------<96   Ca 10.1 Mg N/A  Ph N/A   [ @ 08:17]  5.4   | 20.0 | 0.50        145  |110  | 9.0    ------------------<99   Ca 10.4 Mg N/A  Ph N/A   [ @ 05:17]  5.2   | 22.0 | 0.56        CAPILLARY BLOOD GLUCOSE      ferrous sulfate Oral Liquid - Peds 3.8 milliGRAM(s) Elemental Iron daily  hepatitis B IntraMuscular Vaccine - Peds 0.5 milliLiter(s) once  multivitamin Oral Drops - Peds 1 milliLiter(s) daily      RESPIRATORY SUPPORT:  [ _ ] Mechanical Ventilation:   [ _ ] Nasal Cannula: _ __ _ Liters, FiO2: ___ %  [ _X ]RA        PHYSICAL EXAM:  General:	Awake and active; in no acute distress  Head:		NC/AFOF  Eyes:		Normally set bilaterally. Red reflex ++/++  Ears:		Patent bilaterally, no deformities  Nose/Mouth:	Nares patent, palate intact  Neck:		No masses, intact clavicles  Chest/Lungs:     Breath sounds equal to auscultation. No retractions  CV:		No murmurs appreciated, normal pulses bilaterally  Abdomen:         Soft nontender nondistended, no masses, bowel sounds present. Umbilical stump dry and clean.  :		Normal for gestational age female  Spine:		Intact, no sacral dimples or tags  Anus:		Grossly patent  Extremities:	FROM, no hip clicks  Skin:		Pink, moist membranes; no lesions  Neuro exam:	Appropriate tone, activity    DISCHARGE PLANNING (date and status):  Hep B Vacc : deferred b/o LBW [ Infant now 2 kg; awaiting maternal consent ]  CCHD:	Passed 2/15/19		  : PTD					  Hearing: Passed 19   screen: sent x 1 pre-TPN, 2nd 2/15/19	  Circumcision: n/a  Hip  rec: recommend at 44-46 wk PMA b/o breech  	  Synagis: N/A			  Other Immunizations (with dates):      Neurodevelop eval?	PTD  CPR class done?  Ongoing  	  PVS at DC? yes  TVS at DC?	  FE at DC? yes	    PMD:          Name:  ______________ _             Contact information:  ______________ _  Pharmacy: Name:  ______________ _              Contact information:  ______________ _    Follow-up appointments (list):  PMD  WILLIE  [facility]

## 2019-01-04 DIAGNOSIS — E11.69 TYPE 2 DIABETES MELLITUS WITH OTHER SPECIFIED COMPLICATION, UNSPECIFIED WHETHER LONG TERM INSULIN USE (HCC): ICD-10-CM

## 2019-01-04 NOTE — TELEPHONE ENCOUNTER
Was the patient seen in the last year in this department? Yes    Does patient have an active prescription for medications requested? No     Received Request Via: Pharmacy     patient came in today upset . Patient states the pharmacy said they have told him we are not responding.

## 2019-01-07 RX ORDER — LANCETS 30 GAUGE
EACH MISCELLANEOUS
Qty: 300 EACH | Refills: 3 | Status: SHIPPED | OUTPATIENT
Start: 2019-01-07 | End: 2019-04-29 | Stop reason: SDUPTHER

## 2019-01-10 ENCOUNTER — OFFICE VISIT (OUTPATIENT)
Dept: HEALTH INFORMATION MANAGEMENT | Facility: MEDICAL CENTER | Age: 69
End: 2019-01-10
Payer: MEDICARE

## 2019-01-10 VITALS
HEIGHT: 66 IN | WEIGHT: 212.5 LBS | SYSTOLIC BLOOD PRESSURE: 120 MMHG | HEART RATE: 93 BPM | BODY MASS INDEX: 34.15 KG/M2 | DIASTOLIC BLOOD PRESSURE: 78 MMHG | OXYGEN SATURATION: 94 %

## 2019-01-10 DIAGNOSIS — E11.29 TYPE 2 DIABETES MELLITUS WITH MICROALBUMINURIA, WITHOUT LONG-TERM CURRENT USE OF INSULIN (HCC): ICD-10-CM

## 2019-01-10 DIAGNOSIS — N18.2 CKD (CHRONIC KIDNEY DISEASE) STAGE 2, GFR 60-89 ML/MIN: ICD-10-CM

## 2019-01-10 DIAGNOSIS — E78.5 DYSLIPIDEMIA: ICD-10-CM

## 2019-01-10 DIAGNOSIS — R80.9 TYPE 2 DIABETES MELLITUS WITH MICROALBUMINURIA, WITHOUT LONG-TERM CURRENT USE OF INSULIN (HCC): ICD-10-CM

## 2019-01-10 DIAGNOSIS — I10 ESSENTIAL HYPERTENSION: ICD-10-CM

## 2019-01-10 DIAGNOSIS — E66.9 OBESITY (BMI 35.0-39.9 WITHOUT COMORBIDITY): ICD-10-CM

## 2019-01-10 PROCEDURE — 99214 OFFICE O/P EST MOD 30 MIN: CPT | Performed by: INTERNAL MEDICINE

## 2019-01-10 NOTE — PROGRESS NOTES
Bariatric Medicine Follow Up  Chief Complaint   Patient presents with   • Weight Gain       History of Present Illness:   Zhang Cheung is a 68 y.o. male who presents for weight management follow-up and to help address co-morbidities related to overweight, including T2DM, CKD2, HLD, HTN.    During the patient's last visit, the following were discussed and recommended:  Weight Goal: 3-5% wt loss each month (pt goal weight is 200 lb)  Diet: High Protein/Low Carb Meals and 2 snacks between meals daily  >100 g protein, <100 g total carbs daily if tracking  64+ oz water per day  Continue Premier protein every morning  Track daily intake if possible in my fitness pal, bring in results next visit  Physical Activity: Start tracking steps or consider exercise program  Risk level for moderate/vigorous exercise program: Moderate given current inactivity  New Rx: Continue Contrave one tab bid (patient has enough for another month, he is taking lower dose)  Side Effects: consent in chart  Behavior change: Mindful eating, planning, tracking, increase activity level  Follow-up: 1 month    Was off Contrave about one week but now still taking, feels it is working well.  Happy with his wt loss, got new pants.  Wants to lose more wt to get waist down more.    Using [employer] menus, likes soups a lot for dinner, has added more protein such as chicken and fish and vegetable for lunch. Eggs/toast or PP in am.1333-2419 kcal per day, feels full.  Measuring more, very mindful.  Likes that he can eat what he wants, had adjusted to portion sizes.    GFR with slight decline on last labs 12/2018, compared with 2 months prior.  Had not been drinking fluids before labs but is drinking a lot of water daily.  Fasting glucose still slightly elevated. This am fasting glucose 115. Continues on insulin, metformin.  Blood pressure controlled on Norvasc, benazepril.  On vitamin D repletion.  On gemfibrozil.    Exercise:   Gym x 30 min ea with wts and  "treadmill     Review of Systems   Denies hypoglycemia, diaphoresis, lightheaded  All other ROS were reviewed and are otherwise unchanged from my previous visit with patient.    Physical Exam:    /78 (BP Location: Left arm, Patient Position: Sitting, BP Cuff Size: Large adult)   Pulse 93   Ht 1.676 m (5' 6\")   Wt 96.4 kg (212 lb 8 oz)   SpO2 94%   BMI 34.30 kg/m²   Waist: 45.25 in    Body fat % 35.5  REE 1533 kcal/day    Weight change since last visit: -11 lb (-27 lb total)  Waist Circum change since last visit: -2.5 in (-5 in total)    Constitutional: Oriented to person, place, and time and well-developed, well-nourished, and in no distress.    Head: Normocephalic.   Eyes: EOM are normal. Pupils are equal, round, and reactive to light. No periorbital edema.  No lateral thinning of eyebrows.  No vertical nystagmus.  Neck: Normal range of motion. Neck supple. No thyromegaly present. No buffalo hump.  Cardiovascular: Normal rate and regular rhythm.  No murmur heard.  Pulmonary/Chest: Effort normal and breath sounds normal. No wheezes.   Abdominal: Soft. Bowel sounds are normal.   Musculoskeletal: Normal range of motion. No edema.   Neurological: Alert and oriented to person, place, and time. No muscle weakness.  Gait normal.   Skin: Warm and dry. Not diaphoretic.   Psychiatric: Mood, memory, affect and judgment normal.     Laboratory:   Recent labs reviewed.      ASSESSMENT/PLAN:  Body mass index is 34.3 kg/m².    Obesity Stage ([place]): 2; Class 1    1. Essential hypertension     2. Dyslipidemia     3. Type 2 diabetes mellitus with microalbuminuria, without long-term current use of insulin (HCC)     4. CKD (chronic kidney disease) stage 2, GFR 60-89 ml/min     5. Obesity (BMI 35.0-39.9 without comorbidity)       The patient continues to make great progress towards his weight loss goals.  Responding well to Contrave, CHO reduction.  Blood pressure improved.  GFR down slightly, although patient feels he was " dehydrated at the time of that lab test and has maintained his hydration since.  Monitor fasting glucose and lipids, should improve with further weight loss, CHO reduction.      The patient and I have discussed at length and agree to the following recommendations, which are all addressing the above diagnoses:    Weight Goal: 3-5% wt loss each month (pt goal weight is 200 lb)  The patient has met this months weight loss goal on the anti-obesity medication  Diet: High Protein/Low Carb Meals and 2 snacks between meals daily  >100 g protein, <100 g total carbs daily, between 4828-4662 kcal per day  64+ oz water per day  Avoid sweet drinks and sodas  Track daily intake with my fitness pal, as he is doing  Physical Activity: Gym workouts 3 days/week  Risk level for moderate/vigorous exercise program: Low  New Rx: Continue Contrave one tab twice daily  Side Effects: Consent in chart  Behavior change: Mindful eating, tracking, increase activity  Follow-up: 2 months    Patient's body mass index is 34.3 kg/m². Exercise and nutrition counseling were performed at this visit.

## 2019-01-28 DIAGNOSIS — E11.9 DIABETES MELLITUS WITHOUT COMPLICATION (HCC): ICD-10-CM

## 2019-01-28 NOTE — TELEPHONE ENCOUNTER
----- Message from Gerri Marshall sent at 1/28/2019  8:45 AM PST -----  Regarding: RX ISSUE   Patient came in today upset on why we cant respond to the pharmacy with his test strips He said that after she sent the refill on 1/16 there was no diagnosis and medicare wasn't covering it . Stated pharmacy sent us multiple faxes since then and we haven't fixed it. He's frustrated and wants this taken care of

## 2019-01-29 ENCOUNTER — HOSPITAL ENCOUNTER (OUTPATIENT)
Dept: LAB | Facility: MEDICAL CENTER | Age: 69
End: 2019-01-29
Attending: ANESTHESIOLOGY
Payer: MEDICARE

## 2019-01-29 LAB
ANION GAP SERPL CALC-SCNC: 8 MMOL/L (ref 0–11.9)
BUN SERPL-MCNC: 23 MG/DL (ref 8–22)
CALCIUM SERPL-MCNC: 10.1 MG/DL (ref 8.5–10.5)
CHLORIDE SERPL-SCNC: 105 MMOL/L (ref 96–112)
CO2 SERPL-SCNC: 27 MMOL/L (ref 20–33)
CREAT SERPL-MCNC: 1.11 MG/DL (ref 0.5–1.4)
GLUCOSE SERPL-MCNC: 83 MG/DL (ref 65–99)
HCT VFR BLD AUTO: 47.9 % (ref 42–52)
POTASSIUM SERPL-SCNC: 4.3 MMOL/L (ref 3.6–5.5)
SODIUM SERPL-SCNC: 140 MMOL/L (ref 135–145)

## 2019-01-29 PROCEDURE — 80048 BASIC METABOLIC PNL TOTAL CA: CPT

## 2019-01-29 PROCEDURE — 85014 HEMATOCRIT: CPT

## 2019-01-29 PROCEDURE — 36415 COLL VENOUS BLD VENIPUNCTURE: CPT

## 2019-01-30 ENCOUNTER — HOSPITAL ENCOUNTER (OUTPATIENT)
Dept: RADIOLOGY | Facility: MEDICAL CENTER | Age: 69
End: 2019-01-30
Attending: NEUROLOGICAL SURGERY
Payer: MEDICARE

## 2019-01-30 DIAGNOSIS — M47.817 LUMBOSACRAL SPONDYLOSIS WITHOUT MYELOPATHY: ICD-10-CM

## 2019-01-30 DIAGNOSIS — M48.061 SPINAL STENOSIS, LUMBAR REGION, WITHOUT NEUROGENIC CLAUDICATION: ICD-10-CM

## 2019-01-30 DIAGNOSIS — M54.5 LOW BACK PAIN, UNSPECIFIED BACK PAIN LATERALITY, UNSPECIFIED CHRONICITY, WITH SCIATICA PRESENCE UNSPECIFIED: ICD-10-CM

## 2019-01-30 PROCEDURE — 72110 X-RAY EXAM L-2 SPINE 4/>VWS: CPT

## 2019-02-19 DIAGNOSIS — E11.9 TYPE 2 DIABETES MELLITUS WITHOUT COMPLICATION, WITH LONG-TERM CURRENT USE OF INSULIN (HCC): ICD-10-CM

## 2019-02-19 DIAGNOSIS — G62.9 NEUROPATHY: ICD-10-CM

## 2019-02-19 DIAGNOSIS — Z23 NEED FOR VACCINATION: ICD-10-CM

## 2019-02-19 DIAGNOSIS — Z79.4 TYPE 2 DIABETES MELLITUS WITHOUT COMPLICATION, WITH LONG-TERM CURRENT USE OF INSULIN (HCC): ICD-10-CM

## 2019-02-19 RX ORDER — BENAZEPRIL HYDROCHLORIDE 40 MG/1
40 TABLET ORAL 2 TIMES DAILY
Qty: 180 TAB | Refills: 0 | Status: SHIPPED | OUTPATIENT
Start: 2019-02-19 | End: 2019-06-11 | Stop reason: SDUPTHER

## 2019-02-19 NOTE — TELEPHONE ENCOUNTER
Requesting 90 day supply.    Future Appointments       Provider Department Center    3/25/2019 11:15 AM Jody Armas M.D. [facility]      Was the patient seen in the last year in this department? Yes    Does patient have an active prescription for medications requested? No     Received Request Via: Pharmacy

## 2019-03-08 DIAGNOSIS — E11.9 TYPE 2 DIABETES MELLITUS WITHOUT COMPLICATION, WITH LONG-TERM CURRENT USE OF INSULIN (HCC): ICD-10-CM

## 2019-03-08 DIAGNOSIS — G62.9 NEUROPATHY: ICD-10-CM

## 2019-03-08 DIAGNOSIS — Z23 NEED FOR VACCINATION: ICD-10-CM

## 2019-03-08 DIAGNOSIS — Z79.4 TYPE 2 DIABETES MELLITUS WITHOUT COMPLICATION, WITH LONG-TERM CURRENT USE OF INSULIN (HCC): ICD-10-CM

## 2019-03-08 RX ORDER — CELECOXIB 200 MG/1
200 CAPSULE ORAL
Qty: 90 CAP | Refills: 1 | Status: SHIPPED | OUTPATIENT
Start: 2019-03-08 | End: 2019-11-03 | Stop reason: SDUPTHER

## 2019-04-12 RX ORDER — INDAPAMIDE 2.5 MG/1
TABLET ORAL
Qty: 30 TAB | Refills: 1 | Status: SHIPPED | OUTPATIENT
Start: 2019-04-12 | End: 2019-05-07 | Stop reason: SDUPTHER

## 2019-04-12 RX ORDER — GEMFIBROZIL 600 MG/1
TABLET, FILM COATED ORAL
Qty: 60 TAB | Refills: 2 | Status: SHIPPED | OUTPATIENT
Start: 2019-04-12 | End: 2019-07-05 | Stop reason: SDUPTHER

## 2019-04-29 DIAGNOSIS — E11.69 TYPE 2 DIABETES MELLITUS WITH OTHER SPECIFIED COMPLICATION, UNSPECIFIED WHETHER LONG TERM INSULIN USE (HCC): ICD-10-CM

## 2019-04-29 RX ORDER — LANCETS 30 GAUGE
EACH MISCELLANEOUS
Qty: 300 EACH | Refills: 3 | Status: SHIPPED | OUTPATIENT
Start: 2019-04-29 | End: 2021-03-02

## 2019-05-07 ENCOUNTER — HOSPITAL ENCOUNTER (OUTPATIENT)
Dept: LAB | Facility: MEDICAL CENTER | Age: 69
End: 2019-05-07
Attending: UROLOGY
Payer: MEDICARE

## 2019-05-07 PROCEDURE — 84153 ASSAY OF PSA TOTAL: CPT | Mod: GA

## 2019-05-07 PROCEDURE — 36415 COLL VENOUS BLD VENIPUNCTURE: CPT | Mod: GA

## 2019-05-08 LAB — PSA SERPL-MCNC: 5.13 NG/ML (ref 0–4)

## 2019-05-08 RX ORDER — INDAPAMIDE 2.5 MG/1
TABLET ORAL
Qty: 90 TAB | Refills: 0 | Status: SHIPPED | OUTPATIENT
Start: 2019-05-08 | End: 2019-08-19 | Stop reason: SDUPTHER

## 2019-05-13 ENCOUNTER — PATIENT MESSAGE (OUTPATIENT)
Dept: MEDICAL GROUP | Facility: MEDICAL CENTER | Age: 69
End: 2019-05-13

## 2019-05-13 ENCOUNTER — OFFICE VISIT (OUTPATIENT)
Dept: MEDICAL GROUP | Facility: MEDICAL CENTER | Age: 69
End: 2019-05-13
Payer: MEDICARE

## 2019-05-13 VITALS
HEART RATE: 83 BPM | RESPIRATION RATE: 16 BRPM | HEIGHT: 66 IN | WEIGHT: 214 LBS | DIASTOLIC BLOOD PRESSURE: 70 MMHG | OXYGEN SATURATION: 98 % | BODY MASS INDEX: 34.39 KG/M2 | SYSTOLIC BLOOD PRESSURE: 128 MMHG | TEMPERATURE: 98.2 F

## 2019-05-13 DIAGNOSIS — Z71.3 ENCOUNTER FOR WEIGHT LOSS COUNSELING: ICD-10-CM

## 2019-05-13 DIAGNOSIS — E66.9 OBESITY (BMI 35.0-39.9 WITHOUT COMORBIDITY): ICD-10-CM

## 2019-05-13 DIAGNOSIS — G62.9 NEUROPATHY: ICD-10-CM

## 2019-05-13 DIAGNOSIS — I10 ESSENTIAL HYPERTENSION: ICD-10-CM

## 2019-05-13 DIAGNOSIS — E11.9 TYPE 2 DIABETES MELLITUS WITHOUT COMPLICATION, WITH LONG-TERM CURRENT USE OF INSULIN (HCC): ICD-10-CM

## 2019-05-13 DIAGNOSIS — M25.562 CHRONIC PAIN OF BOTH KNEES: ICD-10-CM

## 2019-05-13 DIAGNOSIS — G89.29 CHRONIC PAIN OF BOTH KNEES: ICD-10-CM

## 2019-05-13 DIAGNOSIS — M48.061 SPINAL STENOSIS OF LUMBAR REGION, UNSPECIFIED WHETHER NEUROGENIC CLAUDICATION PRESENT: ICD-10-CM

## 2019-05-13 DIAGNOSIS — Z79.4 TYPE 2 DIABETES MELLITUS WITHOUT COMPLICATION, WITH LONG-TERM CURRENT USE OF INSULIN (HCC): ICD-10-CM

## 2019-05-13 DIAGNOSIS — R80.9 TYPE 2 DIABETES MELLITUS WITH MICROALBUMINURIA, WITHOUT LONG-TERM CURRENT USE OF INSULIN (HCC): ICD-10-CM

## 2019-05-13 DIAGNOSIS — M25.561 CHRONIC PAIN OF BOTH KNEES: ICD-10-CM

## 2019-05-13 DIAGNOSIS — Z23 NEED FOR VACCINATION: ICD-10-CM

## 2019-05-13 DIAGNOSIS — E11.29 TYPE 2 DIABETES MELLITUS WITH MICROALBUMINURIA, WITHOUT LONG-TERM CURRENT USE OF INSULIN (HCC): ICD-10-CM

## 2019-05-13 PROCEDURE — 99213 OFFICE O/P EST LOW 20 MIN: CPT | Performed by: FAMILY MEDICINE

## 2019-05-13 RX ORDER — GABAPENTIN 300 MG/1
CAPSULE ORAL
Qty: 1200 CAP | Refills: 3 | Status: SHIPPED | OUTPATIENT
Start: 2019-05-13 | End: 2020-01-14

## 2019-05-13 ASSESSMENT — PATIENT HEALTH QUESTIONNAIRE - PHQ9: CLINICAL INTERPRETATION OF PHQ2 SCORE: 0

## 2019-05-13 NOTE — ASSESSMENT & PLAN NOTE
No changes  Continue insulin      Lab Results   Component Value Date/Time    HBA1C 7.5 (H) 05/30/2018 11:10 AM

## 2019-05-13 NOTE — ASSESSMENT & PLAN NOTE
Feels his bilateral knee pain (and back pain)   Is inhibiting his ability to continue to loose weight    Ref to PT offered  I suggest stationary bike  We discuss role of wt loss  Start with tylenol first, then reach for NSAID  ICE  We can consider sport med ref in the future, he is well est with ortho

## 2019-05-13 NOTE — ASSESSMENT & PLAN NOTE
I concur with choice of contrave and I discuss the results of the studies    He is benefiting from regular appoitments and accountability with our weight loss center  I suggest that he continue care with wt loss clinic for at least 6mo-1 year for maitenance and avoidance of regain or minimizing regain and I also discuss the National weight control registry website for resource    15+ min face to face prev counseling

## 2019-05-13 NOTE — PROGRESS NOTES
"This medical record contains text that has been entered with the assistance of computer voice recognition and dictation software.  Therefore, it may contain unintended errors in text, spelling, punctuation, or grammar        Chief Complaint   Patient presents with   • Medication Refill     gabapentin   •         • Diabetic Neuropathy     Pt states gabapentin is not helping       Zhang Cheung is a 69 y.o. male here evaluation and management of:  ongoing issue of weight loss he is successfully loosing weight with contrave with wt loss center and wants to talk about my opinions with medication and diet and also thoughts about maintenance and avoidance of regain   Wants to talk about ongoing issue of OA bilateral knee hip pain and back pain, knee pain is most prominent, makes it hard to garden more than 3-4 hours     No weakness      Diabetes felt like he hit plateau  Neuropathy feels like he hit a plateau            Current Outpatient Medications   Medication Sig Dispense Refill   • celecoxib (CELEBREX) 200 MG Cap TAKE 1 CAP BY MOUTH EVERY DAY FOR 90 DAYS. 90 Cap 0   • gemfibrozil (LOPID) 600 MG Tab TAKE 1 TABLET BY MOUTH TWICE A DAY MUST BE SEEN FOR FURTHER REFILLS 180 Tab 0   • metformin (GLUCOPHAGE) 1000 MG tablet TAKE 1 TABLET BY MOUTH TWICE A  Tab 1   • benazepril (LOTENSIN) 40 MG tablet TAKE 1 TABLET BY MOUTH TWICE A  Tab 1   • glucose blood (ONE TOUCH ULTRA TEST) strip USE TO TEST HIGH OR LOW BLOOD SUGAR 3 TIMES A  Strip 0   • indapamide (LOZOL) 2.5 MG Tab TAKE 1 TAB BY MOUTH EVERYDAY MUST BE SEEN FOR FURTHER REFILLS LAST REFILL 90 Tab 0   • Insulin Pen Needle (NOVOFINE 30) 30G X 8 MM Misc Novofine Autocover 30 gauge x 1/3\" needle 300 Each 3   • NOVOLOG MIX 70/30 FLEXPEN (70-30) 100 UNIT/ML Suspension Pen-injector 20 Units by Subdermal route every day. 100 PEN 1   • glucose blood (ONE TOUCH ULTRA TEST) strip TEST BLOOD 3 TIMES DAILY 300 Strip 3   • indapamide (LOZOL) 2.5 MG Tab TAKE 1 TAB BY " MOUTH EVERYDAY MUST BE SEEN FOR FURTHER REFILLS LAST REFILL 90 Tab 0   • amLODIPine (NORVASC) 5 MG Tab TAKE 1 TABLET BY MOUTH TWICE A  Tab 3   • Lancets Using One Touch Ultra Soft Lancets. Testing 3 times daily for insulin adjustment.  DX. Code 250 02 300 Each 3   • glucose blood strip Check sugars tid prn 100 Strip 1   • alfuzosin (UROXATRAL) 10 MG SR tablet TAKE 1 TABLET BY MOUTH EVERYDAY AT BEDTIME  3   • Multiple Vitamin (MULTI-VITAMIN DAILY PO) Multi Vitamin     • Multiple Vitamins-Minerals (ICAPS AREDS 2 PO) ICaps AREDS     • Insulin Pen Needle (NOVOFINE) 30G X 8 MM Misc USE FOR INSULIN UP TO 3 TIMES A  Each 3   • Cholecalciferol (VITAMIN D3) 5000 UNITS Cap Take 1 Cap by mouth every day.     • insulin aspart protamine-insulin aspart (NOVOLOG MIX 70/30) (70-30) 100 UNIT/ML injection Inject 8-10 Units as instructed 3 times a day before meals. 100 = 5 units  120= 7 units  184 = 9 units  200 = 12 units  Pt can go as high as 22 units if needed     • tamsulosin (FLOMAX) 0.4 MG capsule Take 0.8 mg by mouth every bedtime.     • Omega-3 Fatty Acids (FISH OIL TRIPLE STRENGTH) 1400 MG Cap Take 1 Cap by mouth every day.       No current facility-administered medications for this visit.      Patient Active Problem List    Diagnosis Date Noted   • Lumbar stenosis 09/12/2017     Priority: Medium   • Typical atrial flutter (HCC) 04/25/2017     Priority: Medium   • Essential hypertension 10/13/2013     Priority: Medium   • Dyslipidemia 10/13/2013     Priority: Medium   • Benign prostatic hyperplasia 03/27/2014     Priority: Low   • Degeneration of lumbar or lumbosacral intervertebral disc 02/19/2014     Priority: Low   • Type 2 DM 10/13/2013     Priority: Low   • CKD (chronic kidney disease) stage 2, GFR 60-89 ml/min 10/13/2013     Priority: Low   • Chronic pain of both knees 05/13/2019   • Encounter for weight loss counseling 12/06/2018   • Obesity (BMI 35.0-39.9 without comorbidity) 07/11/2018   • Benign  prostatic hyperplasia with urinary hesitancy 05/21/2018   • Right wrist pain 05/21/2018   • Neuropathy 05/21/2018   • Bilateral shoulder pain 05/21/2018   • Bilateral foot pain 05/21/2018   • Stenosis, spinal, lumbar 09/18/2017     Past Surgical History:   Procedure Laterality Date   • EXTREME LATERAL INTERBODY FUSION Left 9/18/2017    Procedure: EXTREME LATERAL INTERBODY FUSION L2-3 W/PLATE;  Surgeon: Yemi Sharma M.D.;  Location: SURGERY [facility];  Service: Neurosurgery   • LUMBAR FUSION POSTERIOR  9/18/2017    Procedure: LUMBAR FUSION POSTERIOR L2-3 INSTRUMENTED;  Surgeon: Yemi Sharma M.D.;  Location: SURGERY [facility];  Service: Neurosurgery   • LUMBAR LAMINECTOMY DISKECTOMY  9/18/2017    Procedure: LUMBAR LAMINECTOMY DISKECTOMY;  Surgeon: Yemi Sharma M.D.;  Location: SURGERY [facility];  Service: Neurosurgery   • RECOVERY  1/13/2016    Procedure: IR Deep bone biopsy L2-L3 with general anesthesia-DAYANA;  Surgeon: [facility];  [place] [facility] [place]  [place]:    • LUMBAR LAMINECTOMY DISKECTOMY  4/22/2014    Performed by Mazin Long M.D. at SURGERY [facility]   • TRANS URETHRAL RESECTION PROSTATE  3/27/2014    Performed by Kyle Guzman M.D. at [facility]   • LUMBAR LAMINECTOMY DISKECTOMY  2/19/2014    Performed by Mazin Long M.D. at [facility]   • OTHER ORTHOPEDIC SURGERY  02/14    L2-L3 Laminectomy   • OTHER  2014    transection supraorbital nerve   • LUMBAR LAMINECTOMY DISKECTOMY  5/2/2012    Performed by MAZIN LONG at [facility]   • OTHER ORTHOPEDIC SURGERY  03/12    L3-L5 Laminectomy   • OTHER      laser eye susrgery   • OTHER      carpectomy   • OTHER ORTHOPEDIC SURGERY  2003/2007/2008    left shoulder replacement/with corrections   • OTHER ORTHOPEDIC SURGERY      right knee replacement/manipulation      Social History     Tobacco Use   • Smoking status: Former Smoker     Packs/day: 0.75      "Years: 10.00     Pack years: 7.50     Types: Cigarettes     Last attempt to quit: 2008     Years since quittin.0   • Smokeless tobacco: Never Used   Substance Use Topics   • Alcohol use: No   • Drug use: No     Family History   Problem Relation Age of Onset   • Sleep Apnea Neg Hx            ROS  No n/v  all review of system completed and negative except for those listed above     Objective:     /70   Pulse 83   Temp 36.8 °C (98.2 °F)   Resp 16   Ht 1.676 m (5' 6\")   Wt 97.1 kg (214 lb)   SpO2 98%  Body mass index is 34.54 kg/m².  Reviewed steady wt loss since October   Physical Exam:      GEN: comfortable, alert and oriented, well nourished, well developed, in no apparent distress   HEENT: NCAT, eyes: pupils equal and reactive, sclera white, EOMIT, good dentition  HEART: limbs warm and well perfused, regular rate, no JVD, no lower extremity edema  LUNGS: speaking in full sentences, not in apparent respiratory distress, no audible wheezes  MSK: normal tone and bulk, no swelling of the joints, gait steady and normal           Assessment and Plan:   The following treatment plan was discussed        Problem List Items Addressed This Visit     Essential hypertension     At goal continue ACEI CCB          Relevant Orders    Lipid Profile (Completed)    Basic Metabolic Panel (Completed)    HEMOGLOBIN A1C (Completed)    Lumbar stenosis    Relevant Orders    REFERRAL TO PHYSICAL THERAPY Reason for Therapy: Eval/Treat/Report    Neuropathy    Relevant Orders    REFERRAL TO PHYSICAL THERAPY Reason for Therapy: Eval/Treat/Report    Obesity (BMI 35.0-39.9 without comorbidity)    Encounter for weight loss counseling     I concur with choice of contrave and I discuss the results of the studies    He is benefiting from regular appoitments and accountability with our weight loss center  I suggest that he continue care with wt loss clinic for at least 6mo-1 year for maitenance and avoidance of regain or " minimizing regain and I also discuss the National weight control registry website for resource    15+ min face to face prev counseling                Chronic pain of both knees     Feels his bilateral knee pain (and back pain)   Is inhibiting his ability to continue to loose weight    Ref to PT offered  I suggest stationary bike  We discuss role of wt loss  Start with tylenol first, then reach for NSAID  ICE  We can consider sport med ref in the future, he is well est with ortho               Relevant Orders    REFERRAL TO PHYSICAL THERAPY Reason for Therapy: Eval/Treat/Report    Type 2 DM          No changes  Continue insulin      Lab Results   Component Value Date/Time    HBA1C 7.5 (H) 05/30/2018 11:10 AM                 Relevant Orders    Lipid Profile (Completed)    Basic Metabolic Panel (Completed)    HEMOGLOBIN A1C (Completed)      Other Visit Diagnoses     Need for vaccination                    Instructed to follow up if symptoms worsen or fail to improve, ER/UC precautions discussed as well    Cherry Long MD  [facility], Family Medicine   [address] [place] [address] [address]   [place] [id number]  Phone: [phone]

## 2019-05-14 NOTE — TELEPHONE ENCOUNTER
From: Zhang Cheung  To: Cherry Long M.D.  Sent: 5/13/2019 5:34 PM PDT  Subject: Non-Urgent Medical Question    During my visit with you on Monday 5/13/19 we discussed primarily my concerns related to my arthritis condition. I mentioned to your medical assistant one thing I forgot to discuss with you, that is measles immunity. Do you think it would be necessary for me to be checked for measles immunity considering my age and the reported onset of reported measles cases in [place].    Thank You for your time,  Zhang Cheung

## 2019-05-15 ENCOUNTER — TELEPHONE (OUTPATIENT)
Dept: MEDICAL GROUP | Facility: MEDICAL CENTER | Age: 69
End: 2019-05-15

## 2019-05-15 DIAGNOSIS — Z01.84 IMMUNITY STATUS TESTING: ICD-10-CM

## 2019-05-15 NOTE — TELEPHONE ENCOUNTER
Regarding: Non-Urgent Medical Question  Contact: [phone]  ----- Message from Sammy Gupta Ass't sent at 5/14/2019  6:53 AM PDT -----       ----- Message from Zhang Cheung to Cherry Long M.D. sent at 5/13/2019  5:34 PM -----   During my visit with you on Monday 5/13/19 we discussed primarily my concerns related to my arthritis condition. I mentioned to your medical assistant one thing I forgot to discuss with you, that is measles immunity. Do you think it would be necessary for me to be checked for measles immunity considering my age and the reported onset of reported measles cases in [place].    Thank You for your time,  Zhang Cheung

## 2019-06-03 ENCOUNTER — HOSPITAL ENCOUNTER (OUTPATIENT)
Dept: RADIOLOGY | Facility: MEDICAL CENTER | Age: 69
End: 2019-06-03
Attending: NEUROLOGICAL SURGERY
Payer: MEDICARE

## 2019-06-03 DIAGNOSIS — M47.817 LUMBOSACRAL SPONDYLOSIS WITHOUT MYELOPATHY: ICD-10-CM

## 2019-06-03 DIAGNOSIS — M54.5 LOW BACK PAIN, UNSPECIFIED BACK PAIN LATERALITY, UNSPECIFIED CHRONICITY, WITH SCIATICA PRESENCE UNSPECIFIED: ICD-10-CM

## 2019-06-03 DIAGNOSIS — M48.061 SPINAL STENOSIS, LUMBAR REGION, WITHOUT NEUROGENIC CLAUDICATION: ICD-10-CM

## 2019-06-03 PROCEDURE — 72110 X-RAY EXAM L-2 SPINE 4/>VWS: CPT

## 2019-06-11 DIAGNOSIS — Z23 NEED FOR VACCINATION: ICD-10-CM

## 2019-06-11 DIAGNOSIS — Z79.4 TYPE 2 DIABETES MELLITUS WITHOUT COMPLICATION, WITH LONG-TERM CURRENT USE OF INSULIN (HCC): ICD-10-CM

## 2019-06-11 DIAGNOSIS — E11.9 TYPE 2 DIABETES MELLITUS WITHOUT COMPLICATION, WITH LONG-TERM CURRENT USE OF INSULIN (HCC): ICD-10-CM

## 2019-06-11 DIAGNOSIS — G62.9 NEUROPATHY: ICD-10-CM

## 2019-06-12 RX ORDER — BENAZEPRIL HYDROCHLORIDE 40 MG/1
TABLET ORAL
Qty: 90 TAB | Refills: 3 | Status: SHIPPED | OUTPATIENT
Start: 2019-06-12 | End: 2019-12-03 | Stop reason: SDUPTHER

## 2019-06-13 RX ORDER — INSULIN ASPART 100 [IU]/ML
20 INJECTION, SUSPENSION SUBCUTANEOUS DAILY
Qty: 100 PEN | Refills: 1 | Status: SHIPPED | OUTPATIENT
Start: 2019-06-13 | End: 2019-11-18 | Stop reason: SDUPTHER

## 2019-06-17 ENCOUNTER — HOSPITAL ENCOUNTER (OUTPATIENT)
Dept: LAB | Facility: MEDICAL CENTER | Age: 69
End: 2019-06-17
Attending: FAMILY MEDICINE
Payer: MEDICARE

## 2019-06-17 DIAGNOSIS — I10 ESSENTIAL HYPERTENSION: ICD-10-CM

## 2019-06-17 LAB
ANION GAP SERPL CALC-SCNC: 12 MMOL/L (ref 0–11.9)
BUN SERPL-MCNC: 39 MG/DL (ref 8–22)
CALCIUM SERPL-MCNC: 9.7 MG/DL (ref 8.5–10.5)
CHLORIDE SERPL-SCNC: 107 MMOL/L (ref 96–112)
CHOLEST SERPL-MCNC: 146 MG/DL (ref 100–199)
CO2 SERPL-SCNC: 23 MMOL/L (ref 20–33)
CREAT SERPL-MCNC: 1.06 MG/DL (ref 0.5–1.4)
FASTING STATUS PATIENT QL REPORTED: NORMAL
GLUCOSE SERPL-MCNC: 61 MG/DL (ref 65–99)
HDLC SERPL-MCNC: 36 MG/DL
LDLC SERPL CALC-MCNC: 89 MG/DL
POTASSIUM SERPL-SCNC: 3.6 MMOL/L (ref 3.6–5.5)
SODIUM SERPL-SCNC: 142 MMOL/L (ref 135–145)
TRIGL SERPL-MCNC: 105 MG/DL (ref 0–149)

## 2019-06-17 PROCEDURE — 36415 COLL VENOUS BLD VENIPUNCTURE: CPT

## 2019-06-17 PROCEDURE — 80048 BASIC METABOLIC PNL TOTAL CA: CPT

## 2019-06-17 PROCEDURE — 80061 LIPID PANEL: CPT

## 2019-07-08 RX ORDER — GEMFIBROZIL 600 MG/1
TABLET, FILM COATED ORAL
Qty: 180 TAB | Refills: 0 | Status: SHIPPED | OUTPATIENT
Start: 2019-07-08 | End: 2019-09-30 | Stop reason: SDUPTHER

## 2019-07-08 RX ORDER — AMLODIPINE BESYLATE 5 MG/1
TABLET ORAL
Qty: 180 TAB | Refills: 3 | Status: SHIPPED | OUTPATIENT
Start: 2019-07-08 | End: 2020-06-30

## 2019-08-21 RX ORDER — INDAPAMIDE 2.5 MG/1
TABLET ORAL
Qty: 90 TAB | Refills: 0 | Status: SHIPPED | OUTPATIENT
Start: 2019-08-21 | End: 2020-05-07

## 2019-08-21 RX ORDER — INDAPAMIDE 2.5 MG/1
TABLET ORAL
Qty: 90 TAB | Refills: 0 | Status: SHIPPED | OUTPATIENT
Start: 2019-08-21 | End: 2019-11-13 | Stop reason: SDUPTHER

## 2019-09-30 RX ORDER — GEMFIBROZIL 600 MG/1
TABLET, FILM COATED ORAL
Qty: 180 TAB | Refills: 0 | Status: SHIPPED | OUTPATIENT
Start: 2019-09-30 | End: 2020-01-02

## 2019-11-13 ENCOUNTER — OFFICE VISIT (OUTPATIENT)
Dept: MEDICAL GROUP | Facility: MEDICAL CENTER | Age: 69
End: 2019-11-13
Payer: MEDICARE

## 2019-11-13 VITALS
TEMPERATURE: 97.6 F | SYSTOLIC BLOOD PRESSURE: 122 MMHG | HEIGHT: 66 IN | DIASTOLIC BLOOD PRESSURE: 80 MMHG | RESPIRATION RATE: 14 BRPM | OXYGEN SATURATION: 98 % | HEART RATE: 78 BPM | WEIGHT: 214 LBS | BODY MASS INDEX: 34.39 KG/M2

## 2019-11-13 DIAGNOSIS — E11.9 TYPE 2 DIABETES MELLITUS WITHOUT COMPLICATION, WITHOUT LONG-TERM CURRENT USE OF INSULIN (HCC): ICD-10-CM

## 2019-11-13 DIAGNOSIS — M48.061 SPINAL STENOSIS OF LUMBAR REGION, UNSPECIFIED WHETHER NEUROGENIC CLAUDICATION PRESENT: ICD-10-CM

## 2019-11-13 PROCEDURE — 99214 OFFICE O/P EST MOD 30 MIN: CPT | Performed by: FAMILY MEDICINE

## 2019-11-13 RX ORDER — INDAPAMIDE 2.5 MG/1
TABLET ORAL
Qty: 90 TAB | Refills: 0 | Status: SHIPPED | OUTPATIENT
Start: 2019-11-13 | End: 2020-02-11

## 2019-11-13 NOTE — ASSESSMENT & PLAN NOTE
"We have not had appointment dedicated to his issue of chronic pain and he asked for year rx of NSAID through the phone so I asked that he come in to discuss    He states that he has had \"issues\" with opiate ?abuse in the past but is non specific  He has had back surgery NOS  He has \"pain from the top of his head to the tips of his toes\"   He recalls that we briefly discussed chronic NSAID risk in 2018 when his GFR was decreased     He takes extra time to express frustrations about his depression, his perception that his life is deteriorating and that he is \"willing to accept risks of chronic NSAID use since his life expectancy is <10 years anyways\" he becomes verbally aroused and elevates his voice.  I explain that I am not comfortable rx-ing NSAID long term without first maximizing other treatment options such as physiatry, PT, tylenol etc      Over 25 minutes spent with patient face to face, greater than 50% time spent with plan/coordination of care regarding that which is discussed in the HPI and A&P      "

## 2019-11-13 NOTE — PROGRESS NOTES
"This medical record contains text that has been entered with the assistance of computer voice recognition and dictation software.  Therefore, it may contain unintended errors in text, spelling, punctuation, or grammar        Chief Complaint   christoph Cheung is a 69 y.o. male here evaluation and management of:  ongoing issue of weight loss he is successfully loosing weight with contrave with wt loss center   Diabetes  Neuropathy       Requested renewal of his celebrex through mychart  I asked him to come in to discuss his pain needs in more detail       Current Outpatient Medications   Medication Sig Dispense Refill   • celecoxib (CELEBREX) 200 MG Cap TAKE 1 CAPSULE BY MOUTH EVERY DAY 14 Cap 0   • gemfibrozil (LOPID) 600 MG Tab TAKE 1 TABLET BY MOUTH TWICE A DAY MUST BE SEEN FOR FURTHER REFILLS 180 Tab 0   • metformin (GLUCOPHAGE) 1000 MG tablet TAKE 1 TABLET BY MOUTH TWICE A  Tab 1   • indapamide (LOZOL) 2.5 MG Tab TAKE 1 TAB BY MOUTH EVERYDAY MUST BE SEEN FOR FURTHER REFILLS LAST REFILL 90 Tab 0   • amLODIPine (NORVASC) 5 MG Tab TAKE 1 TABLET BY MOUTH TWICE A  Tab 3   • NOVOLOG MIX 70/30 FLEXPEN (70-30) 100 UNIT/ML Suspension Pen-injector 20 Units by Subdermal route every day. 100 PEN 1   • benazepril (LOTENSIN) 40 MG tablet TAKE 1 TABLET BY MOUTH TWICE A DAY 90 Tab 3   • Lancets Using One Touch Ultra Soft Lancets. Testing 3 times daily for insulin adjustment.  DX. Code 250 02 300 Each 3   • Insulin Pen Needle (NOVOFINE 30) 30G X 8 MM Misc Novofine Autocover 30 gauge x 1/3\" needle     • alfuzosin (UROXATRAL) 10 MG SR tablet TAKE 1 TABLET BY MOUTH EVERYDAY AT BEDTIME  3   • Multiple Vitamin (MULTI-VITAMIN DAILY PO) Multi Vitamin     • Multiple Vitamins-Minerals (ICAPS AREDS 2 PO) ICaps AREDS     • Cholecalciferol (VITAMIN D3) 5000 UNITS Cap Take 1 Cap by mouth every day.     • Omega-3 Fatty Acids (FISH OIL TRIPLE STRENGTH) 1400 MG Cap Take 1 Cap by mouth every day.     • indapamide (LOZOL) 2.5 MG " Tab TAKE 1 TAB BY MOUTH EVERYDAY MUST BE SEEN FOR FURTHER REFILLS LAST REFILL 90 Tab 0   • gabapentin (NEURONTIN) 300 MG Cap Take 1200mg PO TID 1200 Cap 3   • glucose blood (ONE TOUCH ULTRA TEST) strip TEST BLOOD 3 TIMES DAILY 300 Strip 0   • glucose blood strip Check sugars tid prn 100 Strip 1   • Naltrexone-Bupropion HCl ER (CONTRAVE) 8-90 MG TABLET SR 12 HR 1 tab BID 60 Tab 0   • Naltrexone-Bupropion HCl ER 8-90 MG TABLET SR 12 HR Take 8-90 mg by mouth See Admin Instructions. Week 1 one tab po QD  Week 2 one tab po BID  Week 3 two tab po QAM, one tab po QPM  Week 4 two tab po  Tab 0   • gabapentin (NEURONTIN) 600 MG tablet TAKE 3 TABLETS BY MOUTH 2 TIMES A  Tab 5   • Insulin Pen Needle (NOVOFINE) 30G X 8 MM Misc USE FOR INSULIN UP TO 3 TIMES A  Each 3   • insulin aspart protamine-insulin aspart (NOVOLOG MIX 70/30) (70-30) 100 UNIT/ML injection Inject 8-10 Units as instructed 3 times a day before meals. 100 = 5 units  120= 7 units  184 = 9 units  200 = 12 units  Pt can go as high as 22 units if needed     • tamsulosin (FLOMAX) 0.4 MG capsule Take 0.8 mg by mouth every bedtime.       No current facility-administered medications for this visit.      Patient Active Problem List    Diagnosis Date Noted   • Lumbar stenosis 09/12/2017     Priority: Medium   • Typical atrial flutter (HCC) 04/25/2017     Priority: Medium   • Essential hypertension 10/13/2013     Priority: Medium   • Dyslipidemia 10/13/2013     Priority: Medium   • Benign prostatic hyperplasia 03/27/2014     Priority: Low   • Degeneration of lumbar or lumbosacral intervertebral disc 02/19/2014     Priority: Low   • Type 2 DM 10/13/2013     Priority: Low   • CKD (chronic kidney disease) stage 2, GFR 60-89 ml/min 10/13/2013     Priority: Low   • Chronic pain of both knees 05/13/2019   • Encounter for weight loss counseling 12/06/2018   • Obesity (BMI 35.0-39.9 without comorbidity) 07/11/2018   • Benign prostatic hyperplasia with urinary  hesitancy 05/21/2018   • Right wrist pain 05/21/2018   • Neuropathy (HCC) 05/21/2018   • Bilateral shoulder pain 05/21/2018   • Bilateral foot pain 05/21/2018   • Stenosis, spinal, lumbar 09/18/2017     Past Surgical History:   Procedure Laterality Date   • EXTREME LATERAL INTERBODY FUSION Left 9/18/2017    Procedure: EXTREME LATERAL INTERBODY FUSION L2-3 W/PLATE;  Surgeon: Yemi Sharma M.D.;  Location: SURGERY [facility];  Service: Neurosurgery   • LUMBAR FUSION POSTERIOR  9/18/2017    Procedure: LUMBAR FUSION POSTERIOR L2-3 INSTRUMENTED;  Surgeon: Yemi Sharma M.D.;  Location: SURGERY [facility];  Service: Neurosurgery   • LUMBAR LAMINECTOMY DISKECTOMY  9/18/2017    Procedure: LUMBAR LAMINECTOMY DISKECTOMY;  Surgeon: Yemi Sharma M.D.;  Location: SURGERY [facility];  Service: Neurosurgery   • RECOVERY  1/13/2016    Procedure: IR Deep bone biopsy L2-L3 with general anesthesia-DAYANA;  Surgeon: [facility];  Location: SURGERY PRE-POST PROC UNIT [facility];  Service:    • LUMBAR LAMINECTOMY DISKECTOMY  4/22/2014    Performed by Mazin Long M.D. at SURGERY [facility]   • TRANS URETHRAL RESECTION PROSTATE  3/27/2014    Performed by Kyle Guzman M.D. at [facility]   • LUMBAR LAMINECTOMY DISKECTOMY  2/19/2014    Performed by Mazin Long M.D. at [facility]   • OTHER ORTHOPEDIC SURGERY  02/14    L2-L3 Laminectomy   • OTHER  2014    transection supraorbital nerve   • LUMBAR LAMINECTOMY DISKECTOMY  5/2/2012    Performed by MAZIN LONG at [facility]   • OTHER ORTHOPEDIC SURGERY  03/12    L3-L5 Laminectomy   • OTHER      laser eye susrgery   • OTHER      carpectomy   • OTHER ORTHOPEDIC SURGERY  2003/2007/2008    left shoulder replacement/with corrections   • OTHER ORTHOPEDIC SURGERY      right knee replacement/manipulation      Social History     Tobacco Use   • Smoking status: Former Smoker     Packs/day: 0.75     Years: 10.00     Pack years:  "7.50     Types: Cigarettes     Last attempt to quit: 2008     Years since quittin.5   • Smokeless tobacco: Never Used   Substance Use Topics   • Alcohol use: No   • Drug use: No     Family History   Problem Relation Age of Onset   • Sleep Apnea Neg Hx            ROS  No n/v  all review of system completed and negative except for those listed above     Objective:     /80 (BP Location: Left arm, Patient Position: Sitting, BP Cuff Size: Adult)   Pulse 78   Temp 36.4 °C (97.6 °F) (Temporal)   Resp 14   Ht 1.676 m (5' 6\")   Wt 97.1 kg (214 lb)   SpO2 98%  Body mass index is 34.54 kg/m².  Reviewed steady wt loss since October   Physical Exam:      GEN: comfortable, alert and oriented, well nourished, well developed, in no apparent distress   HEENT: NCAT, eyes: pupils equal and reactive, sclera white, EOMIT, good dentition  HEART: limbs warm and well perfused, regular rate, no JVD, no lower extremity edema  LUNGS: speaking in full sentences, not in apparent respiratory distress, no audible wheezes  MSK: normal tone and bulk, no swelling of the joints, gait steady and normal           Assessment and Plan:   The following treatment plan was discussed        Problem List Items Addressed This Visit     Lumbar stenosis     We have not had appointment dedicated to his issue of chronic pain and he asked for year rx of NSAID through the phone so I asked that he come in to discuss    He states that he has had \"issues\" with opiate ?abuse in the past but is non specific  He has had back surgery NOS  He has \"pain from the top of his head to the tips of his toes\"   He recalls that we briefly discussed chronic NSAID risk in 2018 when his GFR was decreased     He takes extra time to express frustrations about his depression, his perception that his life is deteriorating and that he is \"willing to accept risks of chronic NSAID use since his life expectancy is <10 years anyways\" he becomes verbally aroused and " elevates his voice.  I explain that I am not comfortable rx-ing NSAID long term without first maximizing other treatment options such as physiatry, PT, tylenol etc      Over 25 minutes spent with patient face to face, greater than 50% time spent with plan/coordination of care regarding that which is discussed in the HPI and A&P             Relevant Orders    REFERRAL TO PHYSIATRY (PMR)    REFERRAL TO PHYSIATRY (PMR)    Type 2 DM    Relevant Orders    Basic Metabolic Panel    Lipid Profile    HEMOGLOBIN A1C    MICROALBUMIN CREAT RATIO URINE                Instructed to follow up if symptoms worsen or fail to improve, ER/UC precautions discussed as well    Cherry Long MD  [facility], Family Medicine   [address] [place] [address] [address]   [place] [id number]  Phone: [phone]

## 2019-11-18 ENCOUNTER — TELEPHONE (OUTPATIENT)
Dept: MEDICAL GROUP | Facility: MEDICAL CENTER | Age: 69
End: 2019-11-18

## 2019-11-18 DIAGNOSIS — E11.9 DIABETES MELLITUS WITHOUT COMPLICATION (HCC): ICD-10-CM

## 2019-11-25 RX ORDER — INSULIN ASPART 100 [IU]/ML
20 INJECTION, SUSPENSION SUBCUTANEOUS DAILY
Qty: 100 PEN | Refills: 1 | Status: SHIPPED | OUTPATIENT
Start: 2019-11-25 | End: 2020-05-07

## 2019-12-02 ENCOUNTER — HOSPITAL ENCOUNTER (OUTPATIENT)
Dept: LAB | Facility: MEDICAL CENTER | Age: 69
End: 2019-12-02
Attending: FAMILY MEDICINE
Payer: MEDICARE

## 2019-12-02 DIAGNOSIS — E11.9 TYPE 2 DIABETES MELLITUS WITHOUT COMPLICATION, WITHOUT LONG-TERM CURRENT USE OF INSULIN (HCC): ICD-10-CM

## 2019-12-02 LAB
ANION GAP SERPL CALC-SCNC: 8 MMOL/L (ref 0–11.9)
BUN SERPL-MCNC: 29 MG/DL (ref 8–22)
CALCIUM SERPL-MCNC: 10 MG/DL (ref 8.5–10.5)
CHLORIDE SERPL-SCNC: 107 MMOL/L (ref 96–112)
CHOLEST SERPL-MCNC: 144 MG/DL (ref 100–199)
CO2 SERPL-SCNC: 25 MMOL/L (ref 20–33)
CREAT SERPL-MCNC: 1.03 MG/DL (ref 0.5–1.4)
CREAT UR-MCNC: 101.7 MG/DL
EST. AVERAGE GLUCOSE BLD GHB EST-MCNC: 128 MG/DL
FASTING STATUS PATIENT QL REPORTED: NORMAL
GLUCOSE SERPL-MCNC: 96 MG/DL (ref 65–99)
HBA1C MFR BLD: 6.1 % (ref 0–5.6)
HDLC SERPL-MCNC: 36 MG/DL
LDLC SERPL CALC-MCNC: 93 MG/DL
MICROALBUMIN UR-MCNC: 5.5 MG/DL
MICROALBUMIN/CREAT UR: 54 MG/G (ref 0–30)
POTASSIUM SERPL-SCNC: 3.9 MMOL/L (ref 3.6–5.5)
SODIUM SERPL-SCNC: 140 MMOL/L (ref 135–145)
TRIGL SERPL-MCNC: 73 MG/DL (ref 0–149)

## 2019-12-02 PROCEDURE — 82570 ASSAY OF URINE CREATININE: CPT

## 2019-12-02 PROCEDURE — 83036 HEMOGLOBIN GLYCOSYLATED A1C: CPT | Mod: GA

## 2019-12-02 PROCEDURE — 80061 LIPID PANEL: CPT

## 2019-12-02 PROCEDURE — 82043 UR ALBUMIN QUANTITATIVE: CPT

## 2019-12-02 PROCEDURE — 80048 BASIC METABOLIC PNL TOTAL CA: CPT

## 2019-12-02 PROCEDURE — 36415 COLL VENOUS BLD VENIPUNCTURE: CPT

## 2019-12-02 RX ORDER — CELECOXIB 200 MG/1
200 CAPSULE ORAL
Qty: 45 CAP | Refills: 0 | OUTPATIENT
Start: 2019-12-02

## 2019-12-02 NOTE — TELEPHONE ENCOUNTER
Pt is trying to see what is the plan if he cant get the celbrex refilled pain management cant get him in till 1/14/19

## 2019-12-03 DIAGNOSIS — G62.9 NEUROPATHY: ICD-10-CM

## 2019-12-03 DIAGNOSIS — Z79.4 TYPE 2 DIABETES MELLITUS WITHOUT COMPLICATION, WITH LONG-TERM CURRENT USE OF INSULIN (HCC): ICD-10-CM

## 2019-12-03 DIAGNOSIS — Z23 NEED FOR VACCINATION: ICD-10-CM

## 2019-12-03 DIAGNOSIS — E11.9 TYPE 2 DIABETES MELLITUS WITHOUT COMPLICATION, WITH LONG-TERM CURRENT USE OF INSULIN (HCC): ICD-10-CM

## 2019-12-04 RX ORDER — BENAZEPRIL HYDROCHLORIDE 40 MG/1
TABLET ORAL
Qty: 180 TAB | Refills: 1 | Status: SHIPPED | OUTPATIENT
Start: 2019-12-04 | End: 2020-03-19

## 2020-01-02 RX ORDER — GEMFIBROZIL 600 MG/1
TABLET, FILM COATED ORAL
Qty: 180 TAB | Refills: 0 | Status: SHIPPED | OUTPATIENT
Start: 2020-01-02 | End: 2020-03-26

## 2020-01-14 ENCOUNTER — OFFICE VISIT (OUTPATIENT)
Dept: PHYSICAL MEDICINE AND REHAB | Facility: MEDICAL CENTER | Age: 70
End: 2020-01-14
Payer: MEDICARE

## 2020-01-14 VITALS
TEMPERATURE: 97.4 F | HEART RATE: 98 BPM | BODY MASS INDEX: 33.69 KG/M2 | HEIGHT: 66 IN | WEIGHT: 209.66 LBS | SYSTOLIC BLOOD PRESSURE: 130 MMHG | DIASTOLIC BLOOD PRESSURE: 80 MMHG | OXYGEN SATURATION: 97 %

## 2020-01-14 DIAGNOSIS — Z98.1 S/P LUMBAR FUSION: ICD-10-CM

## 2020-01-14 DIAGNOSIS — G62.9 NEUROPATHY: ICD-10-CM

## 2020-01-14 DIAGNOSIS — N18.2 CKD (CHRONIC KIDNEY DISEASE) STAGE 2, GFR 60-89 ML/MIN: ICD-10-CM

## 2020-01-14 DIAGNOSIS — Z79.1 LONG TERM CURRENT USE OF NON-STEROIDAL ANTI-INFLAMMATORIES (NSAID): ICD-10-CM

## 2020-01-14 PROCEDURE — 99205 OFFICE O/P NEW HI 60 MIN: CPT | Performed by: PHYSICAL MEDICINE & REHABILITATION

## 2020-01-14 RX ORDER — DULOXETIN HYDROCHLORIDE 30 MG/1
CAPSULE, DELAYED RELEASE ORAL
Qty: 53 CAP | Refills: 0 | Status: SHIPPED | OUTPATIENT
Start: 2020-01-14 | End: 2020-02-13

## 2020-01-14 RX ORDER — CELECOXIB 200 MG/1
200 CAPSULE ORAL
Qty: 90 CAP | Refills: 0 | Status: SHIPPED | OUTPATIENT
Start: 2020-01-14 | End: 2020-04-09

## 2020-01-14 ASSESSMENT — PAIN SCALES - GENERAL: PAINLEVEL: 4=SLIGHT-MODERATE PAIN

## 2020-01-14 ASSESSMENT — PATIENT HEALTH QUESTIONNAIRE - PHQ9: CLINICAL INTERPRETATION OF PHQ2 SCORE: 0

## 2020-01-14 NOTE — PROGRESS NOTES
"New patient note    Physiatry (physical medicine and  Rehabilitation), interventional spine and sports medicine    Date of Service: 1/14/2020    Chief complaint:   Chief Complaint   Patient presents with   • New Patient     Spinal Stenosis of lumbar region       HISTORY    HPI: Zhang Cheung 69 y.o. male who presents today for evaluation of low back and bilateral leg pain.     Mr. Cheung reports that he has low back pain.  He reports that he had been taking celebrex for pain in his low back for years due to his spine surgeon, Dr. Sharma.  He has been taking celebrex 200mg daily.  This was after his most recent fusion surgery.  Overall, this has been very effective, but he has been taking celebrex.    From what he reports, he has not found tylenol very helpful for his pain.    From what he reports, he has aching pain in his shoulders, hands, knees and ankles. Taking the celebrex seems to help him to manage his symptoms.  He does not take opiates regularly, except after surgery.    No coughing or sneezing.    He has been diabetic for about 20-25 years. HbA1c 6.1 last checked.  He has been insulin-dependent from the start.    He has weaned off of gabapentin after his most recent spine surgery.  It does not seem like this helped with his neuropathy.      He is retired now, but worked as a  and physical work    He took contrave and reduced his weight from 240lb to 210lb.  Now, he is not taking contrave, but is slowly losing weight.    Medical records review:  I reviewed the note from the referring provider Cherry Long M.D. dated 11/13/2019. At that visit, Mr. Cheung was asking for a prescription for NSAIDs and from review of that note, Dr. Long did not feel comfortable writing a script for a year of NSAIDs through the phone.  Some issue related to opiates in the past, although unclear.   He is willing to \"accept risks of chronic NSAID use since his life expectancy is <10 years anyways\"    Previous " "treatments:    Physical Therapy: Yes    Medications the patient is tried: NSAIDs, gabapentin and tylenol    Previous interventions: Yes    Previous surgeries to relieve the above pain:  Yes      ROS: As noted in chart and otherwise negative for new complaints  Red Flags ROS:   Fever, Chills, Sweats: Denies  Involuntary Weight Loss: Denies  Bladder Incontinence: Denies  Bowel Incontinence: Denies  Saddle Anesthesia: Denies    All other systems reviewed and negative.       PMHx:   Past Medical History:   Diagnosis Date   • Arrhythmia     A Flutter   • Arthritis     generalized   • Diabetes     IDDM   • Diabetic neuropathy (HCC)    • High cholesterol    • Hyperlipidemia    • Hypertension 2014    well  controlled   • Neuropathy (HCC)    • Pain 04/11/14    back pain increases with activity   • Restless leg    • Sleep apnea     CPAP.  8/30/17 - not using CPAP due to back pain \"unable to lay on back\".   • Snoring    • Unspecified urinary incontinence     \" enlarged prostate\"       PSHx:   Past Surgical History:   Procedure Laterality Date   • EXTREME LATERAL INTERBODY FUSION Left 9/18/2017    Procedure: EXTREME LATERAL INTERBODY FUSION L2-3 W/PLATE;  Surgeon: Yemi Sharma M.D.;  Location: [facility];  Service: Neurosurgery   • LUMBAR FUSION POSTERIOR  9/18/2017    Procedure: LUMBAR FUSION POSTERIOR L2-3 INSTRUMENTED;  Surgeon: Yemi Sharma M.D.;  Location: [facility];  Service: Neurosurgery   • LUMBAR LAMINECTOMY DISKECTOMY  9/18/2017    Procedure: LUMBAR LAMINECTOMY DISKECTOMY;  Surgeon: Yemi Sharma M.D.;  Location: [facility];  Service: Neurosurgery   • RECOVERY  1/13/2016    Procedure: IR Deep bone biopsy L2-L3 with general anesthesia-DAYANA;  Surgeon: [facility];  [place] [facility] [place]  Service:    • LUMBAR LAMINECTOMY DISKECTOMY  4/22/2014    Performed by Rogers Long M.D. at [facility]   • TRANS URETHRAL RESECTION PROSTATE "  3/27/2014    Performed by Kyle Guzman M.D. at SURGERY [facility]   • LUMBAR LAMINECTOMY DISKECTOMY  2/19/2014    Performed by aMzin Long M.D. at SURGERY [facility]   • OTHER ORTHOPEDIC SURGERY  02/14    L2-L3 Laminectomy   • OTHER  2014    transection supraorbital nerve   • LUMBAR LAMINECTOMY DISKECTOMY  5/2/2012    Performed by MAZIN LONG at SURGERY [facility]   • OTHER ORTHOPEDIC SURGERY  03/12    L3-L5 Laminectomy   • OTHER      laser eye susrgery   • OTHER      carpectomy   • OTHER ORTHOPEDIC SURGERY  2003/2007/2008    left shoulder replacement/with corrections   • OTHER ORTHOPEDIC SURGERY      right knee replacement/manipulation       Family history   Family History   Problem Relation Age of Onset   • Sleep Apnea Neg Hx          Medications:   Current Outpatient Medications   Medication   • celecoxib (CELEBREX) 200 MG Cap   • DULoxetine (CYMBALTA) 30 MG Cap DR Particles   • gemfibrozil (LOPID) 600 MG Tab   • benazepril (LOTENSIN) 40 MG tablet   • NOVOLOG MIX 70/30 FLEXPEN (70-30) 100 UNIT/ML Suspension Pen-injector   • metformin (GLUCOPHAGE) 1000 MG tablet   • indapamide (LOZOL) 2.5 MG Tab   • amLODIPine (NORVASC) 5 MG Tab   • alfuzosin (UROXATRAL) 10 MG SR tablet   • Multiple Vitamin (MULTI-VITAMIN DAILY PO)   • Multiple Vitamins-Minerals (ICAPS AREDS 2 PO)   • Cholecalciferol (VITAMIN D3) 5000 UNITS Cap   • insulin aspart protamine-insulin aspart (NOVOLOG MIX 70/30) (70-30) 100 UNIT/ML injection   • Omega-3 Fatty Acids (FISH OIL TRIPLE STRENGTH) 1400 MG Cap   • Insulin Pen Needle (NOVOFINE 30) 30G X 8 MM Misc   • glucose blood (ONE TOUCH ULTRA TEST) strip   • indapamide (LOZOL) 2.5 MG Tab   • Lancets   • glucose blood strip   • Insulin Pen Needle (NOVOFINE) 30G X 8 MM Misc   • tamsulosin (FLOMAX) 0.4 MG capsule     No current facility-administered medications for this visit.        Allergies:   Allergies   Allergen Reactions   • Other Drug      Opioid pain killers sent him to ER    • Pollen Extract      Local pollen, primarily [place] Brush       Social Hx:   Social History     Socioeconomic History   • Marital status: Single     Spouse name: Not on file   • Number of children: Not on file   • Years of education: Not on file   • Highest education level: Not on file   Occupational History   • Not on file   Social Needs   • Financial resource strain: Not on file   • Food insecurity:     Worry: Not on file     Inability: Not on file   • Transportation needs:     Medical: Not on file     Non-medical: Not on file   Tobacco Use   • Smoking status: Former Smoker     Packs/day: 0.75     Years: 10.00     Pack years: 7.50     Types: Cigarettes     Last attempt to quit: 2008     Years since quittin.7   • Smokeless tobacco: Never Used   Substance and Sexual Activity   • Alcohol use: No   • Drug use: No   • Sexual activity: Not on file   Lifestyle   • Physical activity:     Days per week: Not on file     Minutes per session: Not on file   • Stress: Not on file   Relationships   • Social connections:     Talks on phone: Not on file     Gets together: Not on file     Attends [employer] service: Not on file     Active member of club or organization: Not on file     Attends meetings of clubs or organizations: Not on file     Relationship status: Not on file   • Intimate partner violence:     Fear of current or ex partner: Not on file     Emotionally abused: Not on file     Physically abused: Not on file     Forced sexual activity: Not on file   Other Topics Concern   •  Service Yes   • Blood Transfusions No   • Caffeine Concern No   • Occupational Exposure No   • Hobby Hazards No   • Sleep Concern Yes   • Stress Concern No   • Weight Concern No   • Special Diet Yes   • Back Care No   • Exercise Yes   • Bike Helmet No   • Seat Belt Yes   • Self-Exams No   Social History Narrative   • Not on file         EXAMINATION     Physical Exam:   Vitals: /80 (BP Location: Right arm, Patient  "Position: Sitting, BP Cuff Size: Large adult long)   Pulse 98   Temp 36.3 °C (97.4 °F) (Temporal)   Ht 1.676 m (5' 6\")   Wt 95.1 kg (209 lb 10.5 oz)   SpO2 97%     Constitutional:   Body Habitus: Body mass index is 33.84 kg/m².  Cooperation: Fully cooperates with exam  Appearance: Well-groomed, well-nourished, not disheveled, in no acute distress    Eyes: No scleral icterus, no proptosis     ENT -no obvious auditory deficits, no obvious tongue lesions, tongue midline, no facial droop     Skin -no rashes or lesions noted     Respiratory-  breathing comfortable on room air, no audible wheezing    Cardiovascular- capillary refills less than 2 seconds. No lower extremity edema is noted.     Gastrointestinal - no obvious abdominal masses, No tenderness to palpation in the abdomen    Psychiatric- alert and oriented ×3. Normal affect.     Gait - normal gait, no use of ambulatory device, nonantalgic. Balance is appropriate..     Musculoskeletal -   Cervical spine   Inspection: No deformities of the skin over the cervical spine. No rashes or lesions.    full  A/P ROM in all directions, without  pain      Spurling’s sign: negative bilaterally    No signs of muscular atrophy in bilateral upper extremities     Thoracic/Lumbar Spine/Sacral Spine/Hips   Inspection: No evidence of atrophy in bilateral lower extremities throughout     ROM: decreased AROM with flexion, extension, lateral flexion, and rotation bilaterally, without pain     Palpation:   No tenderness to palpation in midline at T1-T12 levels. No tenderness to palpation in the left and right of the midline T1-L5  palpation over SI joint: negative bilaterally    palpation over buttock: negative bilaterally    palpation in hip or over the greater trochanters: negative bilaterally      Lumbar spine Special tests  Neuro tension  Seated straight leg test negative bilaterally      SI joint tests  Observation patient sits on one buttocks: Negative    Neuro       Key " points for the international standards for neurological classification of spinal cord injury (ISNCSCI) to light touch.     Dermatome R L   C4 2 2   C5 2 2   C6 2 2   C7 2 2   C8 2 2   T1 2 2   T2 2 2   L2 2 2   L3 2 2   L4 2 2   L5 2 2   S1 2* 2*   S2 2* 2*     * decreased sensation in distal toes with paresthesias      Motor Exam Upper Extremities   ? Myotome R L   Shoulder flexion C5 5 5   Elbow flexion C5 5 5   Wrist extension C6 5 5   Elbow extension C7 5 5   Finger flexion C8 5 5   Finger abduction T1 5 5         Motor Exam Lower Extremities    ? Myotome R L   Hip flexion L2 5 5   Knee extension L3 5 5   Ankle dorsiflexion L4 5 5   Toe extension L5 5 5   Ankle plantarflexion S1 5 5       Galicia’s sign negative bilaterally   Babinski sign negative bilaterally   Clonus of the ankle negative bilaterally     Reflexes  ?  R L   Biceps  2+ 2+   Brachioradialis  2+ 2+   Patella  2+ 2+   Achilles   2+ 2+       MEDICAL DECISION MAKING    Medical records review: see under HPI section.     DATA    Labs:   Lab Results   Component Value Date/Time    SODIUM 140 12/02/2019 10:16 AM    POTASSIUM 3.9 12/02/2019 10:16 AM    CHLORIDE 107 12/02/2019 10:16 AM    CO2 25 12/02/2019 10:16 AM    ANION 8.0 12/02/2019 10:16 AM    GLUCOSE 96 12/02/2019 10:16 AM    BUN 29 (H) 12/02/2019 10:16 AM    CREATININE 1.03 12/02/2019 10:16 AM    CREATININE 0.9 01/09/2008 12:15 PM    CALCIUM 10.0 12/02/2019 10:16 AM    ASTSGOT 28 01/03/2018 11:11 AM    ALTSGPT 12 01/03/2018 11:11 AM    TBILIRUBIN 0.4 01/03/2018 11:11 AM    ALBUMIN 4.49 01/03/2018 11:18 AM    TOTPROTEIN 7.50 01/03/2018 11:18 AM    GLOBULIN 3.2 01/03/2018 11:11 AM    AGRATIO 1.3 01/03/2018 11:11 AM       Lab Results   Component Value Date/Time    PROTHROMBTM 13.2 08/30/2017 07:55 AM    INR 0.97 08/30/2017 07:55 AM        Lab Results   Component Value Date/Time    WBC 5.3 01/03/2018 11:11 AM    RBC 4.92 01/03/2018 11:11 AM    HEMOGLOBIN 14.9 01/03/2018 11:11 AM    HEMATOCRIT 47.9  01/29/2019 10:40 AM    MCV 92.5 01/03/2018 11:11 AM    MCH 30.3 01/03/2018 11:11 AM    MCHC 32.7 (L) 01/03/2018 11:11 AM    MPV 11.5 01/03/2018 11:11 AM    NEUTSPOLYS 58.20 01/03/2018 11:11 AM    LYMPHOCYTES 25.90 01/03/2018 11:11 AM    MONOCYTES 9.60 01/03/2018 11:11 AM    EOSINOPHILS 5.30 01/03/2018 11:11 AM    EOSINOPHILS 3 01/13/2016 09:25 AM    BASOPHILS 0.80 01/03/2018 11:11 AM    HYPOCHROMIA 1+ 03/18/2014 08:11 AM        Lab Results   Component Value Date/Time    HBA1C 6.1 (H) 12/02/2019 10:16 AM        Imaging: I personally reviewed following images, these are my reads  Xray lumbar spine 06/03/2019  There is note of degenerative disc disease at multiple levels with decreased disc height at L4-5 and L5-S1  Left lateral pedicle screw fusion at L2-3 with minimal retrolisthesis at L1-2.   No abnormal motion on flexion and extension    IMAGING radiology reads. I reviewed the following radiology reads                  Results for orders placed during the hospital encounter of 11/08/06   MR-CERVICAL SPINE-W/O    Impression IMPRESSION:    MULTILEVEL DEGENERATIVE CHANGES IN THE CERVICAL REGION WITH MULTILEVEL   MILD CENTRAL CANAL STENOSES AND MULTILEVEL FORAMINAL STENOSES OF   DEGENERATIVE ORIGIN AND VARIABLE SEVERITY.                   Results for orders placed during the hospital encounter of 12/17/16   MR-LUMBAR SPINE-W/O    Impression 1.  L2-3 retrolisthesis. Marked disc space narrowing and endplate irregularities with interval near resolution of vertebral body marrow edema. Findings may represent severe chronic degenerative disc disease. Chronic sequela of discitis-osteomyelitis   could have a similar appearance. There has been previous disc space and L2-3 endplate biopsy with negative cultures and Gram stain. No evidence of active discitis or osteomyelitis. Moderate-marked central spinal stenosis at L2-3.  2.  Degenerative retrolisthesis L1-L2 and L3-L4. Additional multilevel degenerative changes as detailed  above for each level in the body of report.        Results for orders placed during the hospital encounter of 12/30/15   MR-LUMBAR SPINE-WITH & W/O    Impression 1.  Interval increase in the amount of L2-3 disc bulging with increase in the degree of spinal stenosis at this level, now moderate, previously mild.    2.  The enhancement pattern is consistent with inflammation in the disc space and adjacent vertebral endplates as well as the facet joints, right greater than left. This is unchanged. The finding is nonspecific. It may represent inflammatory process   related to severe disc degeneration. Infection not excluded. The relatively little degree of change in the interval is less suggestive of infection.                               Results for orders placed in visit on 10/21/13   DX-KNEE COMPLETE 4+    Impression Right total knee arthroplasty in place, with no acute abnormality.          INTERPRETING LOCATION: [address] [place] [address], [id number]         Results for orders placed during the hospital encounter of 06/03/19   DX-LUMBAR SPINE-4+ VIEWS    Impression 1.  There is a stable appearance to the left lateral pedicle screw fixation and disc spacer at the L2-3 level.  2.  There is minimal degenerative retrolisthesis at the L1-2 level.  3.  There is no significant listhesis at the L2-3 level.  4.  There is no abnormal motion.  5.  There is multilevel degenerative disc disease and arthropathy.      Results for orders placed during the hospital encounter of 11/28/16   DX-PELVIS-1 OR 2 VIEWS    Impression Moderate bilateral hip osteoarthritis.                                       Diagnosis   Visit Diagnoses     ICD-10-CM   1. CKD (chronic kidney disease) stage 2, GFR 60-89 ml/min N18.2   2. S/P lumbar fusion Z98.1   3. Long term current use of non-steroidal anti-inflammatories (NSAID) Z79.1   4. Neuropathy (HCC) G62.9           ASSESSMENT:  Zhang Cheung 69 y.o. male with history of multiple spine surgeries, long-standing  insulin-dependent diabetes     Zhang was seen today for new patient.    Diagnoses and all orders for this visit:    CKD (chronic kidney disease) stage 2, GFR 60-89 ml/min  -     Basic Metabolic Panel; Future    S/P lumbar fusion  -     celecoxib (CELEBREX) 200 MG Cap; Take 1 Cap by mouth every day for 90 days.    Long term current use of non-steroidal anti-inflammatories (NSAID)  -     Basic Metabolic Panel; Future    Neuropathy (HCC)  -     DULoxetine (CYMBALTA) 30 MG Cap DR Particles; Take 1 Cap by mouth every day for 7 days, THEN 2 Caps every day for 23 days.        1. Discussed the risks of taking ibuprofen vs. Celebrex.  We discussed the risks of medications  2. Discussed that I would recommend that we would want to have renal function tests done every three months.  If his GFR should decrease, will need to adjust this or ultimately discontinue.  He understands that there are risks of chronically taking NSAIDs, but the pain that he has in his low back and multiple joints is managed with this and he finds this preferable to taking opioids or other medication at this time.  We will monitor closely and reconsider if renal function changes.  3. We discussed diabetic neuropathy and possible treatments ranging from medications to SCS.  We discussed that he has tried and failed gabapentin.  We discussed possible alpha-lipoic acid as a supplement for diabetes, although we discussed, is not effective for everyone.  I have given him some information about this.  Duloxetine would be an option for him.  Discussed possible side effects.  He would like to trial this.  I have given him a script for 30mg titrating after a week to 60mg.  He will contact the office to let me know if he wants to consider this.  It is possible that this could allow for reduced need for NSAIDs, although we discussed that this medication needs to be renal dose adjusted depending on GFR as well.    Follow-up: Return in about 3 months (around  4/14/2020).    Thank you very much for asking me to participate in Zhang CRAWFORD Cheung's care.  Please contact me with any questions or concerns.      Please note that this dictation was created using voice recognition software. I have made every reasonable attempt to correct obvious errors but there may be errors of grammar and content that I may have overlooked prior to finalization of this note.      Angel Pond MD  Physical Medicine and Rehabilitation  Interventional Spine and Sports Physiatry  [facility]

## 2020-02-11 RX ORDER — INDAPAMIDE 2.5 MG/1
TABLET ORAL
Qty: 90 TAB | Refills: 0 | Status: SHIPPED | OUTPATIENT
Start: 2020-02-11 | End: 2020-05-11

## 2020-02-21 DIAGNOSIS — E11.9 DIABETES MELLITUS WITHOUT COMPLICATION (HCC): ICD-10-CM

## 2020-03-18 DIAGNOSIS — Z79.4 TYPE 2 DIABETES MELLITUS WITHOUT COMPLICATION, WITH LONG-TERM CURRENT USE OF INSULIN (HCC): ICD-10-CM

## 2020-03-18 DIAGNOSIS — G62.9 NEUROPATHY: ICD-10-CM

## 2020-03-18 DIAGNOSIS — E11.9 TYPE 2 DIABETES MELLITUS WITHOUT COMPLICATION, WITH LONG-TERM CURRENT USE OF INSULIN (HCC): ICD-10-CM

## 2020-03-18 DIAGNOSIS — Z23 NEED FOR VACCINATION: ICD-10-CM

## 2020-03-19 RX ORDER — BENAZEPRIL HYDROCHLORIDE 40 MG/1
TABLET ORAL
Qty: 180 TAB | Refills: 1 | Status: SHIPPED | OUTPATIENT
Start: 2020-03-19 | End: 2020-12-08

## 2020-03-23 NOTE — TELEPHONE ENCOUNTER
Received request via: Pharmacy    Was the patient seen in the last year in this department? Yes    Does the patient have an active prescription (recently filled or refills available) for medication(s) requested? No     Future Appointments       Provider Department Center    4/14/2020 9:40 AM Angel Pond M.D. [facility] PHYSIATRY

## 2020-03-26 RX ORDER — GEMFIBROZIL 600 MG/1
TABLET, FILM COATED ORAL
Qty: 180 TAB | Refills: 0 | Status: SHIPPED | OUTPATIENT
Start: 2020-03-26 | End: 2020-06-23

## 2020-04-02 ENCOUNTER — TELEPHONE (OUTPATIENT)
Dept: MEDICAL GROUP | Facility: MEDICAL CENTER | Age: 70
End: 2020-04-02

## 2020-04-02 NOTE — TELEPHONE ENCOUNTER
VOICEMAIL  1. Caller Name: Zhang Cheung                        Call Back Number: [phone] (home)       2. Message: Patient sounded very upset about a bill he received. He was complaining about an A1C not being documented properly by the provider and that's why it was not covered by insurance. Patient called billing and they told him there isn't anything they can do and he would have to call his provider to have this fixed. Saved the VM on MA desk. LM     3. Patient approves office to leave a detailed voicemail/MyChart message: N\A

## 2020-04-05 DIAGNOSIS — Z98.1 S/P LUMBAR FUSION: ICD-10-CM

## 2020-04-07 NOTE — TELEPHONE ENCOUNTER
Was the patient seen in the last year in this department? Yes    Does patient have an active prescription for medications requested? No      Do they have a refill on file? No     If a controlled substance, is a new  on file? No     Received Request Via: Pharmacy    If pharmacy requests, is the patient still taking the medication or medication discontinued? yES

## 2020-04-09 ENCOUNTER — TELEPHONE (OUTPATIENT)
Dept: MEDICAL GROUP | Facility: MEDICAL CENTER | Age: 70
End: 2020-04-09

## 2020-04-09 RX ORDER — CELECOXIB 200 MG/1
200 CAPSULE ORAL
Qty: 90 CAP | Refills: 0 | Status: SHIPPED | OUTPATIENT
Start: 2020-04-09 | End: 2020-05-07

## 2020-04-14 ENCOUNTER — TELEPHONE (OUTPATIENT)
Dept: MEDICAL GROUP | Facility: MEDICAL CENTER | Age: 70
End: 2020-04-14

## 2020-04-14 NOTE — TELEPHONE ENCOUNTER
Pt received a bill with a date of service 5/28/19 from the lab services. Lori my manager asked that we submit a coding review letter to the billing department and add a DX: e11.9 type 2 DM.  , please sign the letter if ok--I placed on your desk.

## 2020-04-22 ENCOUNTER — TELEPHONE (OUTPATIENT)
Dept: PHYSICAL MEDICINE AND REHAB | Facility: MEDICAL CENTER | Age: 70
End: 2020-04-22

## 2020-04-22 NOTE — TELEPHONE ENCOUNTER
Patient called to find out what he needs to do to get a virtual call  I explained to him and when I mentioned him  he needs to download Zoom pk he denied and he is asking if we can do the visit a phone call because he is a high risk patient patient has schedule an appointment on 5/8/2020. Please advise.RR

## 2020-05-07 ENCOUNTER — TELEMEDICINE (OUTPATIENT)
Dept: PHYSICAL MEDICINE AND REHAB | Facility: MEDICAL CENTER | Age: 70
End: 2020-05-07
Payer: MEDICARE

## 2020-05-07 VITALS — BODY MASS INDEX: 33.84 KG/M2 | HEIGHT: 66 IN

## 2020-05-07 DIAGNOSIS — Z98.1 S/P LUMBAR FUSION: ICD-10-CM

## 2020-05-07 DIAGNOSIS — G62.9 NEUROPATHY: ICD-10-CM

## 2020-05-07 DIAGNOSIS — M17.10 PRIMARY OSTEOARTHRITIS OF KNEE, UNSPECIFIED LATERALITY: ICD-10-CM

## 2020-05-07 DIAGNOSIS — N18.2 CKD (CHRONIC KIDNEY DISEASE) STAGE 2, GFR 60-89 ML/MIN: ICD-10-CM

## 2020-05-07 DIAGNOSIS — Z79.1 LONG TERM CURRENT USE OF NON-STEROIDAL ANTI-INFLAMMATORIES (NSAID): ICD-10-CM

## 2020-05-07 DIAGNOSIS — M25.531 RIGHT WRIST PAIN: ICD-10-CM

## 2020-05-07 PROCEDURE — 99214 OFFICE O/P EST MOD 30 MIN: CPT | Mod: 95,CR | Performed by: PHYSICAL MEDICINE & REHABILITATION

## 2020-05-07 RX ORDER — CELECOXIB 200 MG/1
1 CAPSULE ORAL DAILY
COMMUNITY
Start: 2020-04-09 | End: 2020-07-08

## 2020-05-07 ASSESSMENT — PATIENT HEALTH QUESTIONNAIRE - PHQ9: CLINICAL INTERPRETATION OF PHQ2 SCORE: 0

## 2020-05-07 ASSESSMENT — PAIN SCALES - GENERAL: PAINLEVEL: 4=SLIGHT-MODERATE PAIN

## 2020-05-07 NOTE — PROGRESS NOTES
Telemedicine Visit: Established Patient     This encounter was conducted via [employer].   Verbal consent was obtained. Patient's identity was verified.    Subjective:   CC: Low back pain  Zhang Cheung is a 70 y.o. male presenting for evaluation and management of low back pain:    Overall, he feels like he has joint pain diffusely, but he does not have radicular symptoms.  He does feel like the celebrex is helpful.  He feels like the duloxetine did not help after 2-3 weeks and so he stopped it.  No side effects.    He has failed gabapentin in the past.  He feels like this did not help.  Restless leg type symptoms resolved.    He has not tried alpha-lipoic acid.    From his report, he has seen multiple physicians regarding his osteoarthritis and feels like he knows all of the current recommendations.  He reports that he eats about 95% vegetarian diet and worries about his pain worsening.    Since the last visit, he has had an injection in the right wrist by Dr. Mccarthy.  They are planning to meet again regarding possible surgical management.    He is self-isolating and is very concerned about COVID-19 pandemic and has been socially isolated.    Review of records:   Dr. Mccarthy 04/24/2020.  Mr. Cheung was seen for carpal tunnel syndrome, trigger finger of right hand and scapholunate advanced collapse.  At that visit, he was given a steroid injection into his right wrist.  He was also given a wrist brace to help control his pain.  Plan to follow-up in 6 weeks for evaluation and reassessment.    ROS Unchanged from 01/14/2020 except as noted in the HPI  Denies any recent fevers or chills. No nausea or vomiting. No chest pains or shortness of breath.     Allergies   Allergen Reactions   • Other Drug      Opioid pain killers sent him to ER   • Pollen Extract      Local pollen, primarily [place]       Current medicines (including changes today)  Current Outpatient Medications   Medication Sig Dispense Refill   • gemfibrozil  "(LOPID) 600 MG Tab TAKE 1 TABLET BY MOUTH TWICE A DAY MUST BE SEEN FOR FURTHER REFILLS 180 Tab 0   • metformin (GLUCOPHAGE) 1000 MG tablet TAKE 1 TABLET BY MOUTH TWICE A  Tab 1   • benazepril (LOTENSIN) 40 MG tablet TAKE 1 TABLET BY MOUTH TWICE A  Tab 1   • NOVOLOG MIX 70/30 FLEXPEN (70-30) 100 UNIT/ML Suspension Pen-injector 20 Units by Subdermal route every day. 100 PEN 1   • indapamide (LOZOL) 2.5 MG Tab TAKE 1 TAB BY MOUTH EVERYDAY MUST BE SEEN FOR FURTHER REFILLS LAST REFILL 90 Tab 0   • amLODIPine (NORVASC) 5 MG Tab TAKE 1 TABLET BY MOUTH TWICE A  Tab 3   • alfuzosin (UROXATRAL) 10 MG SR tablet TAKE 1 TABLET BY MOUTH EVERYDAY AT BEDTIME  3   • Multiple Vitamin (MULTI-VITAMIN DAILY PO) Multi Vitamin     • Multiple Vitamins-Minerals (ICAPS AREDS 2 PO) ICaps AREDS     • Cholecalciferol (VITAMIN D3) 5000 UNITS Cap Take 1 Cap by mouth every day.     • insulin aspart protamine-insulin aspart (NOVOLOG MIX 70/30) (70-30) 100 UNIT/ML injection Inject 8-10 Units as instructed 3 times a day before meals. 100 = 5 units  120= 7 units  184 = 9 units  200 = 12 units  Pt can go as high as 22 units if needed     • Omega-3 Fatty Acids (FISH OIL TRIPLE STRENGTH) 1400 MG Cap Take 1 Cap by mouth every day.     • celecoxib (CELEBREX) 200 MG Cap Take 1 Tab by mouth every day.     • glucose blood (ONE TOUCH ULTRA TEST) strip USE TO TEST HIGH OR LOW BLOOD SUGAR 3 TIMES A  Strip 0   • indapamide (LOZOL) 2.5 MG Tab TAKE 1 TAB BY MOUTH EVERYDAY MUST BE SEEN FOR FURTHER REFILLS LAST REFILL 90 Tab 0   • Insulin Pen Needle (NOVOFINE 30) 30G X 8 MM Misc Novofine Autocover 30 gauge x 1/3\" needle 300 Each 3   • glucose blood (ONE TOUCH ULTRA TEST) strip TEST BLOOD 3 TIMES DAILY 300 Strip 3   • Lancets Using One Touch Ultra Soft Lancets. Testing 3 times daily for insulin adjustment.  DX. Code 250 02 300 Each 3   • glucose blood strip Check sugars tid prn 100 Strip 1   • Insulin Pen Needle (NOVOFINE) 30G X 8 MM Misc " USE FOR INSULIN UP TO 3 TIMES A  Each 3   • tamsulosin (FLOMAX) 0.4 MG capsule Take 0.8 mg by mouth every bedtime.       No current facility-administered medications for this visit.        Patient Active Problem List    Diagnosis Date Noted   • Lumbar stenosis 09/12/2017     Priority: Medium   • Typical atrial flutter (HCC) 04/25/2017     Priority: Medium   • Essential hypertension 10/13/2013     Priority: Medium   • Dyslipidemia 10/13/2013     Priority: Medium   • Benign prostatic hyperplasia 03/27/2014     Priority: Low   • Degeneration of lumbar or lumbosacral intervertebral disc 02/19/2014     Priority: Low   • Type 2 DM 10/13/2013     Priority: Low   • CKD (chronic kidney disease) stage 2, GFR 60-89 ml/min 10/13/2013     Priority: Low   • Chronic pain of both knees 05/13/2019   • Encounter for weight loss counseling 12/06/2018   • Obesity (BMI 35.0-39.9 without comorbidity) 07/11/2018   • Benign prostatic hyperplasia with urinary hesitancy 05/21/2018   • Right wrist pain 05/21/2018   • Neuropathy 05/21/2018   • Bilateral shoulder pain 05/21/2018   • Bilateral foot pain 05/21/2018   • Stenosis, spinal, lumbar 09/18/2017       Family History   Problem Relation Age of Onset   • Sleep Apnea Neg Hx        He  has a past medical history of Arrhythmia, Arthritis, Diabetes, Diabetic neuropathy (HCC), High cholesterol, Hyperlipidemia, Hypertension (2014), Neuropathy, Pain (04/11/14), Restless leg, Sleep apnea, Snoring, and Unspecified urinary incontinence.  He  has a past surgical history that includes lumbar laminectomy diskectomy (5/2/2012); lumbar laminectomy diskectomy (2/19/2014); trans urethral resection prostate (3/27/2014); lumbar laminectomy diskectomy (4/22/2014); recovery (1/13/2016); other orthopedic surgery (2003/2007/2008); other orthopedic surgery; other orthopedic surgery (03/12); other orthopedic surgery (02/14); other; other; other (2014); extreme lateral interbody fusion (Left, 9/18/2017);  lumbar fusion posterior (9/18/2017); and lumbar laminectomy diskectomy (9/18/2017).       Objective:   Vitals were not obtained due to the telehealth nature of the visit    Physical Exam:  Constitutional: Alert, no distress, well-groomed.  Skin: No rashes in visible areas.  Eye: Round. Conjunctiva clear, lids normal. No icterus.   ENMT: Lips pink without lesions, good dentition, moist mucous membranes. Phonation normal.  Neck: No masses, no thyromegaly. Moves freely without pain.  Respiratory: Unlabored respiratory effort, no cough or audible wheeze  Psych: Alert and oriented x3, normal affect and mood.       Assessment and Plan:   The following treatment plan was discussed:     1. S/P lumbar fusion    2. Neuropathy    3. CKD (chronic kidney disease) stage 2, GFR 60-89 ml/min    4. Long term current use of non-steroidal anti-inflammatories (NSAID)    5. Primary osteoarthritis of knee, unspecified laterality    6. Right wrist pain    Other orders  - celecoxib (CELEBREX) 200 MG Cap; Take 1 Tab by mouth every day.    1. Continue celebrex 200mg daily.  He does feel like this helps him to maintain his symptoms in multiple joints, including the lumbar spine, knees and to some extent, his right wrist.  Still, the relief is incomplete.  Discussed that he does not feel that it would be safe for him to get his lab tests done at this time due to COVID-19.  Discussed that he can get these checked when he feels like it would be okay.  Last GFR was >60 on 12/02/2019.  2. Follow-up with Dr. Mccarthy regarding right wrist pain.  3. Mr. Cheung feels well-informed about nonpharmacologic management of arthritis, for now, we will continue celebrex 200mg daily.  Although it might have been helpful to have a longer trial, okay to discontinue duloxetine.  4. Encouraged him to continue social distancing due to COVID-19 pandemic and co-morbidities that increase his risk.    Follow-up: Return in about 3 months (around 8/7/2020), or if symptoms  worsen or fail to improve.

## 2020-05-11 RX ORDER — INDAPAMIDE 2.5 MG/1
TABLET ORAL
Qty: 90 TAB | Refills: 0 | Status: SHIPPED | OUTPATIENT
Start: 2020-05-11 | End: 2020-08-13

## 2020-06-23 RX ORDER — GEMFIBROZIL 600 MG/1
TABLET, FILM COATED ORAL
Qty: 180 TAB | Refills: 0 | Status: SHIPPED | OUTPATIENT
Start: 2020-06-23 | End: 2020-09-22

## 2020-06-30 ENCOUNTER — TELEMEDICINE (OUTPATIENT)
Dept: MEDICAL GROUP | Facility: MEDICAL CENTER | Age: 70
End: 2020-06-30
Payer: MEDICARE

## 2020-06-30 VITALS
HEIGHT: 66 IN | DIASTOLIC BLOOD PRESSURE: 78 MMHG | SYSTOLIC BLOOD PRESSURE: 128 MMHG | WEIGHT: 215 LBS | BODY MASS INDEX: 34.55 KG/M2

## 2020-06-30 DIAGNOSIS — E78.5 DYSLIPIDEMIA: ICD-10-CM

## 2020-06-30 DIAGNOSIS — M19.131 SCAPHOLUNATE ADVANCED COLLAPSE OF RIGHT WRIST: ICD-10-CM

## 2020-06-30 DIAGNOSIS — F43.9 STRESS: ICD-10-CM

## 2020-06-30 DIAGNOSIS — I10 BENIGN ESSENTIAL HTN: ICD-10-CM

## 2020-06-30 DIAGNOSIS — N18.9 CHRONIC KIDNEY DISEASE, UNSPECIFIED CKD STAGE: ICD-10-CM

## 2020-06-30 DIAGNOSIS — M48.061 SPINAL STENOSIS OF LUMBAR REGION, UNSPECIFIED WHETHER NEUROGENIC CLAUDICATION PRESENT: ICD-10-CM

## 2020-06-30 DIAGNOSIS — Z12.11 SCREENING FOR COLON CANCER: ICD-10-CM

## 2020-06-30 DIAGNOSIS — R73.03 PREDIABETES: ICD-10-CM

## 2020-06-30 PROBLEM — I48.3 TYPICAL ATRIAL FLUTTER (HCC): Status: RESOLVED | Noted: 2017-04-25 | Resolved: 2020-06-30

## 2020-06-30 PROCEDURE — 99214 OFFICE O/P EST MOD 30 MIN: CPT | Mod: 95,CR | Performed by: FAMILY MEDICINE

## 2020-06-30 RX ORDER — AMLODIPINE BESYLATE 5 MG/1
TABLET ORAL
Qty: 180 TAB | Refills: 3 | Status: SHIPPED | OUTPATIENT
Start: 2020-06-30 | End: 2021-03-08 | Stop reason: SDUPTHER

## 2020-06-30 NOTE — LETTER
To Whom it may concern  -     Zhang Cheung was seen and evaluated in my clinic today 6/30/2020.    Blood pressure is well controlled.        Patient is not in acute coronary syndrome presentation, is not having any active TIA or stroke symptoms, is not having decompensated heart failure, is not symptomatic in terms of presyncope pain or syncope. No evidence of significant valvular disease.      I explained to patient that further cardiac testing or procedures will not lower his overall cardiovascular risk for the surgery.  Patient remains low to moderate cardiovascular risk for his intended surgery of right wrist.    Best,   Dr. Cherry Long MD

## 2020-06-30 NOTE — ASSESSMENT & PLAN NOTE
Last visit we had a suboptimal conversation re NSAIDs in the setting of CKD   He is working with physiatry  Now

## 2020-06-30 NOTE — ASSESSMENT & PLAN NOTE
"Working with his orthopaedic surgeon   Wearing splint   He tells me about this process in detail   He is very pleased with his service at [facility] orthopaedic  He has plans for \"major wrist surgery\"    He is asking for medical clearance   See letter     Over 25 minutes spent with patient face to face, greater than 50% time spent with plan/coordination of care regarding that which is discussed in the HPI and A&P      "

## 2020-07-07 ENCOUNTER — TELEPHONE (OUTPATIENT)
Dept: PHYSICAL MEDICINE AND REHAB | Facility: MEDICAL CENTER | Age: 70
End: 2020-07-07

## 2020-07-07 NOTE — TELEPHONE ENCOUNTER
I called patient to confirm the refill for Celecoxib 200mg and leave a message to call us back and confirm. RR

## 2020-07-08 RX ORDER — CELECOXIB 200 MG/1
CAPSULE ORAL
Qty: 90 CAP | Refills: 0 | Status: SHIPPED | OUTPATIENT
Start: 2020-07-08 | End: 2020-10-14

## 2020-07-13 ENCOUNTER — HOSPITAL ENCOUNTER (OUTPATIENT)
Facility: MEDICAL CENTER | Age: 70
End: 2020-07-13
Attending: FAMILY MEDICINE
Payer: MEDICARE

## 2020-07-13 PROCEDURE — 82274 ASSAY TEST FOR BLOOD FECAL: CPT

## 2020-07-14 DIAGNOSIS — Z12.11 SCREENING FOR COLON CANCER: ICD-10-CM

## 2020-07-16 LAB — HEMOCCULT STL QL IA: NEGATIVE

## 2020-08-13 RX ORDER — INDAPAMIDE 2.5 MG/1
TABLET ORAL
Qty: 90 TAB | Refills: 0 | Status: SHIPPED | OUTPATIENT
Start: 2020-08-13 | End: 2020-09-22

## 2020-08-18 ENCOUNTER — APPOINTMENT (OUTPATIENT)
Dept: MEDICAL GROUP | Facility: MEDICAL CENTER | Age: 70
End: 2020-08-18
Payer: MEDICARE

## 2020-08-19 ENCOUNTER — TELEMEDICINE (OUTPATIENT)
Dept: MEDICAL GROUP | Facility: MEDICAL CENTER | Age: 70
End: 2020-08-19
Payer: MEDICARE

## 2020-08-19 VITALS
HEIGHT: 66 IN | BODY MASS INDEX: 34.72 KG/M2 | HEART RATE: 88 BPM | SYSTOLIC BLOOD PRESSURE: 123 MMHG | DIASTOLIC BLOOD PRESSURE: 78 MMHG | WEIGHT: 216 LBS

## 2020-08-19 DIAGNOSIS — Z01.818 PREOP EXAMINATION: ICD-10-CM

## 2020-08-19 DIAGNOSIS — Z12.5 SPECIAL SCREENING FOR MALIGNANT NEOPLASM OF PROSTATE: ICD-10-CM

## 2020-08-19 DIAGNOSIS — E11.9 TYPE 2 DIABETES MELLITUS WITHOUT COMPLICATION, WITHOUT LONG-TERM CURRENT USE OF INSULIN (HCC): ICD-10-CM

## 2020-08-19 DIAGNOSIS — Z12.11 SCREENING FOR COLON CANCER: ICD-10-CM

## 2020-08-19 PROCEDURE — 99214 OFFICE O/P EST MOD 30 MIN: CPT | Mod: 95,CR | Performed by: FAMILY MEDICINE

## 2020-08-19 NOTE — LETTER
"To Whom it May Concern -   I evaluated Zhang Cheung today 8/19/2020.       His blood pressure is well controlled.   I have instructed him to continue current medications.      Patient is not in acute coronary syndrome presentation, is not having any active TIA or stroke symptoms, as not having decompensated heart failure, is not symptomatic in terms of presyncope pain or syncope.  No evidence of significant valvular disease.     I do not feel that further cardiac testing or procedures is necessary, nor will this lower his overall cardiovascular risk for the surgery.    Patient remains low to moderate cardiovascular risk for his intended surgery of right wrist.    \"cleared\" for surgery    Best.   Dr. Cherry Long MD     "

## 2020-08-19 NOTE — PROGRESS NOTES
"Telemedicine Visit: Established Patient     This encounter was conducted via [employer].   Verbal consent was obtained. Patient's identity was verified.    Subjective:   CC: wrist surgery     Zhang Cheung is a 70 y.o. male presenting for evaluation and management of:    Needs to have wrist surgery   Needs clearance       ROS   Denies any recent fevers or chills. No nausea or vomiting. No chest pains or shortness of breath.     Allergies   Allergen Reactions   • Other Drug      Opioid pain killers sent him to ER   • Pollen Extract      Local pollen, primarily [place]       Current medicines (including changes today)  Current Outpatient Medications   Medication Sig Dispense Refill   • indapamide (LOZOL) 2.5 MG Tab TAKE 1 TAB BY MOUTH EVERYDAY 90 Tab 0   • celecoxib (CELEBREX) 200 MG Cap TAKE 1 CAPSULE BY MOUTH EVERY DAY FOR 90 DAYS. 90 Cap 0   • amLODIPine (NORVASC) 5 MG Tab TAKE 1 TABLET BY MOUTH TWICE A  Tab 3   • gemfibrozil (LOPID) 600 MG Tab TAKE 1 TABLET BY MOUTH TWICE A DAY MUST BE SEEN FOR FURTHER REFILLS 180 Tab 0   • metformin (GLUCOPHAGE) 1000 MG tablet TAKE 1 TABLET BY MOUTH TWICE A  Tab 1   • benazepril (LOTENSIN) 40 MG tablet TAKE 1 TABLET BY MOUTH TWICE A  Tab 1   • glucose blood (ONE TOUCH ULTRA TEST) strip USE TO TEST HIGH OR LOW BLOOD SUGAR 3 TIMES A  Strip 0   • Insulin Pen Needle (NOVOFINE 30) 30G X 8 MM Misc Novofine Autocover 30 gauge x 1/3\" needle 300 Each 3   • Lancets Using One Touch Ultra Soft Lancets. Testing 3 times daily for insulin adjustment.  DX. Code 250 02 300 Each 3   • glucose blood strip Check sugars tid prn 100 Strip 1   • alfuzosin (UROXATRAL) 10 MG SR tablet TAKE 1 TABLET BY MOUTH EVERYDAY AT BEDTIME  3   • Multiple Vitamin (MULTI-VITAMIN DAILY PO) Multi Vitamin     • Cholecalciferol (VITAMIN D3) 5000 UNITS Cap Take 1 Cap by mouth every day.     • insulin aspart protamine-insulin aspart (NOVOLOG MIX 70/30) (70-30) 100 UNIT/ML injection Inject 8-10 " Units as instructed 3 times a day before meals. 100 = 5 units  120= 7 units  184 = 9 units  200 = 12 units  Pt can go as high as 22 units if needed     • Omega-3 Fatty Acids (FISH OIL TRIPLE STRENGTH) 1400 MG Cap Take 1 Cap by mouth every day.       No current facility-administered medications for this visit.        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   • Stress 06/30/2020   • Chronic pain of both knees 05/13/2019   • Encounter for weight loss counseling 12/06/2018   • Obesity (BMI 35.0-39.9 without comorbidity) 07/11/2018   • Benign prostatic hyperplasia with urinary hesitancy 05/21/2018   • Right wrist pain 05/21/2018   • Neuropathy 05/21/2018   • Bilateral shoulder pain 05/21/2018   • Bilateral foot pain 05/21/2018   • Stenosis, spinal, lumbar 09/18/2017       Family History   Problem Relation Age of Onset   • Sleep Apnea Neg Hx        He  has a past medical history of Arrhythmia, Arthritis, Diabetes, Diabetic neuropathy (HCC), High cholesterol, Hyperlipidemia, Hypertension (2014), Neuropathy, Pain (04/11/14), Restless leg, Scapholunate advanced collapse of right wrist (2020), Sleep apnea, Snoring, and Unspecified urinary incontinence.  He  has a past surgical history that includes lumbar laminectomy diskectomy (5/2/2012); lumbar laminectomy diskectomy (2/19/2014); trans urethral resection prostate (3/27/2014); lumbar laminectomy diskectomy (4/22/2014); recovery  "(1/13/2016); other orthopedic surgery (2003/2007/2008); other orthopedic surgery; other orthopedic surgery (03/12); other orthopedic surgery (02/14); other; other; other (2014); extreme lateral interbody fusion (Left, 9/18/2017); lumbar fusion posterior (9/18/2017); and lumbar laminectomy diskectomy (9/18/2017).       Objective:   /78 (BP Location: Left arm, Patient Position: Sitting, BP Cuff Size: Adult) Comment: pt stated  Pulse 88 Comment: pt stated  Ht 1.676 m (5' 6\") Comment: pt stated  Wt 98 kg (216 lb) Comment: pt stated  BMI 34.86 kg/m²     Physical Exam:  Constitutional: Alert, no distress, well-groomed.  Skin: No rashes in visible areas.  Eye: Round. Conjunctiva clear, lids normal. No icterus.   ENMT: Lips pink without lesions, good dentition, moist mucous membranes. Phonation normal.  Neck: No masses, no thyromegaly. Moves freely without pain.  CV: Pulse as reported by patient  Respiratory: Unlabored respiratory effort, no cough or audible wheeze  Psych: Alert and oriented x3, normal affect and mood.       Assessment and Plan:   The following treatment plan was discussed:     1. Preop examination  - Lipid Profile; Future  - Basic Metabolic Panel; Future  - HEMOGLOBIN A1C; Future  - MICROALBUMIN CREAT RATIO URINE; Future    2. Type 2 diabetes mellitus without complication, without long-term current use of insulin (HCC)  - Lipid Profile; Future  - Basic Metabolic Panel; Future  - HEMOGLOBIN A1C; Future  - MICROALBUMIN CREAT RATIO URINE; Future    3. Special screening for malignant neoplasm of prostate  - PROSTATE SPECIFIC AG SCREENING; Future    4. Screening for colon cancer  - OCCULT BLOOD FECES IMMUNOASSAY; Future    Problem List Items Addressed This Visit     Preop examination     Desires pre-op optimization for R wrist surgery at [facility]     Extra time spent on letter   Over 25 minutes spent with patient face to face, greater than 50% time spent with plan/coordination of care regarding " that which is discussed in the HPI and A&P      Blood pressure is well controlled.  Continue current medications at current dose as above. Refills were prescribed today and patient was instructed with plan of care.     Patient is not in acute coronary syndrome presentation, is not having any active TIA or stroke symptoms, as not having decompensated heart failure, is not symptomatic in terms of presyncope pain or syncope.  No evidence of significant valvular disease.     I explained to patient that further cardiac testing or procedures will not lower his overall cardiovascular risk for the surgery.  Patient remains low-moderate cardiovascular risk for his intended surgery of right wrist.                 Relevant Orders    Lipid Profile    Basic Metabolic Panel    HEMOGLOBIN A1C    MICROALBUMIN CREAT RATIO URINE    Type 2 DM    Relevant Orders    Lipid Profile    Basic Metabolic Panel    HEMOGLOBIN A1C    MICROALBUMIN CREAT RATIO URINE      Other Visit Diagnoses     Special screening for malignant neoplasm of prostate        Relevant Orders    PROSTATE SPECIFIC AG SCREENING    Screening for colon cancer        Relevant Orders    OCCULT BLOOD FECES IMMUNOASSAY        Cherry Long M.D.      Follow-up: No follow-ups on file.

## 2020-08-19 NOTE — ASSESSMENT & PLAN NOTE
Desires pre-op optimization for R wrist surgery at [facility]     Extra time spent on letter   Over 25 minutes spent with patient face to face, greater than 50% time spent with plan/coordination of care regarding that which is discussed in the HPI and A&P      Blood pressure is well controlled.  Continue current medications at current dose as above. Refills were prescribed today and patient was instructed with plan of care.     Patient is not in acute coronary syndrome presentation, is not having any active TIA or stroke symptoms, as not having decompensated heart failure, is not symptomatic in terms of presyncope pain or syncope.  No evidence of significant valvular disease.     I explained to patient that further cardiac testing or procedures will not lower his overall cardiovascular risk for the surgery.  Patient remains low-moderate cardiovascular risk for his intended surgery of right wrist.

## 2020-08-21 ENCOUNTER — HOSPITAL ENCOUNTER (OUTPATIENT)
Facility: MEDICAL CENTER | Age: 70
End: 2020-08-21
Attending: FAMILY MEDICINE
Payer: MEDICARE

## 2020-08-21 ENCOUNTER — HOSPITAL ENCOUNTER (OUTPATIENT)
Dept: LAB | Facility: MEDICAL CENTER | Age: 70
End: 2020-08-21
Attending: FAMILY MEDICINE
Payer: MEDICARE

## 2020-08-21 DIAGNOSIS — R73.03 PREDIABETES: ICD-10-CM

## 2020-08-21 DIAGNOSIS — I10 BENIGN ESSENTIAL HTN: ICD-10-CM

## 2020-08-21 DIAGNOSIS — N18.9 CHRONIC KIDNEY DISEASE, UNSPECIFIED CKD STAGE: ICD-10-CM

## 2020-08-21 DIAGNOSIS — Z12.5 SPECIAL SCREENING FOR MALIGNANT NEOPLASM OF PROSTATE: ICD-10-CM

## 2020-08-21 DIAGNOSIS — M19.131 SCAPHOLUNATE ADVANCED COLLAPSE OF RIGHT WRIST: ICD-10-CM

## 2020-08-21 DIAGNOSIS — E78.5 DYSLIPIDEMIA: ICD-10-CM

## 2020-08-21 LAB
ANION GAP SERPL CALC-SCNC: 12 MMOL/L (ref 7–16)
BUN SERPL-MCNC: 39 MG/DL (ref 8–22)
CALCIUM SERPL-MCNC: 10.1 MG/DL (ref 8.5–10.5)
CHLORIDE SERPL-SCNC: 106 MMOL/L (ref 96–112)
CHOLEST SERPL-MCNC: 139 MG/DL (ref 100–199)
CO2 SERPL-SCNC: 23 MMOL/L (ref 20–33)
CREAT SERPL-MCNC: 0.99 MG/DL (ref 0.5–1.4)
CREAT UR-MCNC: 66.56 MG/DL
EST. AVERAGE GLUCOSE BLD GHB EST-MCNC: 134 MG/DL
FASTING STATUS PATIENT QL REPORTED: NORMAL
GLUCOSE SERPL-MCNC: 120 MG/DL (ref 65–99)
HBA1C MFR BLD: 6.3 % (ref 0–5.6)
HDLC SERPL-MCNC: 35 MG/DL
LDLC SERPL CALC-MCNC: 89 MG/DL
MICROALBUMIN UR-MCNC: 4.3 MG/DL
MICROALBUMIN/CREAT UR: 65 MG/G (ref 0–30)
POTASSIUM SERPL-SCNC: 4.3 MMOL/L (ref 3.6–5.5)
SODIUM SERPL-SCNC: 141 MMOL/L (ref 135–145)
TRIGL SERPL-MCNC: 75 MG/DL (ref 0–149)

## 2020-08-21 PROCEDURE — 82570 ASSAY OF URINE CREATININE: CPT

## 2020-08-21 PROCEDURE — 82043 UR ALBUMIN QUANTITATIVE: CPT

## 2020-08-21 PROCEDURE — 36415 COLL VENOUS BLD VENIPUNCTURE: CPT

## 2020-08-21 PROCEDURE — 83036 HEMOGLOBIN GLYCOSYLATED A1C: CPT | Mod: GA

## 2020-08-21 PROCEDURE — 80061 LIPID PANEL: CPT

## 2020-08-21 PROCEDURE — 80048 BASIC METABOLIC PNL TOTAL CA: CPT

## 2020-09-14 ENCOUNTER — PRE-ADMISSION TESTING (OUTPATIENT)
Dept: ADMISSIONS | Facility: MEDICAL CENTER | Age: 70
End: 2020-09-14
Attending: ORTHOPAEDIC SURGERY
Payer: MEDICARE

## 2020-09-14 DIAGNOSIS — Z01.810 PRE-OPERATIVE CARDIOVASCULAR EXAMINATION: ICD-10-CM

## 2020-09-14 DIAGNOSIS — Z01.812 PRE-OPERATIVE LABORATORY EXAMINATION: ICD-10-CM

## 2020-09-14 LAB
COVID ORDER STATUS COVID19: NORMAL
EKG IMPRESSION: NORMAL
ERYTHROCYTE [DISTWIDTH] IN BLOOD BY AUTOMATED COUNT: 42.8 FL (ref 35.9–50)
HCT VFR BLD AUTO: 43.2 % (ref 42–52)
HGB BLD-MCNC: 14.8 G/DL (ref 14–18)
MCH RBC QN AUTO: 31.2 PG (ref 27–33)
MCHC RBC AUTO-ENTMCNC: 34.3 G/DL (ref 33.7–35.3)
MCV RBC AUTO: 91.1 FL (ref 81.4–97.8)
PLATELET # BLD AUTO: 197 K/UL (ref 164–446)
PMV BLD AUTO: 11.1 FL (ref 9–12.9)
RBC # BLD AUTO: 4.74 M/UL (ref 4.7–6.1)
SARS-COV-2 RNA RESP QL NAA+PROBE: NOTDETECTED
SPECIMEN SOURCE: NORMAL
WBC # BLD AUTO: 4.8 K/UL (ref 4.8–10.8)

## 2020-09-14 PROCEDURE — C9803 HOPD COVID-19 SPEC COLLECT: HCPCS

## 2020-09-14 PROCEDURE — U0003 INFECTIOUS AGENT DETECTION BY NUCLEIC ACID (DNA OR RNA); SEVERE ACUTE RESPIRATORY SYNDROME CORONAVIRUS 2 (SARS-COV-2) (CORONAVIRUS DISEASE [COVID-19]), AMPLIFIED PROBE TECHNIQUE, MAKING USE OF HIGH THROUGHPUT TECHNOLOGIES AS DESCRIBED BY CMS-2020-01-R: HCPCS

## 2020-09-14 PROCEDURE — 85027 COMPLETE CBC AUTOMATED: CPT

## 2020-09-14 PROCEDURE — 93010 ELECTROCARDIOGRAM REPORT: CPT | Performed by: INTERNAL MEDICINE

## 2020-09-14 PROCEDURE — 36415 COLL VENOUS BLD VENIPUNCTURE: CPT

## 2020-09-14 PROCEDURE — 93005 ELECTROCARDIOGRAM TRACING: CPT

## 2020-09-14 NOTE — OR NURSING
Pre admit apt: Pt. Instructed to continue regularly prescribed medications through day before surgery.  Instructed to take the following medications, the day of surgery, with a sip of water per anesthesia protocol:amlodipine    covid test 9/14

## 2020-09-17 ENCOUNTER — HOSPITAL ENCOUNTER (OUTPATIENT)
Facility: MEDICAL CENTER | Age: 70
End: 2020-09-17
Attending: ORTHOPAEDIC SURGERY | Admitting: ORTHOPAEDIC SURGERY
Payer: MEDICARE

## 2020-09-17 ENCOUNTER — ANESTHESIA EVENT (OUTPATIENT)
Dept: SURGERY | Facility: MEDICAL CENTER | Age: 70
End: 2020-09-17
Payer: MEDICARE

## 2020-09-17 ENCOUNTER — APPOINTMENT (OUTPATIENT)
Dept: RADIOLOGY | Facility: MEDICAL CENTER | Age: 70
End: 2020-09-17
Attending: ORTHOPAEDIC SURGERY
Payer: MEDICARE

## 2020-09-17 ENCOUNTER — ANESTHESIA (OUTPATIENT)
Dept: SURGERY | Facility: MEDICAL CENTER | Age: 70
End: 2020-09-17
Payer: MEDICARE

## 2020-09-17 VITALS
HEART RATE: 87 BPM | TEMPERATURE: 97.3 F | HEIGHT: 66 IN | DIASTOLIC BLOOD PRESSURE: 63 MMHG | WEIGHT: 216.05 LBS | SYSTOLIC BLOOD PRESSURE: 122 MMHG | BODY MASS INDEX: 34.72 KG/M2 | OXYGEN SATURATION: 91 % | RESPIRATION RATE: 16 BRPM

## 2020-09-17 PROBLEM — G47.30 SLEEP APNEA: Status: ACTIVE | Noted: 2020-09-17

## 2020-09-17 LAB — GLUCOSE BLD-MCNC: 94 MG/DL (ref 65–99)

## 2020-09-17 PROCEDURE — 160002 HCHG RECOVERY MINUTES (STAT): Performed by: ORTHOPAEDIC SURGERY

## 2020-09-17 PROCEDURE — 700111 HCHG RX REV CODE 636 W/ 250 OVERRIDE (IP): Performed by: ANESTHESIOLOGY

## 2020-09-17 PROCEDURE — 64415 NJX AA&/STRD BRCH PLXS IMG: CPT | Performed by: ORTHOPAEDIC SURGERY

## 2020-09-17 PROCEDURE — 82962 GLUCOSE BLOOD TEST: CPT

## 2020-09-17 PROCEDURE — 502000 HCHG MISC OR IMPLANTS RC 0278: Performed by: ORTHOPAEDIC SURGERY

## 2020-09-17 PROCEDURE — 160048 HCHG OR STATISTICAL LEVEL 1-5: Performed by: ORTHOPAEDIC SURGERY

## 2020-09-17 PROCEDURE — 700101 HCHG RX REV CODE 250: Performed by: ANESTHESIOLOGY

## 2020-09-17 PROCEDURE — 160036 HCHG PACU - EA ADDL 30 MINS PHASE I: Performed by: ORTHOPAEDIC SURGERY

## 2020-09-17 PROCEDURE — A4565 SLINGS: HCPCS | Performed by: ORTHOPAEDIC SURGERY

## 2020-09-17 PROCEDURE — 160025 RECOVERY II MINUTES (STATS): Performed by: ORTHOPAEDIC SURGERY

## 2020-09-17 PROCEDURE — 501838 HCHG SUTURE GENERAL: Performed by: ORTHOPAEDIC SURGERY

## 2020-09-17 PROCEDURE — 700105 HCHG RX REV CODE 258: Performed by: ORTHOPAEDIC SURGERY

## 2020-09-17 PROCEDURE — 160009 HCHG ANES TIME/MIN: Performed by: ORTHOPAEDIC SURGERY

## 2020-09-17 PROCEDURE — C1713 ANCHOR/SCREW BN/BN,TIS/BN: HCPCS | Performed by: ORTHOPAEDIC SURGERY

## 2020-09-17 PROCEDURE — 160029 HCHG SURGERY MINUTES - 1ST 30 MINS LEVEL 4: Performed by: ORTHOPAEDIC SURGERY

## 2020-09-17 PROCEDURE — 502576 HCHG PACK, HAND: Performed by: ORTHOPAEDIC SURGERY

## 2020-09-17 PROCEDURE — 160041 HCHG SURGERY MINUTES - EA ADDL 1 MIN LEVEL 4: Performed by: ORTHOPAEDIC SURGERY

## 2020-09-17 PROCEDURE — 160035 HCHG PACU - 1ST 60 MINS PHASE I: Performed by: ORTHOPAEDIC SURGERY

## 2020-09-17 PROCEDURE — 160046 HCHG PACU - 1ST 60 MINS PHASE II: Performed by: ORTHOPAEDIC SURGERY

## 2020-09-17 PROCEDURE — 700101 HCHG RX REV CODE 250: Performed by: ORTHOPAEDIC SURGERY

## 2020-09-17 PROCEDURE — 502240 HCHG MISC OR SUPPLY RC 0272: Performed by: ORTHOPAEDIC SURGERY

## 2020-09-17 DEVICE — IMPLANTABLE DEVICE: Type: IMPLANTABLE DEVICE | Site: WRIST | Status: FUNCTIONAL

## 2020-09-17 RX ORDER — SODIUM CHLORIDE, SODIUM LACTATE, POTASSIUM CHLORIDE, CALCIUM CHLORIDE 600; 310; 30; 20 MG/100ML; MG/100ML; MG/100ML; MG/100ML
INJECTION, SOLUTION INTRAVENOUS CONTINUOUS
Status: DISCONTINUED | OUTPATIENT
Start: 2020-09-17 | End: 2020-09-17 | Stop reason: HOSPADM

## 2020-09-17 RX ORDER — HYDROMORPHONE HYDROCHLORIDE 1 MG/ML
0.4 INJECTION, SOLUTION INTRAMUSCULAR; INTRAVENOUS; SUBCUTANEOUS
Status: DISCONTINUED | OUTPATIENT
Start: 2020-09-17 | End: 2020-09-17 | Stop reason: HOSPADM

## 2020-09-17 RX ORDER — SODIUM CHLORIDE, SODIUM LACTATE, POTASSIUM CHLORIDE, CALCIUM CHLORIDE 600; 310; 30; 20 MG/100ML; MG/100ML; MG/100ML; MG/100ML
INJECTION, SOLUTION INTRAVENOUS CONTINUOUS
Status: DISCONTINUED | OUTPATIENT
Start: 2020-09-17 | End: 2020-09-17

## 2020-09-17 RX ORDER — DEXAMETHASONE SODIUM PHOSPHATE 4 MG/ML
INJECTION, SOLUTION INTRA-ARTICULAR; INTRALESIONAL; INTRAMUSCULAR; INTRAVENOUS; SOFT TISSUE
Status: COMPLETED | OUTPATIENT
Start: 2020-09-17 | End: 2020-09-17

## 2020-09-17 RX ORDER — CEFAZOLIN SODIUM 1 G/3ML
INJECTION, POWDER, FOR SOLUTION INTRAMUSCULAR; INTRAVENOUS PRN
Status: DISCONTINUED | OUTPATIENT
Start: 2020-09-17 | End: 2020-09-17 | Stop reason: SURG

## 2020-09-17 RX ORDER — OXYCODONE HCL 5 MG/5 ML
5 SOLUTION, ORAL ORAL
Status: DISCONTINUED | OUTPATIENT
Start: 2020-09-17 | End: 2020-09-17 | Stop reason: HOSPADM

## 2020-09-17 RX ORDER — MIDAZOLAM HYDROCHLORIDE 1 MG/ML
INJECTION INTRAMUSCULAR; INTRAVENOUS PRN
Status: DISCONTINUED | OUTPATIENT
Start: 2020-09-17 | End: 2020-09-17 | Stop reason: SURG

## 2020-09-17 RX ORDER — DEXAMETHASONE SODIUM PHOSPHATE 4 MG/ML
INJECTION, SOLUTION INTRA-ARTICULAR; INTRALESIONAL; INTRAMUSCULAR; INTRAVENOUS; SOFT TISSUE PRN
Status: DISCONTINUED | OUTPATIENT
Start: 2020-09-17 | End: 2020-09-17 | Stop reason: SURG

## 2020-09-17 RX ORDER — ONDANSETRON 2 MG/ML
INJECTION INTRAMUSCULAR; INTRAVENOUS PRN
Status: DISCONTINUED | OUTPATIENT
Start: 2020-09-17 | End: 2020-09-17 | Stop reason: SURG

## 2020-09-17 RX ORDER — ONDANSETRON 2 MG/ML
4 INJECTION INTRAMUSCULAR; INTRAVENOUS
Status: DISCONTINUED | OUTPATIENT
Start: 2020-09-17 | End: 2020-09-17 | Stop reason: HOSPADM

## 2020-09-17 RX ORDER — ROPIVACAINE HYDROCHLORIDE 5 MG/ML
INJECTION, SOLUTION EPIDURAL; INFILTRATION; PERINEURAL
Status: COMPLETED | OUTPATIENT
Start: 2020-09-17 | End: 2020-09-17

## 2020-09-17 RX ORDER — MEPERIDINE HYDROCHLORIDE 25 MG/ML
6.25 INJECTION INTRAMUSCULAR; INTRAVENOUS; SUBCUTANEOUS
Status: DISCONTINUED | OUTPATIENT
Start: 2020-09-17 | End: 2020-09-17 | Stop reason: HOSPADM

## 2020-09-17 RX ORDER — HYDROMORPHONE HYDROCHLORIDE 1 MG/ML
0.2 INJECTION, SOLUTION INTRAMUSCULAR; INTRAVENOUS; SUBCUTANEOUS
Status: DISCONTINUED | OUTPATIENT
Start: 2020-09-17 | End: 2020-09-17 | Stop reason: HOSPADM

## 2020-09-17 RX ORDER — LIDOCAINE HYDROCHLORIDE 20 MG/ML
INJECTION, SOLUTION EPIDURAL; INFILTRATION; INTRACAUDAL; PERINEURAL PRN
Status: DISCONTINUED | OUTPATIENT
Start: 2020-09-17 | End: 2020-09-17 | Stop reason: SURG

## 2020-09-17 RX ORDER — HYDROMORPHONE HYDROCHLORIDE 1 MG/ML
0.1 INJECTION, SOLUTION INTRAMUSCULAR; INTRAVENOUS; SUBCUTANEOUS
Status: DISCONTINUED | OUTPATIENT
Start: 2020-09-17 | End: 2020-09-17 | Stop reason: HOSPADM

## 2020-09-17 RX ORDER — DIPHENHYDRAMINE HYDROCHLORIDE 50 MG/ML
12.5 INJECTION INTRAMUSCULAR; INTRAVENOUS
Status: DISCONTINUED | OUTPATIENT
Start: 2020-09-17 | End: 2020-09-17 | Stop reason: HOSPADM

## 2020-09-17 RX ORDER — OXYCODONE HCL 5 MG/5 ML
10 SOLUTION, ORAL ORAL
Status: DISCONTINUED | OUTPATIENT
Start: 2020-09-17 | End: 2020-09-17 | Stop reason: HOSPADM

## 2020-09-17 RX ORDER — HALOPERIDOL 5 MG/ML
1 INJECTION INTRAMUSCULAR
Status: DISCONTINUED | OUTPATIENT
Start: 2020-09-17 | End: 2020-09-17 | Stop reason: HOSPADM

## 2020-09-17 RX ORDER — LABETALOL HYDROCHLORIDE 5 MG/ML
5 INJECTION, SOLUTION INTRAVENOUS
Status: DISCONTINUED | OUTPATIENT
Start: 2020-09-17 | End: 2020-09-17 | Stop reason: HOSPADM

## 2020-09-17 RX ADMIN — FENTANYL CITRATE 50 MCG: 50 INJECTION, SOLUTION INTRAMUSCULAR; INTRAVENOUS at 07:46

## 2020-09-17 RX ADMIN — FENTANYL CITRATE 50 MCG: 50 INJECTION, SOLUTION INTRAMUSCULAR; INTRAVENOUS at 08:14

## 2020-09-17 RX ADMIN — FENTANYL CITRATE 25 MCG: 50 INJECTION, SOLUTION INTRAMUSCULAR; INTRAVENOUS at 07:08

## 2020-09-17 RX ADMIN — ONDANSETRON 4 MG: 2 INJECTION INTRAMUSCULAR; INTRAVENOUS at 09:16

## 2020-09-17 RX ADMIN — LIDOCAINE HYDROCHLORIDE 30 MG: 20 INJECTION, SOLUTION EPIDURAL; INFILTRATION; INTRACAUDAL; PERINEURAL at 07:08

## 2020-09-17 RX ADMIN — WATER 15 ML: 100 IRRIGANT IRRIGATION at 05:58

## 2020-09-17 RX ADMIN — SODIUM CHLORIDE, POTASSIUM CHLORIDE, SODIUM LACTATE AND CALCIUM CHLORIDE: 600; 310; 30; 20 INJECTION, SOLUTION INTRAVENOUS at 09:18

## 2020-09-17 RX ADMIN — DEXAMETHASONE SODIUM PHOSPHATE 2 MG: 4 INJECTION, SOLUTION INTRAMUSCULAR; INTRAVENOUS at 06:52

## 2020-09-17 RX ADMIN — PROPOFOL 130 MG: 10 INJECTION, EMULSION INTRAVENOUS at 07:08

## 2020-09-17 RX ADMIN — CEFAZOLIN 2 G: 1 INJECTION, POWDER, FOR SOLUTION INTRAVENOUS at 07:09

## 2020-09-17 RX ADMIN — MIDAZOLAM HYDROCHLORIDE 2 MG: 1 INJECTION, SOLUTION INTRAMUSCULAR; INTRAVENOUS at 07:04

## 2020-09-17 RX ADMIN — ROPIVACAINE HYDROCHLORIDE 15 ML: 5 INJECTION, SOLUTION EPIDURAL; INFILTRATION; PERINEURAL at 06:52

## 2020-09-17 RX ADMIN — FENTANYL CITRATE 50 MCG: 50 INJECTION, SOLUTION INTRAMUSCULAR; INTRAVENOUS at 08:53

## 2020-09-17 RX ADMIN — FENTANYL CITRATE 50 MCG: 50 INJECTION, SOLUTION INTRAMUSCULAR; INTRAVENOUS at 09:29

## 2020-09-17 RX ADMIN — SODIUM CHLORIDE, POTASSIUM CHLORIDE, SODIUM LACTATE AND CALCIUM CHLORIDE: 600; 310; 30; 20 INJECTION, SOLUTION INTRAVENOUS at 05:58

## 2020-09-17 RX ADMIN — FENTANYL CITRATE 25 MCG: 50 INJECTION, SOLUTION INTRAMUSCULAR; INTRAVENOUS at 07:15

## 2020-09-17 RX ADMIN — DEXAMETHASONE SODIUM PHOSPHATE 4 MG: 4 INJECTION, SOLUTION INTRAMUSCULAR; INTRAVENOUS at 07:12

## 2020-09-17 ASSESSMENT — PAIN SCALES - GENERAL: PAIN_LEVEL: 0

## 2020-09-17 ASSESSMENT — PAIN DESCRIPTION - PAIN TYPE
TYPE: SURGICAL PAIN
TYPE: SURGICAL PAIN

## 2020-09-17 NOTE — OR NURSING
0934: To PACU post right wrist joint fusion w/ block. OPA OPA dc'd on arrival, breathing is spontaneous and unlabored. Fingers are pink and warm w/ brisk cap refill.  1005: Pt states having some discomfort, but no pain.  1023: Pt given I.S. w/ goal of 2600. Able to pull 1750.  1032: Trialed on room air = 83%.  1052: Meets criteria for stage ll.   yes

## 2020-09-17 NOTE — ANESTHESIA PROCEDURE NOTES
Peripheral Block    Date/Time: 9/17/2020 6:52 AM  Performed by: Jadiel Roca M.D.  Authorized by: Jadiel Roca M.D.     Start Time:  9/17/2020 6:52 AM  End Time:  9/17/2020 6:57 AM  Reason for Block: at surgeon's request and post-op pain management    patient identified, IV checked, site marked, risks and benefits discussed, surgical consent, monitors and equipment checked, pre-op evaluation and timeout performed    Patient Position:  Supine  Prep: ChloraPrep    Monitoring:  Heart rate, continuous pulse ox and cardiac monitor  Block Region:  Upper Extremity  Upper Extremity - Block Type:  BRACHIAL PLEXUS block, Supraclavicular approach    Laterality:  Right  Procedures: ultrasound guided  Image captured, interpreted and electronically stored.  Local Infiltration:  Lidocaine  Strength:  2 %  Dose:  3 ml  Block Type:  Single-shot  Needle Length:  100mm  Needle Gauge:  21 G  Needle Localization:  Ultrasound guidance  Injection Assessment:  Negative aspiration for heme, no paresthesia on injection, incremental injection and local visualized surrounding nerve on ultrasound  Evidence of intravascular injection: No     US Guided Supraclavicular Brachial Plexus Block    US transducer placed cephalad and parallel to clavicle in angle to view the subclavian artery with the brachial plexus lateral and superficial to the artery. Needle inserted lateral to the transducer in-plane and advanced with the needle tip visualized continually into the perineural position. After negative aspiration, LA injected without resistance.    Lot [id number] Exp 12/23

## 2020-09-17 NOTE — ANESTHESIA PREPROCEDURE EVALUATION
Relevant Problems   ANESTHESIA   (+) Sleep apnea      CARDIAC   (+) Essential hypertension         (+) CKD (chronic kidney disease) stage 2, GFR 60-89 ml/min      ENDO   (+) Type 2 DM       Physical Exam    Airway   Mallampati: II  TM distance: >3 FB  Neck ROM: full       Cardiovascular - normal exam  Rhythm: regular  Rate: normal  (-) murmur     Dental - normal exam             Pulmonary - normal exam  Breath sounds clear to auscultation     Abdominal    Neurological - normal exam                 Anesthesia Plan    ASA 3 (Sleep Apnea)   ASA physical status 3 criteria: diabetes - poorly controlled and hypertension - poorly controlled    Plan - general and peripheral nerve block     Peripheral nerve block will be post-op pain control  Airway plan will be LMA        Induction: intravenous    Postoperative Plan: Postoperative administration of opioids is intended.    Pertinent diagnostic labs and testing reviewed    Informed Consent:    Anesthetic plan and risks discussed with patient.    Use of blood products discussed with: patient whom consented to blood products.

## 2020-09-17 NOTE — OR NURSING
1100 Pt arrived from PACU post   FUSION, JOINT, WRIST - SCAPHOID EXCISION AND FOUR CORNER INTERCARPAL ARTHRODESIS, POSTERIOR INTEROSSEUS NERVE EXCISION Right    , report received. Pt states pain is tollerable and denies nausea at this time. Pt dressing @ BS with assistance.    1144 Discharge instructions and medications gone over with Pt and ride. All questions answered. Pt meets DC criteria. PIV DC'd. Pt transported to POV via steady gait in stable condition.

## 2020-09-17 NOTE — ANESTHESIA POSTPROCEDURE EVALUATION
Patient: Zhang Cheung    Procedure Summary     Date: 09/17/20 Room / Location: [place] [id number] / SURGERY [facility]    Anesthesia Start: 0704 Anesthesia Stop: 0938    Procedure: FUSION, JOINT, WRIST - SCAPHOID EXCISION AND FOUR CORNER INTERCARPAL ARTHRODESIS, POSTERIOR INTEROSSEUS NERVE EXCISION (Right Wrist) Diagnosis: (SCAPHOLUNATE ADVANCED COLLAPSE right)    Surgeon: Nhan Mccarthy M.D. Responsible Provider: Jadiel Roca M.D.    Anesthesia Type: general ASA Status: 3          Final Anesthesia Type: general  Last vitals  BP   Blood Pressure : 122/63    Temp   36.3 °C (97.3 °F)    Pulse   Pulse: 87   Resp   16    SpO2   91 %      Anesthesia Post Evaluation    Patient location during evaluation: PACU  Patient participation: complete - patient participated  Level of consciousness: awake and alert  Pain score: 0    Airway patency: patent  Anesthetic complications: no  Cardiovascular status: hemodynamically stable  Respiratory status: acceptable  Hydration status: euvolemic    PONV: none           Nurse Pain Score: 0 (NPRS)

## 2020-09-17 NOTE — DISCHARGE INSTRUCTIONS
ACTIVITY: Rest and take it easy for the first 24 hours.  A responsible adult is recommended to remain with you during that time.  It is normal to feel sleepy.  We encourage you to not do anything that requires balance, judgment or coordination.    MILD FLU-LIKE SYMPTOMS ARE NORMAL. YOU MAY EXPERIENCE GENERALIZED MUSCLE ACHES, THROAT IRRITATION, HEADACHE AND/OR SOME NAUSEA.    FOR 24 HOURS DO NOT:  Drive, operate machinery or run household appliances.  Drink beer or alcoholic beverages.   Make important decisions or sign legal documents.    SPECIAL INSTRUCTIONS:   Keep dressing clean and dry.   Ice and elevate.   Follow up in 2 weeks with Dr. Mccarthy.    DIET: To avoid nausea, slowly advance diet as tolerated, avoiding spicy or greasy foods for the first day.  Add more substantial food to your diet according to your physician's instructions. INCREASE FLUIDS AND FIBER TO AVOID CONSTIPATION.    FOLLOW-UP APPOINTMENT:  A follow-up appointment should be arranged with your doctor in 2 weeks; call to schedule.    You should CALL YOUR PHYSICIAN if you develop:  Fever greater than 101 degrees F.  Pain not relieved by medication, or persistent nausea or vomiting.  Excessive bleeding (blood soaking through dressing) or unexpected drainage from the wound.  Extreme redness or swelling around the incision site, drainage of pus or foul smelling drainage.  Inability to urinate or empty your bladder within 8 hours.    You should call 911 if you develop problems with breathing or chest pain.    If you are unable to contact your doctor or surgical center, you should go to the nearest emergency room or urgent care center.      Dr. Mccarthy's telephone #: [phone]    If any questions arise, call your doctor.  If your doctor is not available, please feel free to call the Surgical Center at [phone].  The Center is open Monday through Friday from 7AM to 7PM.      You can also call the [facility] HOTLINE open 24 hours/day, 7 days/week  and speak to a nurse at [phone], or toll free at [phone].    A registered nurse may call you a few days after your surgery to see how you are doing after your procedure.    MEDICATIONS: Resume taking daily medication.  Take prescribed pain medication with food.  If no medication is prescribed, you may take non-aspirin pain medication if needed.  PAIN MEDICATION CAN BE VERY CONSTIPATING.  Take a stool softener or laxative such as senokot, pericolace, or milk of magnesia if needed.    Prescription for Percocet called into patient's pharmacy.      Last pain medication given at ____________________________________.    If your physician has prescribed pain medication that includes Acetaminophen (Tylenol), do not take additional Acetaminophen (Tylenol) while taking the prescribed medication.    Peripheral Nerve Block Discharge Instructions from Same Day Surgery and Inpatient :    What to Expect - Upper Extremity  · You may experience numbness and weakness in shoulder, arm and hand  on the same side as your surgery  · This is normal. For some people, this may be an unpleasant sensation. Be very careful with your numb limb  · Ask for help when you need it  Shoulder Surgery Side Effects  · In addition to numbness and weakness you may experience other symptoms  · Other nerves that are close to those nerves injected can also be affected by local anesthesia  · You may experience a hoarseness in your voice  · Your breathing may feel different  · You may also notice drooping of your eyelid, pupil constriction, and decreased sweating, on the side of your surgery  · All of these side effects are normal and will resolve when the local anesthetic wears off   Prevent Injury  · Protect the limb like a baby  · Beware of exposing your limb to extreme heat or cold or trauma  · The limb may be injured without you noticing because it is numb  · Keep the limb elevated whenever possible  · Do not sleep on the limb  · Change the  "position of the limb regularly  · Avoid putting pressure on your surgical limb  Pain Control  · The initial block on the day of surgery will make your extremity feel \"numb\"  · Any consecutive injection including prior to discharge from the hospital will make your extremity feel \"numb\"  · You may feel an aching or burning when the local anesthesia starts to wear off  · Take pain pills as prescribed by your surgeon  · Call your surgeon or anesthesiologist if you do not have adequate pain control    Depression / Suicide Risk    As you are discharged from this [facility] facility, it is important to learn how to keep safe from harming yourself.    Recognize the warning signs:  · Abrupt changes in personality, positive or negative- including increase in energy   · Giving away possessions  · Change in eating patterns- significant weight changes-  positive or negative  · Change in sleeping patterns- unable to sleep or sleeping all the time   · Unwillingness or inability to communicate  · Depression  · Unusual sadness, discouragement and loneliness  · Talk of wanting to die  · Neglect of personal appearance   · Rebelliousness- reckless behavior  · Withdrawal from people/activities they love  · Confusion- inability to concentrate     If you or a loved one observes any of these behaviors or has concerns about self-harm, here's what you can do:  · Talk about it- your feelings and reasons for harming yourself  · Remove any means that you might use to hurt yourself (examples: pills, rope, extension cords, firearm)  · Get professional help from the community (Mental Health, Substance Abuse, psychological counseling)  · Do not be alone:Call your Safe Contact- someone whom you trust who will be there for you.  · Call your local CRISIS HOTLINE 674-7076 or 013-222-7921  · Call your local Children's Mobile Crisis Response Team [place] (501) 441-4535 or www.[employer]  · Call the toll free National Suicide Prevention " Hotlines   · National Suicide Prevention Lifeline 880-684-SFKX (4572)  National Hope Line Network 800-SUICIDE (285-8412)

## 2020-09-17 NOTE — ANESTHESIA PROCEDURE NOTES
Airway    Date/Time: 9/17/2020 7:09 AM  Performed by: Jadiel Roca M.D.  Authorized by: Jadiel Roca M.D.     Location:  OR  Urgency:  Elective  Difficult Airway: No    Indications for Airway Management:  Anesthesia      Spontaneous Ventilation: absent    Sedation Level:  Deep  Preoxygenated: Yes    Mask Difficulty Assessment:  0 - not attempted  Final Airway Type:  Supraglottic airway  Final Supraglottic Airway:  Standard LMA    SGA Size:  5  Number of Attempts at Approach:  1

## 2020-09-17 NOTE — ANESTHESIA TIME REPORT
Anesthesia Start and Stop Event Times     Date Time Event    9/17/2020 0640 Ready for Procedure     0704 Anesthesia Start     0938 Anesthesia Stop        Responsible Staff  09/17/20    Name Role Begin End    Jadiel Roca M.D. Anesth 0704 0938        Preop Diagnosis (Free Text):  Pre-op Diagnosis     SCAPHOLUNATE ADVANCED COLLAPSE right        Preop Diagnosis (Codes):    Post op Diagnosis  Scapholunate advanced collapse of right wrist      Premium Reason  A. 3PM - 7AM    Comments: supraclavicular brachial plexus block before 0700

## 2020-09-17 NOTE — OP REPORT
DATE OF SERVICE:  09/17/2020    PREOPERATIVE DIAGNOSIS:  Right scapholunate advance collapsed.    POSTOPERATIVE DIAGNOSIS:  Right scapholunate advance collapsed.    PROCEDURE:  Right wrist limited arthrodesis with a scaphoid excision and   4-corner fusion.    SURGEON:  Nhan Mccarthy MD    ANESTHESIOLOGIST:  Dr. Roca with general and axillary block.    BLOOD LOSS:  Less than 10 mL.    COMPLICATIONS:  None.    DISPOSITION:  Stable to PACU.    IMPLANTS:  Synthes Speed staples 13x10 mm x3.    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  He has failed   conservative management including brace as well as pain control.  Therefore,   he did want to proceed with a more definitive procedure.  Risks and benefits   of a scaphoid excision and 4-corner fusion were discussed including but not   limited to damage to surrounding nerves, arteries, veins, infection, nonunion,   malunion, need for reoperation as well as need for further procedures.    Despite these risks, the patient did consent.  As he did have a fair amount of   pain, I did discuss with him PIN neurectomy.  Consent was obtained.    OPERATION IN DETAIL:  On 09/17/2020, the patient was seen in the preoperative   area.  His right wrist was marked.  All his questions were answered and again   consent was obtained.  The regional block was performed and the patient was   brought to the operating room and placed in supine position.  General   anesthesia was administered.  A formal timeout was performed identifying the   right wrist as the correct wrist as well as the correct procedure.  The right   arm was then prepped, marked and draped in the normal sterile fashion.    Perioperative Ancef was given.  The limb was then elevated and exsanguinated   with an Esmarch and the tourniquet inflated to 250 mmHg.  Using an 8 cm   longitudinal incision just radial to Johanny's tubercle, the  skin was incised.    Full-thickness flaps were kept down to the extensor retinaculum.  The EPL   tendon was identified and released and transposed radially.  The radial border   of the 4th compartment was incised.  The extensor tendons identified and   protected and the PIN nerve was identified, released and transected with 2 cm   segment removed, the end was cauterized.  Benoit ligament sparing incision was   performed of the dorsal wrist capsule.  With good exposure, the scaphoid was   identified and removed in a piecemeal fashion with rongeurs and an osteotome.    Once fully removed, the lunocapitate joint was identified.  There was   moderate arthritis.  There was some full-thickness chondral loss on the base   of the capitate.  The remaining cartilage was removed with flexible osteotomes   as well as rongeurs and curettes.  The triquetral hamate joint was then   identified and any of the remaining cartilage was removed.  The joint was   packed with bone graft from the scaphoid after being removed of any soft   tissue or cartilage.  The lunate was reduced and the capitate reduced onto the   lunate and dorsal tilt significantly improved.  A K-wire was passed from the   dorsum of the radius across the lunate into the capitate to hold the   reduction.  Using the Speed staple guide, the width was measured at 13 mm and   the radial most holes were then drilled and the staple placed.  There was a   significant cut out of the proximal most hole.  A second staple was then   identified under fluoroscopy with K-wires, drilled and malleted into place,   there was excellent purchase here.  The first staple was then removed,   realigned and redrilled proximally and replaced under fluoroscopy.  Then, the   triquetral hamate joint was then aligned under fluoroscopy.  Drill sites   localized with K-wires and drilled and a third staple placed.  Final x-rays   were obtained showing good reduction as well as good placement of my  staples   as well as good bone graft.  The wound was copiously irrigated.  The capsule   was repaired with 2-0 PDS.  The extensor retinaculum repaired with 3-0 PDS and   the skin closed in a layered fashion with 3-0 Vicryl and 4-0 nylon.    Xeroform, soft roll, 4x4s, and a sugar-tong splint was applied.  The patient   tolerated the procedure well and was brought to the recovery room in good   condition.  He will follow up with me in 2 weeks for reevaluation and   reassessment.       ____________________________________     MD ANAHI Osborne / THAO    DD:  09/17/2020 09:55:52  DT:  09/17/2020 11:02:29    D#:  [id number]  Job#:  [id number]

## 2020-09-17 NOTE — OR SURGEON
Immediate Post OP Note    PreOp Diagnosis: right wrist arthritis, scapholunate advanced collapse.      PostOp Diagnosis: same    Procedure(s):  FUSION, JOINT, WRIST - SCAPHOID EXCISION AND FOUR CORNER INTERCARPAL ARTHRODESIS, POSTERIOR INTEROSSEUS NERVE EXCISION - Wound Class: Clean    Surgeon(s):  Nhan Mccarthy M.D.    Anesthesiologist/Type of Anesthesia:  Anesthesiologist: Jadiel Roca M.D./General    Surgical Staff:  Circulator: Caryl Peterson R.N.  Relief Circulator: Haritha Saldana R.N.  Scrub Person: Michael Posada; Mukesh Milan    Specimens removed if any:  * No specimens in log *    Estimated Blood Loss: <10cc    Findings: severe arthritis, mild radiocarpal arthritis    Complications: none    Implants: Synthes 13mm x 10mm speed staples x 3        9/17/2020 9:42 AM Nhan Mccarthy M.D.

## 2020-09-17 NOTE — OR NURSING
0945- Assumed care of patient from WENDY Díaz. Patient resting quietly.  0949- O2 decreased to 3L via NC. Patient reports no pain or nausea at this time. BP low; LR rate increased. Will continue to monitor.  1003- Report given to WENDY Díaz.

## 2020-09-18 DIAGNOSIS — E11.9 DIABETES MELLITUS WITHOUT COMPLICATION (HCC): ICD-10-CM

## 2020-09-22 RX ORDER — GEMFIBROZIL 600 MG/1
TABLET, FILM COATED ORAL
Qty: 180 TAB | Refills: 0 | Status: SHIPPED | OUTPATIENT
Start: 2020-09-22 | End: 2020-12-22

## 2020-09-22 RX ORDER — INDAPAMIDE 2.5 MG/1
TABLET ORAL
Qty: 90 TAB | Refills: 0 | Status: SHIPPED | OUTPATIENT
Start: 2020-09-22 | End: 2021-01-26

## 2020-09-22 RX ORDER — BLOOD SUGAR DIAGNOSTIC
STRIP MISCELLANEOUS
Qty: 300 STRIP | Refills: 0 | Status: SHIPPED | OUTPATIENT
Start: 2020-09-22 | End: 2021-01-20

## 2020-10-08 ENCOUNTER — TELEPHONE (OUTPATIENT)
Dept: PHYSICAL MEDICINE AND REHAB | Facility: MEDICAL CENTER | Age: 70
End: 2020-10-08

## 2020-10-14 RX ORDER — CELECOXIB 200 MG/1
CAPSULE ORAL
Qty: 90 CAP | Refills: 0 | Status: SHIPPED | OUTPATIENT
Start: 2020-10-14 | End: 2021-03-08 | Stop reason: SDUPTHER

## 2020-10-14 NOTE — TELEPHONE ENCOUNTER
Refill approved and electronically sent to pharmacy.  Also, please let him know that I was out of the office last week and did not get this request until one hour ago.  Thanks.

## 2020-10-14 NOTE — TELEPHONE ENCOUNTER
Was the patient seen in the last year in this department? No     Does patient have an active prescription for medications requested? Yes     Do they have a refill on file? No     If a controlled substance, is a new  on file? No     Received Request Via: Pharmacy    If pharmacy requests, is the patient still taking the medication or medication discontinued? yes

## 2020-10-22 ENCOUNTER — PATIENT MESSAGE (OUTPATIENT)
Dept: MEDICAL GROUP | Facility: MEDICAL CENTER | Age: 70
End: 2020-10-22

## 2020-10-22 NOTE — TELEPHONE ENCOUNTER
Phone Number Called: [phone] (home)     Call outcome: Left detailed message for patient. Informed to call back with any additional questions.    Message: Tried to call Pt to make an appt. No answer. Lvm to schedule.

## 2020-11-24 ENCOUNTER — TELEMEDICINE (OUTPATIENT)
Dept: MEDICAL GROUP | Facility: MEDICAL CENTER | Age: 70
End: 2020-11-24
Payer: MEDICARE

## 2020-11-24 ENCOUNTER — NURSE TRIAGE (OUTPATIENT)
Dept: HEALTH INFORMATION MANAGEMENT | Facility: OTHER | Age: 70
End: 2020-11-24

## 2020-11-24 VITALS — WEIGHT: 216 LBS | RESPIRATION RATE: 12 BRPM | HEIGHT: 66 IN | BODY MASS INDEX: 34.72 KG/M2 | TEMPERATURE: 98.6 F

## 2020-11-24 DIAGNOSIS — Z20.822 EXPOSURE TO COVID-19 VIRUS: ICD-10-CM

## 2020-11-24 PROCEDURE — 99212 OFFICE O/P EST SF 10 MIN: CPT | Mod: 95,CR,CS | Performed by: FAMILY MEDICINE

## 2020-11-24 NOTE — TELEPHONE ENCOUNTER
"Pt reports he had recent exposure to COVID-19 positive pt. Reports he is wanting a COVID-19 test because he is high risk pt. States he has diabetes. . Nurse explained patient would need to schedule a virtual visit at PCP office. Pt transferred to scheduling. Appt is available today with K.Brunner.    Additional Information  • Negative: SEVERE difficulty breathing (e.g., struggling for each breath, speak in single words, bluish lips)  • Negative: Sounds like a life-threatening emergency to the triager    Answer Assessment - Initial Assessment Questions  1. CLOSE CONTACT: \"Who is the person with the confirmed or suspected COVID-19 infection that you were exposed to?\"      Son  2. PLACE of CONTACT: \"Where were you when you were exposed to COVID-19?\" (e.g., home, school, medical waiting room; which city?)      Pt house  3. TYPE of CONTACT: \"How much contact was there?\" (e.g., sitting next to, live in same house, work in same office, same building)      Pt reports 15 minute contact  4. DURATION of CONTACT: \"How long were you in contact with the COVID-19 patient?\" (e.g., a few seconds, passed by person, a few minutes, live with the patient)      15 minutes  5. DATE of CONTACT: \"When did you have contact with a COVID-19 patient?\" (e.g., how many days ago)      2 days ago  6. TRAVEL: \"Have you traveled out of the country recently?\" If so, \"When and where?\"      * Also ask about out-of-state travel, since the CDC has identified some high risk cities for community spread in the .      * Note: Travel becomes less relevant if there is widespread community transmission where the patient lives.      No  7. COMMUNITY SPREAD: \"Are there lots of cases of COVID-19 (community spread) where you live?\" (See public health department website, if unsure)    * MAJOR community spread: high number of cases; numbers of cases are increasing; many people hospitalized.    * MINOR community spread: low number of cases; not increasing; few or no " "people hospitalized      major  8. SYMPTOMS: \"Do you have any symptoms?\" (e.g., fever, cough, breathing difficulty)      Reports allergy symptoms, watery eyes, runny nose. Reports these are not new symptoms  9. PREGNANCY OR POSTPARTUM: \"Is there any chance you are pregnant?\" \"When was your last menstrual period?\" \"Did you deliver in the last 2 weeks?\"      N/A  10. HIGH RISK: \"Do you have any heart or lung problems? Do you have a weak immune system?\" (e.g., CHF, COPD, asthma, HIV positive, chemotherapy, renal failure, diabetes mellitus, sickle cell anemia)        Diabetes, HTN    Protocols used: CORONAVIRUS (COVID-19) EXPOSURE-A-OH      "

## 2020-11-24 NOTE — PROGRESS NOTES
Virtual Visit: Established Patient   This visit was conducted via Zoom using secure and encrypted videoconferencing technology. The patient was in a private location in the state of [place].    The patient's identity was confirmed and verbal consent was obtained for this virtual visit.    Subjective:   CC:   Chief Complaint   Patient presents with   • Requesting Labs     COVID       Zhang Cheung is a 70 y.o. male presenting for evaluation and management of:    Exposure to COVID-19 virus  New problem.  The patient states he was recently made aware that his son has tested positive for COVID-19.  He saw his son on Sunday morning, 11/22/2020 for around 15 to 20 minutes inside his home.  Neither of them were wearing a mask.  He believes they were likely within 6 feet.  Later that afternoon the son started feeling ill, got a COVID-19 test the next day and was made aware of his positive status.    The patient has a history of type 2 diabetes, hypertension, dyslipidemia.    He is unsure if he is symptomatic as he has a history of allergies and joint pains but he does not think he feels any different today.  He is afebrile.  Denies any shortness of breath or chest pain.    ROS   See HPI    Allergies   Allergen Reactions   • Other Drug Unspecified     Opioid pain killers sent him to ER   • Pollen Extract      Local pollen, primarily [place]       Current medicines (including changes today)  Current Outpatient Medications   Medication Sig Dispense Refill   • celecoxib (CELEBREX) 200 MG Cap TAKE 1 CAPSULE BY MOUTH EVERY DAY FOR 90 DAYS. 90 Cap 0   • gemfibrozil (LOPID) 600 MG Tab TAKE 1 TABLET BY MOUTH TWICE A DAY MUST BE SEEN FOR FURTHER REFILLS 180 Tab 0   • indapamide (LOZOL) 2.5 MG Tab TAKE 1 TAB BY MOUTH EVERYDAY 90 Tab 0   • ONETOUCH ULTRA strip USE TO TEST HIGH OR LOW BLOOD SUGAR 3 TIMES A DAY E119 300 Strip 0   • metformin (GLUCOPHAGE) 1000 MG tablet TAKE 1 TABLET BY MOUTH TWICE A  Tab 1   • amLODIPine  "(NORVASC) 5 MG Tab TAKE 1 TABLET BY MOUTH TWICE A  Tab 3   • benazepril (LOTENSIN) 40 MG tablet TAKE 1 TABLET BY MOUTH TWICE A  Tab 1   • Insulin Pen Needle (NOVOFINE 30) 30G X 8 MM Misc Novofine Autocover 30 gauge x 1/3\" needle 300 Each 3   • Lancets Using One Touch Ultra Soft Lancets. Testing 3 times daily for insulin adjustment.  DX. Code 250 02 300 Each 3   • glucose blood strip Check sugars tid prn 100 Strip 1   • alfuzosin (UROXATRAL) 10 MG SR tablet TAKE 1 TABLET BY MOUTH EVERYDAY AT BEDTIME  3   • Multiple Vitamin (MULTI-VITAMIN DAILY PO) Take  by mouth every day.     • Cholecalciferol (VITAMIN D3) 5000 UNITS Cap Take 1 Cap by mouth every day.     • insulin aspart protamine-insulin aspart (NOVOLOG MIX 70/30) (70-30) 100 UNIT/ML injection Inject 8-10 Units as instructed 3 times a day before meals. 100 = 5 units  120= 7 units  184 = 9 units  200 = 12 units  Pt can go as high as 22 units if needed     • Omega-3 Fatty Acids (FISH OIL TRIPLE STRENGTH) 1400 MG Cap Take 1 Cap by mouth every day.       No current facility-administered medications for this visit.        Patient Active Problem List    Diagnosis Date Noted   • Lumbar stenosis 09/12/2017     Priority: Medium   • Essential hypertension 10/13/2013     Priority: Medium   • Dyslipidemia 10/13/2013     Priority: Medium   • Benign prostatic hyperplasia 03/27/2014     Priority: Low   • Degeneration of lumbar or lumbosacral intervertebral disc 02/19/2014     Priority: Low   • Type 2 DM 10/13/2013     Priority: Low   • CKD (chronic kidney disease) stage 2, GFR 60-89 ml/min 10/13/2013     Priority: Low   • Exposure to COVID-19 virus 11/24/2020   • Sleep apnea 09/17/2020   • Preop examination 08/19/2020   • 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   • Stress 06/30/2020   • Chronic pain of both knees 05/13/2019   • Encounter for weight loss counseling 12/06/2018   • Obesity (BMI 35.0-39.9 without comorbidity) 07/11/2018   • Benign " "prostatic hyperplasia with urinary hesitancy 05/21/2018   • Right wrist pain 05/21/2018   • Neuropathy 05/21/2018   • Bilateral shoulder pain 05/21/2018   • Bilateral foot pain 05/21/2018   • Stenosis, spinal, lumbar 09/18/2017       Family History   Problem Relation Age of Onset   • Sleep Apnea Neg Hx        He  has a past medical history of Arrhythmia, Arthritis, Diabetes, Diabetic neuropathy (HCC), High cholesterol, Hyperlipidemia, Hypertension (2014), Neuropathy, Pain (04/11/14), Renal disorder, Restless leg, Scapholunate advanced collapse of right wrist (2020), Sleep apnea, Snoring, and Unspecified urinary incontinence.  He  has a past surgical history that includes lumbar laminectomy diskectomy (5/2/2012); lumbar laminectomy diskectomy (2/19/2014); trans urethral resection prostate (3/27/2014); lumbar laminectomy diskectomy (4/22/2014); recovery (1/13/2016); extreme lateral interbody fusion (Left, 9/18/2017); lumbar fusion posterior (9/18/2017); lumbar laminectomy diskectomy (9/18/2017); shoulder arthroscopy (Left, 2008); knee manipulation (Right, 2007); knee arthroplasty total (Right, 2006); and wrist fusion (Right, 9/17/2020).       Objective:   Temp 37 °C (98.6 °F)   Resp 12   Ht 1.676 m (5' 6\")   Wt 98 kg (216 lb)   BMI 34.86 kg/m²     Physical Exam:  General: Normal appearing. No distress.  HEAD: NCAT  EYES: conjunctiva clear, lids without ptosis, pupils equal and reactive to light  EARS: ears normal shape and contour.  MOUTH: normal dentition   Neck:  Normal ROM  Pulmonary: Normal effort. Normal respiratory rate.  Cardiovascular: Well perfused.   Neurologic: Grossly normal, no focal deficits  Skin: Warm and dry.  No obvious lesions.  Musculoskeletal: Normal gait and station.   Psych: Normal mood and affect. Alert and oriented x3. Judgment and insight is normal.        Assessment and Plan:   The following treatment plan was discussed:     1. Exposure to COVID-19 virus  - COVID/SARS COV-2 PCR; " Future  New problem.  It appears the patient had a positive COVID-19 close exposure on 11/22/2020.  He seems to be asymptomatic at this point.  Order placed for a COVID-19 PCR.  Advised symptoms generally occur within the first 2 to 5 days.  However, advised he would need to quarantine for 14 days following his exposure either way.  Reviewed ER/hospital precautions.  Patient is currently asymptomatic.  He is high risk and is aware of that.  -Covid PCR    Follow-up: Return if symptoms worsen or fail to improve.

## 2020-11-27 ENCOUNTER — HOSPITAL ENCOUNTER (OUTPATIENT)
Dept: LAB | Facility: MEDICAL CENTER | Age: 70
End: 2020-11-27
Attending: FAMILY MEDICINE
Payer: MEDICARE

## 2020-11-27 LAB
COVID ORDER STATUS COVID19: NORMAL
SARS-COV-2 RNA RESP QL NAA+PROBE: NOTDETECTED
SPECIMEN SOURCE: NORMAL

## 2020-11-27 PROCEDURE — C9803 HOPD COVID-19 SPEC COLLECT: HCPCS

## 2020-11-27 PROCEDURE — U0003 INFECTIOUS AGENT DETECTION BY NUCLEIC ACID (DNA OR RNA); SEVERE ACUTE RESPIRATORY SYNDROME CORONAVIRUS 2 (SARS-COV-2) (CORONAVIRUS DISEASE [COVID-19]), AMPLIFIED PROBE TECHNIQUE, MAKING USE OF HIGH THROUGHPUT TECHNOLOGIES AS DESCRIBED BY CMS-2020-01-R: HCPCS

## 2020-12-07 DIAGNOSIS — G62.9 NEUROPATHY: ICD-10-CM

## 2020-12-07 DIAGNOSIS — Z23 NEED FOR VACCINATION: ICD-10-CM

## 2020-12-07 DIAGNOSIS — E11.9 TYPE 2 DIABETES MELLITUS WITHOUT COMPLICATION, WITH LONG-TERM CURRENT USE OF INSULIN (HCC): ICD-10-CM

## 2020-12-07 DIAGNOSIS — Z79.4 TYPE 2 DIABETES MELLITUS WITHOUT COMPLICATION, WITH LONG-TERM CURRENT USE OF INSULIN (HCC): ICD-10-CM

## 2020-12-08 RX ORDER — BENAZEPRIL HYDROCHLORIDE 40 MG/1
TABLET ORAL
Qty: 180 TAB | Refills: 1 | Status: SHIPPED | OUTPATIENT
Start: 2020-12-08 | End: 2021-03-08 | Stop reason: SDUPTHER

## 2020-12-11 DIAGNOSIS — E11.9 DIABETES MELLITUS WITHOUT COMPLICATION (HCC): ICD-10-CM

## 2020-12-11 NOTE — TELEPHONE ENCOUNTER
Received request via: Pharmacy    Was the patient seen in the last year in this department? Yes    Does the patient have an active prescription (recently filled or refills available) for medication(s) requested? No     Future Appointments       Provider Department Center    12/31/2020 7:15 AM PREADMIT 1 [facility] TESTS [facility] [place]

## 2020-12-15 RX ORDER — INSULIN ASPART 100 [IU]/ML
INJECTION, SUSPENSION SUBCUTANEOUS
Qty: 1 ML | Refills: 3 | Status: SHIPPED | OUTPATIENT
Start: 2020-12-15 | End: 2020-12-24

## 2020-12-22 ENCOUNTER — TELEPHONE (OUTPATIENT)
Dept: MEDICAL GROUP | Facility: MEDICAL CENTER | Age: 70
End: 2020-12-22

## 2020-12-22 DIAGNOSIS — Z79.4 TYPE 2 DIABETES MELLITUS WITHOUT COMPLICATION, WITH LONG-TERM CURRENT USE OF INSULIN (HCC): ICD-10-CM

## 2020-12-22 DIAGNOSIS — E11.9 TYPE 2 DIABETES MELLITUS WITHOUT COMPLICATION, WITH LONG-TERM CURRENT USE OF INSULIN (HCC): ICD-10-CM

## 2020-12-22 RX ORDER — GEMFIBROZIL 600 MG/1
TABLET, FILM COATED ORAL
Qty: 180 TAB | Refills: 0 | Status: SHIPPED | OUTPATIENT
Start: 2020-12-22 | End: 2021-03-08 | Stop reason: SDUPTHER

## 2020-12-22 NOTE — TELEPHONE ENCOUNTER
"Received fax from pharmacy about directions for Novolog. According to pharmacy pt stated this is supposed to be \"TID\" and was very angry.  Directions currently say inject 20 untits SQ daily.  Please advise  "

## 2020-12-24 ENCOUNTER — TELEMEDICINE (OUTPATIENT)
Dept: MEDICAL GROUP | Facility: MEDICAL CENTER | Age: 70
End: 2020-12-24
Payer: MEDICARE

## 2020-12-24 VITALS — HEIGHT: 66 IN | BODY MASS INDEX: 34.72 KG/M2 | WEIGHT: 216 LBS

## 2020-12-24 DIAGNOSIS — R80.9 TYPE 2 DIABETES MELLITUS WITH MICROALBUMINURIA, WITHOUT LONG-TERM CURRENT USE OF INSULIN (HCC): ICD-10-CM

## 2020-12-24 DIAGNOSIS — E11.29 TYPE 2 DIABETES MELLITUS WITH MICROALBUMINURIA, WITHOUT LONG-TERM CURRENT USE OF INSULIN (HCC): ICD-10-CM

## 2020-12-24 DIAGNOSIS — M25.531 RIGHT WRIST PAIN: ICD-10-CM

## 2020-12-24 PROCEDURE — 99214 OFFICE O/P EST MOD 30 MIN: CPT | Mod: 95,CR | Performed by: FAMILY MEDICINE

## 2020-12-24 RX ORDER — INSULIN ASPART 100 [IU]/ML
8-10 INJECTION, SUSPENSION SUBCUTANEOUS
Qty: 30 ML | Refills: 3 | Status: SHIPPED | OUTPATIENT
Start: 2020-12-24 | End: 2020-12-24

## 2020-12-24 RX ORDER — INSULIN ASPART 100 [IU]/ML
INJECTION, SUSPENSION SUBCUTANEOUS
Qty: 30 ML | Refills: 3 | Status: SHIPPED | OUTPATIENT
Start: 2020-12-24 | End: 2021-05-19

## 2020-12-24 NOTE — PROGRESS NOTES
Virtual Visit: Established Patient   This visit was conducted via Zoom using secure and encrypted videoconferencing technology. The patient was in a private location in the state of [place].    The patient's identity was confirmed and verbal consent was obtained for this virtual visit.    Subjective:   CC:   Chief Complaint   Patient presents with   • Medication Problem     insulin; how is pt administering insulin       Zhang Cheung is a 70 y.o. male presenting for evaluation and management of:    Focus on diabetes   Requested med refills   Has been > 6 mo since his last DM visit , recent visits we were focused on other health issues mainly orthopaedic.    He was quite dissatisfied with the quantity dispensed, additionally we reached out to him suggesting that he schedule time to focus on diabetes and med rec.       ROS   Denies any recent fevers or chills. No nausea or vomiting. No chest pains or shortness of breath.     Allergies   Allergen Reactions   • Other Drug Unspecified     Opioid pain killers sent him to ER   • Pollen Extract      Local pollen, primarily [place]       Current medicines (including changes today)  Current Outpatient Medications   Medication Sig Dispense Refill   • insulin aspart protamine-insulin aspart (NOVOLOG MIX 70/30) (70-30) 100 UNIT/ML injection Inject 8-10 Units under the skin 3 times a day before meals. Inject 8-10 units under skin 3x daily before meals 30 mL 3   • metformin (GLUCOPHAGE) 1000 MG tablet TAKE 1 TABLET BY MOUTH TWICE A  Tab 1   • gemfibrozil (LOPID) 600 MG Tab TAKE 1 TABLET BY MOUTH TWICE A DAY MUST BE SEEN FOR FURTHER REFILLS 180 Tab 0   • benazepril (LOTENSIN) 40 MG tablet TAKE 1 TABLET BY MOUTH TWICE A  Tab 1   • celecoxib (CELEBREX) 200 MG Cap TAKE 1 CAPSULE BY MOUTH EVERY DAY FOR 90 DAYS. 90 Cap 0   • indapamide (LOZOL) 2.5 MG Tab TAKE 1 TAB BY MOUTH EVERYDAY 90 Tab 0   • ONETOUCH ULTRA strip USE TO TEST HIGH OR LOW BLOOD SUGAR 3 TIMES A DAY  "E119 300 Strip 0   • amLODIPine (NORVASC) 5 MG Tab TAKE 1 TABLET BY MOUTH TWICE A  Tab 3   • Insulin Pen Needle (NOVOFINE 30) 30G X 8 MM Misc Novofine Autocover 30 gauge x 1/3\" needle 300 Each 3   • Lancets Using One Touch Ultra Soft Lancets. Testing 3 times daily for insulin adjustment.  DX. Code 250 02 300 Each 3   • alfuzosin (UROXATRAL) 10 MG SR tablet TAKE 1 TABLET BY MOUTH EVERYDAY AT BEDTIME  3   • Multiple Vitamin (MULTI-VITAMIN DAILY PO) Take  by mouth every day.     • Cholecalciferol (VITAMIN D3) 5000 UNITS Cap Take 1 Cap by mouth every day.     • Omega-3 Fatty Acids (FISH OIL TRIPLE STRENGTH) 1400 MG Cap Take 1 Cap by mouth every day.       No current facility-administered medications for this visit.        Patient Active Problem List    Diagnosis Date Noted   • Lumbar stenosis 09/12/2017     Priority: Medium   • Essential hypertension 10/13/2013     Priority: Medium   • Dyslipidemia 10/13/2013     Priority: Medium   • Benign prostatic hyperplasia 03/27/2014     Priority: Low   • Degeneration of lumbar or lumbosacral intervertebral disc 02/19/2014     Priority: Low   • Type 2 DM 10/13/2013     Priority: Low   • CKD (chronic kidney disease) stage 2, GFR 60-89 ml/min 10/13/2013     Priority: Low   • Exposure to COVID-19 virus 11/24/2020   • Sleep apnea 09/17/2020   • Preop examination 08/19/2020   • 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   • Stress 06/30/2020   • Chronic pain of both knees 05/13/2019   • Encounter for weight loss counseling 12/06/2018   • Obesity (BMI 35.0-39.9 without comorbidity) 07/11/2018   • Benign prostatic hyperplasia with urinary hesitancy 05/21/2018   • Right wrist pain 05/21/2018   • Neuropathy 05/21/2018   • Bilateral shoulder pain 05/21/2018   • Bilateral foot pain 05/21/2018   • Stenosis, spinal, lumbar 09/18/2017       Family History   Problem Relation Age of Onset   • Sleep Apnea Neg Hx        He  has a past medical history of Arrhythmia, Arthritis, " "Diabetes, Diabetic neuropathy ([facility]), High cholesterol, Hyperlipidemia, Hypertension (2014), Neuropathy, Pain (04/11/14), Renal disorder, Restless leg, Scapholunate advanced collapse of right wrist (2020), Sleep apnea, Snoring, and Unspecified urinary incontinence.  He  has a past surgical history that includes lumbar laminectomy diskectomy (5/2/2012); lumbar laminectomy diskectomy (2/19/2014); trans urethral resection prostate (3/27/2014); lumbar laminectomy diskectomy (4/22/2014); recovery (1/13/2016); extreme lateral interbody fusion (Left, 9/18/2017); lumbar fusion posterior (9/18/2017); lumbar laminectomy diskectomy (9/18/2017); shoulder arthroscopy (Left, 2008); knee manipulation (Right, 2007); knee arthroplasty total (Right, 2006); and wrist fusion (Right, 9/17/2020).       Objective:   Ht 1.676 m (5' 6\")   Wt 98 kg (216 lb)   BMI 34.86 kg/m²     Physical Exam:  Constitutional: Alert, no distress, well-groomed.  Skin: No rashes in visible areas.  Eye: Round. Conjunctiva clear, lids normal. No icterus.   ENMT: Lips pink without lesions, good dentition, moist mucous membranes. Phonation normal.  Neck: No masses, no thyromegaly. Moves freely without pain.  Respiratory: Unlabored respiratory effort, no cough or audible wheeze  Psych: Alert and oriented x3, normal affect and mood.       Assessment and Plan:   The following treatment plan was discussed:     1. Right wrist pain    2. Type 2 diabetes mellitus with microalbuminuria, without long-term current use of insulin ([facility])  - insulin aspart protamine-insulin aspart (NOVOLOG MIX 70/30) (70-30) 100 UNIT/ML injection; Inject 8-10 Units under the skin 3 times a day before meals. Inject 8-10 units under skin 3x daily before meals  Dispense: 30 mL; Refill: 3  - metformin (GLUCOPHAGE) 1000 MG tablet; TAKE 1 TABLET BY MOUTH TWICE A DAY  Dispense: 180 Tab; Refill: 1    Problem List Items Addressed This Visit     Right wrist pain     Had surgery   He is very dissatisfied " with post op pain and post op recovery     He has had 3 post op visits with Dr. Mccarthy     He has been offered revision surgery              Type 2 DM     Results for SAMUEL AGARWAL (MRN [id number]) as of 12/24/2020 13:43   Ref. Range 12/2/2019 10:16 7/13/2020 08:07 8/21/2020 06:33   Glycohemoglobin Latest Ref Range: 0.0 - 5.6 % 6.1 (H)  6.3 (H)   Very well controlled but he struggles to schedule q3 mo appointments with our DM RN     He also has expressed regular dissatisfaction with our office's ability to manage his refill requests, timing of response and ability of staff/provider to answer questions.  He also takes extra time today to express dissatisfaction with pharmacist and with medicare billing.  Multiple reports of him being rude to staff, he has raised his voice with me in clinic visits before as well.     Because he is insulin dependent I would like him to est care with endo, as their department will likely be able to better meet his expectations for care.      Today extra time spent doing med rec and diabetic medications refilled.        Over 25 minutes spent with patient face to face, greater than 50% time spent with plan/coordination of care regarding that which is discussed in the HPI and A&P           Relevant Medications    insulin aspart protamine-insulin aspart (NOVOLOG MIX 70/30) (70-30) 100 UNIT/ML injection    metformin (GLUCOPHAGE) 1000 MG tablet        Cherry Long M.D.        Follow-up: No follow-ups on file.

## 2020-12-24 NOTE — ASSESSMENT & PLAN NOTE
Had surgery   He is very dissatisfied with post op pain and post op recovery     He has had 3 post op visits with Dr. Mccarthy     He has been offered revision surgery

## 2020-12-24 NOTE — ASSESSMENT & PLAN NOTE
Results for SAMUEL AGARWAL (MRN [id number]) as of 12/24/2020 13:43   Ref. Range 12/2/2019 10:16 7/13/2020 08:07 8/21/2020 06:33   Glycohemoglobin Latest Ref Range: 0.0 - 5.6 % 6.1 (H)  6.3 (H)   Very well controlled but he struggles to schedule q3 mo appointments with our DM RN     He also has expressed regular dissatisfaction with our office's ability to manage his refill requests, timing of response and ability of staff/provider to answer questions.  He also takes extra time today to express dissatisfaction with pharmacist and with medicare billing.  Multiple reports of him being rude to staff, he has raised his voice with me in clinic visits before as well.     Because he is insulin dependent I would like him to est care with endo, as their department will likely be able to better meet his expectations for care.      Today extra time spent doing med rec and diabetic medications refilled.        Over 25 minutes spent with patient face to face, greater than 50% time spent with plan/coordination of care regarding that which is discussed in the HPI and A&P

## 2020-12-28 ENCOUNTER — TELEPHONE (OUTPATIENT)
Dept: MEDICAL GROUP | Facility: MEDICAL CENTER | Age: 70
End: 2020-12-28

## 2020-12-28 NOTE — TELEPHONE ENCOUNTER
Pt. Pharmacy reached out to us concerning patients Novolog prescription. Currently there is no SIG or directions attached to the prescription. They want to know if you would be able to send in a new prescription with them attached. Please advise..

## 2020-12-29 ENCOUNTER — TELEPHONE (OUTPATIENT)
Dept: MEDICAL GROUP | Facility: MEDICAL CENTER | Age: 70
End: 2020-12-29

## 2020-12-29 DIAGNOSIS — E11.9 TYPE 2 DIABETES MELLITUS WITHOUT COMPLICATION, UNSPECIFIED WHETHER LONG TERM INSULIN USE (HCC): ICD-10-CM

## 2020-12-29 RX ORDER — INSULIN ASPART 100 [IU]/ML
INJECTION, SOLUTION INTRAVENOUS; SUBCUTANEOUS
Qty: 100 ML | Refills: 3 | Status: SHIPPED | OUTPATIENT
Start: 2020-12-29 | End: 2021-04-13

## 2020-12-29 NOTE — TELEPHONE ENCOUNTER
Spoke with Pharmacist and gave them a Verbal for Sig '' Inject 8-10 Units under the skin 3 times a day before meals. Inject 8-10 units under skin 3x daily before meals ''

## 2020-12-31 ENCOUNTER — PRE-ADMISSION TESTING (OUTPATIENT)
Dept: ADMISSIONS | Facility: MEDICAL CENTER | Age: 70
End: 2020-12-31
Attending: SURGERY
Payer: MEDICARE

## 2020-12-31 DIAGNOSIS — Z01.812 PRE-OPERATIVE LABORATORY EXAMINATION: ICD-10-CM

## 2020-12-31 DIAGNOSIS — Z01.810 PRE-OPERATIVE CARDIOVASCULAR EXAMINATION: ICD-10-CM

## 2020-12-31 LAB
ALBUMIN SERPL BCP-MCNC: 4.6 G/DL (ref 3.2–4.9)
ALBUMIN/GLOB SERPL: 1.5 G/DL
ALP SERPL-CCNC: 101 U/L (ref 30–99)
ALT SERPL-CCNC: 12 U/L (ref 2–50)
ANION GAP SERPL CALC-SCNC: 7 MMOL/L (ref 7–16)
AST SERPL-CCNC: 24 U/L (ref 12–45)
BASOPHILS # BLD AUTO: 0.4 % (ref 0–1.8)
BASOPHILS # BLD: 0.02 K/UL (ref 0–0.12)
BILIRUB SERPL-MCNC: 0.4 MG/DL (ref 0.1–1.5)
BUN SERPL-MCNC: 31 MG/DL (ref 8–22)
CALCIUM SERPL-MCNC: 10.5 MG/DL (ref 8.5–10.5)
CHLORIDE SERPL-SCNC: 105 MMOL/L (ref 96–112)
CO2 SERPL-SCNC: 26 MMOL/L (ref 20–33)
COVID ORDER STATUS COVID19: NORMAL
CREAT SERPL-MCNC: 0.96 MG/DL (ref 0.5–1.4)
EKG IMPRESSION: NORMAL
EOSINOPHIL # BLD AUTO: 0.19 K/UL (ref 0–0.51)
EOSINOPHIL NFR BLD: 3.9 % (ref 0–6.9)
ERYTHROCYTE [DISTWIDTH] IN BLOOD BY AUTOMATED COUNT: 44.8 FL (ref 35.9–50)
GLOBULIN SER CALC-MCNC: 3.1 G/DL (ref 1.9–3.5)
GLUCOSE SERPL-MCNC: 86 MG/DL (ref 65–99)
HCT VFR BLD AUTO: 46.3 % (ref 42–52)
HGB BLD-MCNC: 15.4 G/DL (ref 14–18)
IMM GRANULOCYTES # BLD AUTO: 0.01 K/UL (ref 0–0.11)
IMM GRANULOCYTES NFR BLD AUTO: 0.2 % (ref 0–0.9)
INR PPP: 0.96 (ref 0.87–1.13)
LYMPHOCYTES # BLD AUTO: 1.53 K/UL (ref 1–4.8)
LYMPHOCYTES NFR BLD: 31.1 % (ref 22–41)
MCH RBC QN AUTO: 30.6 PG (ref 27–33)
MCHC RBC AUTO-ENTMCNC: 33.3 G/DL (ref 33.7–35.3)
MCV RBC AUTO: 92 FL (ref 81.4–97.8)
MONOCYTES # BLD AUTO: 0.51 K/UL (ref 0–0.85)
MONOCYTES NFR BLD AUTO: 10.4 % (ref 0–13.4)
NEUTROPHILS # BLD AUTO: 2.66 K/UL (ref 1.82–7.42)
NEUTROPHILS NFR BLD: 54 % (ref 44–72)
NRBC # BLD AUTO: 0 K/UL
NRBC BLD-RTO: 0 /100 WBC
PLATELET # BLD AUTO: 266 K/UL (ref 164–446)
PMV BLD AUTO: 10.9 FL (ref 9–12.9)
POTASSIUM SERPL-SCNC: 4 MMOL/L (ref 3.6–5.5)
PROT SERPL-MCNC: 7.7 G/DL (ref 6–8.2)
PROTHROMBIN TIME: 13.1 SEC (ref 12–14.6)
RBC # BLD AUTO: 5.03 M/UL (ref 4.7–6.1)
SARS-COV-2 RNA RESP QL NAA+PROBE: NOTDETECTED
SODIUM SERPL-SCNC: 138 MMOL/L (ref 135–145)
SPECIMEN SOURCE: NORMAL
WBC # BLD AUTO: 4.9 K/UL (ref 4.8–10.8)

## 2020-12-31 PROCEDURE — U0003 INFECTIOUS AGENT DETECTION BY NUCLEIC ACID (DNA OR RNA); SEVERE ACUTE RESPIRATORY SYNDROME CORONAVIRUS 2 (SARS-COV-2) (CORONAVIRUS DISEASE [COVID-19]), AMPLIFIED PROBE TECHNIQUE, MAKING USE OF HIGH THROUGHPUT TECHNOLOGIES AS DESCRIBED BY CMS-2020-01-R: HCPCS

## 2020-12-31 PROCEDURE — 85610 PROTHROMBIN TIME: CPT

## 2020-12-31 PROCEDURE — 85025 COMPLETE CBC W/AUTO DIFF WBC: CPT

## 2020-12-31 PROCEDURE — 93010 ELECTROCARDIOGRAM REPORT: CPT | Performed by: INTERNAL MEDICINE

## 2020-12-31 PROCEDURE — 36415 COLL VENOUS BLD VENIPUNCTURE: CPT

## 2020-12-31 PROCEDURE — 93005 ELECTROCARDIOGRAM TRACING: CPT

## 2020-12-31 PROCEDURE — 80053 COMPREHEN METABOLIC PANEL: CPT

## 2021-01-04 ENCOUNTER — ANESTHESIA EVENT (OUTPATIENT)
Dept: SURGERY | Facility: MEDICAL CENTER | Age: 71
End: 2021-01-04
Payer: MEDICARE

## 2021-01-05 ENCOUNTER — ANESTHESIA (OUTPATIENT)
Dept: SURGERY | Facility: MEDICAL CENTER | Age: 71
End: 2021-01-05
Payer: MEDICARE

## 2021-01-05 ENCOUNTER — HOSPITAL ENCOUNTER (OUTPATIENT)
Facility: MEDICAL CENTER | Age: 71
End: 2021-01-05
Attending: SURGERY | Admitting: SURGERY
Payer: MEDICARE

## 2021-01-05 VITALS
OXYGEN SATURATION: 96 % | SYSTOLIC BLOOD PRESSURE: 142 MMHG | HEIGHT: 66 IN | DIASTOLIC BLOOD PRESSURE: 68 MMHG | HEART RATE: 74 BPM | RESPIRATION RATE: 16 BRPM | BODY MASS INDEX: 32.95 KG/M2 | WEIGHT: 205.03 LBS | TEMPERATURE: 97.3 F

## 2021-01-05 DIAGNOSIS — K40.90 LEFT INGUINAL HERNIA: ICD-10-CM

## 2021-01-05 LAB
GLUCOSE BLD-MCNC: 88 MG/DL (ref 65–99)
GLUCOSE BLD-MCNC: 95 MG/DL (ref 65–99)

## 2021-01-05 PROCEDURE — 160048 HCHG OR STATISTICAL LEVEL 1-5: Performed by: SURGERY

## 2021-01-05 PROCEDURE — 160035 HCHG PACU - 1ST 60 MINS PHASE I: Performed by: SURGERY

## 2021-01-05 PROCEDURE — 160009 HCHG ANES TIME/MIN: Performed by: SURGERY

## 2021-01-05 PROCEDURE — 160025 RECOVERY II MINUTES (STATS): Performed by: SURGERY

## 2021-01-05 PROCEDURE — 500380 HCHG DRAIN, PENROSE 1/4X12: Performed by: SURGERY

## 2021-01-05 PROCEDURE — 700111 HCHG RX REV CODE 636 W/ 250 OVERRIDE (IP): Performed by: ANESTHESIOLOGY

## 2021-01-05 PROCEDURE — 700105 HCHG RX REV CODE 258: Performed by: SURGERY

## 2021-01-05 PROCEDURE — 700102 HCHG RX REV CODE 250 W/ 637 OVERRIDE(OP): Performed by: ANESTHESIOLOGY

## 2021-01-05 PROCEDURE — 700101 HCHG RX REV CODE 250: Performed by: ANESTHESIOLOGY

## 2021-01-05 PROCEDURE — 160039 HCHG SURGERY MINUTES - EA ADDL 1 MIN LEVEL 3: Performed by: SURGERY

## 2021-01-05 PROCEDURE — A9270 NON-COVERED ITEM OR SERVICE: HCPCS | Performed by: ANESTHESIOLOGY

## 2021-01-05 PROCEDURE — 82962 GLUCOSE BLOOD TEST: CPT

## 2021-01-05 PROCEDURE — 700101 HCHG RX REV CODE 250: Performed by: SURGERY

## 2021-01-05 PROCEDURE — A9270 NON-COVERED ITEM OR SERVICE: HCPCS | Performed by: SURGERY

## 2021-01-05 PROCEDURE — 160028 HCHG SURGERY MINUTES - 1ST 30 MINS LEVEL 3: Performed by: SURGERY

## 2021-01-05 PROCEDURE — 160002 HCHG RECOVERY MINUTES (STAT): Performed by: SURGERY

## 2021-01-05 PROCEDURE — C1781 MESH (IMPLANTABLE): HCPCS | Performed by: SURGERY

## 2021-01-05 PROCEDURE — 160046 HCHG PACU - 1ST 60 MINS PHASE II: Performed by: SURGERY

## 2021-01-05 PROCEDURE — 501838 HCHG SUTURE GENERAL: Performed by: SURGERY

## 2021-01-05 DEVICE — MESH FLAT SHEET 3 X 6 - (3EA/CA): Type: IMPLANTABLE DEVICE | Site: GROIN | Status: FUNCTIONAL

## 2021-01-05 RX ORDER — DEXTROSE MONOHYDRATE 25 G/50ML
INJECTION, SOLUTION INTRAVENOUS
Status: DISCONTINUED | OUTPATIENT
Start: 2021-01-05 | End: 2021-01-05

## 2021-01-05 RX ORDER — HYDROMORPHONE HYDROCHLORIDE 1 MG/ML
0.4 INJECTION, SOLUTION INTRAMUSCULAR; INTRAVENOUS; SUBCUTANEOUS
Status: DISCONTINUED | OUTPATIENT
Start: 2021-01-05 | End: 2021-01-05 | Stop reason: HOSPADM

## 2021-01-05 RX ORDER — MEPERIDINE HYDROCHLORIDE 25 MG/ML
6.25 INJECTION INTRAMUSCULAR; INTRAVENOUS; SUBCUTANEOUS
Status: DISCONTINUED | OUTPATIENT
Start: 2021-01-05 | End: 2021-01-05 | Stop reason: HOSPADM

## 2021-01-05 RX ORDER — ONDANSETRON 2 MG/ML
INJECTION INTRAMUSCULAR; INTRAVENOUS PRN
Status: DISCONTINUED | OUTPATIENT
Start: 2021-01-05 | End: 2021-01-05 | Stop reason: SURG

## 2021-01-05 RX ORDER — BUPIVACAINE HYDROCHLORIDE AND EPINEPHRINE 5; 5 MG/ML; UG/ML
INJECTION, SOLUTION EPIDURAL; INTRACAUDAL; PERINEURAL
Status: DISCONTINUED
Start: 2021-01-05 | End: 2021-01-05 | Stop reason: HOSPADM

## 2021-01-05 RX ORDER — PHENYLEPHRINE HCL IN 0.9% NACL 0.5 MG/5ML
SYRINGE (ML) INTRAVENOUS PRN
Status: DISCONTINUED | OUTPATIENT
Start: 2021-01-05 | End: 2021-01-05 | Stop reason: SURG

## 2021-01-05 RX ORDER — ONDANSETRON 2 MG/ML
4 INJECTION INTRAMUSCULAR; INTRAVENOUS
Status: DISCONTINUED | OUTPATIENT
Start: 2021-01-05 | End: 2021-01-05 | Stop reason: HOSPADM

## 2021-01-05 RX ORDER — SODIUM CHLORIDE, SODIUM LACTATE, POTASSIUM CHLORIDE, CALCIUM CHLORIDE 600; 310; 30; 20 MG/100ML; MG/100ML; MG/100ML; MG/100ML
INJECTION, SOLUTION INTRAVENOUS CONTINUOUS
Status: DISCONTINUED | OUTPATIENT
Start: 2021-01-05 | End: 2021-01-05 | Stop reason: HOSPADM

## 2021-01-05 RX ORDER — DEXTROSE MONOHYDRATE 25 G/50ML
INJECTION, SOLUTION INTRAVENOUS
Status: COMPLETED
Start: 2021-01-05 | End: 2021-01-05

## 2021-01-05 RX ORDER — CEFAZOLIN SODIUM 1 G/3ML
INJECTION, POWDER, FOR SOLUTION INTRAMUSCULAR; INTRAVENOUS PRN
Status: DISCONTINUED | OUTPATIENT
Start: 2021-01-05 | End: 2021-01-05 | Stop reason: SURG

## 2021-01-05 RX ORDER — ACETAMINOPHEN 500 MG
1000 TABLET ORAL ONCE
Status: COMPLETED | OUTPATIENT
Start: 2021-01-05 | End: 2021-01-05

## 2021-01-05 RX ORDER — HYDROMORPHONE HYDROCHLORIDE 1 MG/ML
0.1 INJECTION, SOLUTION INTRAMUSCULAR; INTRAVENOUS; SUBCUTANEOUS
Status: DISCONTINUED | OUTPATIENT
Start: 2021-01-05 | End: 2021-01-05 | Stop reason: HOSPADM

## 2021-01-05 RX ORDER — OXYCODONE HCL 5 MG/5 ML
5 SOLUTION, ORAL ORAL
Status: DISCONTINUED | OUTPATIENT
Start: 2021-01-05 | End: 2021-01-05 | Stop reason: HOSPADM

## 2021-01-05 RX ORDER — HYDROCODONE BITARTRATE AND ACETAMINOPHEN 5; 325 MG/1; MG/1
1 TABLET ORAL EVERY 4 HOURS PRN
Qty: 20 TAB | Refills: 0 | Status: SHIPPED | OUTPATIENT
Start: 2021-01-05 | End: 2021-01-09

## 2021-01-05 RX ORDER — HALOPERIDOL 5 MG/ML
1 INJECTION INTRAMUSCULAR
Status: DISCONTINUED | OUTPATIENT
Start: 2021-01-05 | End: 2021-01-05 | Stop reason: HOSPADM

## 2021-01-05 RX ORDER — PHENYLEPHRINE HYDROCHLORIDE 10 MG/ML
INJECTION, SOLUTION INTRAMUSCULAR; INTRAVENOUS; SUBCUTANEOUS PRN
Status: DISCONTINUED | OUTPATIENT
Start: 2021-01-05 | End: 2021-01-05 | Stop reason: SURG

## 2021-01-05 RX ORDER — HYDROMORPHONE HYDROCHLORIDE 1 MG/ML
0.2 INJECTION, SOLUTION INTRAMUSCULAR; INTRAVENOUS; SUBCUTANEOUS
Status: DISCONTINUED | OUTPATIENT
Start: 2021-01-05 | End: 2021-01-05 | Stop reason: HOSPADM

## 2021-01-05 RX ORDER — LIDOCAINE HYDROCHLORIDE 20 MG/ML
INJECTION, SOLUTION EPIDURAL; INFILTRATION; INTRACAUDAL; PERINEURAL PRN
Status: DISCONTINUED | OUTPATIENT
Start: 2021-01-05 | End: 2021-01-05 | Stop reason: SURG

## 2021-01-05 RX ORDER — DIPHENHYDRAMINE HYDROCHLORIDE 50 MG/ML
12.5 INJECTION INTRAMUSCULAR; INTRAVENOUS
Status: DISCONTINUED | OUTPATIENT
Start: 2021-01-05 | End: 2021-01-05 | Stop reason: HOSPADM

## 2021-01-05 RX ORDER — DEXTROSE MONOHYDRATE 25 G/50ML
INJECTION, SOLUTION INTRAVENOUS
Status: DISCONTINUED | OUTPATIENT
Start: 2021-01-05 | End: 2021-01-05 | Stop reason: SURG

## 2021-01-05 RX ORDER — OXYCODONE HCL 5 MG/5 ML
10 SOLUTION, ORAL ORAL
Status: DISCONTINUED | OUTPATIENT
Start: 2021-01-05 | End: 2021-01-05 | Stop reason: HOSPADM

## 2021-01-05 RX ORDER — BUPIVACAINE HYDROCHLORIDE AND EPINEPHRINE 5; 5 MG/ML; UG/ML
INJECTION, SOLUTION EPIDURAL; INTRACAUDAL; PERINEURAL
Status: DISCONTINUED | OUTPATIENT
Start: 2021-01-05 | End: 2021-01-05 | Stop reason: HOSPADM

## 2021-01-05 RX ADMIN — ACETAMINOPHEN 1000 MG: 500 TABLET ORAL at 06:53

## 2021-01-05 RX ADMIN — Medication 100 MCG: at 08:04

## 2021-01-05 RX ADMIN — Medication 100 MCG: at 08:16

## 2021-01-05 RX ADMIN — FENTANYL CITRATE 25 MCG: 50 INJECTION, SOLUTION INTRAMUSCULAR; INTRAVENOUS at 08:48

## 2021-01-05 RX ADMIN — POVIDONE IODINE 15 ML: 100 SOLUTION TOPICAL at 06:53

## 2021-01-05 RX ADMIN — ONDANSETRON 4 MG: 2 INJECTION INTRAMUSCULAR; INTRAVENOUS at 08:45

## 2021-01-05 RX ADMIN — LIDOCAINE HYDROCHLORIDE 80 MG: 20 INJECTION, SOLUTION EPIDURAL; INFILTRATION; INTRACAUDAL at 07:35

## 2021-01-05 RX ADMIN — FENTANYL CITRATE 25 MCG: 50 INJECTION, SOLUTION INTRAMUSCULAR; INTRAVENOUS at 07:35

## 2021-01-05 RX ADMIN — CEFAZOLIN 2 G: 330 INJECTION, POWDER, FOR SOLUTION INTRAMUSCULAR; INTRAVENOUS at 07:40

## 2021-01-05 RX ADMIN — FENTANYL CITRATE 50 MCG: 50 INJECTION, SOLUTION INTRAMUSCULAR; INTRAVENOUS at 07:50

## 2021-01-05 RX ADMIN — DEXTROSE MONOHYDRATE: 25 INJECTION, SOLUTION INTRAVENOUS at 07:40

## 2021-01-05 RX ADMIN — SODIUM CHLORIDE, POTASSIUM CHLORIDE, SODIUM LACTATE AND CALCIUM CHLORIDE: 600; 310; 30; 20 INJECTION, SOLUTION INTRAVENOUS at 07:11

## 2021-01-05 RX ADMIN — PROPOFOL 130 MG: 10 INJECTION, EMULSION INTRAVENOUS at 07:35

## 2021-01-05 RX ADMIN — EPHEDRINE SULFATE 5 MG: 50 INJECTION INTRAMUSCULAR; INTRAVENOUS; SUBCUTANEOUS at 08:10

## 2021-01-05 RX ADMIN — FENTANYL CITRATE 25 MCG: 50 INJECTION, SOLUTION INTRAMUSCULAR; INTRAVENOUS at 07:46

## 2021-01-05 RX ADMIN — Medication 100 MCG: at 08:13

## 2021-01-05 RX ADMIN — PHENYLEPHRINE HYDROCHLORIDE 100 MCG: 10 INJECTION INTRAVENOUS at 07:58

## 2021-01-05 RX ADMIN — FENTANYL CITRATE 25 MCG: 50 INJECTION, SOLUTION INTRAMUSCULAR; INTRAVENOUS at 08:40

## 2021-01-05 ASSESSMENT — FIBROSIS 4 INDEX: FIB4 SCORE: 1.88

## 2021-01-05 ASSESSMENT — PAIN SCALES - GENERAL: PAIN_LEVEL: 3

## 2021-01-05 NOTE — ANESTHESIA TIME REPORT
Anesthesia Start and Stop Event Times     Date Time Event    1/5/2021 0715 Ready for Procedure     0728 Anesthesia Start     0903 Anesthesia Stop        Responsible Staff  01/05/21    Name Role Begin End    Paula Jackson M.D. Anesth 0728 0903        Preop Diagnosis (Free Text):  Pre-op Diagnosis     INGUINAL HERNIA, LEFT NON-REDUCIBLE        Preop Diagnosis (Codes):    Post op Diagnosis  Left inguinal hernia      Premium Reason  Non-Premium    Comments:

## 2021-01-05 NOTE — OR NURSING
Patient is awake, alert, oriented, vital signs stable, denies nausea, tolerating clear liquids, states pain is tolerable, ambulated to BR, steady gait, voided, post void residual 21ml. Incision CDI, discharge instructions reviewed with patient, copy given. Prescription given.     Discharging home with son.

## 2021-01-05 NOTE — ANESTHESIA PROCEDURE NOTES
Airway    Date/Time: 1/5/2021 7:38 AM  Performed by: Paula Jackson M.D.  Authorized by: Paula Jackson M.D.     Location:  OR  Urgency:  Elective  Difficult Airway: No    Indications for Airway Management:  Anesthesia      Spontaneous Ventilation: absent    Sedation Level:  Deep  Preoxygenated: Yes    Mask Difficulty Assessment:  0 - not attempted  Final Airway Type:  Supraglottic airway  Final Supraglottic Airway:  Standard LMA    SGA Size:  5  Airway Seal Pressure (cm H2O):  20  Number of Attempts at Approach:  2  Ventilation Between Attempts:  Supraglottic airway  Number of Other Approaches Attempted:  1   Unable to seat first LMA, replaced with new

## 2021-01-05 NOTE — DISCHARGE INSTRUCTIONS
Inguinal Hernia Repair, Adult, Care After  This sheet gives you information about how to care for yourself after your procedure. Your health care provider may also give you more specific instructions. If you have problems or questions, contact your health care provider.  What can I expect after the procedure?  After the procedure, it is common to have:  · Pain.  · Swelling and bruising around the incision area.  · Scrotal swelling, in men.  · Some fluid or blood draining from your incisions.  Follow these instructions at home:  Incision care  · Follow instructions from your health care provider about how to take care of your incisions. Make sure you:  ? Wash your hands with soap and water before you change your bandage (dressing). If soap and water are not available, use hand .  ? Change your dressing as told by your health care provider.  ? Leave stitches (sutures), skin glue, or adhesive strips in place. These skin closures may need to stay in place for 2 weeks or longer. If adhesive strip edges start to loosen and curl up, you may trim the loose edges. Do not remove adhesive strips completely unless your health care provider tells you to do that.  · Check your incision area every day for signs of infection. Check for:  ? More redness, swelling, or pain.  ? More fluid or blood.  ? Warmth.  ? Pus or a bad smell.  · Wear loose, soft clothing while your incisions heal.  Driving  · Do not drive or use heavy machinery while taking prescription pain medicine.  · Do not drive for 24 hours if you were given a medicine to help you relax (sedative) during your procedure.  General instructions  · Do not take baths, swim, or use a hot tub until your health care provider approves. Ask your health care provider if you may take showers. You may only be allowed to take sponge baths.  · Take over-the-counter and prescription medicines only as told by your health care provider.  · To prevent or treat constipation while  you are taking prescription pain medicine, your health care provider may recommend that you:  ? Drink enough fluid to keep your urine pale yellow.  ? Take over-the-counter or prescription medicines.  ? Eat foods that are high in fiber, such as fresh fruits and vegetables, whole grains, and beans.  ? Limit foods that are high in fat and processed sugars, such as fried and sweet foods.  · Do not use any products that contain nicotine or tobacco, such as cigarettes and e-cigarettes. If you need help quitting, ask your health care provider.  · Drink enough fluid to keep your urine pale yellow.  · Keep all follow-up visits as told by your health care provider. This is important.  Contact a health care provider if:  · You have more redness, swelling, or pain around your incisions or your groin area.  · You have more swelling in your scrotum.  · You have more fluid or blood coming from your incisions.  · Your incisions feel warm to the touch.  · You have severe pain and medicines do not help.  · You have abdominal pain or swelling.  · You cannot eat or drink without vomiting.  · You cannot urinate or pass a bowel movement.  · You faint.  · You feel dizzy.  · You have nausea and vomiting.  · You have a fever.  Get help right away if:  · You have pus or a bad smell coming from your incisions.  · You have chest pain.  · You have problems breathing.  Summary  · Pain, swelling, and bruising are common after the procedure.  · Check your incision area every day for signs of infection, such as more redness, swelling, or pain.  This information is not intended to replace advice given to you by your health care provider. Make sure you discuss any questions you have with your health care provider.  Document Released: 03/29/2018 Document Revised: 12/21/2018 Document Reviewed: 03/29/2018  [employer] Patient Education © 2020 [employer]    ACTIVITY: Rest and take it easy for the first 24 hours.  A responsible adult is recommended to  remain with you during that time.  It is normal to feel sleepy.  We encourage you to not do anything that requires balance, judgment or coordination.    MILD FLU-LIKE SYMPTOMS ARE NORMAL. YOU MAY EXPERIENCE GENERALIZED MUSCLE ACHES, THROAT IRRITATION, HEADACHE AND/OR SOME NAUSEA.    FOR 24 HOURS DO NOT:  Drive, operate machinery or run household appliances.  Drink beer or alcoholic beverages.   Make important decisions or sign legal documents.    SPECIAL INSTRUCTIONS:   Ice pack intermittently for 48 hours to left groin; patient should expect to have a lot of swelling and bruising     Remove plastic and gauze in 3 days     Leave underlying steristips on until they fall off     Patient may shower on Post OP Day #2     Avoid any heavy lifting more than 15 pounds for 4 weeks    Ambulate with assistance at least 3 times per day         DIET: To avoid nausea, slowly advance diet as tolerated, avoiding spicy or greasy foods for the first day.  Add more substantial food to your diet according to your physician's instructions.  Babies can be fed formula or breast milk as soon as they are hungry.  INCREASE FLUIDS AND FIBER TO AVOID CONSTIPATION.      FOLLOW-UP APPOINTMENT:  A follow-up appointment should be arranged with your doctor in 2 weeks; call [phone] to schedule.    You should CALL YOUR PHYSICIAN if you develop:  Fever greater than 101 degrees F.  Pain not relieved by medication, or persistent nausea or vomiting.  Excessive bleeding (blood soaking through dressing) or unexpected drainage from the wound.  Extreme redness or swelling around the incision site, drainage of pus or foul smelling drainage.  Inability to urinate or empty your bladder within 8 hours.  Problems with breathing or chest pain.    You should call [phone] if you develop problems with breathing or chest pain.  If you are unable to contact your doctor or surgical center, you should go to the nearest emergency room or urgent care center.  Physician's  telephone #: [phone]    If any questions arise, call your doctor.  If your doctor is not available, please feel free to call the Surgical Center at [phone]. The Contact Center is open Monday through Friday 7AM to 5PM and may speak to a nurse at [phone], or toll free at [phone].     A registered nurse may call you a few days after your surgery to see how you are doing after your procedure.    MEDICATIONS: Resume taking daily medication.  Take prescribed pain medication with food.  If no medication is prescribed, you may take non-aspirin pain medication if needed.  PAIN MEDICATION CAN BE VERY CONSTIPATING.  Take a stool softener or laxative such as senokot, pericolace, or milk of magnesia if needed.    Prescription given for [place].     If your physician has prescribed pain medication that includes Acetaminophen (Tylenol), do not take additional Acetaminophen (Tylenol) while taking the prescribed medication.    Depression / Suicide Risk    As you are discharged from this [facility] facility, it is important to learn how to keep safe from harming yourself.    Recognize the warning signs:  · Abrupt changes in personality, positive or negative- including increase in energy   · Giving away possessions  · Change in eating patterns- significant weight changes-  positive or negative  · Change in sleeping patterns- unable to sleep or sleeping all the time   · Unwillingness or inability to communicate  · Depression  · Unusual sadness, discouragement and loneliness  · Talk of wanting to die  · Neglect of personal appearance   · Rebelliousness- reckless behavior  · Withdrawal from people/activities they love  · Confusion- inability to concentrate     If you or a loved one observes any of these behaviors or has concerns about self-harm, here's what you can do:  · Talk about it- your feelings and reasons for harming yourself  · Remove any means that you might use to hurt yourself (examples: pills, rope,  extension cords, firearm)  · Get professional help from the community (Mental Health, Substance Abuse, psychological counseling)  · Do not be alone:Call your Safe Contact- someone whom you trust who will be there for you.  · Call your local CRISIS HOTLINE 936-9981 or 520-902-9325  · Call your local Children's Mobile Crisis Response Team [place] (583) 385-4057 or www.[employer]  · Call the toll free National Suicide Prevention Hotlines   · National Suicide Prevention Lifeline 331-909-GVOF (1529)  · National Hope Line Network 800-SUICIDE (581-8723)

## 2021-01-05 NOTE — ANESTHESIA PREPROCEDURE EVALUATION
69 yo M with BENEDICT, HTN, CKD stage 2, IDDM2 (novalog use last night 6U, glucose 88 this AM) here for L inguinal hernia repair    METS >4  No recent CP/SOB    Relevant Problems   ANESTHESIA   (+) Sleep apnea      CARDIAC   (+) Essential hypertension         (+) CKD (chronic kidney disease) stage 2, GFR 60-89 ml/min      ENDO   (+) Type 2 DM       Physical Exam    Airway   Mallampati: II  TM distance: >3 FB  Neck ROM: full       Cardiovascular - normal exam  Rhythm: regular  Rate: normal  (-) murmur     Dental - normal exam           Pulmonary - normal exam  Breath sounds clear to auscultation     Abdominal   (+) obese     Neurological - normal exam                 Anesthesia Plan    ASA 2       Plan - general       Airway plan will be LMA        Induction: intravenous    Postoperative Plan: Postoperative administration of opioids is intended.    Pertinent diagnostic labs and testing reviewed    Informed Consent:    Anesthetic plan and risks discussed with patient.    Use of blood products discussed with: patient whom consented to blood products.

## 2021-01-05 NOTE — OR SURGEON
Immediate Post OP Note  Incarcerated Left Inguinal Hernia  PreOp Diagnosis: same (huge indirect containing omentum) as well as smaller direct defect    PostOp Diagnosis: same    Procedure(s):  REPAIR, HERNIA, LEFT INGUINAL-LARGE INCARCERATED WITH MESH - Wound Class: Clean    Surgeon(s):  Rogers Chin M.D.    Anesthesiologist/Type of Anesthesia:  Anesthesiologist: Paula Jackson M.D./General    Surgical Staff:  Assistant: MIKE BernalPAMAURI  Circulator: Alda Simpson R.N.; Maximiliano Burnett R.N.  Scrub Person: Levi Hicks    Specimens removed if any:  * No specimens in log *    Estimated Blood Loss: minimal    Findings: huge incarcerated left indirect hernia into scrotum containing omentum, smaller direct hernia    Complications: no apparent complications        1/5/2021 9:09 AM Rogers Chin M.D.

## 2021-01-05 NOTE — ANESTHESIA PROCEDURE NOTES
Peripheral IV    Date/Time: 1/5/2021 8:17 AM  Performed by: Paula Jackson M.D.  Authorized by: Paula Jackson M.D.     Size:  20 G  Laterality:  Left  Local Anesthetic:  None  Site Prep:  Alcohol  Technique:  Direct puncture  Attempts:  1   20G left hand

## 2021-01-05 NOTE — ANESTHESIA POSTPROCEDURE EVALUATION
Patient: Zhang Cheung    Procedure Summary     Date: 01/05/21 Room / Location: [facility] ROOM 23 / SURGERY SAME DAY [facility]    Anesthesia Start: 0728 Anesthesia Stop: 0903    Procedure: REPAIR, HERNIA, INGUINAL-LARGE INCARCERATED WITH MESH (Left Groin) Diagnosis: (INGUINAL HERNIA, LEFT NON-REDUCIBLE)    Surgeons: Rogers Chin M.D. Responsible Provider: Paula Jackson M.D.    Anesthesia Type: general ASA Status: 2          Final Anesthesia Type: general  Last vitals  BP   Blood Pressure : 114/68    Temp   36.6 °C (97.8 °F)    Pulse   Pulse: 73   Resp   17    SpO2   98 %      Anesthesia Post Evaluation    Patient location during evaluation: PACU  Patient participation: complete - patient participated  Level of consciousness: awake and alert  Pain score: 3    Airway patency: patent  Anesthetic complications: no  Cardiovascular status: hemodynamically stable  Respiratory status: acceptable  Hydration status: euvolemic    PONV: none           Nurse Pain Score: 3 (NPRS)

## 2021-01-06 NOTE — OP REPORT
DATE OF SERVICE:  01/05/2021     SURGEON: Rogers Chin MD     ASSISTANT:  ALAN Alston     TYPE OF ANESTHETIC:  General anesthetic using a laryngeal mask airway plus   local 0.5% Marcaine with epinephrine.     PREOPERATIVE DIAGNOSIS:  Incarcerated left inguinal hernia.     POSTOPERATIVE DIAGNOSIS:  Incarcerated indirect inguinal hernia.     PROCEDURES:  Repair of incarcerated left indirect inguinal hernia and direct   inguinal hernia using polypropylene mesh.     FINDINGS:  The patient is a 70-year-old gentleman who was referred by his   primary care doctor with a several year history of a protrusion in the left   groin area.  This gradually has enlarged in size to the point where it was no   longer reducible.  Physical examination did reveal that he had a nonreducible   left inguinal hernia.  The patient is also extremely overweight with chronic   back pain issues, diabetes mellitus, hypertension, and a smoking history.  The   patient was found to have a large incarcerated indirect inguinal hernia on   the left side containing omentum.  There was a smaller direct inguinal hernia   on the floor of the inguinal canal.  Repair was performed using a Shaila   technique with polypropylene mesh.  There were no apparent complications.     DESCRIPTION OF PROCEDURE:  The patient was brought to the operating room and   placed on the table in supine position.  General anesthetic was then provided   by the anesthesiologist.  The left groin area was then prepped and draped in   the usual sterile fashion.  A timeout was called.  The correct patient,   correct diagnosis, correct surgery, and correct location of the surgery was   discussed and agreed upon.  He did receive preoperative IV antibiotics.  The   patient had an obvious incarcerated hernia extending down to the left scrotum.    An incision was made directly over the left inguinal canal with #15 blade   through the skin and dermis.  All bleeding was  controlled with electrocautery.    Dissection was then carried down through several inches of adipose tissue   until the external oblique aponeurosis was identified.  This structure was   then opened along the direction of the fibers through the external ring where   there was incarcerated omentum extending down into the scrotal sac.  The   ilioinguinal nerve and iliohypogastric nerves were retracted medially.    Further dissection around the spermatic cord and hernia sac was then performed   at the level of the pubic tubercle and the structure was surrounded with the   Penrose drain.  The hernia sac was then mobilized out of the scrotum into the   operative field.  The hernia sac was then carefully dissected away from the   vas deferens and the testicular vessels.  There was a lot of chronic scarring   as this hernia had been present for a long time.  The cord vessels and vas   deferens were successfully dissected away from the large hernia.  Sac   contained incarcerated omentum.  Careful manipulation once the hernia was   brought into the operative field allowed reduction of the omentum back into   the peritoneal space along with the peritoneal sac itself.  Several 3-0 Vicryl   suture was used to suture the internal oblique fascia over towards the   shelving portion of the inguinal ligament above and below the internal ring in   order to hold the herniated contents back in the peritoneal space.  A 3x5   inch piece of polypropylene mesh was then cut to fit the confines of the   inguinal space.  The lateral edge of the mesh was then sutured to the shelving   portion of the inguinal ligament with a running 0 Prolene suture beginning at   the pubic tubercle and ending about 1 cm above the internal ring.  Likewise,   the medial edge of this was sutured to the internal oblique fascia with a   running 0 Prolene suture beginning at the pubic tubercle and ending about 1 cm   above the internal ring.  A slit was then made  in the upper portion of the   mesh, which created two tails in the mesh.  The medial tail was then crossed   behind the spermatic cord and sutured to the lateral tail and to the shelving   portion of the inguinal ligament in two locations with 0 Prolene suture.  This   created a new internal ring.  The mesh easily covered the floor of the   inguinal canal, which also had a small direct hernia.  The excess portions of   mesh were trimmed, then tucked underneath the external oblique aponeurosis.   0.5% Marcaine with epinephrine was used for local anesthesia.  The external   oblique aponeurosis was then closed using a running 3-0 Vicryl suture.  Both   Jonathan's fascia and the dermis were each closed using running 3-0 Vicryl   suture.  Skin was closed using running 4-0 Monocryl suture in subcuticular   fashion.  Steri-Strips and sterile dressing were applied.  The patient   tolerated the procedure well with no complications.  Final sponge and needle   counts were correct.        ______________________________  MD EMILY BYRD/SUMEET/CANDIS    DD:  01/05/2021 18:44  DT:  01/05/2021 19:02    Job#:  [id number]    CC:MD Antwon THOMAS PA Roland R. Goode, MD

## 2021-01-13 ENCOUNTER — HOSPITAL ENCOUNTER (OUTPATIENT)
Dept: LAB | Facility: MEDICAL CENTER | Age: 71
End: 2021-01-13
Attending: PHYSICIAN ASSISTANT
Payer: MEDICARE

## 2021-01-13 PROCEDURE — 36415 COLL VENOUS BLD VENIPUNCTURE: CPT

## 2021-01-13 PROCEDURE — 84154 ASSAY OF PSA FREE: CPT

## 2021-01-13 PROCEDURE — 84153 ASSAY OF PSA TOTAL: CPT

## 2021-01-14 LAB
PSA FREE MFR SERPL: 19 %
PSA FREE SERPL-MCNC: 1.6 NG/ML
PSA SERPL-MCNC: 8.5 NG/ML (ref 0–4)

## 2021-01-15 DIAGNOSIS — Z23 NEED FOR VACCINATION: ICD-10-CM

## 2021-01-20 DIAGNOSIS — E11.9 DIABETES MELLITUS WITHOUT COMPLICATION (HCC): ICD-10-CM

## 2021-01-20 RX ORDER — BLOOD SUGAR DIAGNOSTIC
STRIP MISCELLANEOUS
Qty: 300 STRIP | Refills: 0 | Status: SHIPPED | OUTPATIENT
Start: 2021-01-20 | End: 2021-07-06 | Stop reason: SDUPTHER

## 2021-01-26 RX ORDER — INDAPAMIDE 2.5 MG/1
TABLET ORAL
Qty: 90 TAB | Refills: 0 | Status: SHIPPED | OUTPATIENT
Start: 2021-01-26 | End: 2021-02-08 | Stop reason: SDUPTHER

## 2021-02-08 DIAGNOSIS — E11.69 TYPE 2 DIABETES MELLITUS WITH OTHER SPECIFIED COMPLICATION, UNSPECIFIED WHETHER LONG TERM INSULIN USE (HCC): ICD-10-CM

## 2021-02-08 RX ORDER — INDAPAMIDE 2.5 MG/1
2.5 TABLET ORAL
Qty: 90 TAB | Refills: 0 | Status: CANCELLED | OUTPATIENT
Start: 2021-02-08

## 2021-02-08 RX ORDER — LANCING DEVICE/LANCETS
1 KIT MISCELLANEOUS PRN
COMMUNITY
End: 2021-02-08 | Stop reason: SDUPTHER

## 2021-02-08 RX ORDER — LANCETS 30 GAUGE
EACH MISCELLANEOUS
Qty: 300 EACH | Refills: 3 | Status: CANCELLED | OUTPATIENT
Start: 2021-02-08

## 2021-02-08 RX ORDER — LANCING DEVICE/LANCETS
1 KIT MISCELLANEOUS PRN
COMMUNITY
End: 2021-02-08

## 2021-02-08 NOTE — TELEPHONE ENCOUNTER
Received request via: Pharmacy    Was the patient seen in the last year in this department? Yes    Does the patient have an active prescription (recently filled or refills available) for medication(s) requested? No      indapamide rx was sent to the wrong pharmacy and rx was cancelled. Pt states that he needs a new lancet device and a referral for endocrinology was placed but pt is not able to get until march and the endocrinologist will not fill the lancet device until the pt is established.

## 2021-02-09 RX ORDER — LANCING DEVICE/LANCETS
1 KIT MISCELLANEOUS PRN
Qty: 1 EACH | Refills: 0 | Status: SHIPPED
Start: 2021-02-09 | End: 2021-07-06

## 2021-02-09 RX ORDER — INDAPAMIDE 2.5 MG/1
2.5 TABLET ORAL
Qty: 90 TAB | Refills: 0 | Status: SHIPPED | OUTPATIENT
Start: 2021-02-09 | End: 2021-05-10 | Stop reason: SDUPTHER

## 2021-03-02 ENCOUNTER — TELEMEDICINE (OUTPATIENT)
Dept: ENDOCRINOLOGY | Facility: MEDICAL CENTER | Age: 71
End: 2021-03-02
Attending: INTERNAL MEDICINE
Payer: MEDICARE

## 2021-03-02 DIAGNOSIS — E11.9 CONTROLLED TYPE 2 DIABETES MELLITUS WITHOUT COMPLICATION, WITH LONG-TERM CURRENT USE OF INSULIN (HCC): ICD-10-CM

## 2021-03-02 DIAGNOSIS — E78.2 MIXED HYPERLIPIDEMIA: ICD-10-CM

## 2021-03-02 DIAGNOSIS — Z79.4 LONG-TERM INSULIN USE (HCC): ICD-10-CM

## 2021-03-02 DIAGNOSIS — Z79.4 CONTROLLED TYPE 2 DIABETES MELLITUS WITHOUT COMPLICATION, WITH LONG-TERM CURRENT USE OF INSULIN (HCC): ICD-10-CM

## 2021-03-02 PROCEDURE — 99204 OFFICE O/P NEW MOD 45 MIN: CPT | Mod: 95,CR | Performed by: INTERNAL MEDICINE

## 2021-03-02 RX ORDER — EMPAGLIFLOZIN 25 MG/1
1 TABLET, FILM COATED ORAL DAILY
Qty: 30 TABLET | Refills: 3 | Status: SHIPPED | OUTPATIENT
Start: 2021-03-02 | End: 2021-07-06 | Stop reason: SDUPTHER

## 2021-03-02 RX ORDER — EMPAGLIFLOZIN 25 MG/1
1 TABLET, FILM COATED ORAL DAILY
Qty: 30 TABLET | Refills: 3 | Status: SHIPPED | OUTPATIENT
Start: 2021-03-02 | End: 2021-03-02 | Stop reason: SDUPTHER

## 2021-03-02 RX ORDER — LANCETS 33 GAUGE
1 EACH MISCELLANEOUS 3 TIMES DAILY
Qty: 100 EACH | Refills: 11 | Status: SHIPPED | OUTPATIENT
Start: 2021-03-02 | End: 2022-04-20

## 2021-03-02 NOTE — PROGRESS NOTES
"Chief Complaint:  Consult requested by Cherry Long M.D. for initial evaluation of Type 2 Diabetes Mellitus.  Patient was presented for a telehealth consultation via secure and encrypted videoconferencing technology. This encounter was conducted via Zoom . Verbal consent was obtained. Patient's identity was verified.    HPI:   Zhang Cheung is a 71 y.o. male with Type 2 Diabetes Mellitus diagnosed many years ago.  He denies hospitalizations for DKA in the past.    His a1c was 6.3% on 8/21/2020  We do not have an updated A1c on hand  On follow up  Novolog 70/30 premix 8-10u tid and Metformin 1000mg bid    He states that he doesn't like mychart  Patient is unhappy with [facility] (he repeats this during the visit multiple times)  He doesn't like driving far  He has mixed hyperlipidemia is on Lopid  LDL was 89 on 8/2020  He reports that he is scheduled for repeat wrist surgery  He reports poor fine hand coordination due to his wrist    He monitors blood glucose  3 times per day. Blood glucose values range:Breakfast:            He denies hypoglycemic episodes.   He  denies hypoglycemic unawareness. He denies episodes of severe hypoglycemia requiring third party assistance.  He  is not wearing a medical alert bracelet or necklace.  He does not a glucagon emergency kit.    He denies attending diabetes education classes.  Diet: \"unhealthy\" diet in general.    Diabetes Complications   He  denies history of retinopathy.  He reports laser eye surgery. Last eye exam: He is overdue for an eye exam  He denies history of peripheral sensory neuropathy.  He denies numbness, tingling in both feet.  He denies history of foot sores.   He denies history of kidney damage.  He is on ACE inhibitor or ARB.   He denies history of coronary artery disease.  He  denies history of stroke and denies TIA.  He denies history of PAD.  He reports history of hyperlipidemia.      ROS:     CONS:     No fever, no chills, no weight loss, no " fatigue   EYES:      No diplopia, no blurry vision, no redness of eyes, no swelling of eyelids   ENT:    No hearing loss, No ear pain, No sore throat, no dysphagia, no neck swelling   CV:     No chest pain, no palpitations, no claudication, no orthopnea, no PND   PULM:    No SOB, no cough, no hemoptysis, no wheezing    GI:   No nausea, no vomiting, no diarrhea, no constipation, no bloody stools   :  Passing urine well, no dysuria, no hematuria   ENDO:   No polyuria, no polydipsia, no heat intolerance, no cold intolerance   NEURO: No headaches, no dizziness, no convulsions, no tremors   MUSC:  No joint swellings, no arthralgias, no myalgias, no weakness   SKIN:   No rash, no ulcers, no dry skin   PSYCH:   No depression, no anxiety, no difficulty sleeping       Past Medical History:  Patient Active Problem List    Diagnosis Date Noted   • Left inguinal hernia 01/05/2021   • Lumbar stenosis 09/12/2017   • Essential hypertension 10/13/2013   • Dyslipidemia 10/13/2013   • Benign prostatic hyperplasia 03/27/2014   • Degeneration of lumbar or lumbosacral intervertebral disc 02/19/2014   • Type 2 DM 10/13/2013   • CKD (chronic kidney disease) stage 2, GFR 60-89 ml/min 10/13/2013   • Exposure to COVID-19 virus 11/24/2020   • Sleep apnea 09/17/2020   • Preop examination 08/19/2020   • 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   • Stress 06/30/2020   • Chronic pain of both knees 05/13/2019   • Encounter for weight loss counseling 12/06/2018   • Obesity (BMI 35.0-39.9 without comorbidity) 07/11/2018   • Benign prostatic hyperplasia with urinary hesitancy 05/21/2018   • Right wrist pain 05/21/2018   • Neuropathy 05/21/2018   • Bilateral shoulder pain 05/21/2018   • Bilateral foot pain 05/21/2018   • Stenosis, spinal, lumbar 09/18/2017       Past Surgical History:  Past Surgical History:   Procedure Laterality Date   • INGUINAL HERNIA REPAIR Left 1/5/2021    Procedure: REPAIR, HERNIA, INGUINAL-LARGE INCARCERATED  WITH MESH;  Surgeon: Mazin Chin M.D.;  Location: SURGERY SAME DAY [facility];  Service: General   • WRIST FUSION Right 9/17/2020    Procedure: FUSION, JOINT, WRIST - SCAPHOID EXCISION AND FOUR CORNER INTERCARPAL ARTHRODESIS, POSTERIOR INTEROSSEUS NERVE EXCISION;  Surgeon: Nhan Mccarthy M.D.;  Location: SURGERY [facility];  Service: Orthopedics   • EXTREME LATERAL INTERBODY FUSION Left 9/18/2017    Procedure: EXTREME LATERAL INTERBODY FUSION L2-3 W/PLATE;  Surgeon: Yemi Sharma M.D.;  Location: SURGERY [facility];  Service: Neurosurgery   • LUMBAR FUSION POSTERIOR  9/18/2017    Procedure: LUMBAR FUSION POSTERIOR L2-3 INSTRUMENTED;  Surgeon: Yemi Sharma M.D.;  Location: SURGERY [facility];  Service: Neurosurgery   • LUMBAR LAMINECTOMY DISKECTOMY  9/18/2017    Procedure: LUMBAR LAMINECTOMY DISKECTOMY;  Surgeon: Yemi Sharma M.D.;  Location: SURGERY [facility];  Service: Neurosurgery   • RECOVERY  1/13/2016    Procedure: IR Deep bone biopsy L2-L3 with general anesthesia-DAYANA;  Surgeon: [facility];  Location: SURGERY PRE-POST PROC UNIT [facility];  Service:    • LUMBAR LAMINECTOMY DISKECTOMY  4/22/2014    Performed by Mazin Long M.D. at [facility]   • TRANS URETHRAL RESECTION PROSTATE  3/27/2014    Performed by Kyle Guzman M.D. at [facility]   • LUMBAR LAMINECTOMY DISKECTOMY  2/19/2014    Performed by Mazin Long M.D. at [facility]   • LUMBAR LAMINECTOMY DISKECTOMY  5/2/2012    Performed by MAZIN LONG at [facility]   • SHOULDER ARTHROSCOPY Left 2008   • KNEE MANIPULATION Right 2007   • KNEE ARTHROPLASTY TOTAL Right 2006        Allergies:  Pollen extract     Current Medications:    Current Outpatient Medications:   •  indapamide (LOZOL) 2.5 MG Tab, Take 1 Tab by mouth every day., Disp: 90 Tab, Rfl: 0  •  Lancet Devices (ONE TOUCH DELICA LANCING DEV) Misc, 1 Each as needed., Disp: 1 Each, Rfl: 0  •  ONETOUCH ULTRA  "strip, USE TO TEST HIGH OR LOW BLOOD SUGAR 3 TIMES A DAY *E11.9, OK TO FILL 10/3/20*, Disp: 300 Strip, Rfl: 0  •  insulin aspart (NOVOLOG FLEXPEN) 100 UNIT/ML injection PEN, Inject 8-10 units under the skin 3x per day with meals, Disp: 100 mL, Rfl: 3  •  metformin (GLUCOPHAGE) 1000 MG tablet, TAKE 1 TABLET BY MOUTH TWICE A DAY, Disp: 180 Tab, Rfl: 1  •  NOVOLOG MIX 70/30 (70-30) 100 UNIT/ML injection, Please specify directions, refills and quantity, Disp: 30 mL, Rfl: 3  •  gemfibrozil (LOPID) 600 MG Tab, TAKE 1 TABLET BY MOUTH TWICE A DAY MUST BE SEEN FOR FURTHER REFILLS, Disp: 180 Tab, Rfl: 0  •  benazepril (LOTENSIN) 40 MG tablet, TAKE 1 TABLET BY MOUTH TWICE A DAY, Disp: 180 Tab, Rfl: 1  •  celecoxib (CELEBREX) 200 MG Cap, TAKE 1 CAPSULE BY MOUTH EVERY DAY FOR 90 DAYS., Disp: 90 Cap, Rfl: 0  •  amLODIPine (NORVASC) 5 MG Tab, TAKE 1 TABLET BY MOUTH TWICE A DAY, Disp: 180 Tab, Rfl: 3  •  Insulin Pen Needle (NOVOFINE 30) 30G X 8 MM Misc, Novofine Autocover 30 gauge x 1/3\" needle, Disp: 300 Each, Rfl: 3  •  Lancets, Using One Touch Ultra Soft Lancets. Testing 3 times daily for insulin adjustment.  DX. Code 250 02, Disp: 300 Each, Rfl: 3  •  alfuzosin (UROXATRAL) 10 MG SR tablet, TAKE 1 TABLET BY MOUTH EVERYDAY AT BEDTIME, Disp: , Rfl: 3  •  Multiple Vitamin (MULTI-VITAMIN DAILY PO), Take  by mouth every day., Disp: , Rfl:   •  Cholecalciferol (VITAMIN D3) 5000 UNITS Cap, Take 1 Cap by mouth every day., Disp: , Rfl:   •  Omega-3 Fatty Acids (FISH OIL TRIPLE STRENGTH) 1400 MG Cap, Take 1 Cap by mouth every day., Disp: , Rfl:     Social History:  Social History     Socioeconomic History   • Marital status:      Spouse name: Not on file   • Number of children: Not on file   • Years of education: Not on file   • Highest education level: Not on file   Occupational History   • Not on file   Tobacco Use   • Smoking status: Former Smoker     Packs/day: 0.75     Years: 10.00     Pack years: 7.50     Types: Cigarettes    "  Quit date: 2008     Years since quittin.8   • Smokeless tobacco: Never Used   Substance and Sexual Activity   • Alcohol use: Not Currently   • Drug use: Not Currently   • Sexual activity: Not on file   Other Topics Concern   •  Service Yes   • Blood Transfusions No   • Caffeine Concern No   • Occupational Exposure No   • Hobby Hazards No   • Sleep Concern Yes   • Stress Concern No   • Weight Concern No   • Special Diet Yes   • Back Care No   • Exercise Yes   • Bike Helmet No   • Seat Belt Yes   • Self-Exams No   Social History Narrative   • Not on file     Social Determinants of Health     Financial Resource Strain:    • Difficulty of Paying Living Expenses:    Food Insecurity:    • Worried About Running Out of Food in the Last Year:    • Ran Out of Food in the Last Year:    Transportation Needs:    • Lack of Transportation (Medical):    • Lack of Transportation (Non-Medical):    Physical Activity:    • Days of Exercise per Week:    • Minutes of Exercise per Session:    Stress:    • Feeling of Stress :    Social Connections:    • Frequency of Communication with Friends and Family:    • Frequency of Social Gatherings with Friends and Family:    • Attends Taoism Services:    • Active Member of Clubs or Organizations:    • Attends Club or Organization Meetings:    • Marital Status:    Intimate Partner Violence:    • Fear of Current or Ex-Partner:    • Emotionally Abused:    • Physically Abused:    • Sexually Abused:         Family History:   Family History   Problem Relation Age of Onset   • Sleep Apnea Neg Hx        PHYSICAL EXAM:   Vital signs: There were no vitals taken for this visit.  GENERAL: Well-developed, well-nourished  in no apparent distress.   EYE: No ocular and eyelid asymmetry, Anicteric sclerae,  PERRL, No exophthalmos or lidlag  HENT: Hearing grossly intact, Normocephalic, atraumatic. Pink, moist mucous membranes, No exudate  NECK: Supple. Trachea midline. thyroid is normal    CARDIOVASCULAR:  No murmurs, rubs, or gallops.   LUNGS: Symmetrical chest expansion with no obvious wheezing  ABDOMEN: Obese abdomen no obvious masses  EXTREMITIES: No clubbing, cyanosis, or edema.   NEUROLOGICAL: Cranial nerves II-XII are grossly intact   , No visible tremor with both outstretched hands  LYMPH: No cervical, supraclavicular,  Adenopathy seen  SKIN: No rashes, lesions. Turgor is normal.  FOOT: No lesions, no ulcers.     Labs:  Lab Results   Component Value Date/Time    HBA1C 6.3 (H) 08/21/2020 0633    AVGLUC 134 08/21/2020 0633       Lab Results   Component Value Date/Time    WBC 4.9 12/31/2020 07:57 AM    RBC 5.03 12/31/2020 07:57 AM    HEMOGLOBIN 15.4 12/31/2020 07:57 AM    MCV 92.0 12/31/2020 07:57 AM    MCH 30.6 12/31/2020 07:57 AM    MCHC 33.3 (L) 12/31/2020 07:57 AM    RDW 44.8 12/31/2020 07:57 AM    MPV 10.9 12/31/2020 07:57 AM       Lab Results   Component Value Date/Time    SODIUM 138 12/31/2020 07:57 AM    POTASSIUM 4.0 12/31/2020 07:57 AM    CHLORIDE 105 12/31/2020 07:57 AM    CO2 26 12/31/2020 07:57 AM    ANION 7.0 12/31/2020 07:57 AM    GLUCOSE 86 12/31/2020 07:57 AM    BUN 31 (H) 12/31/2020 07:57 AM    CREATININE 0.96 12/31/2020 07:57 AM    CREATININE 0.9 01/09/2008 12:15 PM    CALCIUM 10.5 12/31/2020 07:57 AM    ASTSGOT 24 12/31/2020 07:57 AM    ALTSGPT 12 12/31/2020 07:57 AM    TBILIRUBIN 0.4 12/31/2020 07:57 AM    ALBUMIN 4.6 12/31/2020 07:57 AM    ALBUMIN 4.49 01/03/2018 11:18 AM    TOTPROTEIN 7.7 12/31/2020 07:57 AM    TOTPROTEIN 7.50 01/03/2018 11:18 AM    GLOBULIN 3.1 12/31/2020 07:57 AM    AGRATIO 1.5 12/31/2020 07:57 AM       Lab Results   Component Value Date/Time    CHOLSTRLTOT 139 08/21/2020 0633    TRIGLYCERIDE 75 08/21/2020 0633    HDL 35 (A) 08/21/2020 0633    LDL 89 08/21/2020 0633       Lab Results   Component Value Date/Time    MALBCRT 65 (H) 08/21/2020 02:06 PM    MICROALBUR 4.3 08/21/2020 02:06 PM        Lab Results   Component Value Date/Time    TSHULTRASEN 2.720  03/16/2017 1530     No results found for: FREEDIR  Lab Results   Component Value Date/Time    FREET3 3.06 10/31/2016 1526     No results found for: THYSTIMIG      ASSESSMENT/PLAN:     1. Controlled type 2 diabetes mellitus without complication, with long-term current use of insulin (HCC)  Controlled at baseline advised patient that we need an updated A1c  Reviewed pathogenesis of type 2 diabetes and the importance of good glucose control  His insulin requirements are very meager and I explained to him that we can probably replace insulin with other agents for type 2 diabetes management which have benefits such as weight loss and cardiovascular protection  Recommend starting Jardiance 25 mg daily  Reviewed side effects and benefits of this medication reviewed importance of hydration  Discontinue premixed insulin  Continue Metformin  Recommend that he watch his carb intake and exercise regularly  I will see him again in 3 months with repeat of his A1c    2. Mixed hyperlipidemia  Controlled  Continue Lopid for now  Recommend transitioning him to a statin and discontinue Lopid in the future  Repeat fasting lipids in 6 to 12 months    3. Long-term insulin use (HCC)  Patient is currently on insulin therapy and we are transitioning him to multiple oral agents for type 2 diabetes management because his insulin requirements are very low      Return in about 3 months (around 6/2/2021).       This patient during there office visit was started on new medication.  Side effects of new medications were discussed with the patient today in the office. The patient was supplied paperwork on this new medication.    Thank you kindly for allowing me to participate in the diabetes care plan for this patient.    Yemi Westfall MD, [facility], [facility]  03/02/21    CC:   Cherry Long M.D.

## 2021-03-05 ENCOUNTER — HOSPITAL ENCOUNTER (OUTPATIENT)
Dept: LAB | Facility: MEDICAL CENTER | Age: 71
End: 2021-03-05
Attending: INTERNAL MEDICINE
Payer: MEDICARE

## 2021-03-05 DIAGNOSIS — Z79.4 CONTROLLED TYPE 2 DIABETES MELLITUS WITHOUT COMPLICATION, WITH LONG-TERM CURRENT USE OF INSULIN (HCC): ICD-10-CM

## 2021-03-05 DIAGNOSIS — Z79.4 LONG-TERM INSULIN USE (HCC): ICD-10-CM

## 2021-03-05 DIAGNOSIS — E78.2 MIXED HYPERLIPIDEMIA: ICD-10-CM

## 2021-03-05 DIAGNOSIS — E11.9 CONTROLLED TYPE 2 DIABETES MELLITUS WITHOUT COMPLICATION, WITH LONG-TERM CURRENT USE OF INSULIN (HCC): ICD-10-CM

## 2021-03-05 LAB
ALBUMIN SERPL BCP-MCNC: 4.7 G/DL (ref 3.2–4.9)
ALBUMIN/GLOB SERPL: 1.5 G/DL
ALP SERPL-CCNC: 96 U/L (ref 30–99)
ALT SERPL-CCNC: 12 U/L (ref 2–50)
ANION GAP SERPL CALC-SCNC: 16 MMOL/L (ref 7–16)
AST SERPL-CCNC: 29 U/L (ref 12–45)
BILIRUB SERPL-MCNC: 0.4 MG/DL (ref 0.1–1.5)
BUN SERPL-MCNC: 30 MG/DL (ref 8–22)
CALCIUM SERPL-MCNC: 10.4 MG/DL (ref 8.5–10.5)
CHLORIDE SERPL-SCNC: 103 MMOL/L (ref 96–112)
CHOLEST SERPL-MCNC: 138 MG/DL (ref 100–199)
CO2 SERPL-SCNC: 20 MMOL/L (ref 20–33)
CREAT SERPL-MCNC: 0.82 MG/DL (ref 0.5–1.4)
CREAT UR-MCNC: 67.94 MG/DL
EST. AVERAGE GLUCOSE BLD GHB EST-MCNC: 128 MG/DL
FASTING STATUS PATIENT QL REPORTED: NORMAL
GLOBULIN SER CALC-MCNC: 3.1 G/DL (ref 1.9–3.5)
GLUCOSE SERPL-MCNC: 92 MG/DL (ref 65–99)
HBA1C MFR BLD: 6.1 % (ref 4–5.6)
HDLC SERPL-MCNC: 39 MG/DL
LDLC SERPL CALC-MCNC: 82 MG/DL
MICROALBUMIN UR-MCNC: 5.5 MG/DL
MICROALBUMIN/CREAT UR: 81 MG/G (ref 0–30)
POTASSIUM SERPL-SCNC: 3.6 MMOL/L (ref 3.6–5.5)
PROT SERPL-MCNC: 7.8 G/DL (ref 6–8.2)
SODIUM SERPL-SCNC: 139 MMOL/L (ref 135–145)
TRIGL SERPL-MCNC: 87 MG/DL (ref 0–149)

## 2021-03-05 PROCEDURE — 82570 ASSAY OF URINE CREATININE: CPT

## 2021-03-05 PROCEDURE — 83036 HEMOGLOBIN GLYCOSYLATED A1C: CPT | Mod: GA

## 2021-03-05 PROCEDURE — 36415 COLL VENOUS BLD VENIPUNCTURE: CPT

## 2021-03-05 PROCEDURE — 80061 LIPID PANEL: CPT

## 2021-03-05 PROCEDURE — 86341 ISLET CELL ANTIBODY: CPT

## 2021-03-05 PROCEDURE — 84681 ASSAY OF C-PEPTIDE: CPT

## 2021-03-05 PROCEDURE — 82043 UR ALBUMIN QUANTITATIVE: CPT

## 2021-03-05 PROCEDURE — 80053 COMPREHEN METABOLIC PANEL: CPT

## 2021-03-08 DIAGNOSIS — Z79.4 TYPE 2 DIABETES MELLITUS WITHOUT COMPLICATION, WITH LONG-TERM CURRENT USE OF INSULIN (HCC): ICD-10-CM

## 2021-03-08 DIAGNOSIS — G62.9 NEUROPATHY: ICD-10-CM

## 2021-03-08 DIAGNOSIS — E11.9 TYPE 2 DIABETES MELLITUS WITHOUT COMPLICATION, WITH LONG-TERM CURRENT USE OF INSULIN (HCC): ICD-10-CM

## 2021-03-08 DIAGNOSIS — Z23 NEED FOR VACCINATION: ICD-10-CM

## 2021-03-08 LAB — C PEPTIDE SERPL-MCNC: 1.6 NG/ML (ref 0.8–3.5)

## 2021-03-08 RX ORDER — BENAZEPRIL HYDROCHLORIDE 40 MG/1
TABLET ORAL
Qty: 180 TABLET | Refills: 0 | Status: SHIPPED | OUTPATIENT
Start: 2021-03-08 | End: 2021-07-13

## 2021-03-08 RX ORDER — AMLODIPINE BESYLATE 5 MG/1
5 TABLET ORAL 2 TIMES DAILY
Qty: 180 TABLET | Refills: 0 | Status: SHIPPED | OUTPATIENT
Start: 2021-03-08 | End: 2021-06-28 | Stop reason: SDUPTHER

## 2021-03-08 RX ORDER — CELECOXIB 200 MG/1
200 CAPSULE ORAL DAILY
Qty: 90 CAPSULE | Refills: 0 | Status: SHIPPED | OUTPATIENT
Start: 2021-03-08 | End: 2021-10-12

## 2021-03-08 RX ORDER — GEMFIBROZIL 600 MG/1
600 TABLET, FILM COATED ORAL 2 TIMES DAILY
Qty: 180 TABLET | Refills: 0 | Status: SHIPPED | OUTPATIENT
Start: 2021-03-08 | End: 2021-06-24 | Stop reason: SDUPTHER

## 2021-03-10 LAB — GAD65 AB SER IA-ACNC: <5 IU/ML (ref 0–5)

## 2021-03-12 ENCOUNTER — TELEPHONE (OUTPATIENT)
Dept: ENDOCRINOLOGY | Facility: MEDICAL CENTER | Age: 71
End: 2021-03-12

## 2021-03-12 NOTE — TELEPHONE ENCOUNTER
Pt called this morning stating he had right wrist surgery this last Tuesday, 3/9/21. He started his Jardiance again on Wednesday 3/10. He stated that his sugars have been higher than normal. 150-180's is the average. Pt. Requesting a call back instruction him what to do from here. He stated his last A1C was controlled at 6.1 but knows if he keeps getting readings like he has been, the A1C will go up.

## 2021-03-22 DIAGNOSIS — E11.29 TYPE 2 DIABETES MELLITUS WITH MICROALBUMINURIA, WITHOUT LONG-TERM CURRENT USE OF INSULIN (HCC): ICD-10-CM

## 2021-03-22 DIAGNOSIS — R80.9 TYPE 2 DIABETES MELLITUS WITH MICROALBUMINURIA, WITHOUT LONG-TERM CURRENT USE OF INSULIN (HCC): ICD-10-CM

## 2021-03-22 NOTE — TELEPHONE ENCOUNTER
Received request via: Pharmacy    Was the patient seen in the last year in this department? Yes    Does the patient have an active prescription (recently filled or refills available) for medication(s) requested? No     metformin (GLUCOPHAGE) 1000 MG tablet 180 Tab 1/1 12/24/2020    Sig: TAKE 1 TABLET BY MOUTH TWICE A DAY     Patient request 3 month supply.

## 2021-03-22 NOTE — TELEPHONE ENCOUNTER
Received request via: Pharmacy    Was the patient seen in the last year in this department? Yes    Does the patient have an active prescription (recently filled or refills available) for medication(s) requested? No     Pt transferring pharmacies and new rx sent to [facility] pharmacy.

## 2021-03-24 ENCOUNTER — IMMUNIZATION (OUTPATIENT)
Dept: FAMILY PLANNING/WOMEN'S HEALTH CLINIC | Facility: IMMUNIZATION CENTER | Age: 71
End: 2021-03-24
Attending: INTERNAL MEDICINE
Payer: MEDICARE

## 2021-03-24 DIAGNOSIS — Z23 ENCOUNTER FOR VACCINATION: Primary | ICD-10-CM

## 2021-03-24 DIAGNOSIS — Z23 NEED FOR VACCINATION: ICD-10-CM

## 2021-03-24 PROCEDURE — 0001A PFIZER SARS-COV-2 VACCINE: CPT

## 2021-03-24 PROCEDURE — 91300 PFIZER SARS-COV-2 VACCINE: CPT

## 2021-04-13 ENCOUNTER — TELEPHONE (OUTPATIENT)
Dept: ENDOCRINOLOGY | Facility: MEDICAL CENTER | Age: 71
End: 2021-04-13

## 2021-04-13 DIAGNOSIS — Z79.4 LONG-TERM INSULIN USE (HCC): ICD-10-CM

## 2021-04-13 DIAGNOSIS — R80.9 TYPE 2 DIABETES MELLITUS WITH MICROALBUMINURIA, WITHOUT LONG-TERM CURRENT USE OF INSULIN (HCC): ICD-10-CM

## 2021-04-13 DIAGNOSIS — E11.29 TYPE 2 DIABETES MELLITUS WITH MICROALBUMINURIA, WITHOUT LONG-TERM CURRENT USE OF INSULIN (HCC): ICD-10-CM

## 2021-04-13 NOTE — TELEPHONE ENCOUNTER
"Pt. Called today and stated he noticed on Mychart that there was \"Novolog Pen\" and \"Novolog 70/30\". Pt. Made me aware that he is not on the pen, but he is on Novolog 70/30. Pt is requesting the Novolog Pen be removed from his chart. Thank you.  "

## 2021-04-15 ENCOUNTER — IMMUNIZATION (OUTPATIENT)
Dept: FAMILY PLANNING/WOMEN'S HEALTH CLINIC | Facility: IMMUNIZATION CENTER | Age: 71
End: 2021-04-15
Attending: INTERNAL MEDICINE
Payer: MEDICARE

## 2021-04-15 DIAGNOSIS — Z23 ENCOUNTER FOR VACCINATION: Primary | ICD-10-CM

## 2021-04-15 PROCEDURE — 0002A PFIZER SARS-COV-2 VACCINE: CPT | Performed by: INTERNAL MEDICINE

## 2021-04-15 PROCEDURE — 91300 PFIZER SARS-COV-2 VACCINE: CPT | Performed by: INTERNAL MEDICINE

## 2021-04-16 ENCOUNTER — TELEPHONE (OUTPATIENT)
Dept: ENDOCRINOLOGY | Facility: MEDICAL CENTER | Age: 71
End: 2021-04-16

## 2021-04-16 NOTE — TELEPHONE ENCOUNTER
Received VM 4/7/21 stating he needs a f/v with Dr HINTON, he is scheduled for 7/6/21, called pt to see if this appt was okay or he needed to r/s, LVM.. js

## 2021-05-10 DIAGNOSIS — E11.9 DIABETES MELLITUS WITHOUT COMPLICATION (HCC): ICD-10-CM

## 2021-05-11 RX ORDER — INDAPAMIDE 2.5 MG/1
2.5 TABLET ORAL
Qty: 90 TABLET | Refills: 0 | Status: SHIPPED | OUTPATIENT
Start: 2021-05-11 | End: 2021-08-10

## 2021-05-19 DIAGNOSIS — Z79.4 TYPE 2 DIABETES MELLITUS WITH HYPERGLYCEMIA, WITH LONG-TERM CURRENT USE OF INSULIN (HCC): ICD-10-CM

## 2021-05-19 DIAGNOSIS — E11.29 TYPE 2 DIABETES MELLITUS WITH MICROALBUMINURIA, WITHOUT LONG-TERM CURRENT USE OF INSULIN (HCC): ICD-10-CM

## 2021-05-19 DIAGNOSIS — E11.9 DIABETES MELLITUS WITHOUT COMPLICATION (HCC): ICD-10-CM

## 2021-05-19 DIAGNOSIS — R80.9 TYPE 2 DIABETES MELLITUS WITH MICROALBUMINURIA, WITHOUT LONG-TERM CURRENT USE OF INSULIN (HCC): ICD-10-CM

## 2021-05-19 DIAGNOSIS — E11.65 TYPE 2 DIABETES MELLITUS WITH HYPERGLYCEMIA, WITH LONG-TERM CURRENT USE OF INSULIN (HCC): ICD-10-CM

## 2021-05-19 RX ORDER — INSULIN ASPART 100 [IU]/ML
10 INJECTION, SUSPENSION SUBCUTANEOUS 3 TIMES DAILY
Qty: 15 ML | Refills: 3 | Status: SHIPPED
Start: 2021-05-19 | End: 2021-07-06

## 2021-06-08 RX ORDER — HYDROCODONE BITARTRATE AND ACETAMINOPHEN 5; 325 MG/1; MG/1
TABLET ORAL
COMMUNITY
End: 2021-07-06

## 2021-06-08 RX ORDER — EMPAGLIFLOZIN 25 MG/1
TABLET, FILM COATED ORAL
COMMUNITY
End: 2021-07-06

## 2021-06-08 RX ORDER — INSULIN ASPART 100 [IU]/ML
INJECTION, SOLUTION INTRAVENOUS; SUBCUTANEOUS
COMMUNITY
End: 2021-07-06

## 2021-06-24 RX ORDER — GEMFIBROZIL 600 MG/1
600 TABLET, FILM COATED ORAL 2 TIMES DAILY
Qty: 180 TABLET | Refills: 0 | Status: SHIPPED
Start: 2021-06-24 | End: 2021-07-06

## 2021-06-29 RX ORDER — AMLODIPINE BESYLATE 5 MG/1
5 TABLET ORAL 2 TIMES DAILY
Qty: 180 TABLET | Refills: 0 | Status: SHIPPED | OUTPATIENT
Start: 2021-06-29 | End: 2021-09-28

## 2021-06-30 DIAGNOSIS — R80.9 TYPE 2 DIABETES MELLITUS WITH MICROALBUMINURIA, WITHOUT LONG-TERM CURRENT USE OF INSULIN (HCC): ICD-10-CM

## 2021-06-30 DIAGNOSIS — Z79.4 LONG-TERM INSULIN USE (HCC): ICD-10-CM

## 2021-06-30 DIAGNOSIS — E11.29 TYPE 2 DIABETES MELLITUS WITH MICROALBUMINURIA, WITHOUT LONG-TERM CURRENT USE OF INSULIN (HCC): ICD-10-CM

## 2021-07-02 RX ORDER — BLOOD SUGAR DIAGNOSTIC
STRIP MISCELLANEOUS
Qty: 300 STRIP | Refills: 3 | Status: SHIPPED
Start: 2021-07-02 | End: 2021-07-06

## 2021-07-06 ENCOUNTER — OFFICE VISIT (OUTPATIENT)
Dept: ENDOCRINOLOGY | Facility: MEDICAL CENTER | Age: 71
End: 2021-07-06
Attending: INTERNAL MEDICINE
Payer: MEDICARE

## 2021-07-06 ENCOUNTER — TELEPHONE (OUTPATIENT)
Dept: ENDOCRINOLOGY | Facility: MEDICAL CENTER | Age: 71
End: 2021-07-06

## 2021-07-06 VITALS
HEART RATE: 91 BPM | WEIGHT: 208.3 LBS | BODY MASS INDEX: 33.48 KG/M2 | HEIGHT: 66 IN | DIASTOLIC BLOOD PRESSURE: 68 MMHG | SYSTOLIC BLOOD PRESSURE: 120 MMHG | OXYGEN SATURATION: 95 %

## 2021-07-06 DIAGNOSIS — E11.29 TYPE 2 DIABETES MELLITUS WITH MICROALBUMINURIA, WITHOUT LONG-TERM CURRENT USE OF INSULIN (HCC): ICD-10-CM

## 2021-07-06 DIAGNOSIS — Z79.4 LONG-TERM INSULIN USE (HCC): ICD-10-CM

## 2021-07-06 DIAGNOSIS — E11.9 DIABETES MELLITUS WITHOUT COMPLICATION (HCC): ICD-10-CM

## 2021-07-06 DIAGNOSIS — Z79.4 CONTROLLED TYPE 2 DIABETES MELLITUS WITHOUT COMPLICATION, WITH LONG-TERM CURRENT USE OF INSULIN (HCC): ICD-10-CM

## 2021-07-06 DIAGNOSIS — E78.2 MIXED HYPERLIPIDEMIA: ICD-10-CM

## 2021-07-06 DIAGNOSIS — E11.9 CONTROLLED TYPE 2 DIABETES MELLITUS WITHOUT COMPLICATION, WITH LONG-TERM CURRENT USE OF INSULIN (HCC): ICD-10-CM

## 2021-07-06 DIAGNOSIS — R80.9 TYPE 2 DIABETES MELLITUS WITH MICROALBUMINURIA, WITHOUT LONG-TERM CURRENT USE OF INSULIN (HCC): ICD-10-CM

## 2021-07-06 LAB
HBA1C MFR BLD: 5.7 % (ref 0–5.6)
INT CON NEG: NEGATIVE
INT CON POS: POSITIVE

## 2021-07-06 PROCEDURE — 83036 HEMOGLOBIN GLYCOSYLATED A1C: CPT | Performed by: INTERNAL MEDICINE

## 2021-07-06 PROCEDURE — 99211 OFF/OP EST MAY X REQ PHY/QHP: CPT | Performed by: INTERNAL MEDICINE

## 2021-07-06 PROCEDURE — 99214 OFFICE O/P EST MOD 30 MIN: CPT | Performed by: INTERNAL MEDICINE

## 2021-07-06 RX ORDER — BLOOD SUGAR DIAGNOSTIC
STRIP MISCELLANEOUS
Qty: 300 STRIP | Refills: 3 | Status: SHIPPED | OUTPATIENT
Start: 2021-07-06 | End: 2022-07-22 | Stop reason: SDUPTHER

## 2021-07-06 RX ORDER — EMPAGLIFLOZIN 25 MG/1
1 TABLET, FILM COATED ORAL DAILY
Qty: 30 TABLET | Refills: 11 | Status: SHIPPED | OUTPATIENT
Start: 2021-07-06 | End: 2021-07-06

## 2021-07-06 RX ORDER — EMPAGLIFLOZIN 25 MG/1
TABLET, FILM COATED ORAL
Qty: 30 TABLET | Refills: 3 | Status: SHIPPED | OUTPATIENT
Start: 2021-07-06 | End: 2021-10-12 | Stop reason: SDUPTHER

## 2021-07-06 RX ORDER — INSULIN GLARGINE AND LIXISENATIDE 100; 33 U/ML; UG/ML
30 INJECTION, SOLUTION SUBCUTANEOUS DAILY
Qty: 15 ML | Refills: 11 | Status: SHIPPED
Start: 2021-07-06 | End: 2021-07-07

## 2021-07-06 RX ORDER — ATORVASTATIN CALCIUM 20 MG/1
20 TABLET, FILM COATED ORAL DAILY
Qty: 90 TABLET | Refills: 3 | Status: SHIPPED
Start: 2021-07-06 | End: 2022-01-11

## 2021-07-06 ASSESSMENT — FIBROSIS 4 INDEX: FIB4 SCORE: 2.3

## 2021-07-06 ASSESSMENT — PATIENT HEALTH QUESTIONNAIRE - PHQ9: CLINICAL INTERPRETATION OF PHQ2 SCORE: 0

## 2021-07-06 NOTE — PATIENT INSTRUCTIONS
Stop novolog 70/30  Tonight take soliqua 15u daily and increase by 1 unit every other day until AM BG is stable     Continue metformin and jardiance    Stop lopid take atorvastatin

## 2021-07-06 NOTE — TELEPHONE ENCOUNTER
Spoke with [facility] pharmacy tech to confirm they do not need any additional documentation with the patient's prescription for test strips. She states they have received the Rx and confirmed it has a diagnosis code. At this time they do not need anything further from our office.

## 2021-07-06 NOTE — PROGRESS NOTES
CHIEF COMPLAINT: Patient is here for follow up of Type 2 Diabetes Mellitus    HPI:     Zhang Cheung is a 71 y.o. male with Type 2 Diabetes Mellitus here for follow up.    Labs from 7/6/2021 HbA1c is 5.7%    He is on Novolog 70/30 premixed 8-10u TID, Metformin 1000mg bid, Jardiance 25mg daily    We originally started him on Metformin and Jardiance to help him get off insulin however he still requires a little bit of NovoLog 70/30 premixed and is now injecting a max of 20 units a day    He is testing his blood sugars 3 times a day for the past 6 months      He does have microalbuminuria  He is on benazepril 40mg daily  Blood pressure today is controlled    He has mixed hyperlipidemia but is not on a statin  He is on Lopid  LDL cholesterol was less than 100  on March 2021      Eye exam was from July 2020 and was unremarkable         BG Diary:07/06/21  Patient is testing BG 3 times a day and uses a One Touch Ultra meter    Weight has been stable    Diabetes Complications   Retinopathy: No known retinopathy.  Last eye exam: July 15, 2020  Neuropathy: Denies paresthesias or numbness in hands or feet. Denies any foot wounds.  Exercise: Minimal.  Diet: Fair.  Patient's medications, allergies, and social histories were reviewed and updated as appropriate.    ROS:     CONS:     No fever, no chills   EYES:     No diplopia, no blurry vision   CV:           No chest pain, no palpitations   PULM:     No SOB, no cough, no hemoptysis.   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  2020-06: Stress  2019-05: Chronic pain of both knees  2018-12: Encounter for weight loss counseling  2018-07: Obesity (BMI 35.0-39.9  without comorbidity)  2018-05: Psoriasis  2018-05: Benign prostatic hyperplasia with urinary hesitancy  2018-05: Right wrist pain  2018-05: Neuropathy  2018-05: Bilateral shoulder pain  2018-05: Bilateral foot pain  2017-09: Delirium  2017-09: Acute respiratory failure with hypoxia and hypercapnia ([facility])  2017-09: Hyponatremia  2017-09: MEREDITH (acute kidney injury) ([facility])  2017-09: Hypotension  2017-09: Stenosis, spinal, lumbar  2017-09: Lumbar stenosis  2017-04: Typical atrial flutter ([facility])  2017-03: Renal failure  2017-03: Atrial flutter ([facility])  2017-03: Hypoxia  2014-03: Benign prostatic hyperplasia  2014-02: Degeneration of lumbar or lumbosacral intervertebral disc  2013-10: Controlled type 2 diabetes mellitus without complication,   with long-term current use of insulin ([facility])  2013-10: Essential hypertension  2013-10: Dyslipidemia  2013-10: CKD (chronic kidney disease) stage 2, GFR 60-89 ml/min      Past Surgical History:  Past Surgical History:   Procedure Laterality Date   • INGUINAL HERNIA REPAIR Left 1/5/2021    Procedure: REPAIR, HERNIA, INGUINAL-LARGE INCARCERATED WITH MESH;  Surgeon: Rogers Chin M.D.;  Location: SURGERY SAME DAY [facility];  Service: General   • WRIST FUSION Right 9/17/2020    Procedure: FUSION, JOINT, WRIST - SCAPHOID EXCISION AND FOUR CORNER INTERCARPAL ARTHRODESIS, POSTERIOR INTEROSSEUS NERVE EXCISION;  Surgeon: Nhan Mccarthy M.D.;  Location: SURGERY [facility];  Service: Orthopedics   • EXTREME LATERAL INTERBODY FUSION Left 9/18/2017    Procedure: EXTREME LATERAL INTERBODY FUSION L2-3 W/PLATE;  Surgeon: Yemi Sharma M.D.;  Location: [facility];  Service: Neurosurgery   • LUMBAR FUSION POSTERIOR  9/18/2017    Procedure: LUMBAR FUSION POSTERIOR L2-3 INSTRUMENTED;  Surgeon: Yemi Sharma M.D.;  Location: [facility];  Service: Neurosurgery   • LUMBAR LAMINECTOMY DISKECTOMY  9/18/2017    Procedure: LUMBAR LAMINECTOMY DISKECTOMY;  Surgeon: Yemi Sharma  "M.D.;  Location: SURGERY [facility];  Service: Neurosurgery   • RECOVERY  2016    Procedure: IR Deep bone biopsy L2-L3 with general anesthesia-DAYANA;  Surgeon: [facility];  Location: SURGERY PRE-POST PROC UNIT [facility];  Service:    • LUMBAR LAMINECTOMY DISKECTOMY  2014    Performed by Mazin Long M.D. at SURGERY [facility]   • TRANS URETHRAL RESECTION PROSTATE  3/27/2014    Performed by Kyle Guzman M.D. at SURGERY [facility]   • LUMBAR LAMINECTOMY DISKECTOMY  2014    Performed by Mazin Long M.D. at SURGERY [facility]   • LUMBAR LAMINECTOMY DISKECTOMY  2012    Performed by MAZIN LONG at SURGERY [facility]   • SHOULDER ARTHROSCOPY Left    • KNEE MANIPULATION Right    • KNEE ARTHROPLASTY TOTAL Right         Allergies:  Pollen extract     Social History:  Social History     Tobacco Use   • Smoking status: Former Smoker     Packs/day: 0.75     Years: 10.00     Pack years: 7.50     Types: Cigarettes     Quit date: 2008     Years since quittin.2   • Smokeless tobacco: Never Used   Vaping Use   • Vaping Use: Never used   Substance Use Topics   • Alcohol use: Not Currently   • Drug use: Not Currently        Family History:   family history is not on file.      PHYSICAL EXAM:   OBJECTIVE:  Vital signs: /68 (BP Location: Left arm, Patient Position: Sitting, BP Cuff Size: Adult)   Pulse 91   Ht 1.676 m (5' 6\")   Wt 94.5 kg (208 lb 4.8 oz)   SpO2 95%   BMI 33.62 kg/m²   GENERAL: Well-developed, well-nourished in no apparent distress.   EYE:  No ocular asymmetry, PERRLA  HENT: Pink, moist mucous membranes.    NECK: No thyromegaly.   CARDIOVASCULAR:  No murmurs  LUNGS: Clear breath sounds  ABDOMEN: Soft, nontender   EXTREMITIES: No clubbing, cyanosis, or edema.   NEUROLOGICAL: No gross focal motor abnormalities   LYMPH: No cervical adenopathy palpated.   SKIN: No rashes, lesions.   Monofilament testing with a 10 gram force: " sensation: intact bilaterally  Visual Inspection: Feet without maceration, ulcers, or fissures.  Pedal pulses: intact bilaterally    Labs:  Lab Results   Component Value Date/Time    HBA1C 5.7 (A) 07/06/2021 07:49 AM        Lab Results   Component Value Date/Time    WBC 4.9 12/31/2020 07:57 AM    RBC 5.03 12/31/2020 07:57 AM    HEMOGLOBIN 15.4 12/31/2020 07:57 AM    MCV 92.0 12/31/2020 07:57 AM    MCH 30.6 12/31/2020 07:57 AM    MCHC 33.3 (L) 12/31/2020 07:57 AM    RDW 44.8 12/31/2020 07:57 AM    MPV 10.9 12/31/2020 07:57 AM       Lab Results   Component Value Date/Time    SODIUM 139 03/05/2021 11:19 AM    POTASSIUM 3.6 03/05/2021 11:19 AM    CHLORIDE 103 03/05/2021 11:19 AM    CO2 20 03/05/2021 11:19 AM    ANION 16.0 03/05/2021 11:19 AM    GLUCOSE 92 03/05/2021 11:19 AM    BUN 30 (H) 03/05/2021 11:19 AM    CREATININE 0.82 03/05/2021 11:19 AM    CREATININE 0.9 01/09/2008 12:15 PM    CALCIUM 10.4 03/05/2021 11:19 AM    ASTSGOT 29 03/05/2021 11:19 AM    ALTSGPT 12 03/05/2021 11:19 AM    TBILIRUBIN 0.4 03/05/2021 11:19 AM    ALBUMIN 4.7 03/05/2021 11:19 AM    ALBUMIN 4.49 01/03/2018 11:18 AM    TOTPROTEIN 7.8 03/05/2021 11:19 AM    TOTPROTEIN 7.50 01/03/2018 11:18 AM    GLOBULIN 3.1 03/05/2021 11:19 AM    AGRATIO 1.5 03/05/2021 11:19 AM       Lab Results   Component Value Date/Time    CHOLSTRLTOT 138 03/05/2021 1119    TRIGLYCERIDE 87 03/05/2021 1119    HDL 39 (A) 03/05/2021 1119    LDL 82 03/05/2021 1119       Lab Results   Component Value Date/Time    MALBCRT 81 (H) 03/05/2021 11:19 AM    MICROALBUR 5.5 03/05/2021 11:19 AM        Lab Results   Component Value Date/Time    TSHULTRASEN 2.720 03/16/2017 1530     No results found for: FREEDIR  Lab Results   Component Value Date/Time    FREET3 3.06 10/31/2016 1526     No results found for: THYSTIMIG        ASSESSMENT/PLAN:     1. Controlled type 2 diabetes mellitus without complication, with long-term current use of insulin (HCC)  Controlled  Stop NovoLog 70/30 replace  with Soliqua 15 units daily titrate medication until fasting sugars below 120  Continue Metformin and Jardiance  Recommend he get an updated eye exam  Foot exam was completed today  We will contact [facility] to find out what we need to get his test strips covered  Follow-up in 3 months with diabetes nurse see me in 6 months    2. Mixed hyperlipidemia  Stop Lopid  Start atorvastatin  Repeat fasting lipids next year    3. Long-term insulin use (HCC)  Patient is on long-term insulin therapy with other oral agents for type 2 diabetes management      Return in about 3 months (around 10/6/2021).      This patient during there office visit today was started on a new medication.  Side effects of the new medication were discussed with the patient today in the office.     Thank you kindly for allowing me to participate in the diabetes care plan for this patient.    Yemi Westfall MD, JIL, [facility]  07/06/21    CC:   Cherry Long M.D.

## 2021-07-07 DIAGNOSIS — E11.9 CONTROLLED TYPE 2 DIABETES MELLITUS WITHOUT COMPLICATION, WITH LONG-TERM CURRENT USE OF INSULIN (HCC): ICD-10-CM

## 2021-07-07 DIAGNOSIS — Z79.4 CONTROLLED TYPE 2 DIABETES MELLITUS WITHOUT COMPLICATION, WITH LONG-TERM CURRENT USE OF INSULIN (HCC): ICD-10-CM

## 2021-07-07 RX ORDER — INSULIN ASPART 100 [IU]/ML
8 INJECTION, SUSPENSION SUBCUTANEOUS 3 TIMES DAILY
Qty: 15 ML | Refills: 6 | Status: SHIPPED | OUTPATIENT
Start: 2021-07-07 | End: 2021-10-12 | Stop reason: SDUPTHER

## 2021-07-07 NOTE — TELEPHONE ENCOUNTER
Called to notify patient pharmacy confirmed they do not need anything further from our office for his test strips.     Patient states he sent a [employer] message regarding the cost of his Jardiance and Soliqua, he is unable to afford them. He states he will not qualify for patient assistance and would like to discuss returning to 70/30 Novolog and metformin. He only has one Novolog pen left. Patient is requesting a phone call, he was notified Dr. Westfall is seeing patients in clinic and will not be able to address this issue immediately.

## 2021-07-08 ENCOUNTER — HOSPITAL ENCOUNTER (OUTPATIENT)
Dept: LAB | Facility: MEDICAL CENTER | Age: 71
End: 2021-07-08
Attending: UROLOGY
Payer: MEDICARE

## 2021-07-08 PROCEDURE — 36415 COLL VENOUS BLD VENIPUNCTURE: CPT

## 2021-07-08 PROCEDURE — 84154 ASSAY OF PSA FREE: CPT

## 2021-07-08 PROCEDURE — 84153 ASSAY OF PSA TOTAL: CPT

## 2021-07-10 LAB
PSA FREE MFR SERPL: 22 %
PSA FREE SERPL-MCNC: 1.4 NG/ML
PSA SERPL-MCNC: 6.4 NG/ML (ref 0–4)

## 2021-07-12 DIAGNOSIS — Z23 NEED FOR VACCINATION: ICD-10-CM

## 2021-07-12 DIAGNOSIS — Z79.4 TYPE 2 DIABETES MELLITUS WITHOUT COMPLICATION, WITH LONG-TERM CURRENT USE OF INSULIN (HCC): ICD-10-CM

## 2021-07-12 DIAGNOSIS — G62.9 NEUROPATHY: ICD-10-CM

## 2021-07-12 DIAGNOSIS — E11.9 TYPE 2 DIABETES MELLITUS WITHOUT COMPLICATION, WITH LONG-TERM CURRENT USE OF INSULIN (HCC): ICD-10-CM

## 2021-07-13 RX ORDER — BENAZEPRIL HYDROCHLORIDE 40 MG/1
TABLET ORAL
Qty: 180 TABLET | Refills: 0 | Status: SHIPPED | OUTPATIENT
Start: 2021-07-13 | End: 2021-10-05

## 2021-07-14 RX ORDER — CELECOXIB 200 MG/1
200 CAPSULE ORAL DAILY
Qty: 90 CAPSULE | Refills: 0 | OUTPATIENT
Start: 2021-07-14

## 2021-07-15 ENCOUNTER — TELEPHONE (OUTPATIENT)
Dept: PHYSICAL MEDICINE AND REHAB | Facility: MEDICAL CENTER | Age: 71
End: 2021-07-15

## 2021-07-15 NOTE — TELEPHONE ENCOUNTER
I called to Dr Long's office and spoke with the Medical Assistant Raquel and explain to her the situation regarding the refill for Celecoxib 200 mg and she stated she will talk with Dr Long to find out if she will be able to send the refill to the pharmacy if not she will call the patient no schedule an office visit. RR

## 2021-07-29 NOTE — PROGRESS NOTES
"Telemedicine Visit: Established Patient     This encounter was conducted via [employer].   Verbal consent was obtained. Patient's identity was verified.    Subjective:   CC: wrist surgery   Zhang Cheung is a 70 y.o. male presenting for evaluation and management of:    Needs to have wrist surgery   Needs clearance     ROS   Denies any recent fevers or chills. No nausea or vomiting. No chest pains or shortness of breath.     Allergies   Allergen Reactions   • Other Drug      Opioid pain killers sent him to ER   • Pollen Extract      Local pollen, primarily [place]       Current medicines (including changes today)  Current Outpatient Medications   Medication Sig Dispense Refill   • amLODIPine (NORVASC) 5 MG Tab TAKE 1 TABLET BY MOUTH TWICE A  Tab 3   • gemfibrozil (LOPID) 600 MG Tab TAKE 1 TABLET BY MOUTH TWICE A DAY MUST BE SEEN FOR FURTHER REFILLS 180 Tab 0   • indapamide (LOZOL) 2.5 MG Tab TAKE 1 TAB BY MOUTH EVERYDAY 90 Tab 0   • celecoxib (CELEBREX) 200 MG Cap Take 1 Tab by mouth every day.     • metformin (GLUCOPHAGE) 1000 MG tablet TAKE 1 TABLET BY MOUTH TWICE A  Tab 1   • benazepril (LOTENSIN) 40 MG tablet TAKE 1 TABLET BY MOUTH TWICE A  Tab 1   • glucose blood (ONE TOUCH ULTRA TEST) strip USE TO TEST HIGH OR LOW BLOOD SUGAR 3 TIMES A  Strip 0   • Insulin Pen Needle (NOVOFINE 30) 30G X 8 MM Misc Novofine Autocover 30 gauge x 1/3\" needle 300 Each 3   • Lancets Using One Touch Ultra Soft Lancets. Testing 3 times daily for insulin adjustment.  DX. Code 250 02 300 Each 3   • glucose blood strip Check sugars tid prn 100 Strip 1   • alfuzosin (UROXATRAL) 10 MG SR tablet TAKE 1 TABLET BY MOUTH EVERYDAY AT BEDTIME  3   • Multiple Vitamin (MULTI-VITAMIN DAILY PO) Multi Vitamin     • Cholecalciferol (VITAMIN D3) 5000 UNITS Cap Take 1 Cap by mouth every day.     • insulin aspart protamine-insulin aspart (NOVOLOG MIX 70/30) (70-30) 100 UNIT/ML injection Inject 8-10 Units as instructed 3 times " Keep working to lose weight through healthy eating and exercise.  May take 2 extra strength Tylenol 3 times a day as needed for pain     a day before meals. 100 = 5 units  120= 7 units  184 = 9 units  200 = 12 units  Pt can go as high as 22 units if needed     • Omega-3 Fatty Acids (FISH OIL TRIPLE STRENGTH) 1400 MG Cap Take 1 Cap by mouth every day.       No current facility-administered medications for this visit.        Patient Active Problem List    Diagnosis Date Noted   • Lumbar stenosis 09/12/2017     Priority: Medium   • Essential hypertension 10/13/2013     Priority: Medium   • Dyslipidemia 10/13/2013     Priority: Medium   • Benign prostatic hyperplasia 03/27/2014     Priority: Low   • Degeneration of lumbar or lumbosacral intervertebral disc 02/19/2014     Priority: Low   • Type 2 DM 10/13/2013     Priority: Low   • CKD (chronic kidney disease) stage 2, GFR 60-89 ml/min 10/13/2013     Priority: Low   • 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   • Stress 06/30/2020   • Chronic pain of both knees 05/13/2019   • Encounter for weight loss counseling 12/06/2018   • Obesity (BMI 35.0-39.9 without comorbidity) 07/11/2018   • Benign prostatic hyperplasia with urinary hesitancy 05/21/2018   • Right wrist pain 05/21/2018   • Neuropathy 05/21/2018   • Bilateral shoulder pain 05/21/2018   • Bilateral foot pain 05/21/2018   • Stenosis, spinal, lumbar 09/18/2017       Family History   Problem Relation Age of Onset   • Sleep Apnea Neg Hx        He  has a past medical history of Arrhythmia, Arthritis, Diabetes, Diabetic neuropathy (HCC), High cholesterol, Hyperlipidemia, Hypertension (2014), Neuropathy, Pain (04/11/14), Restless leg, Scapholunate advanced collapse of right wrist (2020), Sleep apnea, Snoring, and Unspecified urinary incontinence.  He  has a past surgical history that includes lumbar laminectomy diskectomy (5/2/2012); lumbar laminectomy diskectomy (2/19/2014); trans urethral resection prostate (3/27/2014); lumbar laminectomy diskectomy (4/22/2014); recovery (1/13/2016); other orthopedic surgery (2003/2007/2008); other  "orthopedic surgery; other orthopedic surgery (03/12); other orthopedic surgery (02/14); other; other; other (2014); extreme lateral interbody fusion (Left, 9/18/2017); lumbar fusion posterior (9/18/2017); and lumbar laminectomy diskectomy (9/18/2017).       Objective:   /78 (BP Location: Right arm, Patient Position: Sitting, BP Cuff Size: Adult) Comment: pt stated  Ht 1.676 m (5' 6\") Comment: pt stated  Wt 97.5 kg (215 lb) Comment: pt stated  BMI 34.70 kg/m²     Physical Exam:  Constitutional: Alert, no distress, well-groomed.  Skin: No rashes in visible areas.  Eye: Round. Conjunctiva clear, lids normal. No icterus.   ENMT: Lips pink without lesions, good dentition, moist mucous membranes. Phonation normal.  Neck: No masses, no thyromegaly. Moves freely without pain.  CV: Pulse as reported by patient  Respiratory: Unlabored respiratory effort, no cough or audible wheeze  Psych: Alert and oriented x3, normal affect and mood.       Assessment and Plan:   The following treatment plan was discussed:     1. Scapholunate advanced collapse of right wrist  - Basic Metabolic Panel; Future  - HEMOGLOBIN A1C; Future  - Lipid Profile; Future  - MICROALBUMIN CREAT RATIO URINE; Future    2. Screening for colon cancer  - OCCULT BLOOD FECES IMMUNOASSAY (FIT); Future    3. Dyslipidemia  - Basic Metabolic Panel; Future  - HEMOGLOBIN A1C; Future  - Lipid Profile; Future  - MICROALBUMIN CREAT RATIO URINE; Future    4. Chronic kidney disease, unspecified CKD stage  - Basic Metabolic Panel; Future  - HEMOGLOBIN A1C; Future  - Lipid Profile; Future  - MICROALBUMIN CREAT RATIO URINE; Future    5. Benign essential HTN  - Basic Metabolic Panel; Future  - HEMOGLOBIN A1C; Future  - Lipid Profile; Future  - MICROALBUMIN CREAT RATIO URINE; Future    6. Prediabetes  - Basic Metabolic Panel; Future  - HEMOGLOBIN A1C; Future  - Lipid Profile; Future  - MICROALBUMIN CREAT RATIO URINE; Future    7. Spinal stenosis of lumbar region, " "unspecified whether neurogenic claudication present    8. Stress    Problem List Items Addressed This Visit     Dyslipidemia    Relevant Orders    Basic Metabolic Panel    HEMOGLOBIN A1C    Lipid Profile    MICROALBUMIN CREAT RATIO URINE    Stenosis, spinal, lumbar     Last visit we had a suboptimal conversation re NSAIDs in the setting of CKD   He is working with physiatry  Now              Scapholunate advanced collapse of right wrist     Working with his orthopaedic surgeon   Wearing splint   He tells me about this process in detail   He is very pleased with his service at [facility] orthopaedic  He has plans for \"major wrist surgery\"    He is asking for medical clearance   See letter     Over 25 minutes spent with patient face to face, greater than 50% time spent with plan/coordination of care regarding that which is discussed in the HPI and A&P             Relevant Orders    Basic Metabolic Panel    HEMOGLOBIN A1C    Lipid Profile    MICROALBUMIN CREAT RATIO URINE    Stress     We have prolonged conversation regarding the pandemic                Other Visit Diagnoses     Screening for colon cancer        Relevant Orders    OCCULT BLOOD FECES IMMUNOASSAY (FIT)    Chronic kidney disease, unspecified CKD stage        Relevant Orders    Basic Metabolic Panel    HEMOGLOBIN A1C    Lipid Profile    MICROALBUMIN CREAT RATIO URINE    Benign essential HTN        Relevant Orders    Basic Metabolic Panel    HEMOGLOBIN A1C    Lipid Profile    MICROALBUMIN CREAT RATIO URINE    Prediabetes        Relevant Orders    Basic Metabolic Panel    HEMOGLOBIN A1C    Lipid Profile    MICROALBUMIN CREAT RATIO URINE        Cherry Long M.D.      Follow-up: No follow-ups on file.           "

## 2021-08-10 RX ORDER — INDAPAMIDE 2.5 MG/1
TABLET ORAL
Qty: 90 TABLET | Refills: 0 | Status: SHIPPED | OUTPATIENT
Start: 2021-08-10 | End: 2021-11-09

## 2021-09-22 ENCOUNTER — NON-PROVIDER VISIT (OUTPATIENT)
Dept: MEDICAL GROUP | Facility: MEDICAL CENTER | Age: 71
End: 2021-09-22
Payer: MEDICARE

## 2021-09-22 DIAGNOSIS — Z23 NEED FOR VACCINATION: ICD-10-CM

## 2021-09-22 PROCEDURE — G0008 ADMIN INFLUENZA VIRUS VAC: HCPCS | Performed by: FAMILY MEDICINE

## 2021-09-22 PROCEDURE — 99999 PR NO CHARGE: CPT | Performed by: FAMILY MEDICINE

## 2021-09-22 PROCEDURE — 90662 IIV NO PRSV INCREASED AG IM: CPT | Performed by: FAMILY MEDICINE

## 2021-09-22 NOTE — PROGRESS NOTES
"Zhang Cheung is a 71 y.o. male here for a non-provider visit for:   FLU    Reason for immunization: Annual Flu Vaccine  Immunization records indicate need for vaccine: Yes, confirmed with [employer]  Minimum interval has been met for this vaccine: Yes  ABN completed: No    VIS Dated  8/6/21 was given to patient: Yes  All IAC Questionnaire questions were answered \"No.\"    Patient tolerated injection and no adverse effects were observed or reported: Yes    Pt scheduled for next dose in series: No  "

## 2021-09-28 ENCOUNTER — PATIENT MESSAGE (OUTPATIENT)
Dept: MEDICAL GROUP | Facility: MEDICAL CENTER | Age: 71
End: 2021-09-28

## 2021-09-28 RX ORDER — AMLODIPINE BESYLATE 5 MG/1
TABLET ORAL
Qty: 180 TABLET | Refills: 0 | Status: SHIPPED | OUTPATIENT
Start: 2021-09-28 | End: 2021-10-12 | Stop reason: SDUPTHER

## 2021-09-28 NOTE — TELEPHONE ENCOUNTER
----- Message from Hunter Joshi, Med Ass't sent at 2021  9:54 AM PDT -----  Regarding: Referral to Endo  Please place a new referral for [facility], as old referral has .    Thank you.

## 2021-10-04 DIAGNOSIS — Z23 NEED FOR VACCINATION: ICD-10-CM

## 2021-10-04 DIAGNOSIS — E11.9 TYPE 2 DIABETES MELLITUS WITHOUT COMPLICATION, WITH LONG-TERM CURRENT USE OF INSULIN (HCC): ICD-10-CM

## 2021-10-04 DIAGNOSIS — Z79.4 TYPE 2 DIABETES MELLITUS WITHOUT COMPLICATION, WITH LONG-TERM CURRENT USE OF INSULIN (HCC): ICD-10-CM

## 2021-10-04 DIAGNOSIS — G62.9 NEUROPATHY: ICD-10-CM

## 2021-10-05 RX ORDER — BENAZEPRIL HYDROCHLORIDE 40 MG/1
TABLET ORAL
Qty: 180 TABLET | Refills: 0 | Status: SHIPPED | OUTPATIENT
Start: 2021-10-05 | End: 2021-10-12 | Stop reason: SDUPTHER

## 2021-10-12 ENCOUNTER — NON-PROVIDER VISIT (OUTPATIENT)
Dept: ENDOCRINOLOGY | Facility: MEDICAL CENTER | Age: 71
End: 2021-10-12
Attending: INTERNAL MEDICINE
Payer: MEDICARE

## 2021-10-12 VITALS
WEIGHT: 207 LBS | DIASTOLIC BLOOD PRESSURE: 68 MMHG | HEART RATE: 68 BPM | OXYGEN SATURATION: 97 % | BODY MASS INDEX: 33.27 KG/M2 | HEIGHT: 66 IN | SYSTOLIC BLOOD PRESSURE: 114 MMHG

## 2021-10-12 DIAGNOSIS — I10 ESSENTIAL HYPERTENSION: ICD-10-CM

## 2021-10-12 DIAGNOSIS — Z79.4 CONTROLLED TYPE 2 DIABETES MELLITUS WITHOUT COMPLICATION, WITH LONG-TERM CURRENT USE OF INSULIN (HCC): ICD-10-CM

## 2021-10-12 DIAGNOSIS — E11.9 CONTROLLED TYPE 2 DIABETES MELLITUS WITHOUT COMPLICATION, WITH LONG-TERM CURRENT USE OF INSULIN (HCC): ICD-10-CM

## 2021-10-12 DIAGNOSIS — G62.9 NEUROPATHY: ICD-10-CM

## 2021-10-12 DIAGNOSIS — Z23 NEED FOR VACCINATION: ICD-10-CM

## 2021-10-12 DIAGNOSIS — Z79.4 TYPE 2 DIABETES MELLITUS WITHOUT COMPLICATION, WITH LONG-TERM CURRENT USE OF INSULIN (HCC): ICD-10-CM

## 2021-10-12 DIAGNOSIS — E11.9 TYPE 2 DIABETES MELLITUS WITHOUT COMPLICATION, WITH LONG-TERM CURRENT USE OF INSULIN (HCC): ICD-10-CM

## 2021-10-12 LAB
HBA1C MFR BLD: 5.7 % (ref 0–5.6)
INT CON NEG: ABNORMAL
INT CON POS: ABNORMAL

## 2021-10-12 PROCEDURE — 83036 HEMOGLOBIN GLYCOSYLATED A1C: CPT

## 2021-10-12 PROCEDURE — 99212 OFFICE O/P EST SF 10 MIN: CPT | Performed by: INTERNAL MEDICINE

## 2021-10-12 RX ORDER — INSULIN ASPART 100 [IU]/ML
10 INJECTION, SUSPENSION SUBCUTANEOUS 3 TIMES DAILY
Qty: 30 ML | Refills: 3 | Status: SHIPPED | OUTPATIENT
Start: 2021-10-12 | End: 2022-10-18

## 2021-10-12 RX ORDER — AMLODIPINE BESYLATE 5 MG/1
TABLET ORAL
Qty: 180 TABLET | Refills: 3 | Status: SHIPPED | OUTPATIENT
Start: 2021-10-12 | End: 2022-12-20

## 2021-10-12 RX ORDER — BENAZEPRIL HYDROCHLORIDE 40 MG/1
TABLET ORAL
Qty: 180 TABLET | Refills: 0 | Status: SHIPPED | OUTPATIENT
Start: 2021-10-12 | End: 2022-02-14

## 2021-10-12 RX ORDER — EMPAGLIFLOZIN 25 MG/1
1 TABLET, FILM COATED ORAL
Qty: 30 TABLET | Refills: 3 | Status: SHIPPED | OUTPATIENT
Start: 2021-10-12 | End: 2022-03-07

## 2021-10-12 ASSESSMENT — FIBROSIS 4 INDEX: FIB4 SCORE: 2.3

## 2021-10-12 NOTE — PROGRESS NOTES
"RN-CDE Note    Subjective:   Endocrinology Clinic Progress Note  PCP: Cherry Long M.D.    HPI:  Zhang Cheung is a 71 y.o. old patient who is seen today for review of Type 2 Diabetes.  Recent changes in health: He states the Soliqua was too expensive.  He states his Jardiance is also very expensive.    DM:   Last A1c:   Lab Results   Component Value Date/Time    HBA1C 5.7 (A) 10/12/2021 09:12 AM      Previous A1c was 5.7 on 7/6/21  A1C GOAL: < 7    Diabetes Medications:   Novolog 70/30 8-10 units before meals  Metformin 1000 mg BID  Jardiance 25 mg daily    Exercise: Recumbent bike 30 minutes every other day.  Diet: \"healthy\" vegetarian except lean protein.  Patient's body mass index is 33.41 kg/m². Exercise and nutrition counseling were performed at this visit.    Glucose monitoring frequency: Testing 3 times daily    Hypoglycemic episodes: no  Last Retinal Exam: on file and up-to-date  Daily Foot Exam: Yes   Foot Exam:  Monofilament: done  Monofilament testing with a 10 gram force: sensation intact: intact bilaterally  Visual Inspection: Feet without maceration, ulcers, fissures.  Pedal pulses: intact bilaterally   Lab Results   Component Value Date/Time    MALBCRT 81 (H) 03/05/2021 11:19 AM    MICROALBUR 5.5 03/05/2021 11:19 AM        He  reports that he quit smoking about 13 years ago. His smoking use included cigarettes. He has a 7.50 pack-year smoking history. He has never used smokeless tobacco.      Plan:     Discussed and educated on:   - All medications, side effects and compliance (discussed carefully)  - Annual eye examinations at Ophthalmology  - Home glucose monitoring emphasized  - Weight control and daily exercise    Recommended medication changes: No changes at this time.  He will talk with Dr. Westfall about staying on Jardinace at his next appointment in January.   " Routing refill request to provider for review/approval because:  Called patient regarding RX request.     9-27-19 Ranitidine 150 mg tabs---1 daily sent to [facility].  #90 x 3 refills.     Today's request is from local pharmacy, [facility] and a different dose.    Patient confirmed today's RX request and dose.    States he takes:  Ranitidine 300mg tabs:  1 tab BID.     Please review and approve if agree.     Evie VERA RN,BSN

## 2021-11-09 RX ORDER — INDAPAMIDE 2.5 MG/1
TABLET ORAL
Qty: 90 TABLET | Refills: 0 | Status: SHIPPED | OUTPATIENT
Start: 2021-11-09 | End: 2022-02-08

## 2022-01-11 ENCOUNTER — OFFICE VISIT (OUTPATIENT)
Dept: ENDOCRINOLOGY | Facility: MEDICAL CENTER | Age: 72
End: 2022-01-11
Attending: INTERNAL MEDICINE
Payer: MEDICARE

## 2022-01-11 VITALS
OXYGEN SATURATION: 95 % | HEART RATE: 91 BPM | RESPIRATION RATE: 16 BRPM | HEIGHT: 66 IN | DIASTOLIC BLOOD PRESSURE: 7 MMHG | SYSTOLIC BLOOD PRESSURE: 114 MMHG | BODY MASS INDEX: 34.7 KG/M2 | WEIGHT: 215.9 LBS

## 2022-01-11 DIAGNOSIS — E11.42 DIABETIC POLYNEUROPATHY ASSOCIATED WITH TYPE 2 DIABETES MELLITUS (HCC): ICD-10-CM

## 2022-01-11 DIAGNOSIS — Z79.4 LONG-TERM INSULIN USE (HCC): ICD-10-CM

## 2022-01-11 DIAGNOSIS — Z79.4 CONTROLLED TYPE 2 DIABETES MELLITUS WITHOUT COMPLICATION, WITH LONG-TERM CURRENT USE OF INSULIN (HCC): ICD-10-CM

## 2022-01-11 DIAGNOSIS — E55.9 VITAMIN D DEFICIENCY: ICD-10-CM

## 2022-01-11 DIAGNOSIS — E78.2 MIXED HYPERLIPIDEMIA: ICD-10-CM

## 2022-01-11 DIAGNOSIS — E11.9 CONTROLLED TYPE 2 DIABETES MELLITUS WITHOUT COMPLICATION, WITH LONG-TERM CURRENT USE OF INSULIN (HCC): ICD-10-CM

## 2022-01-11 PROCEDURE — 99211 OFF/OP EST MAY X REQ PHY/QHP: CPT | Performed by: INTERNAL MEDICINE

## 2022-01-11 PROCEDURE — 99214 OFFICE O/P EST MOD 30 MIN: CPT | Performed by: INTERNAL MEDICINE

## 2022-01-11 RX ORDER — GEMFIBROZIL 600 MG/1
TABLET, FILM COATED ORAL
COMMUNITY
End: 2022-01-17 | Stop reason: SDUPTHER

## 2022-01-11 RX ORDER — INSULIN ASPART 100 [IU]/ML
INJECTION, SUSPENSION SUBCUTANEOUS
COMMUNITY
End: 2022-01-11

## 2022-01-11 RX ORDER — BENAZEPRIL HYDROCHLORIDE 40 MG/1
TABLET ORAL
COMMUNITY
End: 2022-01-11

## 2022-01-11 RX ORDER — PREGABALIN 50 MG/1
50 CAPSULE ORAL 3 TIMES DAILY
Qty: 90 CAPSULE | Refills: 5 | Status: SHIPPED | OUTPATIENT
Start: 2022-01-11 | End: 2022-02-10

## 2022-01-11 RX ORDER — INDAPAMIDE 2.5 MG/1
TABLET ORAL
COMMUNITY
End: 2022-01-11

## 2022-01-11 ASSESSMENT — PATIENT HEALTH QUESTIONNAIRE - PHQ9: CLINICAL INTERPRETATION OF PHQ2 SCORE: 0

## 2022-01-11 ASSESSMENT — FIBROSIS 4 INDEX: FIB4 SCORE: 2.3

## 2022-01-11 NOTE — PROGRESS NOTES
CHIEF COMPLAINT: Patient is here for follow up of Type 2 Diabetes Mellitus    HPI:     Zhang Cheung is a 71 y.o. male with Type 2 Diabetes Mellitus here for follow up.    Labs from 10/12/2021 show a1c is 5.7%  Labs from 7/6/2021 HbA1c was 5.7%        He is on Novolog 70/30 premixed 8-10u TID, Metformin 1000mg bid, Jardiance 25mg daily    He has chronic bilateral hip pain and may need a hip replacement  Pain is controlled with tylenol and naprosyn  He also has BPH and need a repeat TURP        He has mixed hyperlipidemia but is not on a statin  He had cramps with atorvastatin   He is on Lopid  LDL cholesterol was less than 100  on March 2021  He is overdue for labs        He does have diabetic kidney disease  He is on benazepril 40mg daily  Blood pressure today is at goal  UACR was > 30 on 3/5/2021  He is not on Kerendia -he is worried about the cost         Eye exam was from 8/2021 by Dr. Whitt and was unremarkable  He doesn't have retinopathy  He does have neuropathy and pain in both feet  He has tried and failed Gabapentin in the past          BG Diary:  Patient is testing BG 3 times a day and uses a One Touch Ultra meter    Weight has been stable    Diabetes Complications   Retinopathy: No known retinopathy.  Last eye exam: 8/2021  Neuropathy: Denies paresthesias or numbness in hands or feet. Denies any foot wounds.  Exercise: Minimal.  Diet: Fair.  Patient's medications, allergies, and social histories were reviewed and updated as appropriate.    ROS:     CONS:     No fever, no chills   EYES:     No diplopia, no blurry vision   CV:           No chest pain, no palpitations   PULM:     No SOB, no cough, no hemoptysis.   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  2020-06: Stress  2019-05: Chronic pain of both knees  2018-12: Encounter for weight loss counseling  2018-07: Obesity (BMI 35.0-39.9 without comorbidity)  2018-05: Psoriasis  2018-05: Benign prostatic hyperplasia with urinary hesitancy  2018-05: Right wrist pain  2018-05: Neuropathy  2018-05: Bilateral shoulder pain  2018-05: Bilateral foot pain  2017-09: Delirium  2017-09: Acute respiratory failure with hypoxia and hypercapnia ([facility])  2017-09: Hyponatremia  2017-09: MEREDITH (acute kidney injury) ([facility])  2017-09: Hypotension  2017-09: Stenosis, spinal, lumbar  2017-09: Lumbar stenosis  2017-04: Typical atrial flutter ([facility])  2017-03: Renal failure  2017-03: Atrial flutter ([facility])  2017-03: Hypoxia  2014-03: Benign prostatic hyperplasia  2014-02: Degeneration of lumbar or lumbosacral intervertebral disc  2013-10: Controlled type 2 diabetes mellitus without complication,   with long-term current use of insulin ([facility])  2013-10: Essential hypertension  2013-10: Dyslipidemia  2013-10: CKD (chronic kidney disease) stage 2, GFR 60-89 ml/min      Past Surgical History:  Past Surgical History:   Procedure Laterality Date   • INGUINAL HERNIA REPAIR Left 1/5/2021    Procedure: REPAIR, HERNIA, INGUINAL-LARGE INCARCERATED WITH MESH;  Surgeon: Rogers Chin M.D.;  Location: SURGERY [facility];  Service: General   • WRIST FUSION Right 9/17/2020    Procedure: FUSION, JOINT, WRIST - SCAPHOID EXCISION AND FOUR CORNER INTERCARPAL ARTHRODESIS, POSTERIOR INTEROSSEUS NERVE EXCISION;  Surgeon: Nhan Mccarthy M.D.;  Location: SURGERY [facility];  Service: Orthopedics   • FUSION, SPINE, XLIF Left 9/18/2017    Procedure: EXTREME LATERAL INTERBODY FUSION L2-3 W/PLATE;  Surgeon: Yemi Sharma M.D.;  Location: SURGERY [facility];  Service: Neurosurgery   • FUSION, SPINE, LUMBAR, PLIF  9/18/2017    Procedure: LUMBAR FUSION POSTERIOR L2-3 INSTRUMENTED;   "Surgeon: Yemi Sharma M.D.;  Location: SURGERY [facility];  Service: Neurosurgery   • LUMBAR LAMINECTOMY DISKECTOMY  2017    Procedure: LUMBAR LAMINECTOMY DISKECTOMY;  Surgeon: Yemi Sharma M.D.;  Location: SURGERY [facility];  Service: Neurosurgery   • RECOVERY  2016    Procedure: IR Deep bone biopsy L2-L3 with general anesthesia-DAYANA;  Surgeon: Recoveryonly Surgery;  [place] [facility]  Service:    • LUMBAR LAMINECTOMY DISKECTOMY  2014    Performed by Mazin Long M.D. at SURGERY [facility]   • TRANS URETHRAL RESECTION PROSTATE  3/27/2014    Performed by Kyle Guzman M.D. at SURGERY [facility]   • LUMBAR LAMINECTOMY DISKECTOMY  2014    Performed by Mazin Long M.D. at SURGERY [facility]   • LUMBAR LAMINECTOMY DISKECTOMY  2012    Performed by MAZIN LONG at SURGERY [facility]   • SHOULDER ARTHROSCOPY Left    • KNEE MANIPULATION Right    • KNEE ARTHROPLASTY TOTAL Right         Allergies:  Pollen extract     Social History:  Social History     Tobacco Use   • Smoking status: Former Smoker     Packs/day: 0.75     Years: 10.00     Pack years: 7.50     Types: Cigarettes     Quit date: 2008     Years since quittin.7   • Smokeless tobacco: Never Used   Vaping Use   • Vaping Use: Never used   Substance Use Topics   • Alcohol use: Not Currently   • Drug use: Not Currently        Family History:   family history is not on file.      PHYSICAL EXAM:   OBJECTIVE:  Vital signs: BP (!) 114/7 (BP Location: Left arm, Patient Position: Sitting, BP Cuff Size: Adult)   Pulse 91   Resp 16   Ht 1.676 m (5' 6\")   Wt 97.9 kg (215 lb 14.4 oz)   SpO2 95%   BMI 34.85 kg/m²   GENERAL: Well-developed, well-nourished in no apparent distress.   EYE:  No ocular asymmetry, PERRLA  HENT: Pink, moist mucous membranes.    NECK: No thyromegaly.   CARDIOVASCULAR:  No murmurs  LUNGS: Clear breath sounds  ABDOMEN: Soft, nontender "   EXTREMITIES: No clubbing, cyanosis, or edema.   NEUROLOGICAL: No gross focal motor abnormalities   LYMPH: No cervical adenopathy palpated.   SKIN: No rashes, lesions.       Labs:  Lab Results   Component Value Date/Time    HBA1C 5.7 (A) 10/12/2021 09:12 AM        Lab Results   Component Value Date/Time    WBC 4.9 12/31/2020 07:57 AM    RBC 5.03 12/31/2020 07:57 AM    HEMOGLOBIN 15.4 12/31/2020 07:57 AM    MCV 92.0 12/31/2020 07:57 AM    MCH 30.6 12/31/2020 07:57 AM    MCHC 33.3 (L) 12/31/2020 07:57 AM    RDW 44.8 12/31/2020 07:57 AM    MPV 10.9 12/31/2020 07:57 AM       Lab Results   Component Value Date/Time    SODIUM 139 03/05/2021 11:19 AM    POTASSIUM 3.6 03/05/2021 11:19 AM    CHLORIDE 103 03/05/2021 11:19 AM    CO2 20 03/05/2021 11:19 AM    ANION 16.0 03/05/2021 11:19 AM    GLUCOSE 92 03/05/2021 11:19 AM    BUN 30 (H) 03/05/2021 11:19 AM    CREATININE 0.82 03/05/2021 11:19 AM    CREATININE 0.9 01/09/2008 12:15 PM    CALCIUM 10.4 03/05/2021 11:19 AM    ASTSGOT 29 03/05/2021 11:19 AM    ALTSGPT 12 03/05/2021 11:19 AM    TBILIRUBIN 0.4 03/05/2021 11:19 AM    ALBUMIN 4.7 03/05/2021 11:19 AM    ALBUMIN 4.49 01/03/2018 11:18 AM    TOTPROTEIN 7.8 03/05/2021 11:19 AM    TOTPROTEIN 7.50 01/03/2018 11:18 AM    GLOBULIN 3.1 03/05/2021 11:19 AM    AGRATIO 1.5 03/05/2021 11:19 AM       Lab Results   Component Value Date/Time    CHOLSTRLTOT 138 03/05/2021 1119    TRIGLYCERIDE 87 03/05/2021 1119    HDL 39 (A) 03/05/2021 1119    LDL 82 03/05/2021 1119       Lab Results   Component Value Date/Time    MALBCRT 81 (H) 03/05/2021 11:19 AM    MICROALBUR 5.5 03/05/2021 11:19 AM        Lab Results   Component Value Date/Time    TSHULTRASEN 2.720 03/16/2017 1530     No results found for: FREEDIR  Lab Results   Component Value Date/Time    FREET3 3.06 10/31/2016 1526     No results found for: THYSTIMIG        ASSESSMENT/PLAN:     1. Controlled type 2 diabetes mellitus without complication, with long-term current use of insulin  (HCC)  Controlled  Continue 7030 premix  Patient is set in his ways and I tried to prescribe him a GLP-1 basal insulin combo but he did not like this because of the cost  Continue metformin and Jardiance  I want him to get updated labs to check his serum creatinine, fasting lipids, urine albumin TSH  Follow-up in 3 months to 6 months    2. Mixed hyperlipidemia  Unstable  Patient has statin intolerance and did not tolerate atorvastatin  He is on Lopid which controls triglycerides but does not affect LDL cholesterol  I recommend that he complete fasting lipids this week  I will try to talk to him about trying a different statin after I review his labs    3. Diabetic polyneuropathy associated with type 2 diabetes mellitus (HCC)  Unstable  Start Lyrica 50 mg 3 times a day  Reassess in 6 months    4. Vitamin D deficiency  Controlled  Continue vitamin D replacement therapy  Will check calcium vitamin D levels in 6 months    5. Long-term insulin use (HCC)  Is on long-term insulin therapy      Return in about 6 months (around 7/11/2022).      Thank you kindly for allowing me to participate in the diabetes care plan for this patient.    Yemi Westfall MD, JIL, [facility]  07/06/21    CC:   Cherry Long M.D.

## 2022-01-14 ENCOUNTER — HOSPITAL ENCOUNTER (OUTPATIENT)
Dept: LAB | Facility: MEDICAL CENTER | Age: 72
End: 2022-01-14
Attending: INTERNAL MEDICINE
Payer: MEDICARE

## 2022-01-14 ENCOUNTER — HOSPITAL ENCOUNTER (OUTPATIENT)
Dept: LAB | Facility: MEDICAL CENTER | Age: 72
End: 2022-01-14
Attending: UROLOGY
Payer: MEDICARE

## 2022-01-14 DIAGNOSIS — Z79.4 LONG-TERM INSULIN USE (HCC): ICD-10-CM

## 2022-01-14 DIAGNOSIS — Z79.4 CONTROLLED TYPE 2 DIABETES MELLITUS WITHOUT COMPLICATION, WITH LONG-TERM CURRENT USE OF INSULIN (HCC): ICD-10-CM

## 2022-01-14 DIAGNOSIS — E55.9 VITAMIN D DEFICIENCY: ICD-10-CM

## 2022-01-14 DIAGNOSIS — E11.9 CONTROLLED TYPE 2 DIABETES MELLITUS WITHOUT COMPLICATION, WITH LONG-TERM CURRENT USE OF INSULIN (HCC): ICD-10-CM

## 2022-01-14 DIAGNOSIS — E78.2 MIXED HYPERLIPIDEMIA: ICD-10-CM

## 2022-01-14 LAB
25(OH)D3 SERPL-MCNC: 41 NG/ML (ref 30–100)
ALBUMIN SERPL BCP-MCNC: 4.6 G/DL (ref 3.2–4.9)
ALBUMIN/GLOB SERPL: 1.7 G/DL
ALP SERPL-CCNC: 91 U/L (ref 30–99)
ALT SERPL-CCNC: 16 U/L (ref 2–50)
ANION GAP SERPL CALC-SCNC: 13 MMOL/L (ref 7–16)
AST SERPL-CCNC: 28 U/L (ref 12–45)
BILIRUB SERPL-MCNC: 0.4 MG/DL (ref 0.1–1.5)
BUN SERPL-MCNC: 40 MG/DL (ref 8–22)
CALCIUM SERPL-MCNC: 10 MG/DL (ref 8.5–10.5)
CHLORIDE SERPL-SCNC: 104 MMOL/L (ref 96–112)
CHOLEST SERPL-MCNC: 182 MG/DL (ref 100–199)
CO2 SERPL-SCNC: 26 MMOL/L (ref 20–33)
CREAT SERPL-MCNC: 1.02 MG/DL (ref 0.5–1.4)
CREAT UR-MCNC: 81.65 MG/DL
FASTING STATUS PATIENT QL REPORTED: NORMAL
GLOBULIN SER CALC-MCNC: 2.7 G/DL (ref 1.9–3.5)
GLUCOSE SERPL-MCNC: 95 MG/DL (ref 65–99)
HDLC SERPL-MCNC: 30 MG/DL
LDLC SERPL CALC-MCNC: 86 MG/DL
MICROALBUMIN UR-MCNC: 3.1 MG/DL
MICROALBUMIN/CREAT UR: 38 MG/G (ref 0–30)
POTASSIUM SERPL-SCNC: 4.3 MMOL/L (ref 3.6–5.5)
PROT SERPL-MCNC: 7.3 G/DL (ref 6–8.2)
SODIUM SERPL-SCNC: 143 MMOL/L (ref 135–145)
T4 FREE SERPL-MCNC: 1.02 NG/DL (ref 0.93–1.7)
TRIGL SERPL-MCNC: 331 MG/DL (ref 0–149)
TSH SERPL DL<=0.005 MIU/L-ACNC: 5.66 UIU/ML (ref 0.38–5.33)

## 2022-01-14 PROCEDURE — 82043 UR ALBUMIN QUANTITATIVE: CPT

## 2022-01-14 PROCEDURE — 36415 COLL VENOUS BLD VENIPUNCTURE: CPT

## 2022-01-14 PROCEDURE — 82306 VITAMIN D 25 HYDROXY: CPT

## 2022-01-14 PROCEDURE — 82570 ASSAY OF URINE CREATININE: CPT

## 2022-01-14 PROCEDURE — 80053 COMPREHEN METABOLIC PANEL: CPT

## 2022-01-14 PROCEDURE — 84439 ASSAY OF FREE THYROXINE: CPT

## 2022-01-14 PROCEDURE — 84154 ASSAY OF PSA FREE: CPT

## 2022-01-14 PROCEDURE — 80061 LIPID PANEL: CPT

## 2022-01-14 PROCEDURE — 84443 ASSAY THYROID STIM HORMONE: CPT

## 2022-01-14 PROCEDURE — 84153 ASSAY OF PSA TOTAL: CPT

## 2022-01-16 DIAGNOSIS — E03.8 SUBCLINICAL HYPOTHYROIDISM: ICD-10-CM

## 2022-01-16 RX ORDER — LEVOTHYROXINE SODIUM 0.03 MG/1
25 TABLET ORAL
Qty: 90 TABLET | Refills: 1 | Status: SHIPPED
Start: 2022-01-16 | End: 2022-01-28

## 2022-01-17 DIAGNOSIS — E78.2 MIXED HYPERLIPIDEMIA: ICD-10-CM

## 2022-01-17 DIAGNOSIS — E03.8 SUBCLINICAL HYPOTHYROIDISM: ICD-10-CM

## 2022-01-17 RX ORDER — GEMFIBROZIL 600 MG/1
TABLET, FILM COATED ORAL
Qty: 60 TABLET | Refills: 11 | Status: SHIPPED | OUTPATIENT
Start: 2022-01-17 | End: 2022-12-12

## 2022-01-18 NOTE — PROGRESS NOTES
Patient admits to eating kelp and excess iodine can cause hypothyroidism     He doesn't want to take thyroid hormone    Recommend that he stop kelp and we will repeat his labs

## 2022-01-19 LAB
PSA FREE MFR SERPL: 26 %
PSA FREE SERPL-MCNC: 1.6 NG/ML
PSA SERPL-MCNC: 6.1 NG/ML (ref 0–4)

## 2022-01-27 PROBLEM — M16.12 PRIMARY OSTEOARTHRITIS OF LEFT HIP: Status: ACTIVE | Noted: 2022-01-27

## 2022-02-03 ENCOUNTER — HOSPITAL ENCOUNTER (OUTPATIENT)
Facility: MEDICAL CENTER | Age: 72
End: 2022-02-03
Attending: ANESTHESIOLOGY
Payer: MEDICARE

## 2022-02-03 PROCEDURE — U0005 INFEC AGEN DETEC AMPLI PROBE: HCPCS

## 2022-02-03 PROCEDURE — U0003 INFECTIOUS AGENT DETECTION BY NUCLEIC ACID (DNA OR RNA); SEVERE ACUTE RESPIRATORY SYNDROME CORONAVIRUS 2 (SARS-COV-2) (CORONAVIRUS DISEASE [COVID-19]), AMPLIFIED PROBE TECHNIQUE, MAKING USE OF HIGH THROUGHPUT TECHNOLOGIES AS DESCRIBED BY CMS-2020-01-R: HCPCS

## 2022-02-08 RX ORDER — INDAPAMIDE 2.5 MG/1
TABLET ORAL
Qty: 90 TABLET | Refills: 0 | Status: SHIPPED | OUTPATIENT
Start: 2022-02-08 | End: 2022-04-22

## 2022-02-12 DIAGNOSIS — Z79.4 TYPE 2 DIABETES MELLITUS WITHOUT COMPLICATION, WITH LONG-TERM CURRENT USE OF INSULIN (HCC): ICD-10-CM

## 2022-02-12 DIAGNOSIS — I10 ESSENTIAL HYPERTENSION: ICD-10-CM

## 2022-02-12 DIAGNOSIS — E11.9 TYPE 2 DIABETES MELLITUS WITHOUT COMPLICATION, WITH LONG-TERM CURRENT USE OF INSULIN (HCC): ICD-10-CM

## 2022-02-14 RX ORDER — BENAZEPRIL HYDROCHLORIDE 40 MG/1
TABLET ORAL
Qty: 180 TABLET | Refills: 0 | Status: SHIPPED | OUTPATIENT
Start: 2022-02-14 | End: 2022-07-01

## 2022-02-14 NOTE — TELEPHONE ENCOUNTER
Received request via: Pharmacy    Was the patient seen in the last year in this department? Yes    Does the patient have an active prescription (recently filled or refills available) for medication(s) requested? No     REQUESTED MEDICATION:   Requested Prescriptions     Pending Prescriptions Disp Refills   • benazepril (LOTENSIN) 40 MG tablet [Pharmacy Med Name: BENAZEPRIL HCL 40MG TABS] 180 Tablet 0     Sig: TAKE ONE TABLET BY MOUTH TWICE A DAY

## 2022-03-07 DIAGNOSIS — Z79.4 CONTROLLED TYPE 2 DIABETES MELLITUS WITHOUT COMPLICATION, WITH LONG-TERM CURRENT USE OF INSULIN (HCC): ICD-10-CM

## 2022-03-07 DIAGNOSIS — E11.9 CONTROLLED TYPE 2 DIABETES MELLITUS WITHOUT COMPLICATION, WITH LONG-TERM CURRENT USE OF INSULIN (HCC): ICD-10-CM

## 2022-03-07 RX ORDER — EMPAGLIFLOZIN 25 MG/1
TABLET, FILM COATED ORAL
Qty: 30 TABLET | Refills: 3 | Status: SHIPPED | OUTPATIENT
Start: 2022-03-07 | End: 2022-07-05

## 2022-03-24 ENCOUNTER — HOSPITAL ENCOUNTER (OUTPATIENT)
Dept: LAB | Facility: MEDICAL CENTER | Age: 72
End: 2022-03-24
Attending: INTERNAL MEDICINE
Payer: MEDICARE

## 2022-03-24 DIAGNOSIS — Z79.4 LONG-TERM INSULIN USE (HCC): ICD-10-CM

## 2022-03-24 DIAGNOSIS — E11.9 CONTROLLED TYPE 2 DIABETES MELLITUS WITHOUT COMPLICATION, WITH LONG-TERM CURRENT USE OF INSULIN (HCC): ICD-10-CM

## 2022-03-24 DIAGNOSIS — E55.9 VITAMIN D DEFICIENCY: ICD-10-CM

## 2022-03-24 DIAGNOSIS — E03.8 SUBCLINICAL HYPOTHYROIDISM: ICD-10-CM

## 2022-03-24 DIAGNOSIS — Z79.4 CONTROLLED TYPE 2 DIABETES MELLITUS WITHOUT COMPLICATION, WITH LONG-TERM CURRENT USE OF INSULIN (HCC): ICD-10-CM

## 2022-03-24 DIAGNOSIS — E78.2 MIXED HYPERLIPIDEMIA: ICD-10-CM

## 2022-03-24 LAB
25(OH)D3 SERPL-MCNC: 48 NG/ML (ref 30–100)
ALBUMIN SERPL BCP-MCNC: 4.8 G/DL (ref 3.2–4.9)
ALBUMIN/GLOB SERPL: 2.1 G/DL
ALP SERPL-CCNC: 122 U/L (ref 30–99)
ALT SERPL-CCNC: 10 U/L (ref 2–50)
ANION GAP SERPL CALC-SCNC: 17 MMOL/L (ref 7–16)
AST SERPL-CCNC: 25 U/L (ref 12–45)
BILIRUB SERPL-MCNC: 0.3 MG/DL (ref 0.1–1.5)
BUN SERPL-MCNC: 33 MG/DL (ref 8–22)
CALCIUM SERPL-MCNC: 9.9 MG/DL (ref 8.5–10.5)
CHLORIDE SERPL-SCNC: 103 MMOL/L (ref 96–112)
CO2 SERPL-SCNC: 21 MMOL/L (ref 20–33)
CREAT SERPL-MCNC: 1.03 MG/DL (ref 0.5–1.4)
CREAT UR-MCNC: 49.72 MG/DL
FASTING STATUS PATIENT QL REPORTED: NORMAL
GFR SERPLBLD CREATININE-BSD FMLA CKD-EPI: 76 ML/MIN/1.73 M 2
GLOBULIN SER CALC-MCNC: 2.3 G/DL (ref 1.9–3.5)
GLUCOSE SERPL-MCNC: 99 MG/DL (ref 65–99)
MICROALBUMIN UR-MCNC: 1.8 MG/DL
MICROALBUMIN/CREAT UR: 36 MG/G (ref 0–30)
POTASSIUM SERPL-SCNC: 4.2 MMOL/L (ref 3.6–5.5)
PROT SERPL-MCNC: 7.1 G/DL (ref 6–8.2)
SODIUM SERPL-SCNC: 141 MMOL/L (ref 135–145)
T4 FREE SERPL-MCNC: 0.99 NG/DL (ref 0.93–1.7)
TSH SERPL DL<=0.005 MIU/L-ACNC: 3.99 UIU/ML (ref 0.38–5.33)

## 2022-03-24 PROCEDURE — 84439 ASSAY OF FREE THYROXINE: CPT

## 2022-03-24 PROCEDURE — 82043 UR ALBUMIN QUANTITATIVE: CPT

## 2022-03-24 PROCEDURE — 82570 ASSAY OF URINE CREATININE: CPT

## 2022-03-24 PROCEDURE — 80053 COMPREHEN METABOLIC PANEL: CPT

## 2022-03-24 PROCEDURE — 82306 VITAMIN D 25 HYDROXY: CPT

## 2022-03-24 PROCEDURE — 36415 COLL VENOUS BLD VENIPUNCTURE: CPT

## 2022-03-24 PROCEDURE — 84443 ASSAY THYROID STIM HORMONE: CPT

## 2022-03-29 PROBLEM — M16.11 OSTEOARTHRITIS OF RIGHT HIP: Status: ACTIVE | Noted: 2022-03-29

## 2022-04-20 DIAGNOSIS — Z79.4 CONTROLLED TYPE 2 DIABETES MELLITUS WITHOUT COMPLICATION, WITH LONG-TERM CURRENT USE OF INSULIN (HCC): ICD-10-CM

## 2022-04-20 DIAGNOSIS — E11.9 CONTROLLED TYPE 2 DIABETES MELLITUS WITHOUT COMPLICATION, WITH LONG-TERM CURRENT USE OF INSULIN (HCC): ICD-10-CM

## 2022-04-20 RX ORDER — LANCETS 33 GAUGE
EACH MISCELLANEOUS
Qty: 100 EACH | Refills: 11 | Status: SHIPPED | OUTPATIENT
Start: 2022-04-20 | End: 2022-07-27 | Stop reason: SDUPTHER

## 2022-04-21 DIAGNOSIS — I10 ESSENTIAL HYPERTENSION: ICD-10-CM

## 2022-04-21 DIAGNOSIS — E11.9 TYPE 2 DIABETES MELLITUS WITHOUT COMPLICATION, WITH LONG-TERM CURRENT USE OF INSULIN (HCC): ICD-10-CM

## 2022-04-21 DIAGNOSIS — Z79.4 TYPE 2 DIABETES MELLITUS WITHOUT COMPLICATION, WITH LONG-TERM CURRENT USE OF INSULIN (HCC): ICD-10-CM

## 2022-04-22 RX ORDER — INDAPAMIDE 2.5 MG/1
TABLET ORAL
Qty: 90 TABLET | Refills: 0 | Status: SHIPPED | OUTPATIENT
Start: 2022-04-22 | End: 2022-07-21

## 2022-05-03 ENCOUNTER — PATIENT MESSAGE (OUTPATIENT)
Dept: MEDICAL GROUP | Facility: MEDICAL CENTER | Age: 72
End: 2022-05-03
Payer: MEDICARE

## 2022-06-09 ENCOUNTER — TELEMEDICINE (OUTPATIENT)
Dept: MEDICAL GROUP | Facility: MEDICAL CENTER | Age: 72
End: 2022-06-09
Payer: MEDICARE

## 2022-06-09 VITALS — HEIGHT: 66 IN | TEMPERATURE: 98.6 F | BODY MASS INDEX: 33.75 KG/M2 | WEIGHT: 210 LBS

## 2022-06-09 DIAGNOSIS — Z12.11 SCREENING FOR COLON CANCER: ICD-10-CM

## 2022-06-09 DIAGNOSIS — R39.11 BENIGN PROSTATIC HYPERPLASIA WITH URINARY HESITANCY: ICD-10-CM

## 2022-06-09 DIAGNOSIS — M16.11 PRIMARY OSTEOARTHRITIS OF RIGHT HIP: ICD-10-CM

## 2022-06-09 DIAGNOSIS — Z79.4 CONTROLLED TYPE 2 DIABETES MELLITUS WITHOUT COMPLICATION, WITH LONG-TERM CURRENT USE OF INSULIN (HCC): ICD-10-CM

## 2022-06-09 DIAGNOSIS — E11.42 DIABETIC POLYNEUROPATHY ASSOCIATED WITH TYPE 2 DIABETES MELLITUS (HCC): ICD-10-CM

## 2022-06-09 DIAGNOSIS — I10 ESSENTIAL HYPERTENSION: ICD-10-CM

## 2022-06-09 DIAGNOSIS — E11.9 CONTROLLED TYPE 2 DIABETES MELLITUS WITHOUT COMPLICATION, WITH LONG-TERM CURRENT USE OF INSULIN (HCC): ICD-10-CM

## 2022-06-09 DIAGNOSIS — N40.1 BENIGN PROSTATIC HYPERPLASIA WITH URINARY HESITANCY: ICD-10-CM

## 2022-06-09 PROCEDURE — 99214 OFFICE O/P EST MOD 30 MIN: CPT | Mod: 95 | Performed by: FAMILY MEDICINE

## 2022-06-09 ASSESSMENT — FIBROSIS 4 INDEX: FIB4 SCORE: 2.2

## 2022-06-09 NOTE — ASSESSMENT & PLAN NOTE
Seeing endo  Doing well   Lab Results   Component Value Date/Time    HBA1C 5.7 (A) 10/12/2021 09:12 AM

## 2022-06-09 NOTE — ASSESSMENT & PLAN NOTE
Excellent control   Had small amount of weight loss and saw his blood pressure improve drastically

## 2022-06-09 NOTE — PROGRESS NOTES
Virtual Visit: Established Patient   This visit was conducted via Zoom using secure and encrypted videoconferencing technology.   The patient was in their home in the state of [place].    The patient's identity was confirmed and verbal consent was obtained for this virtual visit.     Subjective:   CC:   Chief Complaint   Patient presents with   • Other     Other checking in with us. Pt has been seeing Dr Westfall regularly. Pt has hip replacement few days ago       Zhang Cheung is a 72 y.o. male presenting for evaluation and management of:          Current medicines (including changes today)  Current Outpatient Medications   Medication Sig Dispense Refill   • Multiple Vitamins-Minerals (PRESERVISION AREDS PO) Take 2 Capsules by mouth every day.     • cyclobenzaprine (FLEXERIL) 5 mg tablet Take 1-2 Tablets by mouth every 8 hours as needed for Moderate Pain or Muscle Spasms for up to 7 days. 30 Tablet 0   • aspirin 81 MG EC tablet Take 1 Tablet by mouth 2 times a day with meals. 90 Tablet 0   • HYDROcodone-acetaminophen (NORCO) 5-325 MG Tab per tablet Take 1 Tablet by mouth every four hours as needed (pain) for up to 7 days. 42 Tablet 0   • indapamide (LOZOL) 2.5 MG Tab TAKE ONE TABLET BY MOUTH EVERY DAY 90 Tablet 0   • Lancets (ONETOUCH DELICA PLUS [id number]) Misc USE 1 THREE TIMES A  Each 11   • JARDIANCE 25 MG Tab TAKE ONE TABLET BY MOUTH EVERY DAY 30 Tablet 3   • benazepril (LOTENSIN) 40 MG tablet TAKE ONE TABLET BY MOUTH TWICE A  Tablet 0   • gemfibrozil (LOPID) 600 MG Tab gemfibrozil 600 mg tablet   2 tablets every day by oral route. 60 Tablet 11   • amLODIPine (NORVASC) 5 MG Tab TAKE ONE TABLET BY MOUTH TWICE A  Tablet 3   • Insulin Aspart Prot & Aspart (NOVOLOG MIX 70/30 FLEXPEN) (70-30) 100 UNIT/ML Suspension Pen-injector Inject 10 Units under the skin 3 times a day. 30 mL 3   • metformin (GLUCOPHAGE) 1000 MG tablet TAKE 1 TABLET BY MOUTH TWICE A  tablet 3   • Insulin Pen Needle  "(NOVOFINE 30) 30G X 8 MM Misc Novofine Autocover 30 gauge x 1/3\" needle 300 Each 3   • glucose blood (ONETOUCH ULTRA) strip USE TO TEST HIGH OR LOW BLOOD SUGAR 3 TIMES A DAY *E11.9, OK TO FILL 10/3/20* 300 Strip 3   • alfuzosin (UROXATRAL) 10 MG SR tablet TAKE 1 TABLET BY MOUTH EVERYDAY AT BEDTIME  3   • Multiple Vitamin (MULTI-VITAMIN DAILY PO) Take  by mouth every day.     • Cholecalciferol (VITAMIN D3) 5000 UNITS Cap Take 1 Cap by mouth every day.     • Omega-3 Fatty Acids (FISH OIL TRIPLE STRENGTH) 1400 MG Cap Take 1 Cap by mouth every day.       No current facility-administered medications for this visit.       Patient Active Problem List    Diagnosis Date Noted   • Osteoarthritis of right hip 03/29/2022   • Primary osteoarthritis of left hip 01/27/2022   • Long-term insulin use ([facility]) 01/11/2022   • Mixed hyperlipidemia 01/11/2022   • Diabetic polyneuropathy associated with type 2 diabetes mellitus ([facility]) 01/11/2022   • Left inguinal hernia 01/05/2021   • Exposure to COVID-19 virus 11/24/2020   • Sleep apnea 09/17/2020   • Preop examination 08/19/2020   • 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   • Stress 06/30/2020   • Chronic pain of both knees 05/13/2019   • Encounter for weight loss counseling 12/06/2018   • Obesity (BMI 35.0-39.9 without comorbidity) 07/11/2018   • Benign prostatic hyperplasia with urinary hesitancy 05/21/2018   • Right wrist pain 05/21/2018   • Neuropathy 05/21/2018   • Bilateral shoulder pain 05/21/2018   • Bilateral foot pain 05/21/2018   • Stenosis, spinal, lumbar 09/18/2017   • Lumbar stenosis 09/12/2017   • Benign prostatic hyperplasia 03/27/2014   • Degeneration of lumbar or lumbosacral intervertebral disc 02/19/2014   • Controlled type 2 diabetes mellitus without complication, with long-term current use of insulin ([facility]) 10/13/2013   • Essential hypertension 10/13/2013   • Dyslipidemia 10/13/2013   • CKD (chronic kidney disease) stage 2, GFR 60-89 ml/min 10/13/2013 " "       Objective:   Temp 37 °C (98.6 °F) (Temporal) Comment: pt stated  Ht 1.676 m (5' 6\") Comment: pt stated  Wt 95.3 kg (210 lb) Comment: pt stated  BMI 33.89 kg/m²     Physical Exam:  Constitutional: Alert, no distress, well-groomed.  Skin: No rashes in visible areas.  Eye: Round. Conjunctiva clear, lids normal. No icterus.   ENMT: Lips pink without lesions, good dentition, moist mucous membranes. Phonation normal.  Neck: No masses, no thyromegaly. Moves freely without pain.  Respiratory: Unlabored respiratory effort, no cough or audible wheeze  Psych: Alert and oriented x3, normal affect and mood.     Assessment and Plan:   The following treatment plan was discussed:     1. Benign prostatic hyperplasia with urinary hesitancy    2. Controlled type 2 diabetes mellitus without complication, with long-term current use of insulin ([facility])    3. Essential hypertension    4. Primary osteoarthritis of right hip    5. Screening for colon cancer  - OCCULT BLOOD FECES IMMUNOASSAY; Future    6. Diabetic polyneuropathy associated with type 2 diabetes mellitus (HCC)    Other orders  - Multiple Vitamins-Minerals (PRESERVISION AREDS PO); Take 2 Capsules by mouth every day.    Problem List Items Addressed This Visit     Controlled type 2 diabetes mellitus without complication, with long-term current use of insulin ([facility])     Seeing endo  Doing well   Lab Results   Component Value Date/Time    HBA1C 5.7 (A) 10/12/2021 09:12 AM                 Essential hypertension     Excellent control   Had small amount of weight loss and saw his blood pressure improve drastically                Benign prostatic hyperplasia with urinary hesitancy     Seeing urology   1-2x per year visits   Talking about turp revision   Following the PSA                  Diabetic polyneuropathy associated with type 2 diabetes mellitus (HCC)     Lab Results   Component Value Date/Time    HBA1C 5.7 (A) 10/12/2021 09:12 AM                 Osteoarthritis of right hip "     S/p bilateral hip replacement   Really pleased  Doing well                  Other Visit Diagnoses     Screening for colon cancer        Relevant Orders    OCCULT BLOOD FECES IMMUNOASSAY          Follow-up: No follow-ups on file.

## 2022-06-09 NOTE — ASSESSMENT & PLAN NOTE
Anticoagulation Summary as of 3/28/2017     INR goal 2.0-3.0   Selected INR 1.3! (3/28/2017)   Maintenance plan 5 mg (5 mg x 1) every day   Weekly total 35 mg   Plan last modified Robinson Edwards, PHARMD (3/13/2017)   Next INR check 3/30/2017   Target end date 4/6/2017    Indications   Other and unspecified coagulation defects (CMS-HCC) [D68.9] [D68.9]         Anticoagulation Episode Summary     INR check location     Preferred lab     Send INR reminders to     Comments Orthopedic Prophylaxis      Anticoagulation Care Providers     Provider Role Specialty Phone number    [facility] Responsible  [phone]    Serafin Fitzgerald M.D. Responsible Orthopaedics [phone]        Pt misunderstood instructions and HELD warfarin for 4 days.  Pt states appetite is decreased.  Pt receives meals on wheels.  Will bolus dose with 10mg tonight and 7.5mg tomorrow. Follow up in 2 days.  Janelle Roa, PHARMD       S/p bilateral hip replacement   Really pleased  Doing well

## 2022-06-21 ENCOUNTER — HOSPITAL ENCOUNTER (OUTPATIENT)
Facility: MEDICAL CENTER | Age: 72
End: 2022-06-21
Attending: FAMILY MEDICINE
Payer: MEDICARE

## 2022-06-21 PROCEDURE — 82274 ASSAY TEST FOR BLOOD FECAL: CPT

## 2022-06-24 DIAGNOSIS — Z12.11 SCREENING FOR COLON CANCER: ICD-10-CM

## 2022-06-27 LAB — IMM ASSAY OCC BLD FITOB: NEGATIVE

## 2022-06-30 DIAGNOSIS — Z79.4 TYPE 2 DIABETES MELLITUS WITHOUT COMPLICATION, WITH LONG-TERM CURRENT USE OF INSULIN (HCC): ICD-10-CM

## 2022-06-30 DIAGNOSIS — I10 ESSENTIAL HYPERTENSION: ICD-10-CM

## 2022-06-30 DIAGNOSIS — E11.9 TYPE 2 DIABETES MELLITUS WITHOUT COMPLICATION, WITH LONG-TERM CURRENT USE OF INSULIN (HCC): ICD-10-CM

## 2022-07-01 RX ORDER — BENAZEPRIL HYDROCHLORIDE 40 MG/1
TABLET ORAL
Qty: 180 TABLET | Refills: 0 | Status: SHIPPED | OUTPATIENT
Start: 2022-07-01 | End: 2022-09-30

## 2022-07-05 DIAGNOSIS — E11.9 CONTROLLED TYPE 2 DIABETES MELLITUS WITHOUT COMPLICATION, WITH LONG-TERM CURRENT USE OF INSULIN (HCC): ICD-10-CM

## 2022-07-05 DIAGNOSIS — Z79.4 CONTROLLED TYPE 2 DIABETES MELLITUS WITHOUT COMPLICATION, WITH LONG-TERM CURRENT USE OF INSULIN (HCC): ICD-10-CM

## 2022-07-05 RX ORDER — EMPAGLIFLOZIN 25 MG/1
TABLET, FILM COATED ORAL
Qty: 30 TABLET | Refills: 3 | Status: SHIPPED | OUTPATIENT
Start: 2022-07-05 | End: 2022-07-15 | Stop reason: SDUPTHER

## 2022-07-07 NOTE — TELEPHONE ENCOUNTER
Received request via: Pharmacy    Was the patient seen in the last year in this department? Yes    Does the patient have an active prescription (recently filled or refills available) for medication(s) requested? No   Vital Signs Last 24 Hrs  T(C): 36.9 (07-07-22 @ 08:30), Max: 37.2 (07-06-22 @ 17:00)  T(F): 98.5 (07-07-22 @ 08:30), Max: 98.9 (07-06-22 @ 17:00)  HR: 76 (07-07-22 @ 08:30) (76 - 80)  BP: 101/59 (07-07-22 @ 08:30) (101/59 - 107/71)  BP(mean): --  RR: 18 (07-07-22 @ 08:30) (18 - 18)  SpO2: 98% (07-07-22 @ 08:30) (98% - 100%)

## 2022-07-08 ENCOUNTER — HOSPITAL ENCOUNTER (OUTPATIENT)
Dept: LAB | Facility: MEDICAL CENTER | Age: 72
End: 2022-07-08
Attending: INTERNAL MEDICINE
Payer: MEDICARE

## 2022-07-15 ENCOUNTER — PATIENT MESSAGE (OUTPATIENT)
Dept: MEDICAL GROUP | Facility: MEDICAL CENTER | Age: 72
End: 2022-07-15

## 2022-07-15 ENCOUNTER — OFFICE VISIT (OUTPATIENT)
Dept: ENDOCRINOLOGY | Facility: MEDICAL CENTER | Age: 72
End: 2022-07-15
Attending: INTERNAL MEDICINE
Payer: MEDICARE

## 2022-07-15 VITALS
OXYGEN SATURATION: 97 % | HEART RATE: 84 BPM | SYSTOLIC BLOOD PRESSURE: 104 MMHG | BODY MASS INDEX: 34.23 KG/M2 | WEIGHT: 213 LBS | HEIGHT: 66 IN | DIASTOLIC BLOOD PRESSURE: 64 MMHG

## 2022-07-15 DIAGNOSIS — Z78.9 STATIN INTOLERANCE: ICD-10-CM

## 2022-07-15 DIAGNOSIS — Z79.4 TYPE 2 DIABETES MELLITUS WITHOUT COMPLICATION, WITH LONG-TERM CURRENT USE OF INSULIN (HCC): ICD-10-CM

## 2022-07-15 DIAGNOSIS — I10 ESSENTIAL HYPERTENSION: ICD-10-CM

## 2022-07-15 DIAGNOSIS — Z79.4 LONG-TERM INSULIN USE (HCC): ICD-10-CM

## 2022-07-15 DIAGNOSIS — E03.8 SUBCLINICAL HYPOTHYROIDISM: ICD-10-CM

## 2022-07-15 DIAGNOSIS — E11.9 CONTROLLED TYPE 2 DIABETES MELLITUS WITHOUT COMPLICATION, WITH LONG-TERM CURRENT USE OF INSULIN (HCC): ICD-10-CM

## 2022-07-15 DIAGNOSIS — E78.2 MIXED HYPERLIPIDEMIA: ICD-10-CM

## 2022-07-15 DIAGNOSIS — Z79.4 CONTROLLED TYPE 2 DIABETES MELLITUS WITHOUT COMPLICATION, WITH LONG-TERM CURRENT USE OF INSULIN (HCC): ICD-10-CM

## 2022-07-15 DIAGNOSIS — E11.42 CONTROLLED TYPE 2 DIABETES MELLITUS WITH DIABETIC POLYNEUROPATHY, WITH LONG-TERM CURRENT USE OF INSULIN (HCC): ICD-10-CM

## 2022-07-15 DIAGNOSIS — E11.9 TYPE 2 DIABETES MELLITUS WITHOUT COMPLICATION, WITH LONG-TERM CURRENT USE OF INSULIN (HCC): ICD-10-CM

## 2022-07-15 DIAGNOSIS — Z79.4 CONTROLLED TYPE 2 DIABETES MELLITUS WITH DIABETIC POLYNEUROPATHY, WITH LONG-TERM CURRENT USE OF INSULIN (HCC): ICD-10-CM

## 2022-07-15 LAB
HBA1C MFR BLD: 5.8 % (ref 0–5.6)
INT CON NEG: ABNORMAL
INT CON POS: ABNORMAL

## 2022-07-15 PROCEDURE — 99212 OFFICE O/P EST SF 10 MIN: CPT | Performed by: INTERNAL MEDICINE

## 2022-07-15 PROCEDURE — 99214 OFFICE O/P EST MOD 30 MIN: CPT | Performed by: INTERNAL MEDICINE

## 2022-07-15 PROCEDURE — 83036 HEMOGLOBIN GLYCOSYLATED A1C: CPT | Performed by: INTERNAL MEDICINE

## 2022-07-15 RX ORDER — PREGABALIN 50 MG/1
50 CAPSULE ORAL 3 TIMES DAILY
Qty: 90 CAPSULE | Refills: 5 | Status: SHIPPED | OUTPATIENT
Start: 2022-07-15 | End: 2022-08-14

## 2022-07-15 RX ORDER — EMPAGLIFLOZIN 25 MG/1
1 TABLET, FILM COATED ORAL
Qty: 30 TABLET | Refills: 6 | Status: SHIPPED | OUTPATIENT
Start: 2022-07-15 | End: 2023-07-14

## 2022-07-15 ASSESSMENT — FIBROSIS 4 INDEX: FIB4 SCORE: 2.2

## 2022-07-15 NOTE — PROGRESS NOTES
CHIEF COMPLAINT: Patient is here for follow up of Type 2 Diabetes Mellitus    HPI:     Zhang Cheung is a 72 y.o. male with Type 2 Diabetes Mellitus here for follow up.    Labs from 10/12/2021 show a1c is 5.7%  Labs from 7/6/2021 HbA1c was 5.7%        He is on Novolog 70/30 premixed 8-10u TID,   Metformin 1000mg bid,   Jardiance 25mg daily  He is on Lyrica 50 mg 3 times a day for diabetic neuropathy      He had bilateral hip replacement  He denies Se with his mneds         He has mixed hyperlipidemia but is not on a statin  He had cramps with atorvastatin   He is on Lopid  We discussed getting a lipid profile this week       He does have diabetic kidney disease  He is on benazepril 40mg daily  Blood pressure today is at goal  UACR was 35 on 3/24/2022  UACR was > 30 on 3/5/2021  He is not on Kerendia -he is worried about the cost         Eye exam was from 8/2021 by Dr. Whitt and was unremarkable  He doesn't have retinopathy  He does have neuropathy and pain in both feet  He has tried and failed Gabapentin in the past   He is on Lyrica 50 mg 3 times a day for diabetic neuropathy           BG Diary:  Patient is testing BG 3 times a day and uses a One Touch Ultra meter    Weight has been stable    Diabetes Complications   Retinopathy: No known retinopathy.  Last eye exam: 8/2021  Neuropathy: Denies paresthesias or numbness in hands or feet. Denies any foot wounds.  Exercise: Minimal.  Diet: Fair.  Patient's medications, allergies, and social histories were reviewed and updated as appropriate.    ROS:     CONS:     No fever, no chills   EYES:     No diplopia, no blurry vision   CV:           No chest pain, no palpitations   PULM:     No SOB, no cough, no hemoptysis.   GI:            No nausea, no vomiting, no diarrhea, no constipation   ENDO:     No polyuria, no polydipsia, no heat intolerance, no cold intolerance       Past Medical History:  Problem List:  2022-07: Statin intolerance  2022-03: Osteoarthritis of  right hip  2022-01: Primary osteoarthritis of left hip  2022-01: Long-term insulin use ([facility])  2022-01: Mixed hyperlipidemia  2022-01: Diabetic polyneuropathy associated with type 2 diabetes   mellitus ([facility])  2021-01: Left inguinal hernia  2020-11: Exposure to COVID-19 virus  2020-09: Sleep apnea  2020-08: Preop examination  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  2020-06: Stress  2019-05: Chronic pain of both knees  2018-12: Encounter for weight loss counseling  2018-07: Obesity (BMI 35.0-39.9 without comorbidity)  2018-05: Psoriasis  2018-05: Benign prostatic hyperplasia with urinary hesitancy  2018-05: Right wrist pain  2018-05: Neuropathy  2018-05: Bilateral shoulder pain  2018-05: Bilateral foot pain  2017-09: Delirium  2017-09: Acute respiratory failure with hypoxia and hypercapnia ([facility])  2017-09: Hyponatremia  2017-09: MEREDITH (acute kidney injury) ([facility])  2017-09: Hypotension  2017-09: Stenosis, spinal, lumbar  2017-09: Lumbar stenosis  2017-04: Typical atrial flutter ([facility])  2017-03: Renal failure  2017-03: Atrial flutter ([facility])  2017-03: Hypoxia  2014-03: Benign prostatic hyperplasia  2014-02: Degeneration of lumbar or lumbosacral intervertebral disc  2013-10: Controlled type 2 diabetes mellitus without complication,   with long-term current use of insulin ([facility])  2013-10: Essential hypertension  2013-10: Dyslipidemia  2013-10: CKD (chronic kidney disease) stage 2, GFR 60-89 ml/min      Past Surgical History:  Past Surgical History:   Procedure Laterality Date   • LA TOTAL HIP ARTHROPLASTY Right 6/6/2022    Procedure: RIGHT ANTERIOR TOTAL HIP ARTHROPLASTY;  Surgeon: Jose Manuel Combs M.D.;  Location: [facility];  Service: Orthopedics   • LA TOTAL HIP ARTHROPLASTY Left 2/7/2022    Procedure: LEFT ANTERIOR TOTAL HIP ARTHROPLASTY;  Surgeon: Jose Manuel Combs M.D.;  Location: [facility];  Service: Orthopedics   • INGUINAL HERNIA REPAIR Left 1/5/2021    Procedure:  REPAIR, HERNIA, INGUINAL-LARGE INCARCERATED WITH MESH;  Surgeon: Mazin Chin M.D.;  Location: SURGERY SAME DAY [facility];  Service: General   • WRIST FUSION Right 2020    Procedure: FUSION, JOINT, WRIST - SCAPHOID EXCISION AND FOUR CORNER INTERCARPAL ARTHRODESIS, POSTERIOR INTEROSSEUS NERVE EXCISION;  Surgeon: Nhan Mccarthy M.D.;  Location: SURGERY [facility];  Service: Orthopedics   • FUSION, SPINE, XLIF Left 2017    Procedure: EXTREME LATERAL INTERBODY FUSION L2-3 W/PLATE;  Surgeon: Yemi Sharma M.D.;  Location: SURGERY [facility];  Service: Neurosurgery   • FUSION, SPINE, LUMBAR, PLIF  2017    Procedure: LUMBAR FUSION POSTERIOR L2-3 INSTRUMENTED;  Surgeon: Yemi Sharma M.D.;  Location: SURGERY [facility];  Service: Neurosurgery   • LUMBAR LAMINECTOMY DISKECTOMY  2017    Procedure: LUMBAR LAMINECTOMY DISKECTOMY;  Surgeon: Yemi Sharma M.D.;  Location: SURGERY [facility];  Service: Neurosurgery   • RECOVERY  2016    Procedure: IR Deep bone biopsy L2-L3 with general anesthesia-DAYANA;  Surgeon: [facility];  Location: SURGERY PRE-POST PROC UNIT [facility];  Service:    • LUMBAR LAMINECTOMY DISKECTOMY  2014    Performed by Mazin Logn M.D. at [facility]   • TRANS URETHRAL RESECTION PROSTATE  3/27/2014    Performed by Kyle Guzman M.D. at [facility]   • LUMBAR LAMINECTOMY DISKECTOMY  2014    Performed by Mazin Long M.D. at [facility]   • LUMBAR LAMINECTOMY DISKECTOMY  2012    Performed by MAZIN LONG at [facility]   • SHOULDER ARTHROSCOPY Left 2008   • KNEE MANIPULATION Right 2007   • KNEE ARTHROPLASTY TOTAL Right         Allergies:  Pollen extract     Social History:  Social History     Tobacco Use   • Smoking status: Former Smoker     Packs/day: 0.75     Years: 10.00     Pack years: 7.50     Types: Cigarettes     Quit date: 2008     Years since quittin.2   •  "Smokeless tobacco: Never Used   Vaping Use   • Vaping Use: Never used   Substance Use Topics   • Alcohol use: Not Currently   • Drug use: Not Currently        Family History:   family history is not on file.      PHYSICAL EXAM:   OBJECTIVE:  Vital signs: /64   Pulse 84   Ht 1.676 m (5' 6\")   Wt 96.6 kg (213 lb)   SpO2 97%   BMI 34.38 kg/m²   GENERAL: Well-developed, well-nourished in no apparent distress.   EYE:  No ocular asymmetry, PERRLA  HENT: Pink, moist mucous membranes.    NECK: No thyromegaly.   CARDIOVASCULAR:  No murmurs  LUNGS: Clear breath sounds  ABDOMEN: Soft, nontender   EXTREMITIES: No clubbing, cyanosis, or edema.   NEUROLOGICAL: No gross focal motor abnormalities   LYMPH: No cervical adenopathy palpated.   SKIN: No rashes, lesions.       Labs:  Lab Results   Component Value Date/Time    HBA1C 5.8 (A) 07/15/2022 10:21 AM        Lab Results   Component Value Date/Time    WBC 4.9 12/31/2020 07:57 AM    RBC 5.03 12/31/2020 07:57 AM    HEMOGLOBIN 15.4 12/31/2020 07:57 AM    MCV 92.0 12/31/2020 07:57 AM    MCH 30.6 12/31/2020 07:57 AM    MCHC 33.3 (L) 12/31/2020 07:57 AM    RDW 44.8 12/31/2020 07:57 AM    MPV 10.9 12/31/2020 07:57 AM       Lab Results   Component Value Date/Time    SODIUM 141 03/24/2022 11:30 AM    POTASSIUM 4.2 03/24/2022 11:30 AM    CHLORIDE 103 03/24/2022 11:30 AM    CO2 21 03/24/2022 11:30 AM    ANION 17.0 (H) 03/24/2022 11:30 AM    GLUCOSE 99 03/24/2022 11:30 AM    BUN 33 (H) 03/24/2022 11:30 AM    CREATININE 1.03 03/24/2022 11:30 AM    CREATININE 0.9 01/09/2008 12:15 PM    CALCIUM 9.9 03/24/2022 11:30 AM    ASTSGOT 25 03/24/2022 11:30 AM    ALTSGPT 10 03/24/2022 11:30 AM    TBILIRUBIN 0.3 03/24/2022 11:30 AM    ALBUMIN 4.8 03/24/2022 11:30 AM    ALBUMIN 4.49 01/03/2018 11:18 AM    TOTPROTEIN 7.1 03/24/2022 11:30 AM    TOTPROTEIN 7.50 01/03/2018 11:18 AM    GLOBULIN 2.3 03/24/2022 11:30 AM    AGRATIO 2.1 03/24/2022 11:30 AM       Lab Results   Component Value Date/Time    " CHOLSTRLTOT 138 03/05/2021 1119    TRIGLYCERIDE 87 03/05/2021 1119    HDL 39 (A) 03/05/2021 1119    LDL 82 03/05/2021 1119       Lab Results   Component Value Date/Time    MALBCRT 36 (H) 03/24/2022 11:30 AM    MICROALBUR 1.8 03/24/2022 11:30 AM        Lab Results   Component Value Date/Time    TSHULTRASEN 2.720 03/16/2017 1530     No results found for: FREEDIR  Lab Results   Component Value Date/Time    FREET3 3.06 10/31/2016 1526     No results found for: THYSTIMIG        ASSESSMENT/PLAN:     1. Controlled type 2 diabetes mellitus without complication, with long-term current use of insulin (HCC)  Controlled  Continue 70/30 premix  Patient is set in his ways and I tried to prescribe him a GLP-1 basal insulin combo but he did not like this because of the cost  Continue metformin   Continue Jardiance  I want him to get updated labs to check his serum creatinine, fasting lipids, urine albumin this week  Follow-up in 6 months    2. Mixed hyperlipidemia  Stable  Patient has statin intolerance and did not tolerate atorvastatin  He is on Lopid which controls triglycerides but does not affect LDL cholesterol  I recommend that he complete fasting lipids this week      3. Diabetic polyneuropathy associated with type 2 diabetes mellitus (HCC)  Stable  Controlled Lyrica 50 mg 3 times a day  Reassess in 6 months    4. Vitamin D deficiency  Controlled  Continue vitamin D replacement therapy  Will check calcium vitamin D levels in 6 months    5. Long-term insulin use (HCC)  Is on long-term insulin therapy      Return in about 6 months (around 1/15/2023).      Thank you kindly for allowing me to participate in the diabetes care plan for this patient.    Yemi Westfall MD, FACE, [facility]  07/06/21    CC:   Cherry Long M.D.

## 2022-07-15 NOTE — PROGRESS NOTES
RN-CDE Note    Subjective:   Endocrinology Clinic Progress Note  PCP: Cherry Long M.D.    HPI:  Zhang Cheung is a 72 y.o. old patient who is seen today for review of Type 2 Diabetes.  Recent changes in health: Recently had hip surgery and states his blood sugars are going up faster.  DM:   Last A1c:   Lab Results   Component Value Date/Time    HBA1C 5.8 (A) 07/15/2022 10:21 AM      Previous A1c was 5.7 on 10/12/21  A1C GOAL: < 7    Diabetes Medications:   Novolog 70/30 10 units TID  Metformin 1000 mg BID  Jardiance 25 mg daily      Exercise: Walking and recumbent bike  Diet: 90% vegetarian except chicken breast and fish.  Low carbohydrate  Patient's body mass index is 34.38 kg/m². Exercise and nutrition counseling were performed at this visit.    Glucose monitoring frequency: Testing blood sugars 3-6 times daily  Fastin-110, Lunch: 130-140 and Bed: <150  Hypoglycemic episodes: no  Last Retinal Exam: on file and up-to-date  Daily Foot Exam: Yes   Foot Exam:  Monofilament: done  Monofilament testing with a 10 gram force: sensation intact: intact bilaterally  Visual Inspection: Feet without maceration, ulcers, fissures.  Having cramping and pain in feet at night.  Pedal pulses: intact bilaterally   Lab Results   Component Value Date/Time    MALBCRT 36 (H) 2022 11:30 AM    MICROALBUR 1.8 2022 11:30 AM     He  reports that he quit smoking about 14 years ago. His smoking use included cigarettes. He has a 7.50 pack-year smoking history. He has never used smokeless tobacco.      Plan:     Discussed and educated on:   - All medications, side effects and compliance (discussed carefully)  - Annual eye examinations at Ophthalmology  - Home glucose monitoring emphasized  - Weight control and daily exercise    Recommended medication changes:  No changes at this time.  Jardiance 25 mg x6 sampled ( [id number] expir. 10/24).

## 2022-07-18 DIAGNOSIS — E11.9 CONTROLLED TYPE 2 DIABETES MELLITUS WITHOUT COMPLICATION, WITH LONG-TERM CURRENT USE OF INSULIN (HCC): ICD-10-CM

## 2022-07-18 DIAGNOSIS — Z79.4 CONTROLLED TYPE 2 DIABETES MELLITUS WITHOUT COMPLICATION, WITH LONG-TERM CURRENT USE OF INSULIN (HCC): ICD-10-CM

## 2022-07-18 RX ORDER — INSULIN ADMIN. SUPPLIES
1 INSULIN PEN (EA) SUBCUTANEOUS DAILY
Qty: 100 EACH | Refills: 11 | Status: SHIPPED | OUTPATIENT
Start: 2022-07-18 | End: 2023-02-26

## 2022-07-20 DIAGNOSIS — Z79.4 TYPE 2 DIABETES MELLITUS WITHOUT COMPLICATION, WITH LONG-TERM CURRENT USE OF INSULIN (HCC): ICD-10-CM

## 2022-07-20 DIAGNOSIS — E11.9 TYPE 2 DIABETES MELLITUS WITHOUT COMPLICATION, WITH LONG-TERM CURRENT USE OF INSULIN (HCC): ICD-10-CM

## 2022-07-20 DIAGNOSIS — I10 ESSENTIAL HYPERTENSION: ICD-10-CM

## 2022-07-21 RX ORDER — INDAPAMIDE 2.5 MG/1
TABLET ORAL
Qty: 90 TABLET | Refills: 0 | Status: SHIPPED | OUTPATIENT
Start: 2022-07-21 | End: 2022-10-18

## 2022-07-22 ENCOUNTER — PATIENT MESSAGE (OUTPATIENT)
Dept: ENDOCRINOLOGY | Facility: MEDICAL CENTER | Age: 72
End: 2022-07-22
Payer: MEDICARE

## 2022-07-22 DIAGNOSIS — Z79.4 CONTROLLED TYPE 2 DIABETES MELLITUS WITHOUT COMPLICATION, WITH LONG-TERM CURRENT USE OF INSULIN (HCC): ICD-10-CM

## 2022-07-22 DIAGNOSIS — E11.9 CONTROLLED TYPE 2 DIABETES MELLITUS WITHOUT COMPLICATION, WITH LONG-TERM CURRENT USE OF INSULIN (HCC): ICD-10-CM

## 2022-07-22 RX ORDER — BLOOD SUGAR DIAGNOSTIC
STRIP MISCELLANEOUS
Qty: 300 STRIP | Refills: 3 | Status: SHIPPED | OUTPATIENT
Start: 2022-07-22 | End: 2023-05-30 | Stop reason: SDUPTHER

## 2022-07-27 ENCOUNTER — PATIENT MESSAGE (OUTPATIENT)
Dept: ENDOCRINOLOGY | Facility: MEDICAL CENTER | Age: 72
End: 2022-07-27
Payer: MEDICARE

## 2022-07-27 DIAGNOSIS — E11.9 CONTROLLED TYPE 2 DIABETES MELLITUS WITHOUT COMPLICATION, WITH LONG-TERM CURRENT USE OF INSULIN (HCC): ICD-10-CM

## 2022-07-27 DIAGNOSIS — Z79.4 CONTROLLED TYPE 2 DIABETES MELLITUS WITHOUT COMPLICATION, WITH LONG-TERM CURRENT USE OF INSULIN (HCC): ICD-10-CM

## 2022-07-30 RX ORDER — LANCETS 33 GAUGE
1 EACH MISCELLANEOUS 3 TIMES DAILY
Qty: 300 EACH | Refills: 11 | Status: SHIPPED | OUTPATIENT
Start: 2022-07-30 | End: 2023-05-30 | Stop reason: SDUPTHER

## 2022-08-01 ENCOUNTER — HOSPITAL ENCOUNTER (OUTPATIENT)
Dept: LAB | Facility: MEDICAL CENTER | Age: 72
End: 2022-08-01
Attending: INTERNAL MEDICINE
Payer: MEDICARE

## 2022-08-01 DIAGNOSIS — Z78.9 STATIN INTOLERANCE: ICD-10-CM

## 2022-08-01 DIAGNOSIS — I10 ESSENTIAL HYPERTENSION: ICD-10-CM

## 2022-08-01 DIAGNOSIS — E78.2 MIXED HYPERLIPIDEMIA: ICD-10-CM

## 2022-08-01 DIAGNOSIS — Z79.4 LONG-TERM INSULIN USE (HCC): ICD-10-CM

## 2022-08-01 DIAGNOSIS — E11.42 CONTROLLED TYPE 2 DIABETES MELLITUS WITH DIABETIC POLYNEUROPATHY, WITH LONG-TERM CURRENT USE OF INSULIN (HCC): ICD-10-CM

## 2022-08-01 DIAGNOSIS — Z79.4 CONTROLLED TYPE 2 DIABETES MELLITUS WITH DIABETIC POLYNEUROPATHY, WITH LONG-TERM CURRENT USE OF INSULIN (HCC): ICD-10-CM

## 2022-08-01 LAB
ALBUMIN SERPL BCP-MCNC: 4.8 G/DL (ref 3.2–4.9)
ALBUMIN/GLOB SERPL: 1.5 G/DL
ALP SERPL-CCNC: 130 U/L (ref 30–99)
ALT SERPL-CCNC: 6 U/L (ref 2–50)
ANION GAP SERPL CALC-SCNC: 14 MMOL/L (ref 7–16)
AST SERPL-CCNC: 23 U/L (ref 12–45)
BILIRUB SERPL-MCNC: 0.3 MG/DL (ref 0.1–1.5)
BUN SERPL-MCNC: 48 MG/DL (ref 8–22)
CALCIUM SERPL-MCNC: 10.3 MG/DL (ref 8.5–10.5)
CHLORIDE SERPL-SCNC: 107 MMOL/L (ref 96–112)
CHOLEST SERPL-MCNC: 169 MG/DL (ref 100–199)
CO2 SERPL-SCNC: 24 MMOL/L (ref 20–33)
CREAT SERPL-MCNC: 1.29 MG/DL (ref 0.5–1.4)
CREAT UR-MCNC: 68.22 MG/DL
FASTING STATUS PATIENT QL REPORTED: NORMAL
GFR SERPLBLD CREATININE-BSD FMLA CKD-EPI: 58 ML/MIN/1.73 M 2
GLOBULIN SER CALC-MCNC: 3.1 G/DL (ref 1.9–3.5)
GLUCOSE SERPL-MCNC: 76 MG/DL (ref 65–99)
HDLC SERPL-MCNC: 37 MG/DL
LDLC SERPL CALC-MCNC: 106 MG/DL
MICROALBUMIN UR-MCNC: 2.9 MG/DL
MICROALBUMIN/CREAT UR: 43 MG/G (ref 0–30)
POTASSIUM SERPL-SCNC: 4.8 MMOL/L (ref 3.6–5.5)
PROT SERPL-MCNC: 7.9 G/DL (ref 6–8.2)
SODIUM SERPL-SCNC: 145 MMOL/L (ref 135–145)
TRIGL SERPL-MCNC: 128 MG/DL (ref 0–149)

## 2022-08-01 PROCEDURE — 80053 COMPREHEN METABOLIC PANEL: CPT

## 2022-08-01 PROCEDURE — 82570 ASSAY OF URINE CREATININE: CPT

## 2022-08-01 PROCEDURE — 82043 UR ALBUMIN QUANTITATIVE: CPT

## 2022-08-01 PROCEDURE — 80061 LIPID PANEL: CPT

## 2022-09-12 DIAGNOSIS — E11.9 CONTROLLED TYPE 2 DIABETES MELLITUS WITHOUT COMPLICATION, WITH LONG-TERM CURRENT USE OF INSULIN (HCC): ICD-10-CM

## 2022-09-12 DIAGNOSIS — Z79.4 CONTROLLED TYPE 2 DIABETES MELLITUS WITHOUT COMPLICATION, WITH LONG-TERM CURRENT USE OF INSULIN (HCC): ICD-10-CM

## 2022-10-15 NOTE — PROGRESS NOTES
"Subjective:   Zhang Cheung is a 67 y.o. male who presents today for surgical clearance for back surgery.    He is a patient of Dr. Torres in our office. Hx of aflutter with aflutter ablation, DM, HTN, BENEDICT with CPAP, and obesity.    He is overall doing well from a cardiac standpoint. He does feel his pulse regularly and has no irregularities noted. He also has had no feelings of palpitations, chest pain, or shortness of breath.    His back has been his main concern and he is very excited to get this procedure done.     Past Medical History   Diagnosis Date   • Hyperlipidemia    • Unspecified urinary incontinence      \" enlarged prostate\"   • Hypertension 2014     well  controlled   • Diabetes      IDDM   • Arthritis      generalized   • Snoring    • High cholesterol    • Arrhythmia      A Flutter   • Sleep apnea      CPAP   • Pain 04/11/14     back pain increases with activity   • Restless leg      Past Surgical History   Procedure Laterality Date   • Lumbar laminectomy diskectomy  5/2/2012     Performed by MAZIN LONG at SURGERY [facility]   • Lumbar laminectomy diskectomy  2/19/2014     Performed by Mazin Long M.D. at SURGERY [facility]   • Trans urethral resection prostate  3/27/2014     Performed by Kyle Guzman M.D. at SURGERY [facility]   • Lumbar laminectomy diskectomy  4/22/2014     Performed by Mazin Long M.D. at SURGERY [facility]   • Recovery  1/13/2016     Procedure: IR Deep bone biopsy L2-L3 with general anesthesia-DAYANA;  Surgeon: [facility];  Location: SURGERY PRE-POST PROC UNIT [facility];  Service:    • Other orthopedic surgery  2003/2007/2008     left shoulder replacement/with corrections   • Other orthopedic surgery       right knee replacement/manipulation   • Other orthopedic surgery  03/12     L3-L5 Laminectomy   • Other orthopedic surgery  02/14     L2-L3 Laminectomy   • Other       laser eye susrgery   • Other       carpectomy   • Other  2014 " Received request via: Patient    Was the patient seen in the last year in this department? Yes    Does the patient have an active prescription (recently filled or refills available) for medication(s) requested? No     transection supraorbital nerve     Family History   Problem Relation Age of Onset   • Sleep Apnea Neg Hx      History   Smoking status   • Former Smoker -- 0.75 packs/day for 10 years   • Types: Cigarettes   • Quit date: 04/24/2008   Smokeless tobacco   • Never Used     Allergies   Allergen Reactions   • Pollen Extract      Local pollen, primarily Rabitt Polo     Outpatient Encounter Prescriptions as of 8/3/2017   Medication Sig Dispense Refill   • gabapentin (NEURONTIN) 600 MG tablet every day.     • Cholecalciferol (VITAMIN D3) 5000 UNITS Cap Take 1 Cap by mouth every day.     • insulin aspart protamine-insulin aspart (NOVOLOG MIX 70/30) (70-30) 100 UNIT/ML injection Inject 5-22 Units as instructed 3 times a day before meals. 100 = 5 units  120= 7 units  184 = 9 units  200 = 12 units  Pt can go as high as 22 units if needed     • ropinirole (REQUIP) 0.5 MG Tab Take 1 mg by mouth every bedtime.     • hydrocodone/acetaminophen (NORCO)  MG Tab Take 1-2 Tabs by mouth every 6 hours as needed for Severe Pain.     • tamsulosin (FLOMAX) 0.4 MG capsule Take 0.8 mg by mouth ONE-HALF HOUR AFTER BREAKFAST.     • Omega-3 Fatty Acids (FISH OIL TRIPLE STRENGTH) 1400 MG Cap Take 1 Cap by mouth every day.     • Multiple Vitamins-Minerals (MULTIVITAMIN PO) Take 1 Tab by mouth every day.     • tramadol (ULTRAM) 50 MG Tab Take  mg by mouth every 6 hours as needed for Mild Pain.     • metformin (GLUCOPHAGE) 1000 MG tablet Take 1,000 mg by mouth 2 times a day, with meals.     • benazepril (LOTENSIN) 40 MG tablet Take 40 mg by mouth 2 Times a Day.     • indapamide (LOZOL) 2.5 MG TABS Take 2.5 mg by mouth every morning.     • Carvedilol Phosphate (COREG CR) 40 MG CP24 Take 40 mg by mouth every day.     • amlodipine (NORVASC) 5 MG TABS Take 5 mg by mouth 2 Times a Day.     • gemfibrozil (LOPID) 600 MG TABS Take 600 mg by mouth 2 times a day.       No facility-administered encounter medications on file as of 8/3/2017.  "    Review of Systems   Constitutional: Negative for fever and malaise/fatigue.   Respiratory: Negative for cough and shortness of breath.    Cardiovascular: Negative for chest pain, palpitations, orthopnea, claudication, leg swelling and PND.   Gastrointestinal: Negative for abdominal pain.   Musculoskeletal: Positive for back pain. Negative for myalgias.   Neurological: Negative for dizziness.   All other systems reviewed and are negative.       Objective:   /80 mmHg  Pulse 86  Ht 1.676 m (5' 5.98\")  Wt 105.688 kg (233 lb)  BMI 37.63 kg/m2  SpO2 95%    Physical Exam   Constitutional: He is oriented to person, place, and time. He appears well-developed. No distress.   obese   HENT:   Head: Normocephalic and atraumatic.   Eyes: EOM are normal.   Neck: Normal range of motion. No JVD present.   Cardiovascular: Normal rate, regular rhythm, normal heart sounds and intact distal pulses.    No murmur heard.  Pulmonary/Chest: Effort normal and breath sounds normal. No respiratory distress. He has no wheezes. He has no rales.   Abdominal: Soft. Bowel sounds are normal.   Musculoskeletal: Normal range of motion. He exhibits no edema.   Neurological: He is alert and oriented to person, place, and time.   Skin: Skin is warm and dry.   Psychiatric: He has a normal mood and affect.   Nursing note and vitals reviewed.    Lab Results   Component Value Date/Time    WBC 6.4 04/24/2017 08:01 AM    RBC 4.36* 04/24/2017 08:01 AM    HEMOGLOBIN 12.7* 04/24/2017 08:01 AM    HEMATOCRIT 40.0* 04/24/2017 08:01 AM    MCV 91.7 04/24/2017 08:01 AM    MCH 29.1 04/24/2017 08:01 AM    MCHC 31.8* 04/24/2017 08:01 AM    MPV 11.3 04/24/2017 08:01 AM      Lab Results   Component Value Date/Time    CHOLESTEROL, 10/31/2016 03:26 PM    LDL 47 10/31/2016 03:26 PM    HDL 28* 10/31/2016 03:26 PM    TRIGLYCERIDES 138 10/31/2016 03:26 PM       Lab Results   Component Value Date/Time    SODIUM 139 04/24/2017 08:01 AM    POTASSIUM 4.5 " 04/24/2017 08:01 AM    CHLORIDE 102 04/24/2017 08:01 AM    CO2 29 04/24/2017 08:01 AM    GLUCOSE 126* 04/24/2017 08:01 AM    BUN 31* 04/24/2017 08:01 AM    CREATININE 0.99 04/24/2017 08:01 AM     Lab Results   Component Value Date/Time    ALKALINE PHOSPHATASE 128* 04/24/2017 08:01 AM    AST(SGOT) 23 04/24/2017 08:01 AM    ALT(SGPT) 13 04/24/2017 08:01 AM    TOTAL BILIRUBIN 0.4 04/24/2017 08:01 AM      Lab Results   Component Value Date/Time    B NATRIURETIC PEPTIDE 207* 03/14/2017 11:42 AM      2012 CAROTID DUPLEX   1. There is mild estimated less than 50% bilateral internal carotid artery stenosis at the origins with a mild amount of plaque.    2017 LE DUPLEX CONCLUSIONS   No evidence of DVT in the right or left lower extremity.    2017 JOSE GUADALUPE CONCLUSIONS  No thrombus detected in the left atrial appendage.  Left ventricular ejection fraction is visually estimated to be 65%.  The right ventricle was normal in size and function.  No significant valve abnormalities.     2017 AFLUTTER ABLATION PROCEDURE COMPONENTS:  1.  EP study with atrial flutter ablation.  2.  Coronary sinus catheter placed via the coronary sinus for sensing and    pacing of the left atrium.  3.  Guidance 3 dimensional mapping.  4.  Program stimulation after drug infusion.    Assessment:     1. Pre-operative cardiovascular examination  EKG   2. Type 2 diabetes mellitus without complication, without long-term current use of insulin (CMS-HCC)     3. Essential hypertension     4. Dyslipidemia     5. Typical atrial flutter (CMS-HCC)       Medical Decision Making:  Today's Assessment / Status / Plan:     1. DM, managed well with PCP. Insulin.    2. HTN, moderate control. Higher systolic reading today than normal. He will follow at home and call PCP for abnormal readings. Continue same med's for now, to be adjusted by PCP.    3. HLD, good readings. Continue lopid per PCP.    4. Aflutter with previous ablation, no recurrence. Good EKG. Off OAC. No sense of  palpitations. Follow up as planned with JE.    He is cleared for surgery with a low cardiac risk.     FU in clinic in 6 months with JE or sooner if warranted by symptoms    Patient verbalizes understanding and agrees with the plan of care.     Collaborating MD: Johana HOLLAND

## 2022-10-18 DIAGNOSIS — E11.9 TYPE 2 DIABETES MELLITUS WITHOUT COMPLICATION, WITH LONG-TERM CURRENT USE OF INSULIN (HCC): ICD-10-CM

## 2022-10-18 DIAGNOSIS — Z79.4 CONTROLLED TYPE 2 DIABETES MELLITUS WITHOUT COMPLICATION, WITH LONG-TERM CURRENT USE OF INSULIN (HCC): ICD-10-CM

## 2022-10-18 DIAGNOSIS — E11.9 CONTROLLED TYPE 2 DIABETES MELLITUS WITHOUT COMPLICATION, WITH LONG-TERM CURRENT USE OF INSULIN (HCC): ICD-10-CM

## 2022-10-18 DIAGNOSIS — I10 ESSENTIAL HYPERTENSION: ICD-10-CM

## 2022-10-18 DIAGNOSIS — Z79.4 TYPE 2 DIABETES MELLITUS WITHOUT COMPLICATION, WITH LONG-TERM CURRENT USE OF INSULIN (HCC): ICD-10-CM

## 2022-10-18 RX ORDER — INDAPAMIDE 2.5 MG/1
TABLET ORAL
Qty: 90 TABLET | Refills: 0 | Status: SHIPPED | OUTPATIENT
Start: 2022-10-18 | End: 2023-01-16

## 2022-10-18 RX ORDER — INSULIN ASPART 100 [IU]/ML
INJECTION, SUSPENSION SUBCUTANEOUS
Qty: 30 ML | Refills: 3 | Status: SHIPPED
Start: 2022-10-18 | End: 2023-06-26

## 2022-11-10 ENCOUNTER — PATIENT MESSAGE (OUTPATIENT)
Dept: HEALTH INFORMATION MANAGEMENT | Facility: OTHER | Age: 72
End: 2022-11-10

## 2022-12-18 SDOH — HEALTH STABILITY: PHYSICAL HEALTH: ON AVERAGE, HOW MANY MINUTES DO YOU ENGAGE IN EXERCISE AT THIS LEVEL?: 30 MIN

## 2022-12-18 SDOH — ECONOMIC STABILITY: HOUSING INSECURITY: IN THE LAST 12 MONTHS, HOW MANY PLACES HAVE YOU LIVED?: 1

## 2022-12-18 SDOH — ECONOMIC STABILITY: TRANSPORTATION INSECURITY
IN THE PAST 12 MONTHS, HAS LACK OF TRANSPORTATION KEPT YOU FROM MEETINGS, WORK, OR FROM GETTING THINGS NEEDED FOR DAILY LIVING?: PATIENT DECLINED

## 2022-12-18 SDOH — ECONOMIC STABILITY: TRANSPORTATION INSECURITY
IN THE PAST 12 MONTHS, HAS THE LACK OF TRANSPORTATION KEPT YOU FROM MEDICAL APPOINTMENTS OR FROM GETTING MEDICATIONS?: PATIENT DECLINED

## 2022-12-18 SDOH — HEALTH STABILITY: PHYSICAL HEALTH: ON AVERAGE, HOW MANY DAYS PER WEEK DO YOU ENGAGE IN MODERATE TO STRENUOUS EXERCISE (LIKE A BRISK WALK)?: 4 DAYS

## 2022-12-18 SDOH — ECONOMIC STABILITY: INCOME INSECURITY: IN THE LAST 12 MONTHS, WAS THERE A TIME WHEN YOU WERE NOT ABLE TO PAY THE MORTGAGE OR RENT ON TIME?: PATIENT REFUSED

## 2022-12-18 SDOH — ECONOMIC STABILITY: FOOD INSECURITY: WITHIN THE PAST 12 MONTHS, THE FOOD YOU BOUGHT JUST DIDN'T LAST AND YOU DIDN'T HAVE MONEY TO GET MORE.: PATIENT DECLINED

## 2022-12-18 SDOH — ECONOMIC STABILITY: INCOME INSECURITY: HOW HARD IS IT FOR YOU TO PAY FOR THE VERY BASICS LIKE FOOD, HOUSING, MEDICAL CARE, AND HEATING?: NOT VERY HARD

## 2022-12-18 SDOH — ECONOMIC STABILITY: HOUSING INSECURITY
IN THE LAST 12 MONTHS, WAS THERE A TIME WHEN YOU DID NOT HAVE A STEADY PLACE TO SLEEP OR SLEPT IN A SHELTER (INCLUDING NOW)?: PATIENT REFUSED

## 2022-12-18 SDOH — ECONOMIC STABILITY: FOOD INSECURITY: WITHIN THE PAST 12 MONTHS, YOU WORRIED THAT YOUR FOOD WOULD RUN OUT BEFORE YOU GOT MONEY TO BUY MORE.: PATIENT DECLINED

## 2022-12-18 ASSESSMENT — SOCIAL DETERMINANTS OF HEALTH (SDOH)
IN A TYPICAL WEEK, HOW MANY TIMES DO YOU TALK ON THE PHONE WITH FAMILY, FRIENDS, OR NEIGHBORS?: PATIENT DECLINED
HOW OFTEN DO YOU ATTEND CHURCH OR RELIGIOUS SERVICES?: NEVER
HOW OFTEN DO YOU GET TOGETHER WITH FRIENDS OR RELATIVES?: PATIENT DECLINED
HOW OFTEN DO YOU ATTENT MEETINGS OF THE CLUB OR ORGANIZATION YOU BELONG TO?: PATIENT DECLINED
DO YOU BELONG TO ANY CLUBS OR ORGANIZATIONS SUCH AS CHURCH GROUPS UNIONS, FRATERNAL OR ATHLETIC GROUPS, OR SCHOOL GROUPS?: PATIENT DECLINED

## 2022-12-18 ASSESSMENT — LIFESTYLE VARIABLES
HOW OFTEN DO YOU HAVE A DRINK CONTAINING ALCOHOL: NEVER
HOW OFTEN DO YOU HAVE SIX OR MORE DRINKS ON ONE OCCASION: NEVER
AUDIT-C TOTAL SCORE: 0
HOW MANY STANDARD DRINKS CONTAINING ALCOHOL DO YOU HAVE ON A TYPICAL DAY: PATIENT DOES NOT DRINK
SKIP TO QUESTIONS 9-10: 1

## 2022-12-19 DIAGNOSIS — I10 ESSENTIAL HYPERTENSION: ICD-10-CM

## 2022-12-19 NOTE — TELEPHONE ENCOUNTER
Received request via: Patient    Was the patient seen in the last year in this department? Yes    Does the patient have an active prescription (recently filled or refills available) for medication(s) requested? No    Does the patient have [facility] and need 100 day supply (blood pressure, diabetes and cholesterol meds only)? Patient does not have SCP

## 2022-12-20 RX ORDER — AMLODIPINE BESYLATE 5 MG/1
TABLET ORAL
Qty: 180 TABLET | Refills: 9 | Status: SHIPPED | OUTPATIENT
Start: 2022-12-20 | End: 2023-03-20

## 2023-01-16 DIAGNOSIS — Z79.4 TYPE 2 DIABETES MELLITUS WITHOUT COMPLICATION, WITH LONG-TERM CURRENT USE OF INSULIN (HCC): ICD-10-CM

## 2023-01-16 DIAGNOSIS — I10 ESSENTIAL HYPERTENSION: ICD-10-CM

## 2023-01-16 DIAGNOSIS — E11.9 TYPE 2 DIABETES MELLITUS WITHOUT COMPLICATION, WITH LONG-TERM CURRENT USE OF INSULIN (HCC): ICD-10-CM

## 2023-01-16 RX ORDER — INDAPAMIDE 2.5 MG/1
TABLET ORAL
Qty: 90 TABLET | Refills: 0 | Status: SHIPPED | OUTPATIENT
Start: 2023-01-16 | End: 2023-04-17

## 2023-01-17 ENCOUNTER — HOSPITAL ENCOUNTER (OUTPATIENT)
Dept: LAB | Facility: MEDICAL CENTER | Age: 73
End: 2023-01-17
Attending: INTERNAL MEDICINE
Payer: MEDICARE

## 2023-01-17 DIAGNOSIS — E78.2 MIXED HYPERLIPIDEMIA: ICD-10-CM

## 2023-01-17 DIAGNOSIS — Z79.4 CONTROLLED TYPE 2 DIABETES MELLITUS WITH DIABETIC POLYNEUROPATHY, WITH LONG-TERM CURRENT USE OF INSULIN (HCC): ICD-10-CM

## 2023-01-17 DIAGNOSIS — E03.8 SUBCLINICAL HYPOTHYROIDISM: ICD-10-CM

## 2023-01-17 DIAGNOSIS — E11.42 CONTROLLED TYPE 2 DIABETES MELLITUS WITH DIABETIC POLYNEUROPATHY, WITH LONG-TERM CURRENT USE OF INSULIN (HCC): ICD-10-CM

## 2023-01-17 DIAGNOSIS — Z79.4 LONG-TERM INSULIN USE (HCC): ICD-10-CM

## 2023-01-17 DIAGNOSIS — Z78.9 STATIN INTOLERANCE: ICD-10-CM

## 2023-01-17 DIAGNOSIS — I10 ESSENTIAL HYPERTENSION: ICD-10-CM

## 2023-01-17 LAB
T4 FREE SERPL-MCNC: 0.91 NG/DL (ref 0.93–1.7)
TSH SERPL DL<=0.005 MIU/L-ACNC: 5.14 UIU/ML (ref 0.38–5.33)

## 2023-01-17 PROCEDURE — 82043 UR ALBUMIN QUANTITATIVE: CPT

## 2023-01-17 PROCEDURE — 84439 ASSAY OF FREE THYROXINE: CPT

## 2023-01-17 PROCEDURE — 36415 COLL VENOUS BLD VENIPUNCTURE: CPT

## 2023-01-17 PROCEDURE — 80061 LIPID PANEL: CPT

## 2023-01-17 PROCEDURE — 82570 ASSAY OF URINE CREATININE: CPT

## 2023-01-17 PROCEDURE — 84443 ASSAY THYROID STIM HORMONE: CPT

## 2023-01-17 PROCEDURE — 80053 COMPREHEN METABOLIC PANEL: CPT

## 2023-01-18 LAB
ALBUMIN SERPL BCP-MCNC: 4.7 G/DL (ref 3.2–4.9)
ALBUMIN/GLOB SERPL: 1.6 G/DL
ALP SERPL-CCNC: 106 U/L (ref 30–99)
ALT SERPL-CCNC: 7 U/L (ref 2–50)
ANION GAP SERPL CALC-SCNC: 13 MMOL/L (ref 7–16)
AST SERPL-CCNC: 22 U/L (ref 12–45)
BILIRUB SERPL-MCNC: 0.3 MG/DL (ref 0.1–1.5)
BUN SERPL-MCNC: 37 MG/DL (ref 8–22)
CALCIUM ALBUM COR SERPL-MCNC: 9.7 MG/DL (ref 8.5–10.5)
CALCIUM SERPL-MCNC: 10.3 MG/DL (ref 8.5–10.5)
CHLORIDE SERPL-SCNC: 105 MMOL/L (ref 96–112)
CHOLEST SERPL-MCNC: 172 MG/DL (ref 100–199)
CO2 SERPL-SCNC: 24 MMOL/L (ref 20–33)
CREAT SERPL-MCNC: 1.09 MG/DL (ref 0.5–1.4)
CREAT UR-MCNC: 68.62 MG/DL
FASTING STATUS PATIENT QL REPORTED: NORMAL
GFR SERPLBLD CREATININE-BSD FMLA CKD-EPI: 71 ML/MIN/1.73 M 2
GLOBULIN SER CALC-MCNC: 2.9 G/DL (ref 1.9–3.5)
GLUCOSE SERPL-MCNC: 106 MG/DL (ref 65–99)
HDLC SERPL-MCNC: 35 MG/DL
LDLC SERPL CALC-MCNC: 108 MG/DL
MICROALBUMIN UR-MCNC: 4.8 MG/DL
MICROALBUMIN/CREAT UR: 70 MG/G (ref 0–30)
POTASSIUM SERPL-SCNC: 4.6 MMOL/L (ref 3.6–5.5)
PROT SERPL-MCNC: 7.6 G/DL (ref 6–8.2)
SODIUM SERPL-SCNC: 142 MMOL/L (ref 135–145)
TRIGL SERPL-MCNC: 145 MG/DL (ref 0–149)

## 2023-01-28 SDOH — HEALTH STABILITY: PHYSICAL HEALTH: ON AVERAGE, HOW MANY MINUTES DO YOU ENGAGE IN EXERCISE AT THIS LEVEL?: 30 MIN

## 2023-01-28 SDOH — ECONOMIC STABILITY: INCOME INSECURITY: HOW HARD IS IT FOR YOU TO PAY FOR THE VERY BASICS LIKE FOOD, HOUSING, MEDICAL CARE, AND HEATING?: SOMEWHAT HARD

## 2023-01-28 SDOH — ECONOMIC STABILITY: FOOD INSECURITY: WITHIN THE PAST 12 MONTHS, YOU WORRIED THAT YOUR FOOD WOULD RUN OUT BEFORE YOU GOT MONEY TO BUY MORE.: PATIENT DECLINED

## 2023-01-28 SDOH — ECONOMIC STABILITY: FOOD INSECURITY: WITHIN THE PAST 12 MONTHS, THE FOOD YOU BOUGHT JUST DIDN'T LAST AND YOU DIDN'T HAVE MONEY TO GET MORE.: PATIENT DECLINED

## 2023-01-28 SDOH — ECONOMIC STABILITY: TRANSPORTATION INSECURITY
IN THE PAST 12 MONTHS, HAS LACK OF RELIABLE TRANSPORTATION KEPT YOU FROM MEDICAL APPOINTMENTS, MEETINGS, WORK OR FROM GETTING THINGS NEEDED FOR DAILY LIVING?: PATIENT DECLINED

## 2023-01-28 SDOH — HEALTH STABILITY: MENTAL HEALTH
STRESS IS WHEN SOMEONE FEELS TENSE, NERVOUS, ANXIOUS, OR CAN'T SLEEP AT NIGHT BECAUSE THEIR MIND IS TROUBLED. HOW STRESSED ARE YOU?: NOT AT ALL

## 2023-01-28 SDOH — ECONOMIC STABILITY: HOUSING INSECURITY: IN THE LAST 12 MONTHS, HOW MANY PLACES HAVE YOU LIVED?: 1

## 2023-01-28 SDOH — HEALTH STABILITY: PHYSICAL HEALTH: ON AVERAGE, HOW MANY DAYS PER WEEK DO YOU ENGAGE IN MODERATE TO STRENUOUS EXERCISE (LIKE A BRISK WALK)?: 4 DAYS

## 2023-01-28 SDOH — ECONOMIC STABILITY: INCOME INSECURITY: IN THE LAST 12 MONTHS, WAS THERE A TIME WHEN YOU WERE NOT ABLE TO PAY THE MORTGAGE OR RENT ON TIME?: PATIENT REFUSED

## 2023-01-28 ASSESSMENT — SOCIAL DETERMINANTS OF HEALTH (SDOH)
IN A TYPICAL WEEK, HOW MANY TIMES DO YOU TALK ON THE PHONE WITH FAMILY, FRIENDS, OR NEIGHBORS?: PATIENT DECLINED
HOW HARD IS IT FOR YOU TO PAY FOR THE VERY BASICS LIKE FOOD, HOUSING, MEDICAL CARE, AND HEATING?: SOMEWHAT HARD
HOW OFTEN DO YOU ATTENT MEETINGS OF THE CLUB OR ORGANIZATION YOU BELONG TO?: PATIENT DECLINED
HOW OFTEN DO YOU HAVE A DRINK CONTAINING ALCOHOL: NEVER
WITHIN THE PAST 12 MONTHS, YOU WORRIED THAT YOUR FOOD WOULD RUN OUT BEFORE YOU GOT THE MONEY TO BUY MORE: PATIENT DECLINED
IN A TYPICAL WEEK, HOW MANY TIMES DO YOU TALK ON THE PHONE WITH FAMILY, FRIENDS, OR NEIGHBORS?: PATIENT DECLINED
HOW MANY DRINKS CONTAINING ALCOHOL DO YOU HAVE ON A TYPICAL DAY WHEN YOU ARE DRINKING: PATIENT DOES NOT DRINK
HOW OFTEN DO YOU GET TOGETHER WITH FRIENDS OR RELATIVES?: PATIENT DECLINED
HOW OFTEN DO YOU GET TOGETHER WITH FRIENDS OR RELATIVES?: PATIENT DECLINED
HOW OFTEN DO YOU HAVE SIX OR MORE DRINKS ON ONE OCCASION: NEVER
HOW OFTEN DO YOU ATTEND CHURCH OR RELIGIOUS SERVICES?: PATIENT DECLINED
HOW OFTEN DO YOU ATTEND CHURCH OR RELIGIOUS SERVICES?: PATIENT DECLINED
DO YOU BELONG TO ANY CLUBS OR ORGANIZATIONS SUCH AS CHURCH GROUPS UNIONS, FRATERNAL OR ATHLETIC GROUPS, OR SCHOOL GROUPS?: PATIENT DECLINED
HOW OFTEN DO YOU ATTENT MEETINGS OF THE CLUB OR ORGANIZATION YOU BELONG TO?: PATIENT DECLINED
DO YOU BELONG TO ANY CLUBS OR ORGANIZATIONS SUCH AS CHURCH GROUPS UNIONS, FRATERNAL OR ATHLETIC GROUPS, OR SCHOOL GROUPS?: PATIENT DECLINED

## 2023-01-28 ASSESSMENT — LIFESTYLE VARIABLES
SKIP TO QUESTIONS 9-10: 1
AUDIT-C TOTAL SCORE: 0
HOW MANY STANDARD DRINKS CONTAINING ALCOHOL DO YOU HAVE ON A TYPICAL DAY: PATIENT DOES NOT DRINK
HOW OFTEN DO YOU HAVE SIX OR MORE DRINKS ON ONE OCCASION: NEVER
HOW OFTEN DO YOU HAVE A DRINK CONTAINING ALCOHOL: NEVER

## 2023-01-31 ENCOUNTER — OFFICE VISIT (OUTPATIENT)
Dept: MEDICAL GROUP | Facility: MEDICAL CENTER | Age: 73
End: 2023-01-31
Payer: MEDICARE

## 2023-01-31 VITALS
DIASTOLIC BLOOD PRESSURE: 68 MMHG | TEMPERATURE: 96.8 F | HEIGHT: 66 IN | SYSTOLIC BLOOD PRESSURE: 122 MMHG | RESPIRATION RATE: 16 BRPM | WEIGHT: 207.23 LBS | OXYGEN SATURATION: 97 % | BODY MASS INDEX: 33.3 KG/M2 | HEART RATE: 85 BPM

## 2023-01-31 DIAGNOSIS — M48.061 SPINAL STENOSIS OF LUMBAR REGION, UNSPECIFIED WHETHER NEUROGENIC CLAUDICATION PRESENT: ICD-10-CM

## 2023-01-31 DIAGNOSIS — N40.1 BENIGN PROSTATIC HYPERPLASIA WITH URINARY RETENTION: ICD-10-CM

## 2023-01-31 DIAGNOSIS — Z79.4 CONTROLLED TYPE 2 DIABETES MELLITUS WITHOUT COMPLICATION, WITH LONG-TERM CURRENT USE OF INSULIN (HCC): ICD-10-CM

## 2023-01-31 DIAGNOSIS — R33.8 BENIGN PROSTATIC HYPERPLASIA WITH URINARY RETENTION: ICD-10-CM

## 2023-01-31 DIAGNOSIS — I10 ESSENTIAL HYPERTENSION: ICD-10-CM

## 2023-01-31 DIAGNOSIS — E11.9 CONTROLLED TYPE 2 DIABETES MELLITUS WITHOUT COMPLICATION, WITH LONG-TERM CURRENT USE OF INSULIN (HCC): ICD-10-CM

## 2023-01-31 DIAGNOSIS — Z00.00 MEDICARE ANNUAL WELLNESS VISIT, SUBSEQUENT: ICD-10-CM

## 2023-01-31 PROCEDURE — G0439 PPPS, SUBSEQ VISIT: HCPCS | Performed by: FAMILY MEDICINE

## 2023-01-31 ASSESSMENT — ACTIVITIES OF DAILY LIVING (ADL): BATHING_REQUIRES_ASSISTANCE: 0

## 2023-01-31 ASSESSMENT — PATIENT HEALTH QUESTIONNAIRE - PHQ9: CLINICAL INTERPRETATION OF PHQ2 SCORE: 0

## 2023-01-31 NOTE — PROGRESS NOTES
Chief Complaint   Patient presents with    Annual Wellness Visit       HPI:  Zhang is a 72 y.o. here for Medicare Annual Wellness Visit        Patient Active Problem List    Diagnosis Date Noted    Statin intolerance 07/15/2022    Osteoarthritis of right hip 03/29/2022    Primary osteoarthritis of left hip 01/27/2022    Long-term insulin use (HCC) 01/11/2022    Mixed hyperlipidemia 01/11/2022    Diabetic polyneuropathy associated with type 2 diabetes mellitus (HCC) 01/11/2022    Left inguinal hernia 01/05/2021    Exposure to COVID-19 virus 11/24/2020    Sleep apnea 09/17/2020    Preop examination 08/19/2020    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    Stress 06/30/2020    Chronic pain of both knees 05/13/2019    Encounter for weight loss counseling 12/06/2018    Obesity (BMI 35.0-39.9 without comorbidity) 07/11/2018    Benign prostatic hyperplasia with urinary hesitancy 05/21/2018    Right wrist pain 05/21/2018    Neuropathy 05/21/2018    Bilateral shoulder pain 05/21/2018    Bilateral foot pain 05/21/2018    Stenosis, spinal, lumbar 09/18/2017    Lumbar stenosis 09/12/2017    Benign prostatic hyperplasia 03/27/2014    Degeneration of lumbar or lumbosacral intervertebral disc 02/19/2014    Controlled type 2 diabetes mellitus without complication, with long-term current use of insulin (HCC) 10/13/2013    Essential hypertension 10/13/2013    Dyslipidemia 10/13/2013    CKD (chronic kidney disease) stage 2, GFR 60-89 ml/min 10/13/2013       Current Outpatient Medications   Medication Sig Dispense Refill    indapamide (LOZOL) 2.5 MG Tab TAKE ONE TABLET BY MOUTH EVERY DAY 90 Tablet 0    amLODIPine (NORVASC) 5 MG Tab TAKE 1 TABLET BY MOUTH TWO TIMES A  Tablet 9    gemfibrozil (LOPID) 600 MG Tab TAKE 2 TABLETS BY MOUTH ONCE DAILY 180 Tablet 3    NOVOLOG MIX 70/30 FLEXPEN (70-30) 100 UNIT/ML Suspension Pen-injector INJECT 10 UNITS UNDER THE SKIN THREE TIMES A DAY 30 mL 3    benazepril (LOTENSIN) 40 MG  "tablet TAKE ONE TABLET BY MOUTH TWICE A  Tablet 3    metformin (GLUCOPHAGE) 1000 MG tablet TAKE 1 TABLET BY MOUTH TWICE A  Tablet 3    Lancets (ONETOUCH DELICA PLUS [id number]) Misc Inject 1 Each under the skin in the morning, at noon, and at bedtime. 300 Each 11    glucose blood (ONETOUCH ULTRA) strip USE TO TEST HIGH OR LOW BLOOD SUGAR 3 TIMES A DAY *E11.9, OK TO FILL 10/3/20* 300 Strip 3    Insulin Pen Needle (NOVOFINE AUTOCOVER PEN NEEDLE) 30G X 8 MM Misc 1 Each every day. 100 Each 11    Empagliflozin (JARDIANCE) 25 MG Tab Take 1 Tablet by mouth every day. 30 Tablet 6    Multiple Vitamins-Minerals (PRESERVISION AREDS PO) Take 2 Capsules by mouth every day.      Insulin Pen Needle (NOVOFINE 30) 30G X 8 MM Misc Novofine Autocover 30 gauge x 1/3\" needle 300 Each 3    alfuzosin (UROXATRAL) 10 MG SR tablet TAKE 1 TABLET BY MOUTH EVERYDAY AT BEDTIME  3    Multiple Vitamin (MULTI-VITAMIN DAILY PO) Take  by mouth every day.      Cholecalciferol (VITAMIN D3) 5000 UNITS Cap Take 1 Cap by mouth every day.      Omega-3 Fatty Acids (FISH OIL TRIPLE STRENGTH) 1400 MG Cap Take 1 Cap by mouth every day.       No current facility-administered medications for this visit.        Patient is taking medications as noted in medication list.  Current supplements as per medication list.     Allergies: Pollen extract    Current social contact/activities: yes, seeing family and friends     Is patient current with immunizations? Yes.    He  reports that he quit smoking about 23 years ago. His smoking use included cigarettes. He started smoking about 48 years ago. He has a 7.50 pack-year smoking history. He has never used smokeless tobacco. He reports that he does not currently use alcohol. He reports that he does not use drugs.  Counseling given: Not Answered  Tobacco comments: cigarette free over 20 years      ROS:    Gait: Uses no assistive device   Ostomy: No   Other tubes: No   Amputations: No   Chronic oxygen use No "   Last eye exam 9/2022   Wears hearing aids: No   : Denies any urinary leakage during the last 6 months      Depression Screening  Little interest or pleasure in doing things?  0 - not at all  Feeling down, depressed, or hopeless? 0 - not at all  Patient Health Questionnaire Score: 0    If depressive symptoms identified deferred to follow up visit unless specifically addressed in assessment and plan.    Interpretation of PHQ-9 Total Score   Score Severity   1-4 No Depression   5-9 Mild Depression   10-14 Moderate Depression   15-19 Moderately Severe Depression   20-27 Severe Depression    Screening for Cognitive Impairment  Three Minute Recall (daughter, heaven, irwin)  2/3    Anam clock face with all 12 numbers and set the hands to show 10 past 11.  Yes    If cognitive concerns identified, deferred for follow up unless specifically addressed in assessment and plan.    Fall Risk Assessment  Has the patient had two or more falls in the last year or any fall with injury in the last year?  No  If fall risk identified, deferred for follow up unless specifically addressed in assessment and plan.    Safety Assessment  Throw rugs on floor.  No  Handrails on all stairs.  No  Good lighting in all hallways.  Yes  Difficulty hearing.  No  Patient counseled about all safety risks that were identified.    Functional Assessment ADLs  Are there any barriers preventing you from cooking for yourself or meeting nutritional needs?  No.    Are there any barriers preventing you from driving safely or obtaining transportation?  No.    Are there any barriers preventing you from using a telephone or calling for help?  No.    Are there any barriers preventing you from shopping?  No.    Are there any barriers preventing you from taking care of your own finances?  No.    Are there any barriers preventing you from managing your medications?  No.    Are there any barriers preventing you from showering, bathing or dressing yourself?  No.     Are you currently engaging in any exercise or physical activity?  Yes.  Snow shoveling and blowing currently. Also does recumbent bike 30 mins daily or every other day. Out door activity in the summer time  What is your perception of your health?   .    Advance Care Planning  Do you have an Advance Directive, Living Will, Durable Power of , or POLST? Yes  Advance Directive       is on file    Health Maintenance Summary            Overdue - Annual Wellness Visit (Every 366 Days) Overdue - never done      No completion history exists for this topic.              Overdue - A1C SCREENING (Every 6 Months) Overdue since 1/15/2023      07/15/2022  POCT Hemoglobin A1C    10/12/2021  POCT Hemoglobin A1C    07/06/2021  POCT Hemoglobin A1C    03/05/2021  HEMOGLOBIN A1C    08/21/2020  HEMOGLOBIN A1C    Only the first 5 history entries have been loaded, but more history exists.              COLORECTAL CANCER SCREENING (COLON CANCER SCREENING ANNUAL FIT - Yearly) Next due on 6/21/2023 06/21/2022  OCCULT BLOOD FECES IMMUNOASSAY    07/13/2020  OCCULT BLOOD FECES IMMUNOASSAY (FIT)    07/23/2018  OCCULT BLOOD FECES IMMUNOASSAY              DIABETES MONOFILAMENT / LE EXAM (Yearly) Next due on 7/15/2023      07/15/2022  Diabetic Monofilament LE Exam    07/15/2022  Smart[employer]: WORKFLOW - DIABETES - DIABETIC FOOT EXAM PERFORMED    01/11/2022  Smart[employer]: WORKFLOW - DIABETES - DIABETIC FOOT EXAM PERFORMED    10/12/2021  Diabetic Monofilament LE Exam    07/06/2021  Smart[employer]: WORKFLOW - DIABETES - DIABETIC FOOT EXAM PERFORMED    Only the first 5 history entries have been loaded, but more history exists.              RETINAL SCREENING (Yearly) Next due on 9/7/2023 09/07/2022  REFERRAL FOR RETINAL SCREENING EXAM    08/26/2021  REFERRAL FOR RETINAL SCREENING EXAM    07/15/2020  REFERRAL FOR RETINAL SCREENING EXAM    07/10/2019  REFERRAL FOR RETINAL SCREENING EXAM    06/25/2018  REFERRAL FOR RETINAL SCREENING EXAM    Only  the first 5 history entries have been loaded, but more history exists.              IMM DTaP/Tdap/Td Vaccine (3 - Td or Tdap) Next due on 9/18/2023 09/18/2013  Imm Admin: Tdap Vaccine    06/08/2010  Imm Admin: Tdap Vaccine              FASTING LIPID PROFILE (Yearly) Next due on 1/17/2024 01/17/2023  Lipid Profile    08/01/2022  Lipid Profile    01/14/2022  Lipid Profile    03/05/2021  Lipid Profile    08/21/2020  Lipid Profile    Only the first 5 history entries have been loaded, but more history exists.              URINE ACR / MICROALBUMIN (Yearly) Next due on 1/17/2024 01/17/2023  MICROALBUMIN CREAT RATIO URINE    08/01/2022  MICROALBUMIN CREAT RATIO URINE    03/24/2022  MICROALBUMIN CREAT RATIO URINE    01/14/2022  MICROALBUMIN CREAT RATIO URINE    03/05/2021  MICROALBUMIN CREAT RATIO URINE    Only the first 5 history entries have been loaded, but more history exists.              SERUM CREATININE (Yearly) Next due on 1/17/2024 01/17/2023  Comp Metabolic Panel    08/01/2022  Comp Metabolic Panel    03/24/2022  Comp Metabolic Panel    01/14/2022  Comp Metabolic Panel    03/05/2021  Comp Metabolic Panel    Only the first 5 history entries have been loaded, but more history exists.              HEPATITIS C SCREENING  Completed      09/07/2012  HEP C VIRUS ANTIBODY              IMM PNEUMOCOCCAL VACCINE: 65+ Years (Series Information) Completed      10/30/2019  Imm Admin: Pneumococcal polysaccharide vaccine (PPSV-23)    05/21/2018  Imm Admin: Pneumococcal Conjugate Vaccine (Prevnar/PCV-13)    10/24/2012  Imm Admin: Pneumococcal polysaccharide vaccine (PPSV-23)    03/27/2012  Imm Admin: Pneumococcal polysaccharide vaccine (PPSV-23)              ABDOMINAL AORTIC ANEURYSM (AAA) SCREEN  Completed      01/25/2022  Outside Procedure: UT US, RETROPERITNL ABD,  LTD    01/20/2021  Outside Procedure: UT US, RETROPERITNL ABD,  LTD    12/28/2020  Outside Procedure: UT US, RETROPERITNL ABD,  LTD    01/13/2020   Outside Procedure: MS US, RETROPERITNL ABD,  LTD    2013  US-ABDOMEN COMPLETE SURVEY    Only the first 5 history entries have been loaded, but more history exists.              IMM INFLUENZA (Series Information) Completed      2022  Outside Immunization: Fluzone High-Dose Quad    2021  Imm Admin: Influenza Vaccine Adult HD    2021  Imm Admin: Influenza Vaccine Quad Inj (Preserved)    2020  Imm Admin: Influenza Vac Subunit Quad Inj (Pf)    2019  Imm Admin: Influenza Vaccine Adult HD    Only the first 5 history entries have been loaded, but more history exists.              COVID-19 Vaccine (Series Information) Completed      2022  Imm Admin: PFIZER BIVALENT BOOSTER SARS-COV-2 VACCINE (12+)    2022  Imm Admin: PFIZER MORALES CAP SARS-COV-2 VACCINATION (12+)    10/22/2021  Imm Admin: PFIZER PURPLE CAP SARS-COV-2 VACCINATION (12+)    04/15/2021  Imm Admin: PFIZER PURPLE CAP SARS-COV-2 VACCINATION (12+)    2021  Imm Admin: PFIZER PURPLE CAP SARS-COV-2 VACCINATION (12+)              IMM HEP B VACCINE (Series Information) Aged Out      No completion history exists for this topic.              IMM MENINGOCOCCAL ACWY VACCINE (Series Information) Aged Out      No completion history exists for this topic.                    Patient Care Team:  Cherry Long M.D. as PCP - General (Family Medicine)  Kenzie Wade, PT, MSPT  yKle Guzman M.D. as Consulting Physician (Urology)    Social History     Tobacco Use    Smoking status: Former     Packs/day: 0.75     Years: 10.00     Pack years: 7.50     Types: Cigarettes     Start date: 1975     Quit date: 2000     Years since quittin.0    Smokeless tobacco: Never    Tobacco comments:     cigarette free over 20 years   Vaping Use    Vaping Use: Never used   Substance Use Topics    Alcohol use: Not Currently    Drug use: Never     Family History   Problem Relation Age of Onset    Sleep Apnea Neg Hx      He  has a past  medical history of Arrhythmia, Arthritis, Diabetes, Diabetic neuropathy (HCC), Environmental and seasonal allergies, High cholesterol, Hyperlipidemia, Hypertension (2014), Neuropathy, Pain (12/31/2020), Renal disorder, Restless leg, Scapholunate advanced collapse of right wrist (2020), Sleep apnea, Snoring, and Unspecified urinary incontinence.   Past Surgical History:   Procedure Laterality Date    ID TOTAL HIP ARTHROPLASTY Right 6/6/2022    Procedure: RIGHT ANTERIOR TOTAL HIP ARTHROPLASTY;  Surgeon: Jose Manuel Combs M.D.;  Location: [facility];  Service: Orthopedics    ID TOTAL HIP ARTHROPLASTY Left 2/7/2022    Procedure: LEFT ANTERIOR TOTAL HIP ARTHROPLASTY;  Surgeon: Jose Manuel Combs M.D.;  Location: [facility];  Service: Orthopedics    INGUINAL HERNIA REPAIR Left 1/5/2021    Procedure: REPAIR, HERNIA, INGUINAL-LARGE INCARCERATED WITH MESH;  Surgeon: Rogers Chin M.D.;  Location: SURGERY SAME DAY [facility];  Service: General    WRIST FUSION Right 9/17/2020    Procedure: FUSION, JOINT, WRIST - SCAPHOID EXCISION AND FOUR CORNER INTERCARPAL ARTHRODESIS, POSTERIOR INTEROSSEUS NERVE EXCISION;  Surgeon: Nhan Mccarthy M.D.;  Location: SURGERY [facility];  Service: Orthopedics    FUSION, SPINE, XLIF Left 9/18/2017    Procedure: EXTREME LATERAL INTERBODY FUSION L2-3 W/PLATE;  Surgeon: Yemi Sharma M.D.;  Location: [facility];  Service: Neurosurgery    FUSION, SPINE, LUMBAR, PLIF  9/18/2017    Procedure: LUMBAR FUSION POSTERIOR L2-3 INSTRUMENTED;  Surgeon: Yemi Sharma M.D.;  Location: SURGERY [facility];  Service: Neurosurgery    LUMBAR LAMINECTOMY DISKECTOMY  9/18/2017    Procedure: LUMBAR LAMINECTOMY DISKECTOMY;  Surgeon: Yemi Sharma M.D.;  Location: SURGERY [facility];  Service: Neurosurgery    RECOVERY  1/13/2016    Procedure: IR Deep bone biopsy L2-L3 with general anesthesia-DAYANA;  Surgeon: [facility];  Location: SURGERY  "PRE-POST PROC UNIT [facility];  Service:     LUMBAR LAMINECTOMY DISKECTOMY  4/22/2014    Performed by Mazin Long M.D. at SURGERY [facility]    TRANS URETHRAL RESECTION PROSTATE  3/27/2014    Performed by Kyle Prakahs M.D. at SURGERY [facility]    LUMBAR LAMINECTOMY DISKECTOMY  2/19/2014    Performed by Mazin Long M.D. at SURGERY [facility]    LUMBAR LAMINECTOMY DISKECTOMY  5/2/2012    Performed by MAZIN LONG at SURGERY [facility]    SHOULDER ARTHROSCOPY Left 2008    KNEE MANIPULATION Right 2007    KNEE ARTHROPLASTY TOTAL Right 2006       Exam:   /68 (BP Location: Right arm, Patient Position: Sitting, BP Cuff Size: Adult long)   Pulse 85   Temp 36 °C (96.8 °F) (Temporal)   Resp 16   Ht 1.676 m (5' 6\")   Wt 94 kg (207 lb 3.7 oz)   SpO2 97%  Body mass index is 33.45 kg/m².    Hearing excellent.    Dentition good  Alert, oriented in no acute distress.  Eye contact is good, speech goal directed, affect calm    Problem List Items Addressed This Visit       Controlled type 2 diabetes mellitus without complication, with long-term current use of insulin (HCC)     Per endo   Lab Results   Component Value Date/Time    HBA1C 5.8 (A) 07/15/2022 10:21 AM               Essential hypertension     At goal today            Benign prostatic hyperplasia     Seeing urology dr prakash   Who orders his annual PSAs  Had turp in the remote past            Stenosis, spinal, lumbar     S/p bilateral hip replacement   Really pleased  Doing well     Had back surgery in the remote past     Seeing hip specialist at [facility]             Relevant Orders    Referral to Pain Clinic    Medicare annual wellness visit, subsequent     Age appropriate prev health care discussed   Cancer screening discussed   Vaccines discussed   Labs offered   Blood pressure at goal     Anticipatory guidance including SPF and other skin protective measures, dental hygiene and care, regular exercise, diet, stress and family planning advice " discussed.                Assessment and Plan. The following treatment and monitoring plan is recommended:    There are no diagnoses linked to this encounter.   Age appropriate prev health care discussed   Cancer screening discussed   Vaccines discussed   Labs offered   Blood pressure at goal     Anticipatory guidance including SPF and other skin protective measures, dental hygiene and care, regular exercise, diet, stress and family planning advice discussed.      Services suggested: No services needed at this time  Health Care Screening recommendations as per orders if indicated.  Referrals offered: PT/OT/Nutrition counseling/Behavioral Health/Smoking cessation as per orders if indicated.    Discussion today about general wellness and lifestyle habits:    Prevent falls and reduce trip hazards; Cautioned about securing or removing rugs.  Have a working fire alarm and carbon monoxide detector;   Engage in regular physical activity and social activities     Follow-up: No follow-ups on file.

## 2023-01-31 NOTE — ASSESSMENT & PLAN NOTE
S/p bilateral hip replacement   Really pleased  Doing well     Had back surgery in the remote past     Seeing hip specialist at [facility]

## 2023-02-02 ENCOUNTER — HOSPITAL ENCOUNTER (OUTPATIENT)
Dept: LAB | Facility: MEDICAL CENTER | Age: 73
End: 2023-02-02
Attending: UROLOGY
Payer: MEDICARE

## 2023-02-02 PROCEDURE — 84154 ASSAY OF PSA FREE: CPT

## 2023-02-02 PROCEDURE — 84153 ASSAY OF PSA TOTAL: CPT

## 2023-02-02 PROCEDURE — 36415 COLL VENOUS BLD VENIPUNCTURE: CPT

## 2023-02-03 ENCOUNTER — OFFICE VISIT (OUTPATIENT)
Dept: ENDOCRINOLOGY | Facility: MEDICAL CENTER | Age: 73
End: 2023-02-03
Attending: INTERNAL MEDICINE
Payer: MEDICARE

## 2023-02-03 VITALS
DIASTOLIC BLOOD PRESSURE: 70 MMHG | HEART RATE: 76 BPM | WEIGHT: 210 LBS | BODY MASS INDEX: 33.75 KG/M2 | SYSTOLIC BLOOD PRESSURE: 118 MMHG | HEIGHT: 66 IN | OXYGEN SATURATION: 97 %

## 2023-02-03 DIAGNOSIS — I10 ESSENTIAL HYPERTENSION: ICD-10-CM

## 2023-02-03 DIAGNOSIS — E11.9 TYPE 2 DIABETES MELLITUS WITHOUT COMPLICATION, WITH LONG-TERM CURRENT USE OF INSULIN (HCC): ICD-10-CM

## 2023-02-03 DIAGNOSIS — Z79.4 CONTROLLED TYPE 2 DIABETES MELLITUS WITHOUT COMPLICATION, WITH LONG-TERM CURRENT USE OF INSULIN (HCC): ICD-10-CM

## 2023-02-03 DIAGNOSIS — Z79.4 LONG-TERM INSULIN USE (HCC): ICD-10-CM

## 2023-02-03 DIAGNOSIS — E55.9 VITAMIN D DEFICIENCY: ICD-10-CM

## 2023-02-03 DIAGNOSIS — Z79.4 TYPE 2 DIABETES MELLITUS WITHOUT COMPLICATION, WITH LONG-TERM CURRENT USE OF INSULIN (HCC): ICD-10-CM

## 2023-02-03 DIAGNOSIS — E03.9 PRIMARY HYPOTHYROIDISM: ICD-10-CM

## 2023-02-03 DIAGNOSIS — E78.2 MIXED HYPERLIPIDEMIA: ICD-10-CM

## 2023-02-03 DIAGNOSIS — E11.9 CONTROLLED TYPE 2 DIABETES MELLITUS WITHOUT COMPLICATION, WITH LONG-TERM CURRENT USE OF INSULIN (HCC): ICD-10-CM

## 2023-02-03 LAB
HBA1C MFR BLD: 6.4 % (ref ?–5.8)
POCT INT CON NEG: NEGATIVE
POCT INT CON POS: POSITIVE

## 2023-02-03 PROCEDURE — 83036 HEMOGLOBIN GLYCOSYLATED A1C: CPT | Performed by: INTERNAL MEDICINE

## 2023-02-03 PROCEDURE — 99212 OFFICE O/P EST SF 10 MIN: CPT | Performed by: INTERNAL MEDICINE

## 2023-02-03 PROCEDURE — 99215 OFFICE O/P EST HI 40 MIN: CPT | Performed by: INTERNAL MEDICINE

## 2023-02-03 RX ORDER — LEVOTHYROXINE SODIUM 0.03 MG/1
25 TABLET ORAL
Qty: 90 TABLET | Refills: 2 | Status: SHIPPED
Start: 2023-02-03 | End: 2023-03-28

## 2023-02-03 NOTE — PROGRESS NOTES
RN-CDE Note    Subjective:   Endocrinology Clinic Progress Note  PCP: Cherry Long M.D.    HPI:  Zhang Cheung is a 72 y.o. old patient who is seen today by the Diabetes Nurse Specialist for review of Type 2 Diabetes.  Recent changes in health: Health good.  DM:   Last A1c:   Lab Results   Component Value Date/Time    HBA1C 6.4 (A) 02/03/2023 10:52 AM      Previous A1c was 5.8 on 7/15/22  A1C GOAL: < 7    Diabetes Medications:   Jardiance 25 mg daily  Novolog 70/30 8-10 units TID  Metformin 1000 mg BID      Exercise: Recumbent bike 30 minutes daily  Diet: vegetarian except chicken , fish, and fat free dairy  Patient's body mass index is 33.89 kg/m². Exercise and nutrition counseling were performed at this visit.    Glucose monitoring frequency: Testing blood sugars 3-4 times daily  Fasting 100-110 and after eating 130-160  Hypoglycemic episodes: no  Last Retinal Exam: on file and up-to-date  Daily Foot Exam: Yes   Foot Exam:  Monofilament: done  Monofilament testing with a 10 gram force: sensation intact: intact bilaterally  Visual Inspection: Feet without maceration, ulcers, fissures.  Pedal pulses: intact bilaterally   Lab Results   Component Value Date/Time    MALBCRT 70 (H) 01/17/2023 11:04 AM    MICROALBUR 4.8 01/17/2023 11:04 AM        ACR Albumin/Creatinine Ratio goal <30     HTN:   Blood pressure goal <130/<80 .   Currently Rx ACE/ARB: Yes     Dyslipidemia:    Lab Results   Component Value Date/Time    CHOLSTRLTOT 172 01/17/2023 11:04 AM     (H) 01/17/2023 11:04 AM    HDL 35 (A) 01/17/2023 11:04 AM    TRIGLYCERIDE 145 01/17/2023 11:04 AM         Currently Rx Statin: No     He  reports that he quit smoking about 23 years ago. His smoking use included cigarettes. He started smoking about 48 years ago. He has a 7.50 pack-year smoking history. He has never used smokeless tobacco.      Plan:     Discussed and educated on:   - All medications, side effects and compliance (discussed carefully)  -  Annual eye examinations at Ophthalmology  - Home glucose monitoring emphasized  - Weight control and daily exercise    Recommended medication changes: He would like to see PCP with Patito Nunn RN CDE.

## 2023-02-03 NOTE — PROGRESS NOTES
CHIEF COMPLAINT: Patient is here for follow up of Type 2 Diabetes Mellitus    HPI:     hZang Cheung is a 72 y.o. male with Type 2 Diabetes Mellitus here for follow up.    Labs from February 3, 2023 show A1c is 6.4%  Labs from 10/12/2021 show a1c is 5.7%  Labs from 7/6/2021 HbA1c was 5.7%      Historically I have tried to prescribe him other medications for his diabetes including GLP-1 analogs but he prefers to be on premixed insulin  He is also conscious about the cost of medications      He is on Novolog 70/30 premixed 8-10u TID,   Metformin 1000mg bid,   Jardiance 25mg daily  He is on Lyrica 50 mg 3 times a day for diabetic neuropathy      He denies side effects with his medications  He is requesting that he go back to his primary care for management of his diabetes  We actually diagnosed with hypothyroidism that is new onset on the visit today based on his latest labs  TSH is 5.1 and free T4 is low at 0.9 on Jan 2023      He has mixed hyperlipidemia but is not on a statin  He had cramps with atorvastatin   He is on Lopid  His LDL cholesterol was 108 on January 2023      He does have diabetic kidney disease  He is on benazepril 40mg daily  Blood pressure today is at goal  U ACR was 70 on January 2023  UACR was 35 on 3/24/2022  UACR was > 30 on 3/5/2021  He is not on Kerendia for his diabetic kidney disease-he is worried about the cost         Eye exam was from 9/7/2022  He doesn't have retinopathy  He does have neuropathy and pain in both feet  He has tried and failed Gabapentin in the past   He is on Lyrica 50 mg 3 times a day for diabetic neuropathy           BG Diary:  Patient is testing BG 3 times a day and uses a One Touch Ultra meter    Weight has been stable    Diabetes Complications   Retinopathy: No known retinopathy.  Last eye exam: 9/7/2022  Neuropathy: Denies paresthesias or numbness in hands or feet. Denies any foot wounds.  Exercise: Minimal.  Diet: Fair.  Patient's medications, allergies, and  social histories were reviewed and updated as appropriate.    ROS:     CONS:     No fever, no chills   EYES:     No diplopia, no blurry vision   CV:           No chest pain, no palpitations   PULM:     No SOB, no cough, no hemoptysis.   GI:            No nausea, no vomiting, no diarrhea, no constipation   ENDO:     No polyuria, no polydipsia, no heat intolerance, no cold intolerance       Past Medical History:  Problem List:  2023-01: Medicare annual wellness visit, subsequent  2022-07: Statin intolerance  2022-03: Osteoarthritis of right hip  2022-01: Primary osteoarthritis of left hip  2022-01: Long-term insulin use ([facility])  2022-01: Mixed hyperlipidemia  2022-01: Diabetic polyneuropathy associated with type 2 diabetes   mellitus ([facility])  2021-01: Left inguinal hernia  2020-11: Exposure to COVID-19 virus  2020-09: Sleep apnea  2020-08: Preop examination  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  2020-06: Stress  2019-05: Chronic pain of both knees  2018-12: Encounter for weight loss counseling  2018-07: Obesity (BMI 35.0-39.9 without comorbidity)  2018-05: Psoriasis  2018-05: Benign prostatic hyperplasia with urinary hesitancy  2018-05: Right wrist pain  2018-05: Neuropathy  2018-05: Bilateral shoulder pain  2018-05: Bilateral foot pain  2017-09: Delirium  2017-09: Acute respiratory failure with hypoxia and hypercapnia ([facility])  2017-09: Hyponatremia  2017-09: MEREDITH (acute kidney injury) ([facility])  2017-09: Hypotension  2017-09: Stenosis, spinal, lumbar  2017-09: Lumbar stenosis  2017-04: Typical atrial flutter ([facility])  2017-03: Renal failure  2017-03: Atrial flutter ([facility])  2017-03: Hypoxia  2014-03: Benign prostatic hyperplasia  2014-02: Degeneration of lumbar or lumbosacral intervertebral disc  2013-10: Controlled type 2 diabetes mellitus without complication,   with long-term current use of insulin ([facility])  2013-10: Essential hypertension  2013-10: Dyslipidemia  2013-10: CKD (chronic kidney disease) stage 2, GFR  60-89 ml/min      Past Surgical History:  Past Surgical History:   Procedure Laterality Date    AZ TOTAL HIP ARTHROPLASTY Right 6/6/2022    Procedure: RIGHT ANTERIOR TOTAL HIP ARTHROPLASTY;  Surgeon: Jose Manuel Combs M.D.;  Location: [facility];  Service: Orthopedics    AZ TOTAL HIP ARTHROPLASTY Left 2/7/2022    Procedure: LEFT ANTERIOR TOTAL HIP ARTHROPLASTY;  Surgeon: Jose Manuel Combs M.D.;  Location: [facility];  Service: Orthopedics    INGUINAL HERNIA REPAIR Left 1/5/2021    Procedure: REPAIR, HERNIA, INGUINAL-LARGE INCARCERATED WITH MESH;  Surgeon: Rogers Chin M.D.;  Location: SURGERY SAME DAY [facility];  Service: General    WRIST FUSION Right 9/17/2020    Procedure: FUSION, JOINT, WRIST - SCAPHOID EXCISION AND FOUR CORNER INTERCARPAL ARTHRODESIS, POSTERIOR INTEROSSEUS NERVE EXCISION;  Surgeon: Nhan Mccarthy M.D.;  Location: SURGERY [facility];  Service: Orthopedics    FUSION, SPINE, XLIF Left 9/18/2017    Procedure: EXTREME LATERAL INTERBODY FUSION L2-3 W/PLATE;  Surgeon: Yemi Sharma M.D.;  Location: SURGERY [facility];  Service: Neurosurgery    FUSION, SPINE, LUMBAR, PLIF  9/18/2017    Procedure: LUMBAR FUSION POSTERIOR L2-3 INSTRUMENTED;  Surgeon: Yemi Sharma M.D.;  Location: SURGERY [facility];  Service: Neurosurgery    LUMBAR LAMINECTOMY DISKECTOMY  9/18/2017    Procedure: LUMBAR LAMINECTOMY DISKECTOMY;  Surgeon: Yemi Sharma M.D.;  Location: SURGERY [facility];  Service: Neurosurgery    RECOVERY  1/13/2016    Procedure: IR Deep bone biopsy L2-L3 with general anesthesia-DAYANA;  Surgeon: [facility];  Location: SURGERY PRE-POST PROC UNIT [facility];  Service:     LUMBAR LAMINECTOMY DISKECTOMY  4/22/2014    Performed by Rogers Long M.D. at SURGERY [facility]    TRANS URETHRAL RESECTION PROSTATE  3/27/2014    Performed by Kyle Guzman M.D. at [facility]    LUMBAR LAMINECTOMY DISKECTOMY  2/19/2014     "Performed by Mazin Long M.D. at SURGERY [facility]    LUMBAR LAMINECTOMY DISKECTOMY  2012    Performed by MAZIN LONG at SURGERY [facility]    SHOULDER ARTHROSCOPY Left 2008    KNEE MANIPULATION Right 2007    KNEE ARTHROPLASTY TOTAL Right         Allergies:  Pollen extract     Social History:  Social History     Tobacco Use    Smoking status: Former     Packs/day: 0.75     Years: 10.00     Pack years: 7.50     Types: Cigarettes     Start date: 1975     Quit date: 2000     Years since quittin.1    Smokeless tobacco: Never    Tobacco comments:     cigarette free over 20 years   Vaping Use    Vaping Use: Never used   Substance Use Topics    Alcohol use: Not Currently    Drug use: Never        Family History:   family history is not on file.      PHYSICAL EXAM:   OBJECTIVE:  Vital signs: /70   Pulse 76   Ht 1.676 m (5' 6\")   Wt 95.3 kg (210 lb)   SpO2 97%   BMI 33.89 kg/m²   GENERAL: Well-developed, well-nourished in no apparent distress.   EYE:  No ocular asymmetry, PERRLA  HENT: Pink, moist mucous membranes.    NECK: No thyromegaly.   CARDIOVASCULAR:  No murmurs  LUNGS: Clear breath sounds  ABDOMEN: Soft, nontender   EXTREMITIES: No clubbing, cyanosis, or edema.   NEUROLOGICAL: No gross focal motor abnormalities   LYMPH: No cervical adenopathy palpated.   SKIN: No rashes, lesions.       Labs:  Lab Results   Component Value Date/Time    HBA1C 6.4 (A) 2023 10:52 AM        Lab Results   Component Value Date/Time    WBC 4.9 2020 07:57 AM    RBC 5.03 2020 07:57 AM    HEMOGLOBIN 15.4 2020 07:57 AM    MCV 92.0 2020 07:57 AM    MCH 30.6 2020 07:57 AM    MCHC 33.3 (L) 2020 07:57 AM    RDW 44.8 2020 07:57 AM    MPV 10.9 2020 07:57 AM       Lab Results   Component Value Date/Time    SODIUM 142 2023 11:04 AM    POTASSIUM 4.6 2023 11:04 AM    CHLORIDE 105 2023 11:04 AM    CO2 24 2023 11:04 AM    ANION 13.0 " 01/17/2023 11:04 AM    GLUCOSE 106 (H) 01/17/2023 11:04 AM    BUN 37 (H) 01/17/2023 11:04 AM    CREATININE 1.09 01/17/2023 11:04 AM    CREATININE 0.9 01/09/2008 12:15 PM    CALCIUM 10.3 01/17/2023 11:04 AM    ASTSGOT 22 01/17/2023 11:04 AM    ALTSGPT 7 01/17/2023 11:04 AM    TBILIRUBIN 0.3 01/17/2023 11:04 AM    ALBUMIN 4.7 01/17/2023 11:04 AM    ALBUMIN 4.49 01/03/2018 11:18 AM    TOTPROTEIN 7.6 01/17/2023 11:04 AM    TOTPROTEIN 7.50 01/03/2018 11:18 AM    GLOBULIN 2.9 01/17/2023 11:04 AM    AGRATIO 1.6 01/17/2023 11:04 AM       Lab Results   Component Value Date/Time    CHOLSTRLTOT 138 03/05/2021 1119    TRIGLYCERIDE 87 03/05/2021 1119    HDL 39 (A) 03/05/2021 1119    LDL 82 03/05/2021 1119       Lab Results   Component Value Date/Time    MALBCRT 70 (H) 01/17/2023 11:04 AM    MICROALBUR 4.8 01/17/2023 11:04 AM        Lab Results   Component Value Date/Time    TSHULTRASEN 2.720 03/16/2017 1530     No results found for: FREEDIR  Lab Results   Component Value Date/Time    FREET3 3.06 10/31/2016 1526     No results found for: THYSTIMIG        ASSESSMENT/PLAN:     1. Controlled type 2 diabetes mellitus without complication, with long-term current use of insulin (HCC)  Controlled  Continue 70/30 premix  Continue metformin   Continue Jardiance  He is updated with his labs  Follow-up with primary care    2. Primary hypothyroidism  Uncontrolled  New onset hypothyroidism reviewed pathogenesis and the importance of control  Start levothyroxine 25 MCG daily  Reviewed proper administration of thyroid hormone  Recommend that primary care repeat thyroid labs in 2 to 3 months    3. Mixed hyperlipidemia  Stable  Patient is declining statin therapy    4. Essential hypertension  Stable  Continue monitoring urine albumin  He is on an ARB/ACE inhibitor  He is a candidate for finerenone however this is very expensive    5. Vitamin D deficiency  Stable  Continue monitoring    6. Long-term insulin use (HCC)  Patient is on long-term  insulin for his type 2 diabetes      Return if symptoms worsen or fail to improve.      Total time spent on day of service was over 40 minutes which included obtaining a detailed history and physical exam, ordering labs, coordinating care and scheduling future follow-up      Thank you kindly for allowing me to participate in the diabetes care plan for this patient.    Yemi Westfall MD, FACE, [facility]    CC:   Cherry Long M.D.

## 2023-02-04 LAB
PSA FREE MFR SERPL: 26 %
PSA FREE SERPL-MCNC: 1.8 NG/ML
PSA SERPL-MCNC: 6.8 NG/ML (ref 0–4)

## 2023-02-15 ENCOUNTER — OFFICE VISIT (OUTPATIENT)
Dept: DERMATOLOGY | Facility: IMAGING CENTER | Age: 73
End: 2023-02-15
Payer: MEDICARE

## 2023-02-15 DIAGNOSIS — L21.9 SEBORRHEIC DERMATITIS OF SCALP: ICD-10-CM

## 2023-02-15 DIAGNOSIS — L57.0 ACTINIC KERATOSIS: ICD-10-CM

## 2023-02-15 PROCEDURE — 17000 DESTRUCT PREMALG LESION: CPT | Performed by: NURSE PRACTITIONER

## 2023-02-15 PROCEDURE — 99213 OFFICE O/P EST LOW 20 MIN: CPT | Mod: 25 | Performed by: NURSE PRACTITIONER

## 2023-02-15 PROCEDURE — 17003 DESTRUCT PREMALG LES 2-14: CPT | Performed by: NURSE PRACTITIONER

## 2023-02-15 RX ORDER — CLOBETASOL PROPIONATE 0.05 G/100ML
SHAMPOO TOPICAL
Qty: 120 ML | Refills: 1 | Status: SHIPPED
Start: 2023-02-15 | End: 2023-03-10

## 2023-02-15 RX ORDER — LEVOTHYROXINE SODIUM 0.03 MG/1
TABLET ORAL
COMMUNITY
Start: 2023-02-03 | End: 2023-02-15

## 2023-02-15 RX ORDER — INSULIN ASPART 100 [IU]/ML
INJECTION, SOLUTION INTRAVENOUS; SUBCUTANEOUS
COMMUNITY
End: 2023-03-10

## 2023-02-15 NOTE — PROGRESS NOTES
DERMATOLOGY NOTE  NEW VISIT       Chief complaint: Establish Care and Rash       HPI:pso on scalp   Onset: +20 yrs   Symptoms: red patches, itch eruption scabs   Treatments: otc cream and shampoo never had any prescriptions         Allergies   Allergen Reactions    Pollen Extract      Local pollen, primarily [place]        MEDICATIONS:  Medications relevant to specialty reviewed.     REVIEW OF SYSTEMS:   Positive for skin (see HPI)  Negative for fevers and chills       EXAM:  There were no vitals taken for this visit.  Constitutional: Well-developed, well-nourished, and in no distress.     A focused skin exam was performed including the affected areas of the face and scalp. Notable findings on exam today listed below and/or in assessment/plan.     Numerous ill-defined erythematous gritty/scaly papules over the forehead vertex, temples and nose  Dry scalp    IMPRESSION / PLAN:    1. Actinic keratosis  - NMSC education/counseling   CRYOTHERAPY:  Risks (including, but not limited to: skin discoloration, redness, blister, blood blister, recurrence, need for further treatment, infection, scar) and benefits of cryotherapy discussed. Patient verbally agreed to proceed with treatment. 1 cryotherapy freeze thaw cycles of 10 seconds were applied to 10 lesions on areas as noted on exam with cryac. Patient tolerated procedure well. Aftercare instructions given--no specific care needed unless irritated during healing process, can apply Vaseline with small band-aid if needed.      2. Seborrheic dermatitis of scalp vs other  Will empirically tx with Rx below  Follow up 6 weeks  - Clobetasol Propionate 0.05 % Shampoo; Use 2-3 times per week as needed  Dispense: 120 mL; Refill: 1      Discussed risks associated with LN2, Patient verbalized understanding and agrees with plan regarding  the above        Please note that this dictation was created using voice recognition software. I have made every reasonable attempt to correct  obvious errors, but I expect that there are errors of grammar and possibly content that I did not discover before finalizing the note.      Return to clinic in: Return in about 6 weeks (around 3/29/2023) for AK follow up. and as needed for any new or changing skin lesions.

## 2023-02-25 DIAGNOSIS — Z79.4 CONTROLLED TYPE 2 DIABETES MELLITUS WITHOUT COMPLICATION, WITH LONG-TERM CURRENT USE OF INSULIN (HCC): ICD-10-CM

## 2023-02-25 DIAGNOSIS — E11.9 CONTROLLED TYPE 2 DIABETES MELLITUS WITHOUT COMPLICATION, WITH LONG-TERM CURRENT USE OF INSULIN (HCC): ICD-10-CM

## 2023-02-26 RX ORDER — INSULIN ADMIN. SUPPLIES
INSULIN PEN (EA) SUBCUTANEOUS
Qty: 300 EACH | Refills: 3 | Status: SHIPPED | OUTPATIENT
Start: 2023-02-26

## 2023-03-08 ENCOUNTER — TELEPHONE (OUTPATIENT)
Dept: MEDICAL GROUP | Facility: PHYSICIAN GROUP | Age: 73
End: 2023-03-08
Payer: MEDICARE

## 2023-03-10 ENCOUNTER — OFFICE VISIT (OUTPATIENT)
Dept: MEDICAL GROUP | Facility: PHYSICIAN GROUP | Age: 73
End: 2023-03-10
Payer: MEDICARE

## 2023-03-10 VITALS
HEART RATE: 72 BPM | SYSTOLIC BLOOD PRESSURE: 110 MMHG | WEIGHT: 211.4 LBS | DIASTOLIC BLOOD PRESSURE: 60 MMHG | TEMPERATURE: 97.3 F | HEIGHT: 66 IN | BODY MASS INDEX: 33.97 KG/M2 | OXYGEN SATURATION: 96 %

## 2023-03-10 DIAGNOSIS — E03.9 PRIMARY HYPOTHYROIDISM: ICD-10-CM

## 2023-03-10 DIAGNOSIS — I10 ESSENTIAL HYPERTENSION: ICD-10-CM

## 2023-03-10 DIAGNOSIS — N40.1 BENIGN PROSTATIC HYPERPLASIA WITH LOWER URINARY TRACT SYMPTOMS, SYMPTOM DETAILS UNSPECIFIED: ICD-10-CM

## 2023-03-10 DIAGNOSIS — E78.5 DYSLIPIDEMIA: ICD-10-CM

## 2023-03-10 DIAGNOSIS — Z79.4 CONTROLLED TYPE 2 DIABETES MELLITUS WITHOUT COMPLICATION, WITH LONG-TERM CURRENT USE OF INSULIN (HCC): ICD-10-CM

## 2023-03-10 DIAGNOSIS — E11.9 CONTROLLED TYPE 2 DIABETES MELLITUS WITHOUT COMPLICATION, WITH LONG-TERM CURRENT USE OF INSULIN (HCC): ICD-10-CM

## 2023-03-10 DIAGNOSIS — N18.2 CKD (CHRONIC KIDNEY DISEASE) STAGE 2, GFR 60-89 ML/MIN: ICD-10-CM

## 2023-03-10 PROCEDURE — 99214 OFFICE O/P EST MOD 30 MIN: CPT

## 2023-03-10 NOTE — ASSESSMENT & PLAN NOTE
Chronic condition improving  -Recommend continuation of diligent efforts towards controlling chronic health conditions diabetes as well as hypertension  -Patient is also recommended to avoid NSAIDs which she is doing

## 2023-03-10 NOTE — PROGRESS NOTES
Subjective:     CC:  Diagnoses of Controlled type 2 diabetes mellitus without complication, with long-term current use of insulin ([facility]), Essential hypertension, Primary hypothyroidism, Dyslipidemia, CKD (chronic kidney disease) stage 2, GFR 60-89 ml/min, and Benign prostatic hyperplasia with lower urinary tract symptoms, symptom details unspecified were pertinent to this visit.    HISTORY OF THE PRESENT ILLNESS: Patient is a 73 y.o. male. This pleasant patient is here today to establish care and discuss the following problems:    Problem   Primary Hypothyroidism    Taking Levothyroxine 25 mcg daily this was new to him after the following labs by endo:   Latest Reference Range & Units 01/17/23 11:04   TSH 0.380 - 5.330 uIU/mL 5.140   Free T-4 0.93 - 1.70 ng/dL 0.91 (L)        Benign Prostatic Hyperplasia    Patient with history of BPH and TURP for which he sees Dr. Guzman at urology [place].  Most recent PSA slightly elevated.  He has an appointment coming up with Dr. Guzman with intervention planned.     Controlled Type 2 Diabetes Mellitus Without Complication, With Long-Term Current Use of Insulin ([facility])    Chronic, taking Empagliflozin 25 mg daily,  Metformin 1000 mg twice daily, Novolog 70/30 as directed-Most recent A1C 6.4%. Was being seen by Dr. Westfall who has now released him to PCP for management. No current complaints.  All diabetic screenings are UTD.  He takes his blood sugar at home 3 times a day     Essential Hypertension    Chronic condition, stable.  History of arrythmia in the past.    -BP today in clinic 110/60  -Taking amlodipine 5 mg, Benzepril 40 mg, daily doing well on these without reported side effects     Dyslipidemia    Chronic, taking gemfibrozil 600 mg daily  -Statin intolerant due to leg cramps   Latest Reference Range & Units 01/17/23 11:04   Cholesterol,Tot 100 - 199 mg/dL 172   Triglycerides 0 - 149 mg/dL 145   HDL >=40 mg/dL 35 !   LDL <100 mg/dL 108 (H)        Ckd (Chronic Kidney  "Disease) Stage 2, Gfr 60-89 Ml/Min    Chronic condition likely secondary to diabetes.  Most recent GFR improved at 71 this is up from 58 at his last lab draw in August 2022 microalbumin creatinine ratio as follows:   Latest Reference Range & Units 01/17/23 11:04   Micro Alb Creat Ratio 0 - 30 mg/g 70 (H)   Creatinine, Urine mg/dL 68.62   Microalbumin, Urine Random mg/dL 4.8            Health Maintenance: Completed    ROS:   Review of Systems   All other systems reviewed and are negative.      Objective:     Exam: /60 (BP Location: Left arm, Patient Position: Sitting, BP Cuff Size: Adult)   Pulse 72   Temp 36.3 °C (97.3 °F) (Temporal)   Ht 1.676 m (5' 6\")   Wt 95.9 kg (211 lb 6.4 oz)   SpO2 96%  Body mass index is 34.12 kg/m².    Physical Exam  Constitutional:       Appearance: Normal appearance.   HENT:      Head: Normocephalic.   Eyes:      Conjunctiva/sclera: Conjunctivae normal.      Pupils: Pupils are equal, round, and reactive to light.   Pulmonary:      Effort: Pulmonary effort is normal.   Musculoskeletal:         General: Normal range of motion.   Skin:     General: Skin is warm and dry.   Neurological:      General: No focal deficit present.      Mental Status: He is alert and oriented to person, place, and time.   Psychiatric:         Mood and Affect: Mood normal.         Behavior: Behavior normal.         Labs:   Lab Results   Component Value Date/Time    CHOLSTRLTOT 172 01/17/2023 11:04 AM     (H) 01/17/2023 11:04 AM    HDL 35 (A) 01/17/2023 11:04 AM    TRIGLYCERIDE 145 01/17/2023 11:04 AM       Lab Results   Component Value Date/Time    SODIUM 142 01/17/2023 11:04 AM    POTASSIUM 4.6 01/17/2023 11:04 AM    CHLORIDE 105 01/17/2023 11:04 AM    CO2 24 01/17/2023 11:04 AM    GLUCOSE 106 (H) 01/17/2023 11:04 AM    BUN 37 (H) 01/17/2023 11:04 AM    CREATININE 1.09 01/17/2023 11:04 AM    CREATININE 0.9 01/09/2008 12:15 PM     Lab Results   Component Value Date/Time    ALKPHOSPHAT 106 (H) " 01/17/2023 11:04 AM    ASTSGOT 22 01/17/2023 11:04 AM    ALTSGPT 7 01/17/2023 11:04 AM    TBILIRUBIN 0.3 01/17/2023 11:04 AM      Lab Results   Component Value Date/Time    HBA1C 6.4 (A) 02/03/2023 10:52 AM    HBA1C 5.8 (A) 07/15/2022 10:21 AM    HBA1C 5.7 (A) 10/12/2021 09:12 AM     No results found for: TSH  Lab Results   Component Value Date/Time    FREET4 0.91 (L) 01/17/2023 11:04 AM    FREET4 0.99 03/24/2022 11:30 AM         Assessment & Plan: Medical Decision Making   73 y.o. male with the following -    Problem List Items Addressed This Visit       Controlled type 2 diabetes mellitus without complication, with long-term current use of insulin (HCC)     - Chronic condition stable  -Therefore we will continue patient on empagliflozin 25 mg daily, metformin 1000 mg twice daily, NovoLog 70/30 as directed 3 times daily blood sugars.           Relevant Orders    Referral to Diabetic Education    Lipid Profile    Comp Metabolic Panel    HEMOGLOBIN A1C    Essential hypertension     Chronic condition stable with blood pressure today in clinic 110/68 therefore we will continue patient on his current dose of amlodipine 5 mg daily and benazepril 40 mg daily.         Relevant Orders    Lipid Profile    Comp Metabolic Panel    HEMOGLOBIN A1C    Dyslipidemia     The 10-year ASCVD risk score (Hermann GARIBAY, et al., 2019) is: 41%  Chronic, uncontrolled  -Recommend continuing Gemfibrozil 600 mg daily  -Recommend healthy diet/exercise as directed  -May consider Cardiac CT in the future         Relevant Orders    Lipid Profile    Comp Metabolic Panel    HEMOGLOBIN A1C    CKD (chronic kidney disease) stage 2, GFR 60-89 ml/min     Chronic condition improving  -Recommend continuation of diligent efforts towards controlling chronic health conditions diabetes as well as hypertension  -Patient is also recommended to avoid NSAIDs which she is doing         Benign prostatic hyperplasia     Chronic condition stable  -Continue to follow with  Dr. Guzman as directed         Primary hypothyroidism     This is a new condition that was recently diagnosed in January 2023.  Patient has started taking levothyroxine 25 mcg daily.  He is doing well on this medication without reported side effects.  He is due for repeat thyroid studies in the next 4 weeks.  Will likely adjust medication will adjust medication if need to be based upon levels.         Relevant Orders    T3 FREE    FREE THYROXINE    TSH       Differential diagnosis, natural history, supportive care, and indications for immediate follow-up discussed.  Shared decision making approach was utilized, and patient is amendable with plan of care.  Patient understands to return to clinic or go to the emergency department if symptoms worsen. All questions and concerns addressed to the best of my knowledge.      Return in about 4 months (around 7/10/2023) for F/U DM, F/U Lab Review.    Please note that this dictation was created using voice recognition software. I have made every reasonable attempt to correct obvious errors, but I expect that there are errors of grammar and possibly content that I did not discover before finalizing the note.

## 2023-03-10 NOTE — ASSESSMENT & PLAN NOTE
This is a new condition that was recently diagnosed in January 2023.  Patient has started taking levothyroxine 25 mcg daily.  He is doing well on this medication without reported side effects.  He is due for repeat thyroid studies in the next 4 weeks.  Will likely adjust medication will adjust medication if need to be based upon levels.

## 2023-03-10 NOTE — ASSESSMENT & PLAN NOTE
Chronic condition stable with blood pressure today in clinic 110/68 therefore we will continue patient on his current dose of amlodipine 5 mg daily and benazepril 40 mg daily.

## 2023-03-10 NOTE — ASSESSMENT & PLAN NOTE
- Chronic condition stable  -Therefore we will continue patient on empagliflozin 25 mg daily, metformin 1000 mg twice daily, NovoLog 70/30 as directed 3 times daily blood sugars.

## 2023-03-10 NOTE — ASSESSMENT & PLAN NOTE
The 10-year ASCVD risk score (Hermann GARIBAY, et al., 2019) is: 41%  Chronic, uncontrolled  -Recommend continuing Gemfibrozil 600 mg daily  -Recommend healthy diet/exercise as directed  -May consider Cardiac CT in the future

## 2023-03-17 ENCOUNTER — TELEPHONE (OUTPATIENT)
Dept: MEDICAL GROUP | Facility: PHYSICIAN GROUP | Age: 73
End: 2023-03-17
Payer: MEDICARE

## 2023-03-17 NOTE — TELEPHONE ENCOUNTER
Patient called about an appt and wanted to get Patito's last name so he can schedule and appt.

## 2023-03-24 ENCOUNTER — HOSPITAL ENCOUNTER (OUTPATIENT)
Facility: MEDICAL CENTER | Age: 73
End: 2023-03-24
Attending: PHYSICIAN ASSISTANT
Payer: MEDICARE

## 2023-03-24 PROCEDURE — 87086 URINE CULTURE/COLONY COUNT: CPT

## 2023-03-27 ENCOUNTER — HOSPITAL ENCOUNTER (OUTPATIENT)
Dept: LAB | Facility: MEDICAL CENTER | Age: 73
End: 2023-03-27
Payer: MEDICARE

## 2023-03-27 DIAGNOSIS — E03.9 PRIMARY HYPOTHYROIDISM: ICD-10-CM

## 2023-03-27 LAB
BACTERIA UR CULT: NORMAL
SIGNIFICANT IND 70042: NORMAL
SITE SITE: NORMAL
SOURCE SOURCE: NORMAL
T3FREE SERPL-MCNC: 2.17 PG/ML (ref 2–4.4)
T4 FREE SERPL-MCNC: 0.89 NG/DL (ref 0.93–1.7)
TSH SERPL DL<=0.005 MIU/L-ACNC: 4.76 UIU/ML (ref 0.38–5.33)

## 2023-03-27 PROCEDURE — 84443 ASSAY THYROID STIM HORMONE: CPT

## 2023-03-27 PROCEDURE — 84439 ASSAY OF FREE THYROXINE: CPT

## 2023-03-27 PROCEDURE — 84481 FREE ASSAY (FT-3): CPT

## 2023-03-27 PROCEDURE — 36415 COLL VENOUS BLD VENIPUNCTURE: CPT

## 2023-03-28 DIAGNOSIS — E03.9 PRIMARY HYPOTHYROIDISM: ICD-10-CM

## 2023-03-28 RX ORDER — LEVOTHYROXINE SODIUM 0.05 MG/1
50 TABLET ORAL
Qty: 30 TABLET | Refills: 2 | Status: SHIPPED
Start: 2023-03-28 | End: 2023-04-25

## 2023-04-04 ENCOUNTER — OFFICE VISIT (OUTPATIENT)
Dept: DERMATOLOGY | Facility: IMAGING CENTER | Age: 73
End: 2023-04-04
Payer: MEDICARE

## 2023-04-04 DIAGNOSIS — L57.0 ACTINIC KERATOSIS: ICD-10-CM

## 2023-04-04 DIAGNOSIS — L21.9 SEBORRHEIC DERMATITIS OF SCALP: ICD-10-CM

## 2023-04-04 PROCEDURE — 99213 OFFICE O/P EST LOW 20 MIN: CPT | Performed by: NURSE PRACTITIONER

## 2023-04-04 RX ORDER — SULFAMETHOXAZOLE AND TRIMETHOPRIM 800; 160 MG/1; MG/1
TABLET ORAL
COMMUNITY
End: 2023-06-26

## 2023-04-04 RX ORDER — KETOCONAZOLE 20 MG/ML
SHAMPOO TOPICAL
Qty: 120 ML | Refills: 3 | Status: SHIPPED
Start: 2023-04-04 | End: 2023-07-06

## 2023-04-04 RX ORDER — SULFAMETHOXAZOLE AND TRIMETHOPRIM 800; 160 MG/1; MG/1
TABLET ORAL
COMMUNITY
Start: 2023-03-24 | End: 2023-04-04

## 2023-04-04 NOTE — PROGRESS NOTES
DERMATOLOGY NOTE  FOLLOW UP VISIT       Chief complaint: Actinic Keratosis and Follow-Up  Pt states Ak's resolved well after Tx             Allergies   Allergen Reactions    Pollen Extract      Local pollen, primarily Rabitt [place]        MEDICATIONS:  Medications relevant to specialty reviewed.     REVIEW OF SYSTEMS:   Positive for skin (see HPI)  Negative for fevers and chills       EXAM:  There were no vitals taken for this visit.  Constitutional: Well-developed, well-nourished, and in no distress.     A focused skin exam was performed including the affected areas of the face and scalp. Notable findings on exam today listed below and/or in assessment/plan.     Few ill-defined erythematous gritty/scaly papules over the forehead    Dry scalp    IMPRESSION / PLAN:    1. Actinic keratosis, improved with LN2 at last visit  Few remaining AKs, but pt declines LN2 today as has surgery coming up  Will return at later date for further tx      2. Seborrheic dermatitis of scalp vs other, not improved with clobetasol shampoo  Will trial Rx below  Follow up for no improvement    - ketoconazole (NIZORAL) 2 % shampoo; AAA 2-3 times per week, let sit for 5-15 minutes, then rinse  Dispense: 120 mL; Refill: 3          Please note that this dictation was created using voice recognition software. I have made every reasonable attempt to correct obvious errors, but I expect that there are errors of grammar and possibly content that I did not discover before finalizing the note.      Return to clinic in: Return for PRN. and as needed for any new or changing skin lesions.

## 2023-04-17 DIAGNOSIS — E11.9 TYPE 2 DIABETES MELLITUS WITHOUT COMPLICATION, WITH LONG-TERM CURRENT USE OF INSULIN (HCC): ICD-10-CM

## 2023-04-17 DIAGNOSIS — Z79.4 TYPE 2 DIABETES MELLITUS WITHOUT COMPLICATION, WITH LONG-TERM CURRENT USE OF INSULIN (HCC): ICD-10-CM

## 2023-04-17 DIAGNOSIS — I10 ESSENTIAL HYPERTENSION: ICD-10-CM

## 2023-04-17 RX ORDER — INDAPAMIDE 2.5 MG/1
TABLET ORAL
Qty: 90 TABLET | Refills: 0 | Status: SHIPPED | OUTPATIENT
Start: 2023-04-17 | End: 2023-04-20 | Stop reason: SDUPTHER

## 2023-04-19 DIAGNOSIS — E11.9 TYPE 2 DIABETES MELLITUS WITHOUT COMPLICATION, WITH LONG-TERM CURRENT USE OF INSULIN (HCC): ICD-10-CM

## 2023-04-19 DIAGNOSIS — I10 ESSENTIAL HYPERTENSION: ICD-10-CM

## 2023-04-19 DIAGNOSIS — Z79.4 TYPE 2 DIABETES MELLITUS WITHOUT COMPLICATION, WITH LONG-TERM CURRENT USE OF INSULIN (HCC): ICD-10-CM

## 2023-04-19 RX ORDER — INDAPAMIDE 2.5 MG/1
2.5 TABLET ORAL
Qty: 90 TABLET | Refills: 0 | Status: CANCELLED | OUTPATIENT
Start: 2023-04-19

## 2023-04-20 ENCOUNTER — TELEPHONE (OUTPATIENT)
Dept: MEDICAL GROUP | Facility: PHYSICIAN GROUP | Age: 73
End: 2023-04-20
Payer: MEDICARE

## 2023-04-20 DIAGNOSIS — E11.9 TYPE 2 DIABETES MELLITUS WITHOUT COMPLICATION, WITH LONG-TERM CURRENT USE OF INSULIN (HCC): ICD-10-CM

## 2023-04-20 DIAGNOSIS — Z79.4 TYPE 2 DIABETES MELLITUS WITHOUT COMPLICATION, WITH LONG-TERM CURRENT USE OF INSULIN (HCC): ICD-10-CM

## 2023-04-20 DIAGNOSIS — I10 ESSENTIAL HYPERTENSION: ICD-10-CM

## 2023-04-20 RX ORDER — INDAPAMIDE 2.5 MG/1
2.5 TABLET ORAL
Qty: 90 TABLET | Refills: 1 | Status: SHIPPED | OUTPATIENT
Start: 2023-04-20 | End: 2023-10-17

## 2023-04-20 NOTE — TELEPHONE ENCOUNTER
Patient called and stated that you will be filling all his medications and not Dr. Westfall and he is having a TURP at [facility] on May 15, 2023, HE has stopped his 50 mcg of levothyroxine due to weight gain.  When does he need to do his TSH labs to see of he should stay on the 25 mcg.  Please advise what he needs to due

## 2023-04-20 NOTE — TELEPHONE ENCOUNTER
Patient has came back again in regards of requesting Indapamide. Pharmacist from [facility] has been faxing a request to refill the patient medication, but no to avail.. Patient is running of the said medication. Please reach out to the patient when the request is done..

## 2023-04-21 ENCOUNTER — HOSPITAL ENCOUNTER (OUTPATIENT)
Facility: MEDICAL CENTER | Age: 73
End: 2023-04-21
Attending: UROLOGY
Payer: MEDICARE

## 2023-04-21 ENCOUNTER — TELEPHONE (OUTPATIENT)
Dept: MEDICAL GROUP | Facility: PHYSICIAN GROUP | Age: 73
End: 2023-04-21
Payer: MEDICARE

## 2023-04-21 LAB
AMORPH CRY #/AREA URNS HPF: PRESENT /HPF
APPEARANCE UR: CLEAR
BACTERIA #/AREA URNS HPF: ABNORMAL /HPF
BILIRUB UR QL STRIP.AUTO: NEGATIVE
COLOR UR: YELLOW
EPI CELLS #/AREA URNS HPF: ABNORMAL /HPF
GLUCOSE UR STRIP.AUTO-MCNC: 500 MG/DL
GRAN CASTS #/AREA URNS LPF: ABNORMAL /LPF
KETONES UR STRIP.AUTO-MCNC: NEGATIVE MG/DL
LEUKOCYTE ESTERASE UR QL STRIP.AUTO: ABNORMAL
MICRO URNS: ABNORMAL
NITRITE UR QL STRIP.AUTO: NEGATIVE
PH UR STRIP.AUTO: 6 [PH] (ref 5–8)
PROT UR QL STRIP: NEGATIVE MG/DL
RBC # URNS HPF: ABNORMAL /HPF
RBC CASTS #/AREA URNS LPF: ABNORMAL /LPF
RBC UR QL AUTO: ABNORMAL
RENAL EPI CELLS #/AREA URNS HPF: ABNORMAL /HPF
SP GR UR STRIP.AUTO: 1.02
UROBILINOGEN UR STRIP.AUTO-MCNC: 0.2 MG/DL
WBC #/AREA URNS HPF: ABNORMAL /HPF

## 2023-04-21 PROCEDURE — 87086 URINE CULTURE/COLONY COUNT: CPT

## 2023-04-21 PROCEDURE — 81001 URINALYSIS AUTO W/SCOPE: CPT

## 2023-04-23 LAB
BACTERIA UR CULT: NORMAL
SIGNIFICANT IND 70042: NORMAL
SITE SITE: NORMAL
SOURCE SOURCE: NORMAL

## 2023-04-25 ENCOUNTER — OFFICE VISIT (OUTPATIENT)
Dept: MEDICAL GROUP | Facility: PHYSICIAN GROUP | Age: 73
End: 2023-04-25
Payer: MEDICARE

## 2023-04-25 VITALS
HEART RATE: 100 BPM | BODY MASS INDEX: 34.01 KG/M2 | SYSTOLIC BLOOD PRESSURE: 110 MMHG | OXYGEN SATURATION: 93 % | TEMPERATURE: 97.4 F | HEIGHT: 66 IN | DIASTOLIC BLOOD PRESSURE: 60 MMHG | WEIGHT: 211.6 LBS

## 2023-04-25 DIAGNOSIS — E66.9 CLASS 1 OBESITY WITH SERIOUS COMORBIDITY AND BODY MASS INDEX (BMI) OF 34.0 TO 34.9 IN ADULT, UNSPECIFIED OBESITY TYPE: ICD-10-CM

## 2023-04-25 DIAGNOSIS — E03.9 PRIMARY HYPOTHYROIDISM: ICD-10-CM

## 2023-04-25 PROBLEM — E66.811 CLASS 1 OBESITY WITH SERIOUS COMORBIDITY AND BODY MASS INDEX (BMI) OF 34.0 TO 34.9 IN ADULT: Status: ACTIVE | Noted: 2023-04-25

## 2023-04-25 PROCEDURE — 99214 OFFICE O/P EST MOD 30 MIN: CPT

## 2023-04-25 RX ORDER — LEVOTHYROXINE SODIUM 0.03 MG/1
25 TABLET ORAL
Qty: 90 TABLET | Refills: 1 | Status: SHIPPED | OUTPATIENT
Start: 2023-04-25

## 2023-04-25 NOTE — PROGRESS NOTES
"Subjective:     CC: Thyroid medication    HPI:   Zhang presents today with    Problem   Class 1 Obesity With Serious Comorbidity and Body Mass Index (Bmi) of 34.0 to 34.9 in Adult    Reports to be active and follows a healthy diet  -BMI today is 34.15  -Wt is 211 lbs       Primary Hypothyroidism    Was taking Levothyroxine 25 mcg daily this was new to him after the following labs by berta.  Tried to increase to 50 mcg due to low Free T4. He did not tolerate this change and reported weight gain and feeling bloated. He would like to go back on the 25 mcg dose. He has no other symptoms at this time.   Latest Reference Range & Units 01/17/23 11:04 03/27/23 06:32   TSH 0.380 - 5.330 uIU/mL 5.140 4.760   Free T-4 0.93 - 1.70 ng/dL 0.91 (L) 0.89 (L)   T3,Free 2.00 - 4.40 pg/mL  2.17            Health Maintenance: Completed    ROS: See HPI  ROS    Objective:     Exam:  /60 (BP Location: Left arm, Patient Position: Sitting, BP Cuff Size: Adult)   Pulse 100   Temp 36.3 °C (97.4 °F) (Temporal)   Ht 1.676 m (5' 6\")   Wt 96 kg (211 lb 9.6 oz)   SpO2 93%   BMI 34.15 kg/m²  Body mass index is 34.15 kg/m².    Physical Exam    Labs:   Lab Results   Component Value Date/Time    CHOLSTRLTOT 172 01/17/2023 11:04 AM     (H) 01/17/2023 11:04 AM    HDL 35 (A) 01/17/2023 11:04 AM    TRIGLYCERIDE 145 01/17/2023 11:04 AM       Lab Results   Component Value Date/Time    SODIUM 142 01/17/2023 11:04 AM    POTASSIUM 4.6 01/17/2023 11:04 AM    CHLORIDE 105 01/17/2023 11:04 AM    CO2 24 01/17/2023 11:04 AM    GLUCOSE 106 (H) 01/17/2023 11:04 AM    BUN 37 (H) 01/17/2023 11:04 AM    CREATININE 1.09 01/17/2023 11:04 AM    CREATININE 0.9 01/09/2008 12:15 PM     Lab Results   Component Value Date/Time    ALKPHOSPHAT 106 (H) 01/17/2023 11:04 AM    ASTSGOT 22 01/17/2023 11:04 AM    ALTSGPT 7 01/17/2023 11:04 AM    TBILIRUBIN 0.3 01/17/2023 11:04 AM      Lab Results   Component Value Date/Time    HBA1C 6.4 (A) 02/03/2023 10:52 AM    HBA1C " 5.8 (A) 07/15/2022 10:21 AM    HBA1C 5.7 (A) 10/12/2021 09:12 AM     No results found for: TSH  Lab Results   Component Value Date/Time    FREET4 0.89 (L) 03/27/2023 06:32 AM    FREET4 0.91 (L) 01/17/2023 11:04 AM         Assessment & Plan: Medical Decision Making     73 y.o. male with the following -     1. Primary hypothyroidism  Chronic condition uncontrolled  -Given patient's response to increasing levothyroxine to 50 mcg daily and his most recent symptomology of weight gain will decrease back down to 25 mcg daily and have him repeat thyroid studies in 3 to 6 months taking the 25 mcg daily dose  - T3 FREE; Future  - FREE THYROXINE; Future  - TSH; Future  - levothyroxine (SYNTHROID) 25 MCG Tab; Take 1 Tablet by mouth every morning on an empty stomach.  Dispense: 90 Tablet; Refill: 1    2. Class 1 obesity with serious comorbidity and body mass index (BMI) of 34.0 to 34.9 in adult, unspecified obesity type  Chronic condition-stable  -Recommend diligent efforts towards healthy diet and exercise as discussed, eating Mediterranean style diet and aiming to achieve at least 30 minutes of activity most days of the week      Differential diagnosis, natural history, supportive care, and indications for immediate follow-up discussed.  Shared decision making approach utilized, and patient is amendable with plan of care.  Patient understands to return to clinic or go to the emergency department if symptoms worsen. All questions and concerns addressed to the best of my knowledge.    Return in about 4 months (around 8/25/2023) for F/U Lab Review.    Please note that this dictation was created using voice recognition software. I have made every reasonable attempt to correct obvious errors, but I expect that there are errors of grammar and possibly content that I did not discover before finalizing the note.

## 2023-05-30 ENCOUNTER — TELEPHONE (OUTPATIENT)
Dept: MEDICAL GROUP | Facility: PHYSICIAN GROUP | Age: 73
End: 2023-05-30
Payer: MEDICARE

## 2023-05-30 DIAGNOSIS — E11.9 CONTROLLED TYPE 2 DIABETES MELLITUS WITHOUT COMPLICATION, WITH LONG-TERM CURRENT USE OF INSULIN (HCC): ICD-10-CM

## 2023-05-30 DIAGNOSIS — Z79.4 CONTROLLED TYPE 2 DIABETES MELLITUS WITHOUT COMPLICATION, WITH LONG-TERM CURRENT USE OF INSULIN (HCC): ICD-10-CM

## 2023-05-30 RX ORDER — LANCETS 33 GAUGE
1 EACH MISCELLANEOUS 3 TIMES DAILY
Qty: 300 EACH | Refills: 0 | Status: SHIPPED | OUTPATIENT
Start: 2023-05-30

## 2023-05-30 RX ORDER — BLOOD SUGAR DIAGNOSTIC
STRIP MISCELLANEOUS
Qty: 300 STRIP | Refills: 0 | Status: SHIPPED | OUTPATIENT
Start: 2023-05-30 | End: 2023-10-17

## 2023-05-30 NOTE — TELEPHONE ENCOUNTER
Patient came in to ask if the provider could send a refill request of LANCETS. Patient wanted the MA to give him a call, because the pharmacy or insurance is requiring the patient to get his A1C checked every 3months...Please reach out to the patient.

## 2023-06-09 ENCOUNTER — TELEPHONE (OUTPATIENT)
Dept: MEDICAL GROUP | Facility: PHYSICIAN GROUP | Age: 73
End: 2023-06-09

## 2023-06-19 ENCOUNTER — HOSPITAL ENCOUNTER (OUTPATIENT)
Dept: LAB | Facility: MEDICAL CENTER | Age: 73
End: 2023-06-19
Payer: MEDICARE

## 2023-06-19 DIAGNOSIS — E11.9 CONTROLLED TYPE 2 DIABETES MELLITUS WITHOUT COMPLICATION, WITH LONG-TERM CURRENT USE OF INSULIN (HCC): ICD-10-CM

## 2023-06-19 DIAGNOSIS — E78.5 DYSLIPIDEMIA: ICD-10-CM

## 2023-06-19 DIAGNOSIS — Z79.4 CONTROLLED TYPE 2 DIABETES MELLITUS WITHOUT COMPLICATION, WITH LONG-TERM CURRENT USE OF INSULIN (HCC): ICD-10-CM

## 2023-06-19 DIAGNOSIS — I10 ESSENTIAL HYPERTENSION: ICD-10-CM

## 2023-06-19 DIAGNOSIS — E03.9 PRIMARY HYPOTHYROIDISM: ICD-10-CM

## 2023-06-19 LAB
ALBUMIN SERPL BCP-MCNC: 4.8 G/DL (ref 3.2–4.9)
ALBUMIN/GLOB SERPL: 1.4 G/DL
ALP SERPL-CCNC: 154 U/L (ref 30–99)
ALT SERPL-CCNC: 12 U/L (ref 2–50)
ANION GAP SERPL CALC-SCNC: 16 MMOL/L (ref 7–16)
AST SERPL-CCNC: 25 U/L (ref 12–45)
BILIRUB SERPL-MCNC: 0.4 MG/DL (ref 0.1–1.5)
BUN SERPL-MCNC: 40 MG/DL (ref 8–22)
CALCIUM ALBUM COR SERPL-MCNC: 9.9 MG/DL (ref 8.5–10.5)
CALCIUM SERPL-MCNC: 10.5 MG/DL (ref 8.5–10.5)
CHLORIDE SERPL-SCNC: 106 MMOL/L (ref 96–112)
CHOLEST SERPL-MCNC: 165 MG/DL (ref 100–199)
CO2 SERPL-SCNC: 23 MMOL/L (ref 20–33)
CREAT SERPL-MCNC: 1.22 MG/DL (ref 0.5–1.4)
EST. AVERAGE GLUCOSE BLD GHB EST-MCNC: 137 MG/DL
FASTING STATUS PATIENT QL REPORTED: NORMAL
GFR SERPLBLD CREATININE-BSD FMLA CKD-EPI: 62 ML/MIN/1.73 M 2
GLOBULIN SER CALC-MCNC: 3.4 G/DL (ref 1.9–3.5)
GLUCOSE SERPL-MCNC: 87 MG/DL (ref 65–99)
HBA1C MFR BLD: 6.4 % (ref 4–5.6)
HDLC SERPL-MCNC: 39 MG/DL
LDLC SERPL CALC-MCNC: 99 MG/DL
POTASSIUM SERPL-SCNC: 4.6 MMOL/L (ref 3.6–5.5)
PROT SERPL-MCNC: 8.2 G/DL (ref 6–8.2)
SODIUM SERPL-SCNC: 145 MMOL/L (ref 135–145)
T3FREE SERPL-MCNC: 2.42 PG/ML (ref 2–4.4)
T4 FREE SERPL-MCNC: 1.13 NG/DL (ref 0.93–1.7)
TRIGL SERPL-MCNC: 135 MG/DL (ref 0–149)
TSH SERPL DL<=0.005 MIU/L-ACNC: 3.63 UIU/ML (ref 0.38–5.33)

## 2023-06-19 PROCEDURE — 36415 COLL VENOUS BLD VENIPUNCTURE: CPT

## 2023-06-19 PROCEDURE — 84439 ASSAY OF FREE THYROXINE: CPT

## 2023-06-19 PROCEDURE — 84481 FREE ASSAY (FT-3): CPT

## 2023-06-19 PROCEDURE — 80061 LIPID PANEL: CPT

## 2023-06-19 PROCEDURE — 84443 ASSAY THYROID STIM HORMONE: CPT

## 2023-06-19 PROCEDURE — 80053 COMPREHEN METABOLIC PANEL: CPT

## 2023-06-19 PROCEDURE — 83036 HEMOGLOBIN GLYCOSYLATED A1C: CPT | Mod: GA

## 2023-06-26 ENCOUNTER — NON-PROVIDER VISIT (OUTPATIENT)
Dept: MEDICAL GROUP | Facility: PHYSICIAN GROUP | Age: 73
End: 2023-06-26
Payer: MEDICARE

## 2023-06-26 DIAGNOSIS — Z79.4 CONTROLLED TYPE 2 DIABETES MELLITUS WITHOUT COMPLICATION, WITH LONG-TERM CURRENT USE OF INSULIN (HCC): ICD-10-CM

## 2023-06-26 DIAGNOSIS — E11.9 CONTROLLED TYPE 2 DIABETES MELLITUS WITHOUT COMPLICATION, WITH LONG-TERM CURRENT USE OF INSULIN (HCC): ICD-10-CM

## 2023-06-26 PROCEDURE — 99211 OFF/OP EST MAY X REQ PHY/QHP: CPT

## 2023-06-26 RX ORDER — INSULIN ASPART 100 [IU]/ML
15 INJECTION, SUSPENSION SUBCUTANEOUS 3 TIMES DAILY
Qty: 15 ML | Refills: 3 | Status: SHIPPED | OUTPATIENT
Start: 2023-06-26 | End: 2023-12-18 | Stop reason: SDUPTHER

## 2023-06-26 NOTE — PROGRESS NOTES
RN-CDE Note    Subjective:     HPI:  Zhang Cheung is a 73 y.o. old patient who is seen by the Diabetes Nurse Specialist today for review of controlled type 2 diabetes with long term use of insulin.    Was being seen by Endocrinology and released back to Primary Care as his diabetes has been well controlled for awhile.      Diabetes Medications:   Novolog 70/30 mix insulin varies depending on if he is eating, what his activity has been and what his blood sugars is .  Usually 6-10 units 2-3 times per day.   Metformin 1000 mg bid  Jardiance 25 mg daily  Taking above medications as prescribed: yes  Taking daily ASA: No    Exercise: active.   Diet: states he is about 90-95% vegetarian with a little skin less chicken breast ar cod fillet.   Patient's body mass index is unknown because there is no height or weight on file. Exercise and nutrition counseling were performed at this visit.      Health Maintenance:   Health Maintenance Due   Topic Date Due    COVID-19 Vaccine (6 - Pfizer series) 01/08/2023    COLORECTAL CANCER SCREENING  06/21/2023         DM:   Last A1c:   Lab Results   Component Value Date/Time    HBA1C 6.4 (H) 06/19/2023 12:26 PM      Previous A1c was 6.4 on 2/3/23  A1C GOAL: < 7    Glucose monitoring frequency: states he is testing between 2-4 times per day.  States average between 110-130 most of the time.   Hypoglycemic episodes: no    Last Retinal Exam: on file and up-to-date  Daily Foot Exam: Yes     Exam:  Monofilament: current  States he is having some neuropathy pain in the bottoms of his feet.   Lab Results   Component Value Date/Time    MALBCRT 70 (H) 01/17/2023 11:04 AM    MICROALBUR 4.8 01/17/2023 11:04 AM        ACR Albumin/Creatinine Ratio goal <30     HTN:   Blood pressure goal <130/<80 .   Currently Rx ACE/ARB: Yes     Dyslipidemia:    Lab Results   Component Value Date/Time    CHOLSTRLTOT 165 06/19/2023 12:26 PM    LDL 99 06/19/2023 12:26 PM    HDL 39 (A) 06/19/2023 12:26 PM     TRIGLYCERIDE 135 06/19/2023 12:26 PM         Currently Rx Statin: No     He  reports that he quit smoking about 23 years ago. His smoking use included cigarettes. He started smoking about 48 years ago. He has a 7.50 pack-year smoking history. He has never used smokeless tobacco.      Plan:     Discussed and educated on:   - All medications, side effects and compliance (discussed carefully)  - Annual eye examinations at Ophthalmology  - Foot Care:   - HbA1C: target  - Home glucose monitoring emphasized  - Weight control and daily exercise    Recommended medication changes: no changes at this time.  Follow up in 6 months.

## 2023-07-06 ENCOUNTER — OFFICE VISIT (OUTPATIENT)
Dept: MEDICAL GROUP | Facility: PHYSICIAN GROUP | Age: 73
End: 2023-07-06
Payer: MEDICARE

## 2023-07-06 VITALS
SYSTOLIC BLOOD PRESSURE: 118 MMHG | BODY MASS INDEX: 34.49 KG/M2 | HEART RATE: 57 BPM | HEIGHT: 66 IN | OXYGEN SATURATION: 97 % | DIASTOLIC BLOOD PRESSURE: 60 MMHG | TEMPERATURE: 96.8 F | WEIGHT: 214.6 LBS

## 2023-07-06 DIAGNOSIS — I10 ESSENTIAL HYPERTENSION: ICD-10-CM

## 2023-07-06 DIAGNOSIS — N18.2 CKD (CHRONIC KIDNEY DISEASE) STAGE 2, GFR 60-89 ML/MIN: ICD-10-CM

## 2023-07-06 DIAGNOSIS — E11.65 TYPE 2 DIABETES MELLITUS WITH HYPERGLYCEMIA, WITH LONG-TERM CURRENT USE OF INSULIN (HCC): ICD-10-CM

## 2023-07-06 DIAGNOSIS — E78.5 DYSLIPIDEMIA: ICD-10-CM

## 2023-07-06 DIAGNOSIS — E03.9 PRIMARY HYPOTHYROIDISM: ICD-10-CM

## 2023-07-06 DIAGNOSIS — Z12.11 SCREENING FOR COLORECTAL CANCER: ICD-10-CM

## 2023-07-06 DIAGNOSIS — Z79.4 TYPE 2 DIABETES MELLITUS WITH HYPERGLYCEMIA, WITH LONG-TERM CURRENT USE OF INSULIN (HCC): ICD-10-CM

## 2023-07-06 DIAGNOSIS — Z12.12 SCREENING FOR COLORECTAL CANCER: ICD-10-CM

## 2023-07-06 PROBLEM — M19.90 ARTHRITIS: Status: ACTIVE | Noted: 2023-07-06

## 2023-07-06 PROBLEM — N28.1 ACQUIRED CYSTIC KIDNEY DISEASE: Status: ACTIVE | Noted: 2023-07-06

## 2023-07-06 PROCEDURE — 1170F FXNL STATUS ASSESSED: CPT

## 2023-07-06 PROCEDURE — 3074F SYST BP LT 130 MM HG: CPT

## 2023-07-06 PROCEDURE — 3078F DIAST BP <80 MM HG: CPT

## 2023-07-06 PROCEDURE — 99214 OFFICE O/P EST MOD 30 MIN: CPT

## 2023-07-06 ASSESSMENT — ENCOUNTER SYMPTOMS
NAUSEA: 0
BLURRED VISION: 0
SHORTNESS OF BREATH: 0
DIZZINESS: 0
MYALGIAS: 0
WEIGHT LOSS: 0
DIARRHEA: 0
VOMITING: 0
CONSTIPATION: 0
WEAKNESS: 0
HEADACHES: 0
COUGH: 0
FEVER: 0
ABDOMINAL PAIN: 0
CHILLS: 0

## 2023-07-06 ASSESSMENT — FIBROSIS 4 INDEX: FIB4 SCORE: 2.55

## 2023-07-06 NOTE — ASSESSMENT & PLAN NOTE
Chronic condition improving  -Recommend continuation of diligent efforts towards controlling chronic health conditions diabetes as well as hypertension  -Patient is also recommended to avoid NSAIDs which she is doing and drinking at least 48-64 oz of water daily

## 2023-07-06 NOTE — ASSESSMENT & PLAN NOTE
This is a new condition that was recently diagnosed in January 2023.  Patient has started taking levothyroxine 25 mcg daily.  He is doing well on this medication without reported side effects.

## 2023-07-06 NOTE — ASSESSMENT & PLAN NOTE
Chronic condition stable with blood pressure today in clinic 118/60 therefore we will continue patient on his current dose of amlodipine 5 mg daily and benazepril 40 mg daily.

## 2023-07-06 NOTE — PROGRESS NOTES
Subjective:     CC: lab follow up    HPI:   Zhang presents today with    Problem   Arthritis    Arthritis (disorder)     Acquired Cystic Kidney Disease    Cyst of kidney, acquired     Type 2 Diabetes Mellitus With Hyperglycemia, With Long-Term Current Use of Insulin (Hcc)    Chronic, stable  A1C= 6.4%  Diabetic screenings UTD  Diabetic educator visit UTD  Stable on empaglaflozin 25 mg daily, indapamide 2.5 mg daily, Novolog 15 U TID, Metformin 1000 mg BID     Primary Hypothyroidism    Was taking Levothyroxine 25 mcg daily this was new to him after the following labs by berta.  Tried to increase to 50 mcg due to low Free T4. He did not tolerate this change and reported weight gain and feeling bloated. He is doing well on the 25 mcg dose and labs are biochemically stable       Essential Hypertension    Chronic condition, stable.  History of arrythmia in the past.    -BP today in clinic 118/60  -Taking amlodipine 5 mg, Benzepril 40 mg, daily doing well on these without reported side effects     Dyslipidemia    Chronic, taking gemfibrozil 600 mg daily  -Statin intolerant due to leg cramps  Lab Results   Component Value Date/Time    CHOLSTRLTOT 165 06/19/2023 12:26 PM    CHOLSTRLTOT 172 01/17/2023 11:04 AM    LDL 99 06/19/2023 12:26 PM     (H) 01/17/2023 11:04 AM    HDL 39 (A) 06/19/2023 12:26 PM    HDL 35 (A) 01/17/2023 11:04 AM    TRIGLYCERIDE 135 06/19/2023 12:26 PM    TRIGLYCERIDE 145 01/17/2023 11:04 AM            Ckd (Chronic Kidney Disease) Stage 2, Gfr 60-89 Ml/Min    Chronic, likely secondary to diabetes/HTN.  Does report to drink ample fluids daily. Most recent GFR 62   Latest Reference Range & Units 01/17/23 11:04   Micro Alb Creat Ratio 0 - 30 mg/g 70 (H)   Creatinine, Urine mg/dL 68.62   Microalbumin, Urine Random mg/dL 4.8            Health Maintenance: Completed    ROS:  Review of Systems   Constitutional:  Negative for chills, fever, malaise/fatigue and weight loss.   Eyes:  Negative for blurred  "vision.   Respiratory:  Negative for cough and shortness of breath.    Cardiovascular:  Negative for chest pain.   Gastrointestinal:  Negative for abdominal pain, constipation, diarrhea, nausea and vomiting.   Musculoskeletal:  Positive for joint pain. Negative for myalgias.   Neurological:  Negative for dizziness, weakness and headaches.       Objective:     Exam:  /60 (BP Location: Left arm, Patient Position: Sitting, BP Cuff Size: Adult)   Pulse (!) 57   Temp 36 °C (96.8 °F) (Temporal)   Ht 1.676 m (5' 6\")   Wt 97.3 kg (214 lb 9.6 oz)   SpO2 97%   BMI 34.64 kg/m²  Body mass index is 34.64 kg/m².    Physical Exam  Constitutional:       Appearance: Normal appearance.   HENT:      Head: Normocephalic.   Eyes:      Conjunctiva/sclera: Conjunctivae normal.      Pupils: Pupils are equal, round, and reactive to light.   Cardiovascular:      Rate and Rhythm: Normal rate and regular rhythm.      Heart sounds: No murmur heard.  Pulmonary:      Effort: Pulmonary effort is normal. No respiratory distress.      Breath sounds: Normal breath sounds.   Musculoskeletal:         General: Normal range of motion.   Skin:     General: Skin is warm and dry.   Neurological:      General: No focal deficit present.      Mental Status: He is alert and oriented to person, place, and time.   Psychiatric:         Mood and Affect: Mood normal.         Behavior: Behavior normal.         Labs:   Hospital Outpatient Visit on 06/19/2023   Component Date Value    T3,Free 06/19/2023 2.42     Free T-4 06/19/2023 1.13     TSH 06/19/2023 3.630     Cholesterol,Tot 06/19/2023 165     Triglycerides 06/19/2023 135     HDL 06/19/2023 39 (A)     LDL 06/19/2023 99     Sodium 06/19/2023 145     Potassium 06/19/2023 4.6     Chloride 06/19/2023 106     Co2 06/19/2023 23     Anion Gap 06/19/2023 16.0     Glucose 06/19/2023 87     Bun 06/19/2023 40 (H)     Creatinine 06/19/2023 1.22     Calcium 06/19/2023 10.5     AST(SGOT) 06/19/2023 25     ALT(SGPT) " 06/19/2023 12     Alkaline Phosphatase 06/19/2023 154 (H)     Total Bilirubin 06/19/2023 0.4     Albumin 06/19/2023 4.8     Total Protein 06/19/2023 8.2     Globulin 06/19/2023 3.4     A-G Ratio 06/19/2023 1.4     Glycohemoglobin 06/19/2023 6.4 (H)     Est Avg Glucose 06/19/2023 137     Fasting Status 06/19/2023 Fasting     Correct Calcium 06/19/2023 9.9     GFR (CKD-EPI) 06/19/2023 62          Assessment & Plan: Medical Decision Making     73 y.o. male with the following -     Problem List Items Addressed This Visit       Essential hypertension     Chronic condition stable with blood pressure today in clinic 118/60 therefore we will continue patient on his current dose of amlodipine 5 mg daily and benazepril 40 mg daily.         Dyslipidemia    Relevant Orders    Comp Metabolic Panel    Lipid Profile    CKD (chronic kidney disease) stage 2, GFR 60-89 ml/min     Chronic condition improving  -Recommend continuation of diligent efforts towards controlling chronic health conditions diabetes as well as hypertension  -Patient is also recommended to avoid NSAIDs which she is doing and drinking at least 48-64 oz of water daily         Relevant Orders    Comp Metabolic Panel    Primary hypothyroidism     This is a new condition that was recently diagnosed in January 2023.  Patient has started taking levothyroxine 25 mcg daily.  He is doing well on this medication without reported side effects.           Relevant Orders    TSH WITH REFLEX TO FT4    Type 2 diabetes mellitus with hyperglycemia, with long-term current use of insulin (HCC)     - Chronic condition stable  -Therefore we will continue patient on empagliflozin 25 mg daily, metformin 1000 mg twice daily, NovoLog 70/30 as directed 3 times daily blood sugars.           Relevant Orders    HEMOGLOBIN A1C    Comp Metabolic Panel     Other Visit Diagnoses       Screening for colorectal cancer        Relevant Orders    COLOGUARD (FIT DNA)              Differential  diagnosis, natural history, supportive care, and indications for immediate follow-up discussed.  Shared decision making approach utilized, and patient is amendable with plan of care.  Patient understands to return to clinic or go to the emergency department if symptoms worsen. All questions and concerns addressed to the best of my knowledge.    Return in about 6 months (around 1/6/2024).    Please note that this dictation was created using voice recognition software. I have made every reasonable attempt to correct obvious errors, but I expect that there are errors of grammar and possibly content that I did not discover before finalizing the note.

## 2023-07-14 DIAGNOSIS — E11.42 CONTROLLED TYPE 2 DIABETES MELLITUS WITH DIABETIC POLYNEUROPATHY, WITH LONG-TERM CURRENT USE OF INSULIN (HCC): ICD-10-CM

## 2023-07-14 DIAGNOSIS — Z79.4 CONTROLLED TYPE 2 DIABETES MELLITUS WITH DIABETIC POLYNEUROPATHY, WITH LONG-TERM CURRENT USE OF INSULIN (HCC): ICD-10-CM

## 2023-07-14 RX ORDER — EMPAGLIFLOZIN 25 MG/1
TABLET, FILM COATED ORAL
Qty: 30 TABLET | Refills: 6 | Status: SHIPPED | OUTPATIENT
Start: 2023-07-14 | End: 2023-07-14 | Stop reason: SDUPTHER

## 2023-07-14 RX ORDER — EMPAGLIFLOZIN 25 MG/1
1 TABLET, FILM COATED ORAL
Qty: 90 TABLET | Refills: 2 | Status: SHIPPED | OUTPATIENT
Start: 2023-07-14

## 2023-09-10 NOTE — PROGRESS NOTES
Balloon deflated, varghese dc'd intact per order, pt tolerated procedure well.    Patient complained of chills and body aches

## 2023-09-18 DIAGNOSIS — E11.9 CONTROLLED TYPE 2 DIABETES MELLITUS WITHOUT COMPLICATION, WITH LONG-TERM CURRENT USE OF INSULIN (HCC): ICD-10-CM

## 2023-09-18 DIAGNOSIS — Z79.4 CONTROLLED TYPE 2 DIABETES MELLITUS WITHOUT COMPLICATION, WITH LONG-TERM CURRENT USE OF INSULIN (HCC): ICD-10-CM

## 2023-10-03 DIAGNOSIS — Z79.4 TYPE 2 DIABETES MELLITUS WITHOUT COMPLICATION, WITH LONG-TERM CURRENT USE OF INSULIN (HCC): ICD-10-CM

## 2023-10-03 DIAGNOSIS — E11.9 TYPE 2 DIABETES MELLITUS WITHOUT COMPLICATION, WITH LONG-TERM CURRENT USE OF INSULIN (HCC): ICD-10-CM

## 2023-10-03 DIAGNOSIS — I10 ESSENTIAL HYPERTENSION: ICD-10-CM

## 2023-10-03 RX ORDER — BENAZEPRIL HYDROCHLORIDE 40 MG/1
40 TABLET ORAL 2 TIMES DAILY
Qty: 180 TABLET | Refills: 0 | OUTPATIENT
Start: 2023-10-03

## 2023-10-03 RX ORDER — BENAZEPRIL HYDROCHLORIDE 40 MG/1
TABLET ORAL
Qty: 180 TABLET | Refills: 3 | Status: SHIPPED | OUTPATIENT
Start: 2023-10-03 | End: 2023-10-04 | Stop reason: SDUPTHER

## 2023-10-04 DIAGNOSIS — E11.9 TYPE 2 DIABETES MELLITUS WITHOUT COMPLICATION, WITH LONG-TERM CURRENT USE OF INSULIN (HCC): ICD-10-CM

## 2023-10-04 DIAGNOSIS — Z79.4 TYPE 2 DIABETES MELLITUS WITHOUT COMPLICATION, WITH LONG-TERM CURRENT USE OF INSULIN (HCC): ICD-10-CM

## 2023-10-04 DIAGNOSIS — I10 ESSENTIAL HYPERTENSION: ICD-10-CM

## 2023-10-04 RX ORDER — BENAZEPRIL HYDROCHLORIDE 40 MG/1
40 TABLET ORAL 2 TIMES DAILY
Qty: 180 TABLET | Refills: 3 | Status: SHIPPED | OUTPATIENT
Start: 2023-10-04

## 2023-10-04 NOTE — TELEPHONE ENCOUNTER
Received request via: Pharmacy    Was the patient seen in the last year in this department? Yes    Does the patient have an active prescription (recently filled or refills available) for medication(s) requested? No    Does the patient have [facility] and need 100 day supply (blood pressure, diabetes and cholesterol meds only)? NO

## 2023-10-16 DIAGNOSIS — E11.9 TYPE 2 DIABETES MELLITUS WITHOUT COMPLICATION, WITH LONG-TERM CURRENT USE OF INSULIN (HCC): ICD-10-CM

## 2023-10-16 DIAGNOSIS — I10 ESSENTIAL HYPERTENSION: ICD-10-CM

## 2023-10-16 DIAGNOSIS — Z79.4 TYPE 2 DIABETES MELLITUS WITHOUT COMPLICATION, WITH LONG-TERM CURRENT USE OF INSULIN (HCC): ICD-10-CM

## 2023-10-16 DIAGNOSIS — Z79.4 CONTROLLED TYPE 2 DIABETES MELLITUS WITHOUT COMPLICATION, WITH LONG-TERM CURRENT USE OF INSULIN (HCC): ICD-10-CM

## 2023-10-16 DIAGNOSIS — E11.9 CONTROLLED TYPE 2 DIABETES MELLITUS WITHOUT COMPLICATION, WITH LONG-TERM CURRENT USE OF INSULIN (HCC): ICD-10-CM

## 2023-10-17 RX ORDER — BLOOD SUGAR DIAGNOSTIC
STRIP MISCELLANEOUS
Qty: 300 STRIP | Refills: 0 | Status: SHIPPED | OUTPATIENT
Start: 2023-10-17 | End: 2023-10-19 | Stop reason: SDUPTHER

## 2023-10-17 RX ORDER — INDAPAMIDE 2.5 MG/1
2.5 TABLET ORAL
Qty: 90 TABLET | Refills: 1 | Status: SHIPPED | OUTPATIENT
Start: 2023-10-17

## 2023-10-19 DIAGNOSIS — E11.9 CONTROLLED TYPE 2 DIABETES MELLITUS WITHOUT COMPLICATION, WITH LONG-TERM CURRENT USE OF INSULIN (HCC): ICD-10-CM

## 2023-10-19 DIAGNOSIS — Z79.4 CONTROLLED TYPE 2 DIABETES MELLITUS WITHOUT COMPLICATION, WITH LONG-TERM CURRENT USE OF INSULIN (HCC): ICD-10-CM

## 2023-10-19 RX ORDER — BLOOD SUGAR DIAGNOSTIC
STRIP MISCELLANEOUS
Qty: 300 STRIP | Refills: 0 | Status: SHIPPED | OUTPATIENT
Start: 2023-10-19

## 2023-11-29 ENCOUNTER — PATIENT MESSAGE (OUTPATIENT)
Dept: HEALTH INFORMATION MANAGEMENT | Facility: OTHER | Age: 73
End: 2023-11-29

## 2023-11-30 ENCOUNTER — HOSPITAL ENCOUNTER (OUTPATIENT)
Dept: LAB | Facility: MEDICAL CENTER | Age: 73
End: 2023-11-30
Payer: MEDICARE

## 2023-11-30 DIAGNOSIS — E11.65 TYPE 2 DIABETES MELLITUS WITH HYPERGLYCEMIA, WITH LONG-TERM CURRENT USE OF INSULIN (HCC): ICD-10-CM

## 2023-11-30 DIAGNOSIS — E78.5 DYSLIPIDEMIA: ICD-10-CM

## 2023-11-30 DIAGNOSIS — N18.2 CKD (CHRONIC KIDNEY DISEASE) STAGE 2, GFR 60-89 ML/MIN: ICD-10-CM

## 2023-11-30 DIAGNOSIS — Z79.4 TYPE 2 DIABETES MELLITUS WITH HYPERGLYCEMIA, WITH LONG-TERM CURRENT USE OF INSULIN (HCC): ICD-10-CM

## 2023-11-30 LAB
ALBUMIN SERPL BCP-MCNC: 4.6 G/DL (ref 3.2–4.9)
ALBUMIN/GLOB SERPL: 1.4 G/DL
ALP SERPL-CCNC: 97 U/L (ref 30–99)
ALT SERPL-CCNC: 8 U/L (ref 2–50)
ANION GAP SERPL CALC-SCNC: 11 MMOL/L (ref 7–16)
AST SERPL-CCNC: 25 U/L (ref 12–45)
BILIRUB SERPL-MCNC: 0.4 MG/DL (ref 0.1–1.5)
BUN SERPL-MCNC: 41 MG/DL (ref 8–22)
CALCIUM ALBUM COR SERPL-MCNC: 9.5 MG/DL (ref 8.5–10.5)
CALCIUM SERPL-MCNC: 10 MG/DL (ref 8.5–10.5)
CHLORIDE SERPL-SCNC: 105 MMOL/L (ref 96–112)
CHOLEST SERPL-MCNC: 189 MG/DL (ref 100–199)
CO2 SERPL-SCNC: 24 MMOL/L (ref 20–33)
CREAT SERPL-MCNC: 1.29 MG/DL (ref 0.5–1.4)
EST. AVERAGE GLUCOSE BLD GHB EST-MCNC: 137 MG/DL
FASTING STATUS PATIENT QL REPORTED: NORMAL
GFR SERPLBLD CREATININE-BSD FMLA CKD-EPI: 58 ML/MIN/1.73 M 2
GLOBULIN SER CALC-MCNC: 3.2 G/DL (ref 1.9–3.5)
GLUCOSE SERPL-MCNC: 115 MG/DL (ref 65–99)
HBA1C MFR BLD: 6.4 % (ref 4–5.6)
HDLC SERPL-MCNC: 32 MG/DL
LDLC SERPL CALC-MCNC: 122 MG/DL
POTASSIUM SERPL-SCNC: 4.6 MMOL/L (ref 3.6–5.5)
PROT SERPL-MCNC: 7.8 G/DL (ref 6–8.2)
SODIUM SERPL-SCNC: 140 MMOL/L (ref 135–145)
TRIGL SERPL-MCNC: 176 MG/DL (ref 0–149)

## 2023-11-30 PROCEDURE — 83036 HEMOGLOBIN GLYCOSYLATED A1C: CPT | Mod: GA

## 2023-11-30 PROCEDURE — 80061 LIPID PANEL: CPT

## 2023-11-30 PROCEDURE — 80053 COMPREHEN METABOLIC PANEL: CPT

## 2023-11-30 PROCEDURE — 36415 COLL VENOUS BLD VENIPUNCTURE: CPT

## 2023-12-01 ENCOUNTER — HOSPITAL ENCOUNTER (OUTPATIENT)
Dept: LAB | Facility: MEDICAL CENTER | Age: 73
End: 2023-12-01
Payer: MEDICARE

## 2023-12-01 DIAGNOSIS — E03.9 PRIMARY HYPOTHYROIDISM: ICD-10-CM

## 2023-12-01 PROCEDURE — 84443 ASSAY THYROID STIM HORMONE: CPT

## 2023-12-01 PROCEDURE — 36415 COLL VENOUS BLD VENIPUNCTURE: CPT

## 2023-12-02 LAB — TSH SERPL DL<=0.005 MIU/L-ACNC: 2.24 UIU/ML (ref 0.38–5.33)

## 2023-12-04 DIAGNOSIS — E78.2 MIXED HYPERLIPIDEMIA: ICD-10-CM

## 2023-12-04 RX ORDER — GEMFIBROZIL 600 MG/1
TABLET, FILM COATED ORAL
Qty: 180 TABLET | Refills: 3 | Status: SHIPPED | OUTPATIENT
Start: 2023-12-04 | End: 2023-12-05 | Stop reason: SDUPTHER

## 2023-12-05 DIAGNOSIS — E78.2 MIXED HYPERLIPIDEMIA: ICD-10-CM

## 2023-12-05 RX ORDER — GEMFIBROZIL 600 MG/1
TABLET, FILM COATED ORAL
Qty: 180 TABLET | Refills: 3 | Status: SHIPPED | OUTPATIENT
Start: 2023-12-05

## 2023-12-07 ENCOUNTER — OFFICE VISIT (OUTPATIENT)
Dept: MEDICAL GROUP | Facility: PHYSICIAN GROUP | Age: 73
End: 2023-12-07
Payer: MEDICARE

## 2023-12-07 VITALS
BODY MASS INDEX: 33.43 KG/M2 | OXYGEN SATURATION: 96 % | SYSTOLIC BLOOD PRESSURE: 130 MMHG | HEART RATE: 76 BPM | WEIGHT: 208 LBS | HEIGHT: 66 IN | TEMPERATURE: 97 F | DIASTOLIC BLOOD PRESSURE: 70 MMHG

## 2023-12-07 DIAGNOSIS — Z79.4 TYPE 2 DIABETES MELLITUS WITH HYPERGLYCEMIA, WITH LONG-TERM CURRENT USE OF INSULIN (HCC): ICD-10-CM

## 2023-12-07 DIAGNOSIS — E11.65 TYPE 2 DIABETES MELLITUS WITH HYPERGLYCEMIA, WITH LONG-TERM CURRENT USE OF INSULIN (HCC): ICD-10-CM

## 2023-12-07 DIAGNOSIS — N18.2 CKD (CHRONIC KIDNEY DISEASE) STAGE 2, GFR 60-89 ML/MIN: ICD-10-CM

## 2023-12-07 DIAGNOSIS — E78.5 DYSLIPIDEMIA: ICD-10-CM

## 2023-12-07 DIAGNOSIS — Z23 NEED FOR VACCINATION: ICD-10-CM

## 2023-12-07 DIAGNOSIS — I10 ESSENTIAL HYPERTENSION: ICD-10-CM

## 2023-12-07 DIAGNOSIS — E03.9 HYPOTHYROIDISM, UNSPECIFIED TYPE: ICD-10-CM

## 2023-12-07 DIAGNOSIS — E66.9 CLASS 1 OBESITY WITH SERIOUS COMORBIDITY AND BODY MASS INDEX (BMI) OF 33.0 TO 33.9 IN ADULT, UNSPECIFIED OBESITY TYPE: ICD-10-CM

## 2023-12-07 PROCEDURE — 3078F DIAST BP <80 MM HG: CPT

## 2023-12-07 PROCEDURE — 90715 TDAP VACCINE 7 YRS/> IM: CPT

## 2023-12-07 PROCEDURE — 90471 IMMUNIZATION ADMIN: CPT

## 2023-12-07 PROCEDURE — 99214 OFFICE O/P EST MOD 30 MIN: CPT | Mod: 25

## 2023-12-07 PROCEDURE — 1170F FXNL STATUS ASSESSED: CPT

## 2023-12-07 PROCEDURE — 3075F SYST BP GE 130 - 139MM HG: CPT

## 2023-12-07 ASSESSMENT — ENCOUNTER SYMPTOMS
WEIGHT LOSS: 0
CONSTIPATION: 0
FEVER: 0
SHORTNESS OF BREATH: 0
CHILLS: 0
WEAKNESS: 0
ABDOMINAL PAIN: 0
BLURRED VISION: 0
COUGH: 0
NAUSEA: 0
DIARRHEA: 0
MYALGIAS: 0
HEADACHES: 0
DIZZINESS: 0
VOMITING: 0

## 2023-12-07 ASSESSMENT — FIBROSIS 4 INDEX: FIB4 SCORE: 3.13

## 2023-12-07 NOTE — PROGRESS NOTES
Subjective:     CC: follow up    HPI:   Zhang presents today with    Problem   Type 2 Diabetes Mellitus With Hyperglycemia, With Long-Term Current Use of Insulin (Hcc)    Chronic, stable  A1C= 6.4%  Diabetic screenings UTD  Diabetic educator visit UTD  Stable on empaglaflozin 25 mg daily, indapamide 2.5 mg daily, Novolog 15 U TID, Metformin 1000 mg BID     Class 1 Obesity With Serious Comorbidity and Body Mass Index (Bmi) of 33.0 to 33.9 in Adult    -Wt: 208 down from 211 from April 2023  Reports to be active and follows a healthy diet- Ivonne\[facility] [place] [facility]\"" for cardio  -BMI today is 33.57         Essential Hypertension    Chronic condition, stable.  History of arrythmia in the past.    -BP today in clinic 130/70. Home -150/60-70  -Taking amlodipine 5 mg, Benzepril 40 mg, daily doing well on these without reported side effects     Dyslipidemia    Chronic, taking gemfibrozil 600 mg daily  -Statin intolerant due to leg cramps  -Mostly vegetarian however, does eat chicken and fish  -He feels since starting Jardiance that his cholesterol has worsened  Lab Results   Component Value Date/Time    CHOLSTRLTOT 189 11/30/2023 06:46 AM    CHOLSTRLTOT 165 06/19/2023 12:26 PM     (H) 11/30/2023 06:46 AM    LDL 99 06/19/2023 12:26 PM    HDL 32 (A) 11/30/2023 06:46 AM    HDL 39 (A) 06/19/2023 12:26 PM    TRIGLYCERIDE 176 (H) 11/30/2023 06:46 AM    TRIGLYCERIDE 135 06/19/2023 12:26 PM            Ckd (Chronic Kidney Disease) Stage 2, Gfr 60-89 Ml/Min    Chronic, likely secondary to diabetes/HTN.  Does report to drink ample fluids daily. Most recent GFR 58. Does report to have been a bit dehydrated when labs were drawn             ROS:  Review of Systems   Constitutional:  Negative for chills, fever, malaise/fatigue and weight loss.   Eyes:  Negative for blurred vision.   Respiratory:  Negative for cough and shortness of breath.    Cardiovascular:  Negative for chest pain.   Gastrointestinal:  Negative for abdominal pain,  "constipation, diarrhea, nausea and vomiting.   Musculoskeletal:  Positive for joint pain. Negative for myalgias.   Neurological:  Negative for dizziness, weakness and headaches.       Objective:     Exam:  /70 (BP Location: Left arm, Patient Position: Sitting, BP Cuff Size: Adult)   Pulse 76   Temp 36.1 °C (97 °F) (Temporal)   Ht 1.676 m (5' 6\")   Wt 94.3 kg (208 lb)   SpO2 96%   BMI 33.57 kg/m²  Body mass index is 33.57 kg/m².    Physical Exam  Constitutional:       Appearance: Normal appearance.   HENT:      Head: Normocephalic.   Eyes:      Conjunctiva/sclera: Conjunctivae normal.      Pupils: Pupils are equal, round, and reactive to light.   Cardiovascular:      Rate and Rhythm: Normal rate and regular rhythm.      Heart sounds: No murmur heard.  Pulmonary:      Effort: Pulmonary effort is normal. No respiratory distress.      Breath sounds: Normal breath sounds.   Musculoskeletal:         General: Normal range of motion.   Skin:     General: Skin is warm and dry.   Neurological:      General: No focal deficit present.      Mental Status: He is alert and oriented to person, place, and time.   Psychiatric:         Mood and Affect: Mood normal.         Behavior: Behavior normal.         Labs:   Hospital Outpatient Visit on 12/01/2023   Component Date Value    TSH 12/01/2023 2.240    Hospital Outpatient Visit on 11/30/2023   Component Date Value    Cholesterol,Tot 11/30/2023 189     Triglycerides 11/30/2023 176 (H)     HDL 11/30/2023 32 (A)     LDL 11/30/2023 122 (H)     Sodium 11/30/2023 140     Potassium 11/30/2023 4.6     Chloride 11/30/2023 105     Co2 11/30/2023 24     Anion Gap 11/30/2023 11.0     Glucose 11/30/2023 115 (H)     Bun 11/30/2023 41 (H)     Creatinine 11/30/2023 1.29     Calcium 11/30/2023 10.0     Correct Calcium 11/30/2023 9.5     AST(SGOT) 11/30/2023 25     ALT(SGPT) 11/30/2023 8     Alkaline Phosphatase 11/30/2023 97     Total Bilirubin 11/30/2023 0.4     Albumin 11/30/2023 4.6  "    Total Protein 11/30/2023 7.8     Globulin 11/30/2023 3.2     A-G Ratio 11/30/2023 1.4     Glycohemoglobin 11/30/2023 6.4 (H)     Est Avg Glucose 11/30/2023 137     Fasting Status 11/30/2023 Fasting     GFR (CKD-EPI) 11/30/2023 58 (A)          Assessment & Plan: Medical Decision Making     73 y.o. male with the following -     Problem List Items Addressed This Visit       Essential hypertension     Chronic condition stable with blood pressure today in clinic 130/70 therefore we will continue patient on his current dose of amlodipine 5 mg daily and benazepril 40 mg daily.         Dyslipidemia     Chronic condition uncontrolled  The 10-year ASCVD risk score (Hermann DK, et al., 2019) is: 53.3%  Chronic, uncontrolled  -Recommend continuing Gemfibrozil 600 mg daily  -Recommend healthy diet/exercise as directed  -May consider Cardiac CT in the future-  Labs as follows:         Relevant Orders    Lipid Profile    APOLIPOPROTEIN B    Lipoprotein (a)    CKD (chronic kidney disease) stage 2, GFR 60-89 ml/min     Chronic condition- uncontrolled  -Recommend continuation of diligent efforts towards controlling chronic health conditions diabetes as well as hypertension  -Patient is also recommended to avoid NSAIDs which she is doing and drinking at least 48-64 oz of water daily         Relevant Orders    Comp Metabolic Panel    MICROALBUMIN CREAT RATIO URINE    Class 1 obesity with serious comorbidity and body mass index (BMI) of 33.0 to 33.9 in adult     Chronic condition showing improvement  --Exercise: At least 150 minutes of moderate aerobic activity per week or 75 minutes of vigorous aerobic activity per week, +2 days/week of strength training  - Healthy lifestyle and eating habits: Mediterranean-based diet (rich in fruits, vegetables, nuts and healthy oils), proper hydration and avoiding sugary beverages, adequate sleep hygiene-(allowing 7 to 8 hours of overnight sleep).           Type 2 diabetes mellitus with  hyperglycemia, with long-term current use of insulin (HCC)     - Chronic condition stable  -Therefore we will continue patient on empagliflozin 25 mg daily, metformin 1000 mg twice daily, NovoLog 70/30 as directed 3 times daily blood sugars.  -Labs as follows:         Relevant Orders    HEMOGLOBIN A1C    Comp Metabolic Panel    Lipid Profile    APOLIPOPROTEIN B    Lipoprotein (a)    MICROALBUMIN CREAT RATIO URINE     Other Visit Diagnoses       Need for vaccination        Relevant Orders    Tdap Vaccine =>8YO IM (Completed)    Hypothyroidism, unspecified type        Relevant Orders    TSH WITH REFLEX TO FT4            Differential diagnosis, natural history, supportive care, and indications for immediate follow-up discussed.  Shared decision making approach utilized, and patient is amendable with plan of care.  Patient understands to return to clinic or go to the emergency department if symptoms worsen. All questions and concerns addressed to the best of my knowledge.    Return in about 6 months (around 6/7/2024).    Please note that this dictation was created using voice recognition software. I have made every reasonable attempt to correct obvious errors, but I expect that there are errors of grammar and possibly content that I did not discover before finalizing the note.

## 2023-12-07 NOTE — ASSESSMENT & PLAN NOTE
Chronic condition uncontrolled  The 10-year ASCVD risk score (Hermann GARIBAY, et al., 2019) is: 53.3%  Chronic, uncontrolled  -Recommend continuing Gemfibrozil 600 mg daily  -Recommend healthy diet/exercise as directed  -May consider Cardiac CT in the future-  Labs as follows:

## 2023-12-07 NOTE — ASSESSMENT & PLAN NOTE
Chronic condition stable with blood pressure today in clinic 130/70 therefore we will continue patient on his current dose of amlodipine 5 mg daily and benazepril 40 mg daily.

## 2023-12-07 NOTE — ASSESSMENT & PLAN NOTE
- Chronic condition stable  -Therefore we will continue patient on empagliflozin 25 mg daily, metformin 1000 mg twice daily, NovoLog 70/30 as directed 3 times daily blood sugars.  -Labs as follows:

## 2023-12-07 NOTE — ASSESSMENT & PLAN NOTE
Chronic condition- uncontrolled  -Recommend continuation of diligent efforts towards controlling chronic health conditions diabetes as well as hypertension  -Patient is also recommended to avoid NSAIDs which she is doing and drinking at least 48-64 oz of water daily

## 2023-12-07 NOTE — ASSESSMENT & PLAN NOTE
Chronic condition showing improvement  --Exercise: At least 150 minutes of moderate aerobic activity per week or 75 minutes of vigorous aerobic activity per week, +2 days/week of strength training  - Healthy lifestyle and eating habits: Mediterranean-based diet (rich in fruits, vegetables, nuts and healthy oils), proper hydration and avoiding sugary beverages, adequate sleep hygiene-(allowing 7 to 8 hours of overnight sleep).

## 2023-12-18 ENCOUNTER — OFFICE VISIT (OUTPATIENT)
Dept: MEDICAL GROUP | Facility: PHYSICIAN GROUP | Age: 73
End: 2023-12-18
Payer: MEDICARE

## 2023-12-18 VITALS
HEIGHT: 66 IN | DIASTOLIC BLOOD PRESSURE: 92 MMHG | SYSTOLIC BLOOD PRESSURE: 108 MMHG | WEIGHT: 208 LBS | BODY MASS INDEX: 33.43 KG/M2

## 2023-12-18 DIAGNOSIS — Z79.4 CONTROLLED TYPE 2 DIABETES MELLITUS WITHOUT COMPLICATION, WITH LONG-TERM CURRENT USE OF INSULIN (HCC): ICD-10-CM

## 2023-12-18 DIAGNOSIS — E11.9 CONTROLLED TYPE 2 DIABETES MELLITUS WITHOUT COMPLICATION, WITH LONG-TERM CURRENT USE OF INSULIN (HCC): ICD-10-CM

## 2023-12-18 PROCEDURE — 3080F DIAST BP >= 90 MM HG: CPT | Performed by: FAMILY MEDICINE

## 2023-12-18 PROCEDURE — 99211 OFF/OP EST MAY X REQ PHY/QHP: CPT | Performed by: FAMILY MEDICINE

## 2023-12-18 PROCEDURE — 3074F SYST BP LT 130 MM HG: CPT | Performed by: FAMILY MEDICINE

## 2023-12-18 PROCEDURE — 1170F FXNL STATUS ASSESSED: CPT | Performed by: FAMILY MEDICINE

## 2023-12-18 RX ORDER — INSULIN ASPART 100 [IU]/ML
17 INJECTION, SUSPENSION SUBCUTANEOUS 3 TIMES DAILY
Qty: 15 ML | Refills: 3 | Status: SHIPPED | OUTPATIENT
Start: 2023-12-18

## 2023-12-18 ASSESSMENT — FIBROSIS 4 INDEX: FIB4 SCORE: 3.13

## 2023-12-18 NOTE — PROGRESS NOTES
RN-CDE Note    Subjective:     HPI:  Zhang Cheung is a 73 y.o. old patient who is seen by the Diabetes Nurse Specialist today for review of his controlled type 2 diabetes with long term use of insulin.      Diabetes Medications:   Novolog 70/30 mix.  Usually 6-10 units 2-3 times per day with meals.   Metformin 1000 mg bid  Jardiance 25 mg daily  Taking above medications as prescribed: yes  Taking daily ASA: No    Exercise: states due to joint pain it is more difficult to get all of his exercising in.   Diet: states primarily a vegetarian.   Patient's body mass index is unknown because there is no height or weight on file. Exercise and nutrition counseling were performed at this visit.      Health Maintenance:   Health Maintenance Due   Topic Date Due    Hepatitis B Vaccine (Hep B) (1 of 3 - Risk 3-dose series) Never done    COVID-19 Vaccine (6 - 2023-24 season) 09/01/2023    Diabetes: Urine Protein Screening  01/17/2024         DM:   Last A1c:   Lab Results   Component Value Date/Time    HBA1C 6.4 (H) 11/30/2023 06:46 AM      Previous A1c was 6.4 on 6/19/23  A1C GOAL: < 7    Glucose monitoring frequency: multiple times a day.   Fasting   Ac meals 100-150    Last Retinal Exam: on file and up-to-date       Exam:  Monofilament: current.     Lab Results   Component Value Date/Time    MALBCRT 70 (H) 01/17/2023 11:04 AM    MICROALBUR 4.8 01/17/2023 11:04 AM        ACR Albumin/Creatinine Ratio goal <30     HTN:   Blood pressure goal <130/<80 .   Currently Rx ACE/ARB: Yes     Dyslipidemia:    Lab Results   Component Value Date/Time    CHOLSTRLTOT 189 11/30/2023 06:46 AM     (H) 11/30/2023 06:46 AM    HDL 32 (A) 11/30/2023 06:46 AM    TRIGLYCERIDE 176 (H) 11/30/2023 06:46 AM         Currently Rx Statin: No     He  reports that he quit smoking about 23 years ago. His smoking use included cigarettes. He started smoking about 48 years ago. He has a 18.8 pack-year smoking history. He has never used smokeless  tobacco.      Plan:     Discussed and educated on:   - All medications, side effects and compliance (discussed carefully)  - Annual eye examinations at Ophthalmology  - HbA1C: target  - Home glucose monitoring emphasized  - Weight control and daily exercise    Recommended medication changes: no changes

## 2023-12-19 DIAGNOSIS — I10 ESSENTIAL HYPERTENSION: ICD-10-CM

## 2023-12-19 RX ORDER — AMLODIPINE BESYLATE 5 MG/1
5 TABLET ORAL 2 TIMES DAILY
Qty: 180 TABLET | Refills: 3 | Status: SHIPPED | OUTPATIENT
Start: 2023-12-19

## 2024-02-14 ENCOUNTER — HOSPITAL ENCOUNTER (OUTPATIENT)
Dept: LAB | Facility: MEDICAL CENTER | Age: 74
End: 2024-02-14
Attending: UROLOGY
Payer: MEDICARE

## 2024-02-14 LAB — PSA SERPL-MCNC: 3.31 NG/ML (ref 0–4)

## 2024-02-14 PROCEDURE — 36415 COLL VENOUS BLD VENIPUNCTURE: CPT

## 2024-02-14 PROCEDURE — 84153 ASSAY OF PSA TOTAL: CPT

## 2024-03-20 ENCOUNTER — HOSPITAL ENCOUNTER (OUTPATIENT)
Dept: LAB | Facility: MEDICAL CENTER | Age: 74
End: 2024-03-20
Attending: PHYSICIAN ASSISTANT
Payer: MEDICARE

## 2024-03-20 DIAGNOSIS — M25.561 ACUTE PAIN OF RIGHT KNEE: ICD-10-CM

## 2024-03-20 DIAGNOSIS — Z96.651 H/O TOTAL KNEE REPLACEMENT, RIGHT: ICD-10-CM

## 2024-03-20 LAB
ALBUMIN SERPL BCP-MCNC: 4.6 G/DL (ref 3.2–4.9)
ALBUMIN/GLOB SERPL: 1.5 G/DL
ALP SERPL-CCNC: 122 U/L (ref 30–99)
ALT SERPL-CCNC: 11 U/L (ref 2–50)
ANION GAP SERPL CALC-SCNC: 16 MMOL/L (ref 7–16)
AST SERPL-CCNC: 35 U/L (ref 12–45)
BASOPHILS # BLD AUTO: 0.7 % (ref 0–1.8)
BASOPHILS # BLD: 0.05 K/UL (ref 0–0.12)
BILIRUB SERPL-MCNC: 0.5 MG/DL (ref 0.1–1.5)
BUN SERPL-MCNC: 39 MG/DL (ref 8–22)
CALCIUM ALBUM COR SERPL-MCNC: 9.3 MG/DL (ref 8.5–10.5)
CALCIUM SERPL-MCNC: 9.8 MG/DL (ref 8.5–10.5)
CHLORIDE SERPL-SCNC: 99 MMOL/L (ref 96–112)
CO2 SERPL-SCNC: 23 MMOL/L (ref 20–33)
CREAT SERPL-MCNC: 1.34 MG/DL (ref 0.5–1.4)
CRP SERPL HS-MCNC: 0.52 MG/DL (ref 0–0.75)
EOSINOPHIL # BLD AUTO: 0.23 K/UL (ref 0–0.51)
EOSINOPHIL NFR BLD: 3.2 % (ref 0–6.9)
ERYTHROCYTE [DISTWIDTH] IN BLOOD BY AUTOMATED COUNT: 50.2 FL (ref 35.9–50)
ERYTHROCYTE [SEDIMENTATION RATE] IN BLOOD BY WESTERGREN METHOD: 6 MM/HOUR (ref 0–20)
GFR SERPLBLD CREATININE-BSD FMLA CKD-EPI: 55 ML/MIN/1.73 M 2
GLOBULIN SER CALC-MCNC: 3 G/DL (ref 1.9–3.5)
GLUCOSE SERPL-MCNC: 134 MG/DL (ref 65–99)
HCT VFR BLD AUTO: 51.7 % (ref 42–52)
HGB BLD-MCNC: 17.2 G/DL (ref 14–18)
IMM GRANULOCYTES # BLD AUTO: 0.02 K/UL (ref 0–0.11)
IMM GRANULOCYTES NFR BLD AUTO: 0.3 % (ref 0–0.9)
LYMPHOCYTES # BLD AUTO: 1.11 K/UL (ref 1–4.8)
LYMPHOCYTES NFR BLD: 15.7 % (ref 22–41)
MCH RBC QN AUTO: 30.6 PG (ref 27–33)
MCHC RBC AUTO-ENTMCNC: 33.3 G/DL (ref 32.3–36.5)
MCV RBC AUTO: 91.8 FL (ref 81.4–97.8)
MONOCYTES # BLD AUTO: 0.64 K/UL (ref 0–0.85)
MONOCYTES NFR BLD AUTO: 9 % (ref 0–13.4)
NEUTROPHILS # BLD AUTO: 5.04 K/UL (ref 1.82–7.42)
NEUTROPHILS NFR BLD: 71.1 % (ref 44–72)
NRBC # BLD AUTO: 0 K/UL
NRBC BLD-RTO: 0 /100 WBC (ref 0–0.2)
PLATELET # BLD AUTO: 256 K/UL (ref 164–446)
PMV BLD AUTO: 11.2 FL (ref 9–12.9)
POTASSIUM SERPL-SCNC: 4.4 MMOL/L (ref 3.6–5.5)
PROT SERPL-MCNC: 7.6 G/DL (ref 6–8.2)
RBC # BLD AUTO: 5.63 M/UL (ref 4.7–6.1)
SODIUM SERPL-SCNC: 138 MMOL/L (ref 135–145)
WBC # BLD AUTO: 7.1 K/UL (ref 4.8–10.8)

## 2024-03-20 PROCEDURE — 80053 COMPREHEN METABOLIC PANEL: CPT

## 2024-03-20 PROCEDURE — 85025 COMPLETE CBC W/AUTO DIFF WBC: CPT

## 2024-03-20 PROCEDURE — 36415 COLL VENOUS BLD VENIPUNCTURE: CPT

## 2024-03-20 PROCEDURE — 85652 RBC SED RATE AUTOMATED: CPT

## 2024-03-20 PROCEDURE — 86140 C-REACTIVE PROTEIN: CPT

## 2024-03-21 PROBLEM — M17.11 ARTHRITIS OF RIGHT KNEE: Status: ACTIVE | Noted: 2024-03-21

## 2024-04-01 ENCOUNTER — APPOINTMENT (OUTPATIENT)
Dept: MEDICAL GROUP | Facility: PHYSICIAN GROUP | Age: 74
End: 2024-04-01
Payer: MEDICARE

## 2024-04-01 VITALS
BODY MASS INDEX: 33.07 KG/M2 | HEIGHT: 66 IN | TEMPERATURE: 97.6 F | DIASTOLIC BLOOD PRESSURE: 60 MMHG | SYSTOLIC BLOOD PRESSURE: 108 MMHG | WEIGHT: 205.8 LBS

## 2024-04-01 DIAGNOSIS — Z01.818 PREOP EXAMINATION: ICD-10-CM

## 2024-04-01 DIAGNOSIS — E11.42 DIABETIC POLYNEUROPATHY ASSOCIATED WITH TYPE 2 DIABETES MELLITUS (HCC): ICD-10-CM

## 2024-04-01 DIAGNOSIS — E11.65 TYPE 2 DIABETES MELLITUS WITH HYPERGLYCEMIA, WITH LONG-TERM CURRENT USE OF INSULIN (HCC): ICD-10-CM

## 2024-04-01 DIAGNOSIS — Z79.4 TYPE 2 DIABETES MELLITUS WITH HYPERGLYCEMIA, WITH LONG-TERM CURRENT USE OF INSULIN (HCC): ICD-10-CM

## 2024-04-01 DIAGNOSIS — N18.31 CHRONIC KIDNEY DISEASE, STAGE 3A: ICD-10-CM

## 2024-04-01 PROCEDURE — 3078F DIAST BP <80 MM HG: CPT

## 2024-04-01 PROCEDURE — 3074F SYST BP LT 130 MM HG: CPT

## 2024-04-01 PROCEDURE — 1170F FXNL STATUS ASSESSED: CPT

## 2024-04-01 PROCEDURE — 99214 OFFICE O/P EST MOD 30 MIN: CPT

## 2024-04-01 PROCEDURE — 93000 ELECTROCARDIOGRAM COMPLETE: CPT

## 2024-04-01 ASSESSMENT — FIBROSIS 4 INDEX: FIB4 SCORE: 3.13

## 2024-04-01 ASSESSMENT — PATIENT HEALTH QUESTIONNAIRE - PHQ9: CLINICAL INTERPRETATION OF PHQ2 SCORE: 0

## 2024-04-02 ENCOUNTER — APPOINTMENT (OUTPATIENT)
Dept: ADMISSIONS | Facility: MEDICAL CENTER | Age: 74
End: 2024-04-02
Attending: ORTHOPAEDIC SURGERY
Payer: MEDICARE

## 2024-04-02 NOTE — PROGRESS NOTES
REASON FOR VISIT: Pre-Op Consultation  Consultation Requested by: Li  Procedure date and type: revision of right knee arthroplasty   Inherent cardiac risk of procedure: Intermediate      History of condition for which surgery is planned:right knee pain with degeneration of right knee joint    Current chronic conditions: diabetes, hypthyroidism, CKD stage 3a, dyslipidemia  Past medical history:  has a past medical history of Arrhythmia, Arthritis, Diabetes, Diabetic neuropathy (), Environmental and seasonal allergies, High cholesterol, Hyperlipidemia, Hypertension (), Neuropathy, Pain (2020), Renal disorder, Restless leg, Scapholunate advanced collapse of right wrist (), Sleep apnea, Snoring, and Unspecified urinary incontinence.. Negative for: CAD, SBE, CVA, TIA, DVT, PE, bleeding requiring transfusion, intubation.    Surgical and anesthetic history:  has a past surgical history that includes lumbar laminectomy diskectomy (2012); lumbar laminectomy diskectomy (2014); trans urethral resection prostate (3/27/2014); lumbar laminectomy diskectomy (2014); recovery (2016); fusion, spine, xlif (Left, 2017); fusion, spine, lumbar, plif (2017); lumbar laminectomy diskectomy (2017); shoulder arthroscopy (Left, ); knee manipulation (Right, ); knee arthroplasty total (Right, ); wrist fusion (Right, 2020); inguinal hernia repair (Left, 2021); pr total hip arthroplasty (Left, 2022); and pr total hip arthroplasty (Right, 2022). Prior surgery without complication, bleeding, reaction to anesthetic, prolonged recovery    Habits:   Social History     Tobacco Use    Smoking status: Former     Current packs/day: 0.00     Average packs/day: 0.8 packs/day for 25.0 years (18.8 ttl pk-yrs)     Types: Cigarettes     Start date: 1975     Quit date: 2000     Years since quittin.2    Smokeless tobacco: Never    Tobacco comments:     cigarette free  over 20 years   Vaping Use    Vaping Use: Never used   Substance Use Topics    Alcohol use: Not Currently    Drug use: Never       Allergies: Pollen extract No known allergy to Anesthetic, or Latex.       Current medicines:   Current Outpatient Medications   Medication Sig Dispense Refill    amLODIPine (NORVASC) 5 MG Tab Take 1 Tablet by mouth 2 times a day.      indapamide (LOZOL) 2.5 MG Tab Take 1 Tablet by mouth every day.      metformin (GLUCOPHAGE) 1000 MG tablet Take 1 Tablet by mouth 2 times a day.      Insulin Aspart Prot & Aspart (NOVOLOG MIX 70/30 FLEXPEN) (70-30) 100 UNIT/ML Suspension Pen-injector Inject 17 Units under the skin 3 times a day. 15 mL 3    gemfibrozil (LOPID) 600 MG Tab TAKE TWO TABLETS BY MOUTH EVERY  Tablet 3    glucose blood (ONETOUCH ULTRA) strip USE TO TEST HIGH OR LOW BLOOD SUGAR THREE TIMES A  Strip 0    indapamide (LOZOL) 2.5 MG Tab TAKE ONE TABLET BY MOUTH EVERY DAY 90 Tablet 1    benazepril (LOTENSIN) 40 MG tablet Take 1 Tablet by mouth 2 times a day. 180 Tablet 3    Empagliflozin (JARDIANCE) 25 MG Tab Take 1 Tablet by mouth every day. 90 Tablet 2    Lancets (ONETOUCH DELICA PLUS NFCHBQ11M) Misc Inject 1 Each under the skin in the morning, at noon, and at bedtime. 300 Each 0    levothyroxine (SYNTHROID) 25 MCG Tab Take 1 Tablet by mouth every morning on an empty stomach. 90 Tablet 1    NOVOFINE AUTOCOVER PEN NEEDLE 30G X 8 MM Misc USE AS DIRECTED THREE TIMES A DAY WITH INSULIN 300 Each 3    Multiple Vitamins-Minerals (PRESERVISION AREDS PO) Take 2 Capsules by mouth every day.      Cholecalciferol (VITAMIN D3) 5000 UNITS Cap Take 1 Cap by mouth every day.      Omega-3 Fatty Acids (FISH OIL TRIPLE STRENGTH) 1400 MG Cap Take 1 Cap by mouth every day.       No current facility-administered medications for this visit.       Anticoagulant: None but does take fish oil which he will stop 2 weeks prior to surgery  Herbals: Melatonin at times          [facility] Activity Status  "Index: 37.45  METS: 6    ASA Class: 3      ROS: negative for: CP, SOB, MCMANUS, Orthopnea, wheezing, leg edema, polydipsia, polyuria, fevers, chills, sweats, cough, cold, congestion, abd pain, reflux, black or bloody stools, weight loss/gain.    PHYSICAL EXAMINATION:  VITAL SIGNS: /60 (BP Location: Left arm, Patient Position: Sitting, BP Cuff Size: Adult)   Temp 36.4 °C (97.6 °F) (Temporal)   Ht 1.676 m (5' 6\")   Wt 93.4 kg (205 lb 12.8 oz)  Body mass index is 33.22 kg/m².  HEENT: EOMI, PERRL. Oropharynx pink, moist. Normal airway. Neck supple, no cervical lymphadenopathy.  LUNGS: CTAB good excursion.   HEART: RRR no murmur.  ABDOMEN: soft, nondistended, nontender normal BS. No HSM.  LOWER EXTREMITIES: warm and well perfused with no edema.    EKG Interpretation   Ordered and interpreted by ALAN Whitehead  Rhythm: normal sinus   Rate: 91normal   Axis: P: 95, QRS 50, T 188, , WRSD 105, , QTc 423   Ectopy: none   Conduction: normal   ST Segments: no acute change   T Waves: no acute change   Q Waves: none   Clinical Impression: no acute changes     Labs: See attached/per surgeon    Risk of complications using [employer] Surgical Risk Calculator:      Average risk:      IMPRESSION:  Diagnoses of Chronic kidney disease, stage 3a, Diabetic polyneuropathy associated with type 2 diabetes mellitus ([facility]), Type 2 diabetes mellitus with hyperglycemia, with long-term current use of insulin ([facility]), and Preop examination [Z01.818] were pertinent to this visit.  Planned surgery:Revision of right knee replacement   High risk medical conditions: negative for Cardiac, Pulmonary, Bleeding, Poor healing, Thrombosis, Debility    PLAN:  Chronic medical conditions: Stable and controlled. Continue current medicines.   Avoid drugs that potentiate bleeding as advised by surgeon  Discontinue all herbal supplements 2 weeks prior to surgery.  Need for SBE prophylaxis: no  This patient is considered low risk for " cardiopulmonary complications for this planned surgery by the [facility]    HCC Gap Form    Diagnosis to address: E11.42 - Diabetic polyneuropathy associated with type 2 diabetes mellitus (HCC)  Assessment and plan: Chronic, stable. Continue with current defined treatment plan: . Follow-up at least annually.  Diagnosis: E11.65, Z79.4 - Type 2 diabetes mellitus with hyperglycemia, with long-term current use of insulin (HCC)  Assessment and plan: Chronic, stable. Continue with current defined treatment plan: . Follow-up at least annually.  Diagnosis: N18.31 - Chronic kidney disease, stage 3a  Assessment and plan: Chronic, stable. Continue with current defined treatment plan: . Follow-up at least annually.  Last edited 04/01/24 17:48 PDT by Homar Harper D.N.P.       Time: 32 minutes in total spend on patient care including record review, face-to-face- time with patient including counseling and coordinating care, ordering and reviewing lab results and/or imaging studies, medication management, and documentation of this encounter.

## 2024-04-05 ENCOUNTER — PRE-ADMISSION TESTING (OUTPATIENT)
Dept: ADMISSIONS | Facility: MEDICAL CENTER | Age: 74
End: 2024-04-05
Attending: ORTHOPAEDIC SURGERY
Payer: MEDICARE

## 2024-04-05 DIAGNOSIS — Z01.812 PRE-OPERATIVE LABORATORY EXAMINATION: ICD-10-CM

## 2024-04-05 NOTE — PREPROCEDURE INSTRUCTIONS
Pre-admit telephone appointment completed. Reviewed the Preparing for your procedure handout with patient over the phone. Patient instructed per pharmacy guidelines regarding taking, holding, or contacting provider for instructions on regularly prescribed medications before surgery. Instructed to take the following medications the day of surgery with a sip of water per pharmacy medication guidelines: Amlodipine, Synthroid    Denies anesthesia complications

## 2024-04-09 ENCOUNTER — APPOINTMENT (OUTPATIENT)
Dept: ADMISSIONS | Facility: MEDICAL CENTER | Age: 74
End: 2024-04-09
Attending: ORTHOPAEDIC SURGERY
Payer: MEDICARE

## 2024-04-09 DIAGNOSIS — Z79.4 CONTROLLED TYPE 2 DIABETES MELLITUS WITH DIABETIC POLYNEUROPATHY, WITH LONG-TERM CURRENT USE OF INSULIN (HCC): ICD-10-CM

## 2024-04-09 DIAGNOSIS — I10 ESSENTIAL HYPERTENSION: ICD-10-CM

## 2024-04-09 DIAGNOSIS — Z79.4 TYPE 2 DIABETES MELLITUS WITHOUT COMPLICATION, WITH LONG-TERM CURRENT USE OF INSULIN (HCC): ICD-10-CM

## 2024-04-09 DIAGNOSIS — E11.9 TYPE 2 DIABETES MELLITUS WITHOUT COMPLICATION, WITH LONG-TERM CURRENT USE OF INSULIN (HCC): ICD-10-CM

## 2024-04-09 DIAGNOSIS — E11.42 CONTROLLED TYPE 2 DIABETES MELLITUS WITH DIABETIC POLYNEUROPATHY, WITH LONG-TERM CURRENT USE OF INSULIN (HCC): ICD-10-CM

## 2024-04-09 DIAGNOSIS — Z01.818 PRE-OP EXAM: ICD-10-CM

## 2024-04-09 DIAGNOSIS — Z01.812 PRE-OPERATIVE LABORATORY EXAMINATION: ICD-10-CM

## 2024-04-09 LAB
EST. AVERAGE GLUCOSE BLD GHB EST-MCNC: 140 MG/DL
HBA1C MFR BLD: 6.5 % (ref 4–5.6)
SCCMEC + MECA PNL NOSE NAA+PROBE: NEGATIVE
SCCMEC + MECA PNL NOSE NAA+PROBE: NEGATIVE

## 2024-04-09 PROCEDURE — 87640 STAPH A DNA AMP PROBE: CPT | Mod: XU

## 2024-04-09 PROCEDURE — 36415 COLL VENOUS BLD VENIPUNCTURE: CPT

## 2024-04-09 PROCEDURE — 83036 HEMOGLOBIN GLYCOSYLATED A1C: CPT

## 2024-04-09 PROCEDURE — 87641 MR-STAPH DNA AMP PROBE: CPT

## 2024-04-09 RX ORDER — EMPAGLIFLOZIN 25 MG/1
1 TABLET, FILM COATED ORAL
Qty: 90 TABLET | Refills: 3 | Status: SHIPPED | OUTPATIENT
Start: 2024-04-09

## 2024-04-09 RX ORDER — INDAPAMIDE 2.5 MG/1
2.5 TABLET ORAL
Qty: 90 TABLET | Refills: 3 | Status: SHIPPED | OUTPATIENT
Start: 2024-04-09

## 2024-04-09 NOTE — TELEPHONE ENCOUNTER
Received request via: Pharmacy    Was the patient seen in the last year in this department? Yes    Does the patient have an active prescription (recently filled or refills available) for medication(s) requested? No    [facility]    Does the patient have retirement Plus and need 100 day supply (blood pressure, diabetes and cholesterol meds only)? Medication is not for cholesterol, blood pressure or diabetes

## 2024-04-29 DIAGNOSIS — E03.9 PRIMARY HYPOTHYROIDISM: ICD-10-CM

## 2024-04-29 RX ORDER — LEVOTHYROXINE SODIUM 0.03 MG/1
25 TABLET ORAL
Qty: 90 TABLET | Refills: 2 | Status: SHIPPED | OUTPATIENT
Start: 2024-04-29 | End: 2024-04-30 | Stop reason: SDUPTHER

## 2024-04-30 DIAGNOSIS — E03.9 PRIMARY HYPOTHYROIDISM: ICD-10-CM

## 2024-04-30 RX ORDER — LEVOTHYROXINE SODIUM 0.03 MG/1
25 TABLET ORAL
Qty: 90 TABLET | Refills: 3 | Status: SHIPPED | OUTPATIENT
Start: 2024-04-30

## 2024-04-30 NOTE — TELEPHONE ENCOUNTER
Received request via: Pharmacy    Was the patient seen in the last year in this department? Yes    Does the patient have an active prescription (recently filled or refills available) for medication(s) requested? No    [facility]    Does the patient have care home Plus and need 100 day supply (blood pressure, diabetes and cholesterol meds only)? Medication is not for cholesterol, blood pressure or diabetes

## 2024-06-14 NOTE — IP AVS SNAPSHOT
Ark Access Code: Activation code not generated  Current Ark Status: Active    [employer]  A secure, online tool to manage your health information     [employer] Ark® is a secure, online tool that connects you to your personalized health information from the privacy of your home -- day or night - making it very easy for you to manage your healthcare. Once the activation process is completed, you can even access your medical information using the Ark noemi, which is available for free in the Apple Noemi store or Google Play store.     Ark provides the following levels of access (as shown below):   My Chart Features   [facility] Primary Care Doctor [facility]  Specialists [facility]  Urgent  Care Non-[facility]  Primary Care  Doctor   Email your healthcare team securely and privately 24/7 X X X X   Manage appointments: schedule your next appointment; view details of past/upcoming appointments X      Request prescription refills. X      View recent personal medical records, including lab and immunizations X X X X   View health record, including health history, allergies, medications X X X X   Read reports about your outpatient visits, procedures, consult and ER notes X X X X   See your discharge summary, which is a recap of your hospital and/or ER visit that includes your diagnosis, lab results, and care plan. X X       How to register for Ark:  1. Go to  [email or web address].  2. Click on the Sign Up Now box, which takes you to the New Member Sign Up page. You will need to provide the following information:  a. Enter your Ark Access Code exactly as it appears at the top of this page. (You will not need to use this code after you’ve completed the sign-up process. If you do not sign up before the expiration date, you must request a new code.)   b. Enter your date of birth.   c. Enter your home email address.   d. Click Submit, and follow the next screen’s instructions.  3. Create a Ark ID. This will  be your Command Information login ID and cannot be changed, so think of one that is secure and easy to remember.  4. Create a Command Information password. You can change your password at any time.  5. Enter your Password Reset Question and Answer. This can be used at a later time if you forget your password.   6. Enter your e-mail address. This allows you to receive e-mail notifications when new information is available in Command Information.  7. Click Sign Up. You can now view your health information.    For assistance activating your Command Information account, call [phone]         yes

## 2024-06-19 ENCOUNTER — HOSPITAL ENCOUNTER (OUTPATIENT)
Dept: LAB | Facility: MEDICAL CENTER | Age: 74
End: 2024-06-19
Payer: MEDICARE

## 2024-06-19 DIAGNOSIS — Z79.4 TYPE 2 DIABETES MELLITUS WITH HYPERGLYCEMIA, WITH LONG-TERM CURRENT USE OF INSULIN (HCC): ICD-10-CM

## 2024-06-19 DIAGNOSIS — N18.2 CKD (CHRONIC KIDNEY DISEASE) STAGE 2, GFR 60-89 ML/MIN: ICD-10-CM

## 2024-06-19 DIAGNOSIS — E03.9 HYPOTHYROIDISM, UNSPECIFIED TYPE: ICD-10-CM

## 2024-06-19 DIAGNOSIS — E11.65 TYPE 2 DIABETES MELLITUS WITH HYPERGLYCEMIA, WITH LONG-TERM CURRENT USE OF INSULIN (HCC): ICD-10-CM

## 2024-06-19 DIAGNOSIS — E78.5 DYSLIPIDEMIA: ICD-10-CM

## 2024-06-19 LAB
ALBUMIN SERPL BCP-MCNC: 4.4 G/DL (ref 3.2–4.9)
ALBUMIN/GLOB SERPL: 1.3 G/DL
ALP SERPL-CCNC: 127 U/L (ref 30–99)
ALT SERPL-CCNC: 11 U/L (ref 2–50)
ANION GAP SERPL CALC-SCNC: 14 MMOL/L (ref 7–16)
AST SERPL-CCNC: 24 U/L (ref 12–45)
BILIRUB SERPL-MCNC: 0.4 MG/DL (ref 0.1–1.5)
BUN SERPL-MCNC: 43 MG/DL (ref 8–22)
CALCIUM ALBUM COR SERPL-MCNC: 9.7 MG/DL (ref 8.5–10.5)
CALCIUM SERPL-MCNC: 10 MG/DL (ref 8.5–10.5)
CHLORIDE SERPL-SCNC: 103 MMOL/L (ref 96–112)
CHOLEST SERPL-MCNC: 159 MG/DL (ref 100–199)
CO2 SERPL-SCNC: 19 MMOL/L (ref 20–33)
CREAT SERPL-MCNC: 0.83 MG/DL (ref 0.5–1.4)
CREAT UR-MCNC: 41.59 MG/DL
EST. AVERAGE GLUCOSE BLD GHB EST-MCNC: 143 MG/DL
FASTING STATUS PATIENT QL REPORTED: NORMAL
GFR SERPLBLD CREATININE-BSD FMLA CKD-EPI: 91 ML/MIN/1.73 M 2
GLOBULIN SER CALC-MCNC: 3.3 G/DL (ref 1.9–3.5)
GLUCOSE SERPL-MCNC: 108 MG/DL (ref 65–99)
HBA1C MFR BLD: 6.6 % (ref 4–5.6)
HDLC SERPL-MCNC: 37 MG/DL
LDLC SERPL CALC-MCNC: 98 MG/DL
MICROALBUMIN UR-MCNC: 10 MG/DL
MICROALBUMIN/CREAT UR: 240 MG/G (ref 0–30)
POTASSIUM SERPL-SCNC: 3.9 MMOL/L (ref 3.6–5.5)
PROT SERPL-MCNC: 7.7 G/DL (ref 6–8.2)
SODIUM SERPL-SCNC: 136 MMOL/L (ref 135–145)
TRIGL SERPL-MCNC: 122 MG/DL (ref 0–149)
TSH SERPL DL<=0.005 MIU/L-ACNC: 3.21 UIU/ML (ref 0.38–5.33)

## 2024-06-19 PROCEDURE — 84443 ASSAY THYROID STIM HORMONE: CPT

## 2024-06-19 PROCEDURE — 83695 ASSAY OF LIPOPROTEIN(A): CPT

## 2024-06-19 PROCEDURE — 82172 ASSAY OF APOLIPOPROTEIN: CPT

## 2024-06-19 PROCEDURE — 83036 HEMOGLOBIN GLYCOSYLATED A1C: CPT | Mod: GA

## 2024-06-19 PROCEDURE — 82570 ASSAY OF URINE CREATININE: CPT

## 2024-06-19 PROCEDURE — 36415 COLL VENOUS BLD VENIPUNCTURE: CPT | Mod: GA

## 2024-06-19 PROCEDURE — 80053 COMPREHEN METABOLIC PANEL: CPT

## 2024-06-19 PROCEDURE — 82043 UR ALBUMIN QUANTITATIVE: CPT

## 2024-06-19 PROCEDURE — 80061 LIPID PANEL: CPT

## 2024-06-20 DIAGNOSIS — Z79.4 CONTROLLED TYPE 2 DIABETES MELLITUS WITHOUT COMPLICATION, WITH LONG-TERM CURRENT USE OF INSULIN (HCC): ICD-10-CM

## 2024-06-20 DIAGNOSIS — E11.9 CONTROLLED TYPE 2 DIABETES MELLITUS WITHOUT COMPLICATION, WITH LONG-TERM CURRENT USE OF INSULIN (HCC): ICD-10-CM

## 2024-06-20 RX ORDER — BLOOD SUGAR DIAGNOSTIC
STRIP MISCELLANEOUS
Qty: 300 STRIP | Refills: 2 | Status: SHIPPED
Start: 2024-06-20 | End: 2024-06-24

## 2024-06-20 RX ORDER — INSULIN ADMIN. SUPPLIES
INSULIN PEN (EA) SUBCUTANEOUS
Qty: 300 EACH | Refills: 3 | Status: SHIPPED | OUTPATIENT
Start: 2024-06-20 | End: 2024-06-24 | Stop reason: SDUPTHER

## 2024-06-20 NOTE — TELEPHONE ENCOUNTER
Received request via: Pharmacy    Was the patient seen in the last year in this department? Yes    Does the patient have an active prescription (recently filled or refills available) for medication(s) requested? No    [facility]    Does the patient have half-way Plus and need 100 day supply (blood pressure, diabetes and cholesterol meds only)? Patient does not have SCP

## 2024-06-21 LAB
APO B100 SERPL-MCNC: 103 MG/DL (ref 66–133)
LPA SERPL-MCNC: <6 MG/DL

## 2024-06-24 ENCOUNTER — OFFICE VISIT (OUTPATIENT)
Dept: MEDICAL GROUP | Facility: PHYSICIAN GROUP | Age: 74
End: 2024-06-24
Payer: MEDICARE

## 2024-06-24 VITALS
HEIGHT: 66 IN | BODY MASS INDEX: 33.3 KG/M2 | WEIGHT: 207.2 LBS | DIASTOLIC BLOOD PRESSURE: 74 MMHG | SYSTOLIC BLOOD PRESSURE: 126 MMHG

## 2024-06-24 DIAGNOSIS — Z79.4 CONTROLLED TYPE 2 DIABETES MELLITUS WITHOUT COMPLICATION, WITH LONG-TERM CURRENT USE OF INSULIN (HCC): ICD-10-CM

## 2024-06-24 DIAGNOSIS — E11.9 CONTROLLED TYPE 2 DIABETES MELLITUS WITHOUT COMPLICATION, WITH LONG-TERM CURRENT USE OF INSULIN (HCC): ICD-10-CM

## 2024-06-24 PROCEDURE — 1170F FXNL STATUS ASSESSED: CPT

## 2024-06-24 PROCEDURE — 3074F SYST BP LT 130 MM HG: CPT

## 2024-06-24 PROCEDURE — 3078F DIAST BP <80 MM HG: CPT

## 2024-06-24 PROCEDURE — 99211 OFF/OP EST MAY X REQ PHY/QHP: CPT

## 2024-06-24 RX ORDER — INSULIN ADMIN. SUPPLIES
INSULIN PEN (EA) SUBCUTANEOUS
Qty: 300 EACH | Refills: 3 | Status: SHIPPED | OUTPATIENT
Start: 2024-06-24

## 2024-06-24 RX ORDER — BLOOD SUGAR DIAGNOSTIC
STRIP MISCELLANEOUS
Qty: 300 STRIP | Refills: 2 | Status: SHIPPED | OUTPATIENT
Start: 2024-06-24

## 2024-06-24 ASSESSMENT — FIBROSIS 4 INDEX: FIB4 SCORE: 2.15

## 2024-06-24 NOTE — PROGRESS NOTES
RN-CDE Note    Subjective:     HPI:  Zhang Cheung is a 74 y.o. old patient who is seen by the Diabetes Nurse Specialist today for review of his controlled type 2 diabetes with long term use of insulin.    Complaining of increased pain with arthritis.     Diabetes Medications:   Novolog 70/30 mix 8-10 with meals (lunch and hs)  Jardiance 25 mg daily  Metformin 1000 mg bid    Taking daily ASA: No    Patient's body mass index is unknown because there is no height or weight on file. Exercise and nutrition counseling were performed at this visit.      Health Maintenance:   Health Maintenance Due   Topic Date Due    COVID-19 Vaccine (6 - 2023-24 season) 09/01/2023    Annual Wellness Visit  01/31/2024    Diabetes: Monofilament / LE Exam  02/03/2024         DM:   Last A1c:   Lab Results   Component Value Date/Time    HBA1C 6.6 (H) 06/19/2024 11:06 AM      Previous A1c was 6.5 on 4/9/2024  A1C GOAL: < 7    Glucose monitoring frequency:  testing his blood sugars 3 times per day for insulin adjustment.     Hypoglycemic episodes: no    Last Retinal Exam: on file and up-to-date  Daily Foot Exam: Yes   Does complain of arthritis in his feet and complains of neuropathy symptoms in his feet.   Exam:  Monofilament testing with a 10 gram force: sensation intact: intact bilaterally  Visual Inspection: Feet without maceration, ulcers, fissures.  Pedal pulses: intact bilaterally         Lab Results   Component Value Date/Time    MALBCRT 240 (H) 06/19/2024 11:06 AM    MICROALBUR 10.0 06/19/2024 11:06 AM        ACR Albumin/Creatinine Ratio goal <30     HTN:   Blood pressure goal <130/<80 .   Currently Rx ACE/ARB: Yes     Dyslipidemia:    Lab Results   Component Value Date/Time    CHOLSTRLTOT 159 06/19/2024 11:06 AM    LDL 98 06/19/2024 11:06 AM    HDL 37 (A) 06/19/2024 11:06 AM    TRIGLYCERIDE 122 06/19/2024 11:06 AM         Currently Rx Statin: No     He  reports that he quit smoking about 24 years ago. His smoking use included  cigarettes. He started smoking about 49 years ago. He has a 18.8 pack-year smoking history. He has never used smokeless tobacco.      Plan:     Discussed and educated on:   - All medications, side effects and compliance (discussed carefully)  - Foot Care:   - HbA1C: target  - Home glucose monitoring emphasized  - Weight control and daily exercise    Recommended medication changes: no changes.

## 2024-06-25 ENCOUNTER — APPOINTMENT (OUTPATIENT)
Dept: MEDICAL GROUP | Facility: PHYSICIAN GROUP | Age: 74
End: 2024-06-25
Payer: MEDICARE

## 2024-06-25 VITALS
SYSTOLIC BLOOD PRESSURE: 110 MMHG | HEART RATE: 82 BPM | TEMPERATURE: 96.9 F | OXYGEN SATURATION: 95 % | BODY MASS INDEX: 33.11 KG/M2 | WEIGHT: 206 LBS | DIASTOLIC BLOOD PRESSURE: 60 MMHG | HEIGHT: 66 IN

## 2024-06-25 DIAGNOSIS — N18.2 CKD (CHRONIC KIDNEY DISEASE) STAGE 2, GFR 60-89 ML/MIN: ICD-10-CM

## 2024-06-25 DIAGNOSIS — E11.65 TYPE 2 DIABETES MELLITUS WITH HYPERGLYCEMIA, WITH LONG-TERM CURRENT USE OF INSULIN (HCC): ICD-10-CM

## 2024-06-25 DIAGNOSIS — Z79.4 TYPE 2 DIABETES MELLITUS WITH HYPERGLYCEMIA, WITH LONG-TERM CURRENT USE OF INSULIN (HCC): ICD-10-CM

## 2024-06-25 ASSESSMENT — ENCOUNTER SYMPTOMS
BLURRED VISION: 0
CONSTIPATION: 0
BACK PAIN: 1
SHORTNESS OF BREATH: 0
CHILLS: 0
VOMITING: 0
WEAKNESS: 0
NAUSEA: 0
HEADACHES: 0
DIZZINESS: 0
COUGH: 0
DIARRHEA: 0
FEVER: 0
MYALGIAS: 0
WEIGHT LOSS: 0
NECK PAIN: 1
ABDOMINAL PAIN: 0

## 2024-06-25 ASSESSMENT — FIBROSIS 4 INDEX: FIB4 SCORE: 2.15

## 2024-06-25 NOTE — PROGRESS NOTES
Verbal consent was acquired by the patient to use [employer] ambient listening note generation during this visit  Subjective:     CC: follow up    HPI:   Zhang presents today with  History of Present Illness  The patient presents for evaluation of multiple medical concerns.    The patient recently consulted with Patito regarding his NovoLog insulin regimen, which he has been adhering to. He has been allowing his blood sugar levels to rise to 140 and 150 during the day, a deviation from his usual 120 and 130 range. He expresses concern over his A1c levels, which have been fluctuating between 6.2 and 6.5 to 6.6. His daily NovoLog dosage has been adjusted to less than 20 units. His morning blood sugar levels are typically between 90 and 100, in which he does not require insulin injections. His diet is light, with occasional egg whites and diabetic-proved protein drinks. If he is not physically active, he checks his blood sugar at noon, and if it is below 120 or less, he will not administer an injection. If it is between 120 and 130, he will administer an 8 or 9 injection. If his blood sugar is between 130 and 140, he will administer 10 or 11 units. If his blood sugar stabilizes around 120 range or less, he will administer 8 or 9 units of NovoLog. He is also on Jardiance 25 mg daily and metformin 1000 mg BID.    The patient was previously diagnosed with chronic kidney disease. He maintains a high water intake, consuming 3 to 4 large containers of water daily. He underwent a TURP procedure last summer, which may have altered his fluid intake. His PSA levels have decreased to 4 post-TURP.    The patient's thyroid function is satisfactory, and he takes thyroid medication daily.    The patient denies experiencing any ankle swelling.    Supplemental Information  He was here getting a surgery clearance to have a knee replacement. He went in there with a swollen knee that he could not move or bend it in extreme pain. The doctor  told him that he needed a total replacement. He got clearance and scheduled the surgery 4 days before surgery. His knee was perfectly fine. He is doing squats in front of them. The physician assistant told him that if this is the case, they do not know what happened to his knee. He did not injure it. The physician assistant told him that the risks of doing that surgery far exceed any benefit. Ever since that, it has been fine. About 2 months ago, he tried to get a cap off juice bottles. He twisted them until he could not get it off. Within 2 to 3 days, his wrist swelled up like a balloon. He could not bend his fingers. His hand was like a rock pop on the end of his arm. He waited a couple of days. He applied cold compresses. He got a little bit of swelling down. He finally got into [facility]. They x-rayed his wrist. They told him that he had a lot of arthritis in it. He was told that he had an extremely strained tendon by Dr Mccarthy. He can close his fist. He does have a little bit of pain. He thinks it is old age related. He does not have the durability he used to have. He finds the heat extremely exhaustive right now. It seems to tire him. He does not add salt to his food. He has learned to live without it.  He has not smoked in 30 years. He has not had an alcoholic drink since he was 28 years old. He does not take drugs.      No problems updated.      ROS:  Review of Systems   Constitutional:  Negative for chills, fever, malaise/fatigue and weight loss.   Eyes:  Negative for blurred vision.   Respiratory:  Negative for cough and shortness of breath.    Cardiovascular:  Negative for chest pain.   Gastrointestinal:  Negative for abdominal pain, constipation, diarrhea, nausea and vomiting.   Musculoskeletal:  Positive for back pain, joint pain and neck pain. Negative for myalgias.   Neurological:  Negative for dizziness, weakness and headaches.       Objective:     Exam:  /60 (BP Location: Right arm, Patient  "Position: Sitting, BP Cuff Size: Adult)   Pulse 82   Temp 36.1 °C (96.9 °F) (Temporal)   Ht 1.676 m (5' 6\")   Wt 93.4 kg (206 lb)   SpO2 95%   BMI 33.25 kg/m²  Body mass index is 33.25 kg/m².    Physical Exam  Constitutional:       Appearance: Normal appearance.   HENT:      Head: Normocephalic.   Eyes:      Conjunctiva/sclera: Conjunctivae normal.      Pupils: Pupils are equal, round, and reactive to light.   Cardiovascular:      Rate and Rhythm: Normal rate and regular rhythm.      Heart sounds: No murmur heard.  Pulmonary:      Effort: Pulmonary effort is normal. No respiratory distress.      Breath sounds: Normal breath sounds.   Musculoskeletal:         General: Normal range of motion.   Skin:     General: Skin is warm and dry.   Neurological:      General: No focal deficit present.      Mental Status: He is alert and oriented to person, place, and time.   Psychiatric:         Mood and Affect: Mood normal.         Behavior: Behavior normal.         Labs:   Hospital Outpatient Visit on 06/19/2024   Component Date Value    Glycohemoglobin 06/19/2024 6.6 (H)     Est Avg Glucose 06/19/2024 143     Sodium 06/19/2024 136     Potassium 06/19/2024 3.9     Chloride 06/19/2024 103     Co2 06/19/2024 19 (L)     Anion Gap 06/19/2024 14.0     Glucose 06/19/2024 108 (H)     Bun 06/19/2024 43 (H)     Creatinine 06/19/2024 0.83     Calcium 06/19/2024 10.0     Correct Calcium 06/19/2024 9.7     AST(SGOT) 06/19/2024 24     ALT(SGPT) 06/19/2024 11     Alkaline Phosphatase 06/19/2024 127 (H)     Total Bilirubin 06/19/2024 0.4     Albumin 06/19/2024 4.4     Total Protein 06/19/2024 7.7     Globulin 06/19/2024 3.3     A-G Ratio 06/19/2024 1.3     Cholesterol,Tot 06/19/2024 159     Triglycerides 06/19/2024 122     HDL 06/19/2024 37 (A)     LDL 06/19/2024 98     Apolipoprotein-B 06/19/2024 103     Lipoprotein (a) 06/19/2024 <6     TSH 06/19/2024 3.210     Creatinine, Urine 06/19/2024 41.59     Microalbumin, Urine Rand* " 06/19/2024 10.0     Micro Alb Creat Ratio 06/19/2024 240 (H)     Fasting Status 06/19/2024 Fasting     GFR (CKD-EPI) 06/19/2024 91      74 y.o. male with the following -   Assessment & Plan  1. Diabetes.  The patient's diabetes has been well-controlled since 2019. The current regimen of metformin 1000 mg twice daily, NovoLog 8 to 10 units twice daily if needed, and Jardiance 25 mg daily will be maintained.  He is up-to-date on all diabetic screenings and will maintain relationship with diabetic nutritional educator.  He declines glucose monitor today and will continue 4 times daily fingersticks    2. Chronic kidney disease.  Chronic condition appears to be in remission with GFR at 91 and creatinine at 0.83.  The condition is currently in remission. The patient is advised to maintain adequate hydration.    3. Arthritis.  Chronic uncontrolled  The patient's joint examination revealed no abnormalities. The patient is encouraged to engage in regular physical activity.  He will continue to use Voltaren gel as prescribed to affected joints as needed.    4.  Hypothyroidism  Chronic condition stable with most recent TSH 3.5.  He will maintain levothyroxine dosage at 25 mcg daily each morning on empty stomach and will repeat thyroid labs in 6 months.    Follow-up  A follow-up appointment is scheduled for 6 months from now.    Problem List Items Addressed This Visit       CKD (chronic kidney disease) stage 2, GFR 60-89 ml/min    Relevant Orders    HEMOGLOBIN A1C    Comp Metabolic Panel    Type 2 diabetes mellitus with hyperglycemia, with long-term current use of insulin (HCC)    Relevant Orders    HEMOGLOBIN A1C    Comp Metabolic Panel       Differential diagnosis, natural history, supportive care, and indications for immediate follow-up discussed.  Shared decision making approach utilized, and patient is amendable with plan of care.  Patient understands to return to clinic or go to the emergency department if symptoms  worsen. All questions and concerns addressed to the best of my knowledge.    Return in about 3 months (around 9/25/2024) for F/U Lab Review.    Please note that this dictation was created using voice recognition software. I have made every reasonable attempt to correct obvious errors, but I expect that there are errors of grammar and possibly content that I did not discover before finalizing the note.

## 2024-06-28 ENCOUNTER — TELEPHONE (OUTPATIENT)
Dept: MEDICAL GROUP | Facility: PHYSICIAN GROUP | Age: 74
End: 2024-06-28
Payer: MEDICARE

## 2024-06-28 NOTE — TELEPHONE ENCOUNTER
Patient needs pen needles updated :  Marilyleandro Regulo 32 x 6mm   Ndc#: [id number]    New pen needles.   (Can get them at [facility] for 0 copay)

## 2024-09-09 ENCOUNTER — HOSPITAL ENCOUNTER (OUTPATIENT)
Dept: LAB | Facility: MEDICAL CENTER | Age: 74
End: 2024-09-09
Payer: MEDICARE

## 2024-09-09 ENCOUNTER — HOSPITAL ENCOUNTER (OUTPATIENT)
Dept: LAB | Facility: MEDICAL CENTER | Age: 74
End: 2024-09-09
Attending: UROLOGY
Payer: MEDICARE

## 2024-09-09 DIAGNOSIS — N18.2 CKD (CHRONIC KIDNEY DISEASE) STAGE 2, GFR 60-89 ML/MIN: ICD-10-CM

## 2024-09-09 DIAGNOSIS — Z79.4 TYPE 2 DIABETES MELLITUS WITH HYPERGLYCEMIA, WITH LONG-TERM CURRENT USE OF INSULIN (HCC): ICD-10-CM

## 2024-09-09 DIAGNOSIS — E11.65 TYPE 2 DIABETES MELLITUS WITH HYPERGLYCEMIA, WITH LONG-TERM CURRENT USE OF INSULIN (HCC): ICD-10-CM

## 2024-09-09 LAB
ALBUMIN SERPL BCP-MCNC: 4.4 G/DL (ref 3.2–4.9)
ALBUMIN/GLOB SERPL: 1.4 G/DL
ALP SERPL-CCNC: 115 U/L (ref 30–99)
ALT SERPL-CCNC: 11 U/L (ref 2–50)
ANION GAP SERPL CALC-SCNC: 16 MMOL/L (ref 7–16)
AST SERPL-CCNC: 33 U/L (ref 12–45)
BILIRUB SERPL-MCNC: 0.4 MG/DL (ref 0.1–1.5)
BUN SERPL-MCNC: 41 MG/DL (ref 8–22)
CALCIUM ALBUM COR SERPL-MCNC: 9.8 MG/DL (ref 8.5–10.5)
CALCIUM SERPL-MCNC: 10.1 MG/DL (ref 8.5–10.5)
CHLORIDE SERPL-SCNC: 107 MMOL/L (ref 96–112)
CO2 SERPL-SCNC: 22 MMOL/L (ref 20–33)
CREAT SERPL-MCNC: 1.04 MG/DL (ref 0.5–1.4)
EST. AVERAGE GLUCOSE BLD GHB EST-MCNC: 123 MG/DL
GFR SERPLBLD CREATININE-BSD FMLA CKD-EPI: 74 ML/MIN/1.73 M 2
GLOBULIN SER CALC-MCNC: 3.2 G/DL (ref 1.9–3.5)
GLUCOSE SERPL-MCNC: 75 MG/DL (ref 65–99)
HBA1C MFR BLD: 5.9 % (ref 4–5.6)
POTASSIUM SERPL-SCNC: 4 MMOL/L (ref 3.6–5.5)
PROT SERPL-MCNC: 7.6 G/DL (ref 6–8.2)
PSA SERPL-MCNC: 2.43 NG/ML (ref 0–4)
SODIUM SERPL-SCNC: 145 MMOL/L (ref 135–145)

## 2024-09-09 PROCEDURE — 36415 COLL VENOUS BLD VENIPUNCTURE: CPT

## 2024-09-09 PROCEDURE — 84153 ASSAY OF PSA TOTAL: CPT

## 2024-09-09 PROCEDURE — 80053 COMPREHEN METABOLIC PANEL: CPT

## 2024-09-09 PROCEDURE — 83036 HEMOGLOBIN GLYCOSYLATED A1C: CPT | Mod: GA

## 2024-09-09 NOTE — TELEPHONE ENCOUNTER
Catarina called and has appointment on March 10,2023 and just wanted to touch base with you about labs, appointments that he has had.   Prescription for sodium chloride sent cannot continue taking prednisone may need to follow-up with your pulmonologist

## 2024-09-12 NOTE — TELEPHONE ENCOUNTER
Received request via: Pharmacy    Was the patient seen in the last year in this department? Yes    Does the patient have an active prescription (recently filled or refills available) for medication(s) requested? No    [facility]    Does the patient have USP Plus and need 100-day supply? (This applies to ALL medications) Patient does not have SCP

## 2024-09-23 DIAGNOSIS — E11.9 TYPE 2 DIABETES MELLITUS WITHOUT COMPLICATION, WITH LONG-TERM CURRENT USE OF INSULIN (HCC): ICD-10-CM

## 2024-09-23 DIAGNOSIS — I10 ESSENTIAL HYPERTENSION: ICD-10-CM

## 2024-09-23 DIAGNOSIS — Z79.4 TYPE 2 DIABETES MELLITUS WITHOUT COMPLICATION, WITH LONG-TERM CURRENT USE OF INSULIN (HCC): ICD-10-CM

## 2024-09-23 RX ORDER — BENAZEPRIL HYDROCHLORIDE 40 MG/1
40 TABLET ORAL 2 TIMES DAILY
Qty: 180 TABLET | Refills: 3 | Status: SHIPPED | OUTPATIENT
Start: 2024-09-23

## 2024-09-23 NOTE — TELEPHONE ENCOUNTER
Received request via: Pharmacy    Was the patient seen in the last year in this department? Yes    Does the patient have an active prescription (recently filled or refills available) for medication(s) requested? No    [facility]    Does the patient have USP Plus and need 100-day supply? (This applies to ALL medications) Patient does not have SCP

## 2024-09-25 DIAGNOSIS — E11.9 CONTROLLED TYPE 2 DIABETES MELLITUS WITHOUT COMPLICATION, WITH LONG-TERM CURRENT USE OF INSULIN (HCC): ICD-10-CM

## 2024-09-25 DIAGNOSIS — Z79.4 CONTROLLED TYPE 2 DIABETES MELLITUS WITHOUT COMPLICATION, WITH LONG-TERM CURRENT USE OF INSULIN (HCC): ICD-10-CM

## 2024-09-25 RX ORDER — INSULIN ASPART 100 [IU]/ML
INJECTION, SUSPENSION SUBCUTANEOUS
Qty: 15 ML | Refills: 2 | Status: SHIPPED | OUTPATIENT
Start: 2024-09-25 | End: 2024-09-30 | Stop reason: SDUPTHER

## 2024-09-25 NOTE — TELEPHONE ENCOUNTER
Received request via: Pharmacy    Was the patient seen in the last year in this department? Yes    Does the patient have an active prescription (recently filled or refills available) for medication(s) requested? No    [facility]    Does the patient have alf Plus and need 100-day supply? (This applies to ALL medications) Patient does not have SCP  
Clear

## 2024-09-27 ENCOUNTER — TELEPHONE (OUTPATIENT)
Dept: MEDICAL GROUP | Facility: PHYSICIAN GROUP | Age: 74
End: 2024-09-27

## 2024-09-27 NOTE — TELEPHONE ENCOUNTER
Patient is here because you he is changing pharmacy for his Novolog and one touch test strips 3 month supple. He wants the scripts sent to the [address] [place] [address]. Please call him with any questions. It requires a special document for medicare saying he tests 3 times a day

## 2024-09-30 DIAGNOSIS — E11.9 CONTROLLED TYPE 2 DIABETES MELLITUS WITHOUT COMPLICATION, WITH LONG-TERM CURRENT USE OF INSULIN (HCC): ICD-10-CM

## 2024-09-30 DIAGNOSIS — Z79.4 CONTROLLED TYPE 2 DIABETES MELLITUS WITHOUT COMPLICATION, WITH LONG-TERM CURRENT USE OF INSULIN (HCC): ICD-10-CM

## 2024-09-30 RX ORDER — BLOOD SUGAR DIAGNOSTIC
STRIP MISCELLANEOUS
Qty: 300 STRIP | Refills: 2 | Status: SHIPPED | OUTPATIENT
Start: 2024-09-30

## 2024-09-30 RX ORDER — INSULIN ASPART 100 [IU]/ML
17 INJECTION, SUSPENSION SUBCUTANEOUS
Qty: 3 ML | Refills: 3 | Status: SHIPPED | OUTPATIENT
Start: 2024-09-30

## 2024-11-18 ENCOUNTER — TELEPHONE (OUTPATIENT)
Dept: MEDICAL GROUP | Facility: PHYSICIAN GROUP | Age: 74
End: 2024-11-18

## 2024-11-18 NOTE — TELEPHONE ENCOUNTER
Good morning, the patient brought in his vaccines paperwork to show he received his flu and covid vaccines scanned into media

## 2024-11-26 DIAGNOSIS — E78.2 MIXED HYPERLIPIDEMIA: ICD-10-CM

## 2024-11-26 RX ORDER — GEMFIBROZIL 600 MG/1
TABLET, FILM COATED ORAL
Qty: 180 TABLET | Refills: 3 | Status: SHIPPED | OUTPATIENT
Start: 2024-11-26

## 2024-11-26 NOTE — TELEPHONE ENCOUNTER
Received request via: Pharmacy    Was the patient seen in the last year in this department? Yes    Does the patient have an active prescription (recently filled or refills available) for medication(s) requested? No    Ph[facility] Name[facility]      Does the patient have MCFP Plus and need 100-day supply? (This applies to ALL medications) Patient does not have SCP

## 2024-12-02 RX ORDER — AMLODIPINE BESYLATE 5 MG/1
5 TABLET ORAL 2 TIMES DAILY
Qty: 180 TABLET | Refills: 3 | Status: SHIPPED | OUTPATIENT
Start: 2024-12-02

## 2024-12-02 NOTE — TELEPHONE ENCOUNTER
Received request via: Pharmacy    Was the patient seen in the last year in this department? Yes    Does the patient have an active prescription (recently filled or refills available) for medication(s) requested? No    Pharmacy Name: [facility]    Does the patient have skilled nursing Plus and need 100-day supply? (This applies to ALL medications) Patient does not have SCP

## 2025-01-20 ENCOUNTER — HOSPITAL ENCOUNTER (OUTPATIENT)
Dept: LAB | Facility: MEDICAL CENTER | Age: 75
End: 2025-01-20
Payer: MEDICARE

## 2025-01-20 ENCOUNTER — OFFICE VISIT (OUTPATIENT)
Dept: MEDICAL GROUP | Facility: PHYSICIAN GROUP | Age: 75
End: 2025-01-20
Payer: MEDICARE

## 2025-01-20 VITALS
TEMPERATURE: 96.3 F | OXYGEN SATURATION: 95 % | HEART RATE: 78 BPM | HEIGHT: 66 IN | SYSTOLIC BLOOD PRESSURE: 110 MMHG | WEIGHT: 208.4 LBS | DIASTOLIC BLOOD PRESSURE: 64 MMHG | BODY MASS INDEX: 33.49 KG/M2

## 2025-01-20 DIAGNOSIS — I10 ESSENTIAL HYPERTENSION: ICD-10-CM

## 2025-01-20 DIAGNOSIS — E11.9 CONTROLLED TYPE 2 DIABETES MELLITUS WITHOUT COMPLICATION, WITH LONG-TERM CURRENT USE OF INSULIN (HCC): ICD-10-CM

## 2025-01-20 DIAGNOSIS — E78.5 DYSLIPIDEMIA: ICD-10-CM

## 2025-01-20 DIAGNOSIS — Z00.00 HEALTHCARE MAINTENANCE: ICD-10-CM

## 2025-01-20 DIAGNOSIS — N18.2 CKD (CHRONIC KIDNEY DISEASE) STAGE 2, GFR 60-89 ML/MIN: ICD-10-CM

## 2025-01-20 DIAGNOSIS — Z79.4 CONTROLLED TYPE 2 DIABETES MELLITUS WITHOUT COMPLICATION, WITH LONG-TERM CURRENT USE OF INSULIN (HCC): ICD-10-CM

## 2025-01-20 LAB
ALBUMIN SERPL BCP-MCNC: 4.7 G/DL (ref 3.2–4.9)
ALBUMIN/GLOB SERPL: 1.3 G/DL
ALP SERPL-CCNC: 102 U/L (ref 30–99)
ALT SERPL-CCNC: 10 U/L (ref 2–50)
ANION GAP SERPL CALC-SCNC: 11 MMOL/L (ref 7–16)
AST SERPL-CCNC: 37 U/L (ref 12–45)
BASOPHILS # BLD AUTO: 0.9 % (ref 0–1.8)
BASOPHILS # BLD: 0.07 K/UL (ref 0–0.12)
BILIRUB SERPL-MCNC: 0.4 MG/DL (ref 0.1–1.5)
BUN SERPL-MCNC: 42 MG/DL (ref 8–22)
CALCIUM ALBUM COR SERPL-MCNC: 9.9 MG/DL (ref 8.5–10.5)
CALCIUM SERPL-MCNC: 10.5 MG/DL (ref 8.5–10.5)
CHLORIDE SERPL-SCNC: 103 MMOL/L (ref 96–112)
CHOLEST SERPL-MCNC: 186 MG/DL (ref 100–199)
CO2 SERPL-SCNC: 27 MMOL/L (ref 20–33)
CREAT SERPL-MCNC: 1.31 MG/DL (ref 0.5–1.4)
EOSINOPHIL # BLD AUTO: 0.27 K/UL (ref 0–0.51)
EOSINOPHIL NFR BLD: 3.7 % (ref 0–6.9)
ERYTHROCYTE [DISTWIDTH] IN BLOOD BY AUTOMATED COUNT: 49.7 FL (ref 35.9–50)
GFR SERPLBLD CREATININE-BSD FMLA CKD-EPI: 56 ML/MIN/1.73 M 2
GLOBULIN SER CALC-MCNC: 3.7 G/DL (ref 1.9–3.5)
GLUCOSE SERPL-MCNC: 75 MG/DL (ref 65–99)
HBA1C MFR BLD: 5.8 % (ref ?–5.8)
HCT VFR BLD AUTO: 57.2 % (ref 42–52)
HDLC SERPL-MCNC: 44 MG/DL
HGB BLD-MCNC: 18.5 G/DL (ref 14–18)
IMM GRANULOCYTES # BLD AUTO: 0.01 K/UL (ref 0–0.11)
IMM GRANULOCYTES NFR BLD AUTO: 0.1 % (ref 0–0.9)
LDLC SERPL CALC-MCNC: 122 MG/DL
LYMPHOCYTES # BLD AUTO: 2.01 K/UL (ref 1–4.8)
LYMPHOCYTES NFR BLD: 27.3 % (ref 22–41)
MCH RBC QN AUTO: 30.1 PG (ref 27–33)
MCHC RBC AUTO-ENTMCNC: 32.3 G/DL (ref 32.3–36.5)
MCV RBC AUTO: 93.2 FL (ref 81.4–97.8)
MONOCYTES # BLD AUTO: 0.76 K/UL (ref 0–0.85)
MONOCYTES NFR BLD AUTO: 10.3 % (ref 0–13.4)
NEUTROPHILS # BLD AUTO: 4.25 K/UL (ref 1.82–7.42)
NEUTROPHILS NFR BLD: 57.7 % (ref 44–72)
NRBC # BLD AUTO: 0 K/UL
NRBC BLD-RTO: 0 /100 WBC (ref 0–0.2)
PLATELET # BLD AUTO: 280 K/UL (ref 164–446)
PMV BLD AUTO: 10.5 FL (ref 9–12.9)
POCT INT CON NEG: NEGATIVE
POCT INT CON POS: POSITIVE
POTASSIUM SERPL-SCNC: 4.6 MMOL/L (ref 3.6–5.5)
PROT SERPL-MCNC: 8.4 G/DL (ref 6–8.2)
RBC # BLD AUTO: 6.14 M/UL (ref 4.7–6.1)
SODIUM SERPL-SCNC: 141 MMOL/L (ref 135–145)
TRIGL SERPL-MCNC: 100 MG/DL (ref 0–149)
WBC # BLD AUTO: 7.4 K/UL (ref 4.8–10.8)

## 2025-01-20 PROCEDURE — 1170F FXNL STATUS ASSESSED: CPT

## 2025-01-20 PROCEDURE — 80053 COMPREHEN METABOLIC PANEL: CPT

## 2025-01-20 PROCEDURE — 3074F SYST BP LT 130 MM HG: CPT

## 2025-01-20 PROCEDURE — 85025 COMPLETE CBC W/AUTO DIFF WBC: CPT

## 2025-01-20 PROCEDURE — 80061 LIPID PANEL: CPT

## 2025-01-20 PROCEDURE — 99214 OFFICE O/P EST MOD 30 MIN: CPT

## 2025-01-20 PROCEDURE — 83036 HEMOGLOBIN GLYCOSYLATED A1C: CPT

## 2025-01-20 PROCEDURE — 36415 COLL VENOUS BLD VENIPUNCTURE: CPT

## 2025-01-20 PROCEDURE — 3078F DIAST BP <80 MM HG: CPT

## 2025-01-20 ASSESSMENT — ENCOUNTER SYMPTOMS
SHORTNESS OF BREATH: 0
WEIGHT LOSS: 0
DIARRHEA: 0
COUGH: 0
FEVER: 0
CONSTIPATION: 0
CHILLS: 0
DIZZINESS: 0
VOMITING: 0
NAUSEA: 0
ABDOMINAL PAIN: 0
HEADACHES: 0
WEAKNESS: 0
MYALGIAS: 0
BLURRED VISION: 0

## 2025-01-20 ASSESSMENT — FIBROSIS 4 INDEX: FIB4 SCORE: 2.95

## 2025-01-20 ASSESSMENT — PATIENT HEALTH QUESTIONNAIRE - PHQ9: CLINICAL INTERPRETATION OF PHQ2 SCORE: 0

## 2025-01-20 NOTE — PROGRESS NOTES
Verbal consent was acquired by the patient to use [employer] ambient listening note generation during this visit  Subjective:     CC:  Diagnoses of Controlled type 2 diabetes mellitus without complication, with long-term current use of insulin ([facility]), Essential hypertension, Dyslipidemia, CKD (chronic kidney disease) stage 2, GFR 60-89 ml/min, and Healthcare maintenance were pertinent to this visit.    HISTORY OF THE PRESENT ILLNESS: Patient is a 74 y.o. male.   No problems updated.    History of Present Illness  The patient presents for a 6-month follow-up.    He reports an increased effort required for physical activities, such as leaf cleaning, which now necessitates a recovery period of approximately half a day. He experiences body aches and arthritis, which are alleviated by soaking in a hot tub with jets, resulting in symptom relief within a day or two. He has been advised against the use of Epsom salts due to his diabetes. He has a history of arthritis, which was predicted to affect his spine and tailbone following back surgery several years ago. He experiences intermittent achiness but finds that movement provides significant relief. Prolonged periods of inactivity result in soreness upon resuming movement. He has abstained from pain medications for several years, occasionally resorting to Tylenol for severe pain. He also uses Voltaren gel for muscle soreness. He maintains a predominantly vegetarian diet and has recently begun incorporating watercress into his meals. He uses a  to prepare nutrient-dense, high-protein meals, which he consumes 2 to 3 times per week for lunch. He avoids bread due to its adverse effects on his health. He has experimented with Partha bread and keto bread but found them unsatisfactory. He occasionally prepares his own sourdough bread. He consumes salads regularly, sometimes adding avocado, cucumber, tomatoes, lettuce, radishes, and carrots. He uses 2 tablespoons of salad  dressing and occasionally adds a hard-boiled egg. He is currently fasting and has only consumed water and pills today. He can not recall his last viral infection, attributing his good health to careful public behavior and avoidance of crowded spaces. He sees his granddaughter a few times a week and provides transportation for her after school. He has not experienced any long-term illnesses. He supplements his diet with omega-3, multivitamins, vitamin D3, and PreserVision. He undergoes biannual check-ups for diabetes and macular degeneration, the latter of which was diagnosed a year ago. Since the diagnosis, he has had 3 visits with no negative changes observed.    He adjusts his NovoLog dosage based on his blood sugar levels, typically administering 10 to 13 units per injection, sometimes as low as 9 units. If his morning blood sugar level is in the high 90s or 109, he does not administer an injection, resulting in only 2 injections for the day. He takes metformin 1000 mg twice daily without experiencing diarrhea or stomach side effects.    He is on levothyroxine 25 mcg daily for thyroid management and is due for a refill.    He is on amlodipine 5 mg twice daily and benazepril 40 mg twice daily for blood pressure management. He reports no ankle swelling.    He is on gemfibrozil for cholesterol management. He has a history of cholesterol issues and monitors his diet closely. He experienced severe cramps when previously prescribed a statin. He consumes steamed eggs, sometimes including the yolk in egg salad. He prepares crustless quiches and experiments with different recipes. He has not undergone a cardiac CT scan.    Supplemental Information  He had atrial defibrillation and stent surgery many years ago.    SOCIAL HISTORY  He works as a .    ALLERGIES  The patient has an intolerance to STATINS, causing severe cramps.    MEDICATIONS  Current: NovoLog, metformin, levothyroxine, amlodipine, benazepril,  "gemfibrozil, empagliflozin, omega, multivitamin, vitamin D3, PreserVision, Tylenol, Voltaren gel    ROS:   Review of Systems   Constitutional:  Negative for chills, fever, malaise/fatigue and weight loss.   Eyes:  Negative for blurred vision.   Respiratory:  Negative for cough and shortness of breath.    Cardiovascular:  Negative for chest pain.   Gastrointestinal:  Negative for abdominal pain, constipation, diarrhea, nausea and vomiting.   Musculoskeletal:  Negative for myalgias.   Neurological:  Negative for dizziness, weakness and headaches.         Objective:     Exam: /64 (BP Location: Left arm, Patient Position: Sitting, BP Cuff Size: Adult)   Pulse 78   Temp (!) 35.7 °C (96.3 °F) (Temporal)   Ht 1.676 m (5' 6\")   Wt 94.5 kg (208 lb 6.4 oz)   SpO2 95%  Body mass index is 33.64 kg/m².    Physical Exam  Constitutional:       Appearance: Normal appearance.   HENT:      Head: Normocephalic.   Eyes:      Conjunctiva/sclera: Conjunctivae normal.      Pupils: Pupils are equal, round, and reactive to light.   Cardiovascular:      Rate and Rhythm: Normal rate and regular rhythm.      Heart sounds: No murmur heard.  Pulmonary:      Effort: Pulmonary effort is normal. No respiratory distress.      Breath sounds: Normal breath sounds.   Musculoskeletal:         General: Normal range of motion.   Skin:     General: Skin is warm and dry.   Neurological:      General: No focal deficit present.      Mental Status: He is alert and oriented to person, place, and time.   Psychiatric:         Mood and Affect: Mood normal.         Behavior: Behavior normal.           Labs:     No visits with results within 1 Month(s) from this visit.   Latest known visit with results is:   Hospital Outpatient Visit on 09/09/2024   Component Date Value    Prostatic Specific Antig* 09/09/2024 2.43      Lab Results   Component Value Date/Time    CHOLSTRLTOT 159 06/19/2024 11:06 AM    LDL 98 06/19/2024 11:06 AM    HDL 37 (A) 06/19/2024 " "11:06 AM    TRIGLYCERIDE 122 06/19/2024 11:06 AM       Lab Results   Component Value Date/Time    SODIUM 145 09/09/2024 10:55 AM    POTASSIUM 4.0 09/09/2024 10:55 AM    CHLORIDE 107 09/09/2024 10:55 AM    CO2 22 09/09/2024 10:55 AM    GLUCOSE 75 09/09/2024 10:55 AM    BUN 41 (H) 09/09/2024 10:55 AM    CREATININE 1.04 09/09/2024 10:55 AM    CREATININE 0.9 01/09/2008 12:15 PM    BUNCREATRAT 23.1 (H) 05/02/2023 11:20 AM     Lab Results   Component Value Date/Time    ALKPHOSPHAT 115 (H) 09/09/2024 10:55 AM    ASTSGOT 33 09/09/2024 10:55 AM    ALTSGPT 11 09/09/2024 10:55 AM    TBILIRUBIN 0.4 09/09/2024 10:55 AM      Lab Results   Component Value Date/Time    HBA1C 5.8 01/20/2025 02:35 PM    HBA1C 5.9 (H) 09/09/2024 10:55 AM    HBA1C 6.6 (H) 06/19/2024 11:06 AM     No results found for: \"TSH\"  Lab Results   Component Value Date/Time    FREET4 1.13 06/19/2023 12:26 PM    FREET4 0.89 (L) 03/27/2023 06:32 AM     No results found for: \"CBC\"      Assessment & Plan: Medical Decision Making   74 y.o. male with the following -    Assessment & Plan  1. Health maintenance.  His A1c level is commendably controlled at 5.8%. His thyroid function is within normal limits, necessitating annual re-evaluation. His blood pressure is well-regulated at 110/64. His lipid profile is satisfactory, with triglycerides at 122, HDL slightly low at 37, and LDL within the desired range. His ApoB or Lipo a is absent, eliminating the need for future checks. He has not undergone a cardiac CT scan. He is advised to maintain a diet rich in protein to support muscle growth and prevent sarcopenia. He is encouraged to continue his current regimen of omega supplements, multivitamins, vitamin D3, and PreserVision. He is advised to limit his intake of processed foods and avoid STATINS due to previous adverse reactions. He is recommended to consider a cardiac CT scan to assess for plaque buildup, which could inform the need for statin therapy. A comprehensive " metabolic panel, lipid profile, and CBC will be ordered.    2. Diabetes Mellitus.  His A1c level is stable at 5.8%. He is currently using NovoLog, adjusting the dosage based on his blood sugar levels and activity, typically between 9 to 13 units per shot. He is also taking metformin 1000 mg twice a day without any gastrointestinal side effects. Also using Jardiance 25 mg daily.  He is advised to continue his current medication regimen and dietary practices.    3. Hypothyroidism.  He is on levothyroxine 25 mcg daily. A refill for levothyroxine will be provided.    4. Hypertension.  He is currently on amlodipine 5 mg twice a day and benazepril 40 mg twice a day. His blood pressure is well-controlled at 110/64.    5. Hyperlipidemia.  He is on gemfibrozil for cholesterol management. His lipid profile is satisfactory, with triglycerides at 122, HDL slightly low at 37, and LDL within the desired range. He is advised to continue his current medication and dietary practices. He is recommended to consider a cardiac CT scan to assess for plaque buildup, which could inform the need for statin therapy.    Follow-up  The patient will follow up in 6 months.    PROCEDURE  The patient underwent back surgery several years ago.      Problem List Items Addressed This Visit          Family Medicine Problems    Essential hypertension    Relevant Orders    Comp Metabolic Panel    Lipid Profile    CBC WITH DIFFERENTIAL       Other    Dyslipidemia    Relevant Orders    Comp Metabolic Panel    Lipid Profile    CBC WITH DIFFERENTIAL    CKD (chronic kidney disease) stage 2, GFR 60-89 ml/min    Relevant Orders    Comp Metabolic Panel    Lipid Profile    CBC WITH DIFFERENTIAL     Other Visit Diagnoses       Controlled type 2 diabetes mellitus without complication, with long-term current use of insulin (HCC)        Relevant Orders    POCT  A1C (Completed)    Comp Metabolic Panel    Lipid Profile    CBC WITH DIFFERENTIAL    Healthcare maintenance         Relevant Orders    Comp Metabolic Panel    Lipid Profile    CBC WITH DIFFERENTIAL            Differential diagnosis, natural history, supportive care, and indications for immediate follow-up discussed.  Shared decision making approach was utilized, and patient is amendable with plan of care.  Patient understands to return to clinic or go to the emergency department if symptoms worsen. All questions and concerns addressed to the best of my knowledge.      Return in about 6 months (around 7/20/2025), or if symptoms worsen or fail to improve.    Please note that this dictation was created using voice recognition software. I have made every reasonable attempt to correct obvious errors, but I expect that there are errors of grammar and possibly content that I did not discover before finalizing the note.

## 2025-01-21 DIAGNOSIS — I10 ESSENTIAL HYPERTENSION: ICD-10-CM

## 2025-01-21 DIAGNOSIS — N18.2 CKD (CHRONIC KIDNEY DISEASE) STAGE 2, GFR 60-89 ML/MIN: ICD-10-CM

## 2025-01-21 DIAGNOSIS — Z12.5 ENCOUNTER FOR SCREENING FOR MALIGNANT NEOPLASM OF PROSTATE: ICD-10-CM

## 2025-01-21 DIAGNOSIS — E11.65 TYPE 2 DIABETES MELLITUS WITH HYPERGLYCEMIA, WITH LONG-TERM CURRENT USE OF INSULIN (HCC): ICD-10-CM

## 2025-01-21 DIAGNOSIS — Z79.4 TYPE 2 DIABETES MELLITUS WITH HYPERGLYCEMIA, WITH LONG-TERM CURRENT USE OF INSULIN (HCC): ICD-10-CM

## 2025-01-21 DIAGNOSIS — E78.5 DYSLIPIDEMIA: ICD-10-CM

## 2025-01-21 DIAGNOSIS — E03.9 PRIMARY HYPOTHYROIDISM: ICD-10-CM

## 2025-02-03 ENCOUNTER — TELEPHONE (OUTPATIENT)
Dept: MEDICAL GROUP | Facility: PHYSICIAN GROUP | Age: 75
End: 2025-02-03
Payer: MEDICARE

## 2025-02-03 DIAGNOSIS — E11.65 TYPE 2 DIABETES MELLITUS WITH HYPERGLYCEMIA, WITH LONG-TERM CURRENT USE OF INSULIN (HCC): ICD-10-CM

## 2025-02-03 DIAGNOSIS — Z79.4 TYPE 2 DIABETES MELLITUS WITH HYPERGLYCEMIA, WITH LONG-TERM CURRENT USE OF INSULIN (HCC): ICD-10-CM

## 2025-02-03 RX ORDER — PEN NEEDLE, DIABETIC 32 GX 1/4"
1 NEEDLE, DISPOSABLE MISCELLANEOUS 3 TIMES DAILY
Qty: 300 EACH | Refills: 3 | Status: SHIPPED | OUTPATIENT
Start: 2025-02-03 | End: 2025-02-11

## 2025-02-03 NOTE — TELEPHONE ENCOUNTER
PT came in and informed that his insurance company no longer covers the ANAY TIP 32g x 6cm. PT was informed that his plan now covers     BD PEN NEEDLE/MICRO/ULTRA-FINE/ 32G X 6MM 90 Day Supply    Request that order be sent in  Please advise

## 2025-02-11 ENCOUNTER — TELEPHONE (OUTPATIENT)
Dept: MEDICAL GROUP | Facility: PHYSICIAN GROUP | Age: 75
End: 2025-02-11
Payer: MEDICARE

## 2025-02-11 DIAGNOSIS — E11.65 TYPE 2 DIABETES MELLITUS WITH HYPERGLYCEMIA, WITH LONG-TERM CURRENT USE OF INSULIN (HCC): ICD-10-CM

## 2025-02-11 DIAGNOSIS — Z79.4 TYPE 2 DIABETES MELLITUS WITH HYPERGLYCEMIA, WITH LONG-TERM CURRENT USE OF INSULIN (HCC): ICD-10-CM

## 2025-02-11 RX ORDER — PEN NEEDLE, DIABETIC 32 GX 1/4"
1 NEEDLE, DISPOSABLE MISCELLANEOUS 3 TIMES DAILY
Qty: 300 EACH | Refills: 3 | Status: SHIPPED | OUTPATIENT
Start: 2025-02-11 | End: 2025-02-11 | Stop reason: SDUPTHER

## 2025-02-11 RX ORDER — PEN NEEDLE, DIABETIC 32 GX 1/4"
1 NEEDLE, DISPOSABLE MISCELLANEOUS 3 TIMES DAILY
Qty: 300 EACH | Refills: 3 | Status: SHIPPED
Start: 2025-02-11

## 2025-02-11 NOTE — TELEPHONE ENCOUNTER
The patient came in and  said that [facility] did  not  get the script for his insulin pens. He is  in the  lobby and is  going to wait until hien or jose manuel bring him a paper copy of the script so  he  can  take it to pharmacy

## 2025-03-23 DIAGNOSIS — E11.42 CONTROLLED TYPE 2 DIABETES MELLITUS WITH DIABETIC POLYNEUROPATHY, WITH LONG-TERM CURRENT USE OF INSULIN (HCC): ICD-10-CM

## 2025-03-23 DIAGNOSIS — Z79.4 CONTROLLED TYPE 2 DIABETES MELLITUS WITH DIABETIC POLYNEUROPATHY, WITH LONG-TERM CURRENT USE OF INSULIN (HCC): ICD-10-CM

## 2025-03-24 RX ORDER — EMPAGLIFLOZIN 25 MG/1
1 TABLET, FILM COATED ORAL
Qty: 90 TABLET | Refills: 3 | Status: SHIPPED | OUTPATIENT
Start: 2025-03-24

## 2025-03-24 NOTE — TELEPHONE ENCOUNTER
Received request via: Pharmacy    Was the patient seen in the last year in this department? Yes    Does the patient have an active prescription (recently filled or refills available) for medication(s) requested? No    Pharmacy Name: [facility]      Does the patient have assisted Plus and need 100-day supply? (This applies to ALL medications) Patient does not have SCP

## 2025-03-26 DIAGNOSIS — I10 ESSENTIAL HYPERTENSION: ICD-10-CM

## 2025-03-26 DIAGNOSIS — Z79.4 TYPE 2 DIABETES MELLITUS WITHOUT COMPLICATION, WITH LONG-TERM CURRENT USE OF INSULIN (HCC): ICD-10-CM

## 2025-03-26 DIAGNOSIS — E11.9 TYPE 2 DIABETES MELLITUS WITHOUT COMPLICATION, WITH LONG-TERM CURRENT USE OF INSULIN (HCC): ICD-10-CM

## 2025-03-26 RX ORDER — INDAPAMIDE 2.5 MG/1
2.5 TABLET ORAL
Qty: 90 TABLET | Refills: 3 | Status: SHIPPED | OUTPATIENT
Start: 2025-03-26

## 2025-03-26 NOTE — TELEPHONE ENCOUNTER
Received request via: Patient    Was the patient seen in the last year in this department? Yes    Does the patient have an active prescription (recently filled or refills available) for medication(s) requested? No    Pharmacy Name: [id number] - [place], [place] - [address]      Does the patient have long term Plus and need 100-day supply? (This applies to ALL medications) Patient does not have SCP

## 2025-04-11 DIAGNOSIS — E03.9 PRIMARY HYPOTHYROIDISM: ICD-10-CM

## 2025-04-11 NOTE — TELEPHONE ENCOUNTER
Received request via: Pharmacy    Was the patient seen in the last year in this department? Yes    Does the patient have an active prescription (recently filled or refills available) for medication(s) requested? No    [facility]    Does the patient have long-term Plus and need 100-day supply? (This applies to ALL medications) Patient does not have SCP

## 2025-04-14 RX ORDER — LEVOTHYROXINE SODIUM 25 UG/1
25 TABLET ORAL
Qty: 90 TABLET | Refills: 3 | Status: SHIPPED | OUTPATIENT
Start: 2025-04-14

## 2025-05-08 ENCOUNTER — APPOINTMENT (OUTPATIENT)
Dept: LAB | Facility: MEDICAL CENTER | Age: 75
End: 2025-05-08
Payer: MEDICARE

## 2025-05-13 ENCOUNTER — HOSPITAL ENCOUNTER (OUTPATIENT)
Dept: LAB | Facility: MEDICAL CENTER | Age: 75
End: 2025-05-13
Attending: UROLOGY
Payer: MEDICARE

## 2025-05-13 LAB — PSA SERPL DL<=0.01 NG/ML-MCNC: 2.43 NG/ML (ref 0–4)

## 2025-05-13 PROCEDURE — 36415 COLL VENOUS BLD VENIPUNCTURE: CPT

## 2025-05-13 PROCEDURE — 84153 ASSAY OF PSA TOTAL: CPT

## 2025-06-23 ENCOUNTER — HOSPITAL ENCOUNTER (OUTPATIENT)
Dept: LAB | Facility: MEDICAL CENTER | Age: 75
End: 2025-06-23
Payer: MEDICARE

## 2025-06-23 DIAGNOSIS — E78.5 DYSLIPIDEMIA: ICD-10-CM

## 2025-06-23 DIAGNOSIS — E03.9 PRIMARY HYPOTHYROIDISM: ICD-10-CM

## 2025-06-23 DIAGNOSIS — N18.2 CKD (CHRONIC KIDNEY DISEASE) STAGE 2, GFR 60-89 ML/MIN: ICD-10-CM

## 2025-06-23 DIAGNOSIS — Z79.4 TYPE 2 DIABETES MELLITUS WITH HYPERGLYCEMIA, WITH LONG-TERM CURRENT USE OF INSULIN (HCC): ICD-10-CM

## 2025-06-23 DIAGNOSIS — I10 ESSENTIAL HYPERTENSION: ICD-10-CM

## 2025-06-23 DIAGNOSIS — E11.65 TYPE 2 DIABETES MELLITUS WITH HYPERGLYCEMIA, WITH LONG-TERM CURRENT USE OF INSULIN (HCC): ICD-10-CM

## 2025-06-23 DIAGNOSIS — Z12.5 ENCOUNTER FOR SCREENING FOR MALIGNANT NEOPLASM OF PROSTATE: ICD-10-CM

## 2025-06-23 LAB
ALBUMIN SERPL BCP-MCNC: 4.6 G/DL (ref 3.2–4.9)
ALBUMIN/GLOB SERPL: 1.5 G/DL
ALP SERPL-CCNC: 89 U/L (ref 30–99)
ALT SERPL-CCNC: 8 U/L (ref 2–50)
ANION GAP SERPL CALC-SCNC: 14 MMOL/L (ref 7–16)
AST SERPL-CCNC: 32 U/L (ref 12–45)
BASOPHILS # BLD AUTO: 0.9 % (ref 0–1.8)
BASOPHILS # BLD: 0.05 K/UL (ref 0–0.12)
BILIRUB SERPL-MCNC: 0.3 MG/DL (ref 0.1–1.5)
BUN SERPL-MCNC: 47 MG/DL (ref 8–22)
CALCIUM ALBUM COR SERPL-MCNC: 9.8 MG/DL (ref 8.5–10.5)
CALCIUM SERPL-MCNC: 10.3 MG/DL (ref 8.5–10.5)
CHLORIDE SERPL-SCNC: 106 MMOL/L (ref 96–112)
CHOLEST SERPL-MCNC: 167 MG/DL (ref 100–199)
CO2 SERPL-SCNC: 23 MMOL/L (ref 20–33)
CREAT SERPL-MCNC: 1.29 MG/DL (ref 0.5–1.4)
CREAT UR-MCNC: 53.8 MG/DL
EOSINOPHIL # BLD AUTO: 0.2 K/UL (ref 0–0.51)
EOSINOPHIL NFR BLD: 3.6 % (ref 0–6.9)
ERYTHROCYTE [DISTWIDTH] IN BLOOD BY AUTOMATED COUNT: 49.3 FL (ref 35.9–50)
EST. AVERAGE GLUCOSE BLD GHB EST-MCNC: 126 MG/DL
FASTING STATUS PATIENT QL REPORTED: NORMAL
GFR SERPLBLD CREATININE-BSD FMLA CKD-EPI: 57 ML/MIN/1.73 M 2
GLOBULIN SER CALC-MCNC: 3 G/DL (ref 1.9–3.5)
GLUCOSE SERPL-MCNC: 90 MG/DL (ref 65–99)
HBA1C MFR BLD: 6 % (ref 4–5.6)
HCT VFR BLD AUTO: 52.9 % (ref 42–52)
HDLC SERPL-MCNC: 33 MG/DL
HGB BLD-MCNC: 17.5 G/DL (ref 14–18)
IMM GRANULOCYTES # BLD AUTO: 0.01 K/UL (ref 0–0.11)
IMM GRANULOCYTES NFR BLD AUTO: 0.2 % (ref 0–0.9)
LDLC SERPL CALC-MCNC: 116 MG/DL
LYMPHOCYTES # BLD AUTO: 1.59 K/UL (ref 1–4.8)
LYMPHOCYTES NFR BLD: 29 % (ref 22–41)
MCH RBC QN AUTO: 30.6 PG (ref 27–33)
MCHC RBC AUTO-ENTMCNC: 33.1 G/DL (ref 32.3–36.5)
MCV RBC AUTO: 92.5 FL (ref 81.4–97.8)
MICROALBUMIN UR-MCNC: 7.9 MG/DL
MICROALBUMIN/CREAT UR: 147 MG/G (ref 0–30)
MONOCYTES # BLD AUTO: 0.46 K/UL (ref 0–0.85)
MONOCYTES NFR BLD AUTO: 8.4 % (ref 0–13.4)
NEUTROPHILS # BLD AUTO: 3.17 K/UL (ref 1.82–7.42)
NEUTROPHILS NFR BLD: 57.9 % (ref 44–72)
NRBC # BLD AUTO: 0 K/UL
NRBC BLD-RTO: 0 /100 WBC (ref 0–0.2)
PLATELET # BLD AUTO: 270 K/UL (ref 164–446)
PMV BLD AUTO: 11 FL (ref 9–12.9)
POTASSIUM SERPL-SCNC: 4.6 MMOL/L (ref 3.6–5.5)
PROT SERPL-MCNC: 7.6 G/DL (ref 6–8.2)
PSA SERPL DL<=0.01 NG/ML-MCNC: 2.78 NG/ML (ref 0–4)
RBC # BLD AUTO: 5.72 M/UL (ref 4.7–6.1)
SODIUM SERPL-SCNC: 143 MMOL/L (ref 135–145)
T4 FREE SERPL-MCNC: 1.01 NG/DL (ref 0.93–1.7)
TRIGL SERPL-MCNC: 88 MG/DL (ref 0–149)
TSH SERPL-ACNC: 2.3 UIU/ML (ref 0.38–5.33)
WBC # BLD AUTO: 5.5 K/UL (ref 4.8–10.8)

## 2025-06-23 PROCEDURE — 36415 COLL VENOUS BLD VENIPUNCTURE: CPT

## 2025-06-23 PROCEDURE — 82570 ASSAY OF URINE CREATININE: CPT

## 2025-06-23 PROCEDURE — 80061 LIPID PANEL: CPT

## 2025-06-23 PROCEDURE — 82043 UR ALBUMIN QUANTITATIVE: CPT

## 2025-06-23 PROCEDURE — 80053 COMPREHEN METABOLIC PANEL: CPT

## 2025-06-23 PROCEDURE — 83036 HEMOGLOBIN GLYCOSYLATED A1C: CPT

## 2025-06-23 PROCEDURE — 84153 ASSAY OF PSA TOTAL: CPT

## 2025-06-23 PROCEDURE — 85025 COMPLETE CBC W/AUTO DIFF WBC: CPT

## 2025-06-23 PROCEDURE — 84439 ASSAY OF FREE THYROXINE: CPT

## 2025-06-23 PROCEDURE — 84443 ASSAY THYROID STIM HORMONE: CPT

## 2025-06-24 ENCOUNTER — RESULTS FOLLOW-UP (OUTPATIENT)
Dept: MEDICAL GROUP | Facility: PHYSICIAN GROUP | Age: 75
End: 2025-06-24

## 2025-07-01 ENCOUNTER — APPOINTMENT (OUTPATIENT)
Dept: MEDICAL GROUP | Facility: PHYSICIAN GROUP | Age: 75
End: 2025-07-01
Payer: MEDICARE

## 2025-07-01 VITALS
DIASTOLIC BLOOD PRESSURE: 60 MMHG | TEMPERATURE: 97.8 F | HEART RATE: 69 BPM | HEIGHT: 66 IN | BODY MASS INDEX: 33.01 KG/M2 | OXYGEN SATURATION: 95 % | WEIGHT: 205.4 LBS | SYSTOLIC BLOOD PRESSURE: 110 MMHG

## 2025-07-01 DIAGNOSIS — N18.2 CKD (CHRONIC KIDNEY DISEASE) STAGE 2, GFR 60-89 ML/MIN: ICD-10-CM

## 2025-07-01 DIAGNOSIS — E11.65 TYPE 2 DIABETES MELLITUS WITH HYPERGLYCEMIA, WITH LONG-TERM CURRENT USE OF INSULIN (HCC): ICD-10-CM

## 2025-07-01 DIAGNOSIS — Z79.4 TYPE 2 DIABETES MELLITUS WITH HYPERGLYCEMIA, WITH LONG-TERM CURRENT USE OF INSULIN (HCC): ICD-10-CM

## 2025-07-01 DIAGNOSIS — E11.69 HYPERLIPIDEMIA ASSOCIATED WITH TYPE 2 DIABETES MELLITUS (HCC): ICD-10-CM

## 2025-07-01 DIAGNOSIS — I10 ESSENTIAL HYPERTENSION: Primary | ICD-10-CM

## 2025-07-01 DIAGNOSIS — E11.40 DIABETIC NEUROPATHY, PAINFUL (HCC): ICD-10-CM

## 2025-07-01 DIAGNOSIS — E78.5 HYPERLIPIDEMIA ASSOCIATED WITH TYPE 2 DIABETES MELLITUS (HCC): ICD-10-CM

## 2025-07-01 PROCEDURE — 99214 OFFICE O/P EST MOD 30 MIN: CPT

## 2025-07-01 PROCEDURE — 1170F FXNL STATUS ASSESSED: CPT

## 2025-07-01 PROCEDURE — 3078F DIAST BP <80 MM HG: CPT

## 2025-07-01 PROCEDURE — 3074F SYST BP LT 130 MM HG: CPT

## 2025-07-01 RX ORDER — GABAPENTIN 300 MG/1
300 CAPSULE ORAL 3 TIMES DAILY
Qty: 90 CAPSULE | Refills: 1 | Status: SHIPPED | OUTPATIENT
Start: 2025-07-01

## 2025-07-01 RX ORDER — GABAPENTIN 300 MG/1
300 CAPSULE ORAL 3 TIMES DAILY
Qty: 90 CAPSULE | Refills: 1 | Status: SHIPPED | OUTPATIENT
Start: 2025-07-01 | End: 2025-07-01

## 2025-07-01 ASSESSMENT — ENCOUNTER SYMPTOMS
ABDOMINAL PAIN: 0
CHILLS: 0
DIZZINESS: 0
COUGH: 0
BLURRED VISION: 0
VOMITING: 0
CONSTIPATION: 0
NAUSEA: 0
DIARRHEA: 0
FEVER: 0
HEADACHES: 0
WEIGHT LOSS: 0
WEAKNESS: 0
SHORTNESS OF BREATH: 0
MYALGIAS: 0

## 2025-07-01 ASSESSMENT — FIBROSIS 4 INDEX: FIB4 SCORE: 3.23

## 2025-07-01 NOTE — PROGRESS NOTES
Verbal consent was acquired by the patient to use [employer] ambient listening note generation during this visit  Subjective:     CC:  The primary encounter diagnosis was Essential hypertension. Diagnoses of CKD (chronic kidney disease) stage 2, GFR 60-89 ml/min, Type 2 diabetes mellitus with hyperglycemia, with long-term current use of insulin (HCC), Hyperlipidemia associated with type 2 diabetes mellitus (HCC), and Diabetic neuropathy, painful (HCC) were also pertinent to this visit.    HISTORY OF THE PRESENT ILLNESS: Patient is a 75 y.o. male.   No problems updated.    History of Present Illness  The patient presents for evaluation of diabetes mellitus, hyperlipidemia management, and osteoarthritis.    Diabetes Mellitus  - She has been monitoring her blood glucose levels, which have occasionally exceeded 130 mg/dL.  - She attributes this to her consumption of strawberries, which she has since reduced.  - Her current diabetes management regimen includes insulin, metformin, and empagliflozin (Jardiance).  - Additionally, she takes multivitamins.    Hyperlipidemia  - She has observed an increase in cholesterol levels since January 2023, despite dietary modifications such as reducing red meat intake and increasing vegetable consumption.  - She is curious about potential medication-induced changes in her LDL and HDL levels.  - She has incorporated a kale smoothie into her diet, which she consumes either for breakfast or lunch daily.  - She is hopeful that these dietary changes will help balance her LDL and HDL levels in the coming months.  - She does not currently see a cardiologist.  - She is on gemfibrozil and has a history of statin intolerance.    Osteoarthritis  - She has discontinued all NSAIDs and is currently using acetaminophen (Tylenol), which she reports as being ineffective.  - She experiences significant cervical, shoulder, and lower extremity pain, which makes ambulation on uneven surfaces  "challenging.  - She has noticed a decrease in leg strength and finds it difficult to walk uphill or on unlevel surfaces.  - She has started using an Olympic bench for weight training and sit-ups, which she finds beneficial.  - She also practices getting up from the floor without assistance a few times a week.    Foot Pain  - She experiences pain in the plantar aspect of her feet, which she suspects may be due to a combination of osteoarthritis and diabetic neuropathy.  - She also experiences cramping in this area at night if she bends incorrectly.  - She was previously on gabapentin and pregabalin (Lyrica) but is not currently taking either medication.  - She reports that her foot pain is most bothersome at night when she is trying to sleep, but it does not bother her as much when she is ambulating during the day.    Supplemental information: None.    ROS:   Review of Systems   Constitutional:  Negative for chills, fever, malaise/fatigue and weight loss.   Eyes:  Negative for blurred vision.   Respiratory:  Negative for cough and shortness of breath.    Cardiovascular:  Negative for chest pain.   Gastrointestinal:  Negative for abdominal pain, constipation, diarrhea, nausea and vomiting.   Musculoskeletal:  Negative for myalgias.   Neurological:  Negative for dizziness, weakness and headaches.         Objective:     Exam: /60 (BP Location: Left arm, Patient Position: Sitting, BP Cuff Size: Adult)   Pulse 69   Temp 36.6 °C (97.8 °F) (Temporal)   Ht 1.676 m (5' 6\")   Wt 93.2 kg (205 lb 6.4 oz)   SpO2 95%  Body mass index is 33.15 kg/m².    Physical Exam  Constitutional:       Appearance: Normal appearance.   HENT:      Head: Normocephalic.   Eyes:      Conjunctiva/sclera: Conjunctivae normal.      Pupils: Pupils are equal, round, and reactive to light.   Cardiovascular:      Rate and Rhythm: Normal rate and regular rhythm.      Heart sounds: No murmur heard.  Pulmonary:      Effort: Pulmonary effort is " normal. No respiratory distress.      Breath sounds: Normal breath sounds.   Musculoskeletal:         General: Normal range of motion.   Skin:     General: Skin is warm and dry.   Neurological:      General: No focal deficit present.      Mental Status: He is alert and oriented to person, place, and time.   Psychiatric:         Mood and Affect: Mood normal.         Behavior: Behavior normal.       Monofilament testing with a 10 gram force: sensation intact: intact bilaterally  Visual Inspection: Feet without maceration, ulcers, fissures.  Pedal pulses: intact bilaterally      Labs:   Hospital Outpatient Visit on 06/23/2025   Component Date Value    Prostatic Specific Antig* 06/23/2025 2.78     Creatinine, Urine 06/23/2025 53.80     Microalbumin, Urine Rand* 06/23/2025 7.9     Micro Alb Creat Ratio 06/23/2025 147 (H)     Glycohemoglobin 06/23/2025 6.0 (H)     Est Avg Glucose 06/23/2025 126     Cholesterol,Tot 06/23/2025 167     Triglycerides 06/23/2025 88     HDL 06/23/2025 33 (A)     LDL 06/23/2025 116 (H)     Sodium 06/23/2025 143     Potassium 06/23/2025 4.6     Chloride 06/23/2025 106     Co2 06/23/2025 23     Anion Gap 06/23/2025 14.0     Glucose 06/23/2025 90     Bun 06/23/2025 47 (H)     Creatinine 06/23/2025 1.29     Calcium 06/23/2025 10.3     Correct Calcium 06/23/2025 9.8     AST(SGOT) 06/23/2025 32     ALT(SGPT) 06/23/2025 8     Alkaline Phosphatase 06/23/2025 89     Total Bilirubin 06/23/2025 0.3     Albumin 06/23/2025 4.6     Total Protein 06/23/2025 7.6     Globulin 06/23/2025 3.0     A-G Ratio 06/23/2025 1.5     WBC 06/23/2025 5.5     RBC 06/23/2025 5.72     Hemoglobin 06/23/2025 17.5     Hematocrit 06/23/2025 52.9 (H)     MCV 06/23/2025 92.5     MCH 06/23/2025 30.6     MCHC 06/23/2025 33.1     RDW 06/23/2025 49.3     Platelet Count 06/23/2025 270     MPV 06/23/2025 11.0     Neutrophils-Polys 06/23/2025 57.90     Lymphocytes 06/23/2025 29.00     Monocytes 06/23/2025 8.40     Eosinophils 06/23/2025  3.60     Basophils 06/23/2025 0.90     Immature Granulocytes 06/23/2025 0.20     Nucleated RBC 06/23/2025 0.00     Neutrophils (Absolute) 06/23/2025 3.17     Lymphs (Absolute) 06/23/2025 1.59     Monos (Absolute) 06/23/2025 0.46     Eos (Absolute) 06/23/2025 0.20     Baso (Absolute) 06/23/2025 0.05     Immature Granulocytes (a* 06/23/2025 0.01     NRBC (Absolute) 06/23/2025 0.00     Fasting Status 06/23/2025 Fasting     TSH 06/23/2025 2.300     Free T-4 06/23/2025 1.01     GFR (CKD-EPI) 06/23/2025 57 (A)          Assessment & Plan: Medical Decision Making   75 y.o. male with the following -    Assessment & Plan  1. Diabetes Mellitus.  - A1c level is currently at 6, indicating good control.  - Advised to continue monitoring blood sugar levels and maintain a balanced diet.  - Currently on insulin Novolog 17 units TID, metformin 1000 mg BID, and Jardiance 25 mg daily.  - Prescription for test strips provided to monitor blood sugar levels three times a day.    2. Cholesterol Management.  - Cholesterol levels have shown fluctuations but are currently on a downward trend.  - Advised to continue current diet and exercise regimen. Continue gembirozil 600 mg daily  - Discussed the possibility of using Repatha, but decided not to proceed at this time.  - If no improvement in cholesterol levels, a cardiac CT scan may be considered.    3. Arthritis.  - Reports Tylenol is not effective in managing arthritis pain.  - Advised to use NSAIDs sparingly and only on particularly bad days to avoid potential kidney issues.  - Encouraged to continue current exercise routine, including weightlifting and sit-ups.    4. Diabetic Neuropathy.  - Experiences pain in feet, particularly at night, possibly due to diabetic neuropathy.  - Gabapentin 300 mg prescribed to be taken at night initially, with the option to increase dosage to three times a day if needed.  - Advised to monitor for any side effects such as somnolence.    Follow-up  The  patient will follow up in mid-January 2026.      Problem List Items Addressed This Visit          Family Medicine Problems    Essential hypertension - Primary    Relevant Orders    CBC WITH DIFFERENTIAL    Comp Metabolic Panel       Other    CKD (chronic kidney disease) stage 2, GFR 60-89 ml/min    Relevant Orders    CBC WITH DIFFERENTIAL    Comp Metabolic Panel    Type 2 diabetes mellitus with hyperglycemia, with long-term current use of insulin (HCC)    Relevant Orders    Comp Metabolic Panel    HEMOGLOBIN A1C    Diabetic Monofilament LE Exam (Completed)     Other Visit Diagnoses         Hyperlipidemia associated with type 2 diabetes mellitus (HCC)        Relevant Orders    Comp Metabolic Panel    Lipid Profile      Diabetic neuropathy, painful (HCC)        Relevant Medications    gabapentin (NEURONTIN) 300 MG Cap            Differential diagnosis, natural history, supportive care, and indications for immediate follow-up discussed.  Shared decision making approach was utilized, and patient is amendable with plan of care.  Patient understands to return to clinic or go to the emergency department if symptoms worsen. All questions and concerns addressed to the best of my knowledge.      Return in about 3 months (around 10/1/2025), or if symptoms worsen or fail to improve.    Please note that this dictation was created using voice recognition software. I have made every reasonable attempt to correct obvious errors, but I expect that there are errors of grammar and possibly content that I did not discover before finalizing the note.

## 2025-07-03 DIAGNOSIS — E11.9 CONTROLLED TYPE 2 DIABETES MELLITUS WITHOUT COMPLICATION, WITH LONG-TERM CURRENT USE OF INSULIN (HCC): ICD-10-CM

## 2025-07-03 DIAGNOSIS — Z79.4 CONTROLLED TYPE 2 DIABETES MELLITUS WITHOUT COMPLICATION, WITH LONG-TERM CURRENT USE OF INSULIN (HCC): ICD-10-CM

## 2025-07-03 RX ORDER — INSULIN ASPART 100 [IU]/ML
17 INJECTION, SUSPENSION SUBCUTANEOUS
Qty: 3 ML | Refills: 0 | Status: SHIPPED | OUTPATIENT
Start: 2025-07-03

## 2025-07-03 RX ORDER — BLOOD SUGAR DIAGNOSTIC
STRIP MISCELLANEOUS
Qty: 300 STRIP | Refills: 0 | Status: CANCELLED | OUTPATIENT
Start: 2025-07-03

## 2025-07-03 RX ORDER — BLOOD SUGAR DIAGNOSTIC
STRIP MISCELLANEOUS
Qty: 300 STRIP | Refills: 2 | Status: SHIPPED | OUTPATIENT
Start: 2025-07-03

## 2025-07-03 NOTE — TELEPHONE ENCOUNTER
Received request via: Patient    Was the patient seen in the last year in this department? Yes    Does the patient have an active prescription (recently filled or refills available) for medication(s) requested? No    Pharmacy Name: [facility] [address] [place] [address], [place] - [id number]     Does the patient have skilled nursing Plus and need 100-day supply? (This applies to ALL medications) Patient does not have SCP

## 2025-07-12 DIAGNOSIS — E03.9 PRIMARY HYPOTHYROIDISM: ICD-10-CM

## 2025-07-14 RX ORDER — LEVOTHYROXINE SODIUM 25 UG/1
25 TABLET ORAL
Qty: 90 TABLET | Refills: 3 | Status: SHIPPED | OUTPATIENT
Start: 2025-07-14

## 2025-07-14 NOTE — TELEPHONE ENCOUNTER
Received request via: Pharmacy    Was the patient seen in the last year in this department? Yes    Does the patient have an active prescription (recently filled or refills available) for medication(s) requested? No    [facility]    Does the patient have FPC Plus and need 100-day supply? (This applies to ALL medications) Patient does not have SCP

## 2025-07-23 ENCOUNTER — APPOINTMENT (OUTPATIENT)
Dept: MEDICAL GROUP | Facility: PHYSICIAN GROUP | Age: 75
End: 2025-07-23
Payer: MEDICARE

## 2025-08-07 DIAGNOSIS — Z79.4 CONTROLLED TYPE 2 DIABETES MELLITUS WITHOUT COMPLICATION, WITH LONG-TERM CURRENT USE OF INSULIN (HCC): ICD-10-CM

## 2025-08-07 DIAGNOSIS — E11.9 CONTROLLED TYPE 2 DIABETES MELLITUS WITHOUT COMPLICATION, WITH LONG-TERM CURRENT USE OF INSULIN (HCC): ICD-10-CM

## 2025-08-07 RX ORDER — INSULIN ASPART 100 [IU]/ML
INJECTION, SUSPENSION SUBCUTANEOUS
Qty: 15 ML | Refills: 2 | Status: SHIPPED | OUTPATIENT
Start: 2025-08-07

## (undated) DEVICE — GLOVE BIOGEL M SZ 8 SURGICAL PF LTX - (50/BX 4BX/CA)

## (undated) DEVICE — STERI STRIP COMPOUND BENZOIN - TINCTURE 0.6ML WITH APPLICATOR (40EA/BX)

## (undated) DEVICE — KIT MAXCESS 4 STERILE DISPOSABLE XLIF

## (undated) DEVICE — TUBING C&T SET FLYING LEADS DRAIN TUBING (10EA/BX)

## (undated) DEVICE — SHEET TRANSVERSE LAP - (12EA/CA)

## (undated) DEVICE — SUCTION INSTRUMENT YANKAUER BULBOUS TIP W/O VENT (50EA/CA)

## (undated) DEVICE — APPLICATOR COTTON TIP 6 IN - STERILE (2EA/PK 100PK/BX)

## (undated) DEVICE — GLOVE BIOGEL SZ 7 SURGICAL PF LTX - (50PR/BX 4BX/CA)

## (undated) DEVICE — GLOVE SZ 8 BIOGEL PI MICRO - PF LF (50PR/BX)

## (undated) DEVICE — GLOVE BIOGEL PI INDICATOR SZ 7.0 SURGICAL PF LF - (50/BX 4BX/CA)

## (undated) DEVICE — SET EXTENSION WITH 2 PORTS (48EA/CA) ***PART #2C8610 IS A SUBSTITUTE*****

## (undated) DEVICE — SPLINT PLASTER 4 IN  X 15 IN - (50/BX 12BX/CA)

## (undated) DEVICE — MIDAS LUBRICATOR DIFFUSER PACK (4EA/CA)

## (undated) DEVICE — PACK NEURO - (2EA/CA)

## (undated) DEVICE — SPONGE GAUZE STER 4X4 8-PL - (2/PK 50PK/BX 12BX/CS)

## (undated) DEVICE — TUBING CLEARLINK DUO-VENT - C-FLO (48EA/CA)

## (undated) DEVICE — GUIDE PIN DECADE

## (undated) DEVICE — NEPTUNE 4 PORT MANIFOLD - (20/PK)

## (undated) DEVICE — CLOSURE SKIN STRIP 1/2 X 4 IN - (STERI STRIP) (50/BX 4BX/CA)

## (undated) DEVICE — TOOL DISSECT MATCH HEAD

## (undated) DEVICE — ARMREST CRADLE FOAM - (2PR/PK 12PR/CA)

## (undated) DEVICE — GLOVE BIOGEL PI INDICATOR SZ 8.0 SURGICAL PF LF -(50/BX 4BX/CA)

## (undated) DEVICE — CATHETER COUDE FOLEY 5CC - 14FR (12EA/CA)

## (undated) DEVICE — ELECTRODE 850 FOAM ADHESIVE - HYDROGEL RADIOTRNSPRNT (50/PK)

## (undated) DEVICE — SPONGE GAUZESTER 4 X 4 4PLY - (128PK/CA)

## (undated) DEVICE — SUTURE 3-0 VICRYL PLUS SH - 8X 18 INCH (12/BX)

## (undated) DEVICE — DRAPE STRLE REG TOWEL 18X24 - (10/BX 4BX/CA)"

## (undated) DEVICE — NEEDLE NON SAFETY HYPO 22 GA X 1 1/2 IN (100/BX)

## (undated) DEVICE — PAD LAP STERILE 18 X 18 - (5/PK 40PK/CA)

## (undated) DEVICE — SUTURE 2-0 VICRYL PLUS SH - 27 INCH (36/BX)

## (undated) DEVICE — SUTURE 2-0 VICRYL PLUS CT-1 - 8 X 18 INCH(12/BX)

## (undated) DEVICE — CHLORAPREP 26 ML APPLICATOR - ORANGE TINT(25/CA)

## (undated) DEVICE — PACK JACKSON TABLE KIT W/OUT - HR (6EA/CA)

## (undated) DEVICE — SODIUM CHL IRRIGATION 0.9% 1000ML (12EA/CA)

## (undated) DEVICE — BOVIE  BLADE 6 EXTENDED - (50/PK)

## (undated) DEVICE — TOURNIQUET CUFF 18 X 3 ONE PORT DISP - STERILE (10/BX)

## (undated) DEVICE — PATTIES SURG X-RAYCOTTONOID - 1/2 X 3 IN (200/CA)

## (undated) DEVICE — KIT  I.V. START (100EA/CA)

## (undated) DEVICE — ENDO PEANUT 5MM DEVICE (12EA/BX)

## (undated) DEVICE — SUTURE 0 VICRYL PLUS CT-1 - 8 X 18 INCH (12/BX)

## (undated) DEVICE — TRAY CATHETER FOLEY URINE METER W/STATLOCK 350ML (10EA/CA)

## (undated) DEVICE — TUBE CONNECTING SUCTION - CLEAR PLASTIC STERILE 72 IN (50EA/CA)

## (undated) DEVICE — BLADE SURGICAL #15 - (50/BX 3BX/CA)

## (undated) DEVICE — HEAD HOLDER JUNIOR/ADULT

## (undated) DEVICE — DIGIT TRAP (20PR/BX)

## (undated) DEVICE — SET LEADWIRE 5 LEAD BEDSIDE DISPOSABLE ECG (1SET OF 5/EA)

## (undated) DEVICE — GOWN SURGEONS LARGE - (32/CA)

## (undated) DEVICE — KIT ANESTHESIA W/CIRCUIT & 3/LT BAG W/FILTER (20EA/CA)

## (undated) DEVICE — DRAPE LARGE 3 QUARTER - (20/CA)

## (undated) DEVICE — MASK, LARYNGEAL AIRWAY #4

## (undated) DEVICE — PROTECTOR ULNA NERVE - (36PR/CA)

## (undated) DEVICE — DRAPE FLUOROSCAN C-ARM - 54 X 78 (40EA/CA)

## (undated) DEVICE — DRAPE SRG LG BCK TBL DISP - 10/CA

## (undated) DEVICE — TUBE NG SALEM SUMP 16FR (50EA/CA)

## (undated) DEVICE — CANISTER SUCTION 3000ML MECHANICAL FILTER AUTO SHUTOFF MEDI-VAC NONSTERILE LF DISP  (40EA/CA)

## (undated) DEVICE — DRESSING XEROFORM 1X8 - (50/BX 4BX/CA)

## (undated) DEVICE — DRAPE LAPAROTOMY T SHEET - (12EA/CA)

## (undated) DEVICE — SUTURE GENERAL

## (undated) DEVICE — LACTATED RINGERS INJ 1000 ML - (14EA/CA 60CA/PF)

## (undated) DEVICE — SYRINGE SAFETY 10 ML 18 GA X 1 1/2 BLUNT LL (100/BX 4BX/CA)

## (undated) DEVICE — LACTATED RINGERS INJ. 500 ML - (24EA/CA)

## (undated) DEVICE — DRAPE 36X28IN RAD CARM BND BG - (25/CA) O

## (undated) DEVICE — SLEEVE, VASO, THIGH, MED

## (undated) DEVICE — KIT SURGIFLO W/OUT THROMBIN - (6EA/CA)

## (undated) DEVICE — SENSOR SPO2 NEO LNCS ADHESIVE (20/BX) SEE USER NOTES

## (undated) DEVICE — ELECTRODE DUAL RETURN W/ CORD - (50/PK)

## (undated) DEVICE — BLADE BEAVER 6400 MINI EYE ROUND TIP SHARP ON ONE SIDE (20/CA)

## (undated) DEVICE — GOWN WARMING STANDARD FLEX - (30/CA)

## (undated) DEVICE — KIT EVACUATER 3 SPRING PVC LF 1/8 DRAIN SIZE (10EA/CA)"

## (undated) DEVICE — HEADREST PRONEVIEW LARGE - (10/CA)

## (undated) DEVICE — DRESSING TRANSPARENT FILM TEGADERM 4 X 4.75" (50EA/BX)"

## (undated) DEVICE — SEALER BIPOLAR 2.3 AQUAMANTYS

## (undated) DEVICE — GLOVE BIOGEL INDICATOR SZ 7.5 SURGICAL PF LTX - (50PR/BX 4BX/CA)

## (undated) DEVICE — HEMOSTAT SURG ABSORBABLE - 2 X 3 IN SURGICEL (24EA/CA)

## (undated) DEVICE — KIT ROOM DECONTAMINATION

## (undated) DEVICE — MASK ANESTHESIA ADULT  - (100/CA)

## (undated) DEVICE — BOVIE BLADE COATED &INSULATED - 25/PK

## (undated) DEVICE — GLOVE BIOGEL SZ 8 SURGICAL PF LTX - (50PR/BX 4BX/CA)

## (undated) DEVICE — SYRINGE SAFETY 3 ML 18 GA X 1 1/2 BLUNT LL (100/BX 8BX/CA)

## (undated) DEVICE — SUTURE 3-0 VICRYL PLUS - 12 X 18 INCH (12/BX)

## (undated) DEVICE — PACK MINOR BASIN - (2EA/CA)

## (undated) DEVICE — SUTURE 4-0 MONOCRYL PLUS PS-2 - 27 INCH (36/BX)

## (undated) DEVICE — KIT EMG NEEDLE ELCTRODES - NVM5

## (undated) DEVICE — TOWELS CLOTH SURGICAL - (4/PK 20PK/CA)

## (undated) DEVICE — TUBE E-T HI-LO CUFF 7.0MM (10EA/PK)

## (undated) DEVICE — BONE MILL BM210

## (undated) DEVICE — GLOVE BIOGEL INDICATOR SZ 6.5 SURGICAL PF LTX - (50PR/BX 4BX/CA)

## (undated) DEVICE — SUTURE 3-0 VICRYL PLUS RB-1 - 8 X 18 INCH (12/BX)

## (undated) DEVICE — DRAPE IOBAN II INCISE 23X17 - (10EA/BX 4BX/CA)

## (undated) DEVICE — DRAIN PENROSE STERILE 1/4 X - 18 IN  (25EA/BX)

## (undated) DEVICE — BANDAGE ELASTIC LATEX STERILE VELCRO 4 X 5 YDS (25EA/CA)

## (undated) DEVICE — GLOVE BIOGEL INDICATOR SZ 8 SURGICAL PF LTX - (50/BX 4BX/CA)

## (undated) DEVICE — SLING ORTH UNV TIETX VLFM ARM

## (undated) DEVICE — PACK UPPER EXTREMITY SM OR - (3/CA)

## (undated) DEVICE — GLOVE BIOGEL SZ 6.5 SURGICAL PF LTX (50PR/BX 4BX/CA)

## (undated) DEVICE — SUTURE 0 PROLENE SH 30 (36PK/BX)"

## (undated) DEVICE — SUTURE 4-0 ETHILON PS-2 18 (12PK/BX)"

## (undated) DEVICE — TUBE CONNECT SUCTION CLEAR 120 X 1/4" (50EA/CA)"

## (undated) DEVICE — SUTURE 3-0 MONOCRYL PLUS PS-1 - 27 INCH (36/BX)

## (undated) DEVICE — GLOVE BIOGEL PI ORTHO SZ 6 1/2 SURGICAL PF LF (40PR/BX)

## (undated) DEVICE — GLOVE BIOGEL PI ORTHO SZ 8 PF LF (40PR/BX)

## (undated) DEVICE — DRILL KIT

## (undated) DEVICE — SUTURE 3-0 MONOCRYL PLUS PS-2 - (12/BX)

## (undated) DEVICE — SURGIFOAM (12X7) - (12EA/CA)

## (undated) DEVICE — PADDING CAST 4 IN X 4 YDS - SOF-ROLL (12RL/BG 6BG/CT)

## (undated) DEVICE — CANISTER SUCTION RIGID RED 1500CC (40EA/CA)

## (undated) DEVICE — KIT DILATOR DISPOSABLE XLIF

## (undated) DEVICE — BUR 702 1.6 (ORAL)

## (undated) DEVICE — CATHETER IV 20 GA X 1-1/4 ---SURG.& SDS ONLY--- (50EA/BX)

## (undated) DEVICE — GOWN SURGEONS X-LARGE - DISP. (30/CA)